# Patient Record
Sex: FEMALE | Race: WHITE | NOT HISPANIC OR LATINO | Employment: UNEMPLOYED | URBAN - METROPOLITAN AREA
[De-identification: names, ages, dates, MRNs, and addresses within clinical notes are randomized per-mention and may not be internally consistent; named-entity substitution may affect disease eponyms.]

---

## 2021-04-14 DIAGNOSIS — Z23 ENCOUNTER FOR IMMUNIZATION: ICD-10-CM

## 2021-11-04 ENCOUNTER — HOSPITAL ENCOUNTER (INPATIENT)
Facility: HOSPITAL | Age: 61
LOS: 2 days | Discharge: HOME | End: 2021-11-06
Attending: EMERGENCY MEDICINE | Admitting: HOSPITALIST
Payer: COMMERCIAL

## 2021-11-04 ENCOUNTER — ANESTHESIA EVENT (INPATIENT)
Dept: OPERATING ROOM | Facility: HOSPITAL | Age: 61
End: 2021-11-04
Payer: COMMERCIAL

## 2021-11-04 ENCOUNTER — ANESTHESIA (INPATIENT)
Dept: OPERATING ROOM | Facility: HOSPITAL | Age: 61
End: 2021-11-04
Payer: COMMERCIAL

## 2021-11-04 ENCOUNTER — APPOINTMENT (INPATIENT)
Dept: RADIOLOGY | Facility: HOSPITAL | Age: 61
End: 2021-11-04
Attending: UROLOGY
Payer: COMMERCIAL

## 2021-11-04 ENCOUNTER — APPOINTMENT (EMERGENCY)
Dept: RADIOLOGY | Facility: HOSPITAL | Age: 61
End: 2021-11-04
Attending: EMERGENCY MEDICINE
Payer: COMMERCIAL

## 2021-11-04 DIAGNOSIS — N39.0 URINARY TRACT INFECTION WITH HEMATURIA, SITE UNSPECIFIED: Primary | ICD-10-CM

## 2021-11-04 DIAGNOSIS — R31.9 URINARY TRACT INFECTION WITH HEMATURIA, SITE UNSPECIFIED: Primary | ICD-10-CM

## 2021-11-04 DIAGNOSIS — N20.1 CALCULUS OF PROXIMAL RIGHT URETER: ICD-10-CM

## 2021-11-04 PROBLEM — E11.9 TYPE 2 DIABETES MELLITUS (CMS/HCC): Status: ACTIVE | Noted: 2021-11-04

## 2021-11-04 PROBLEM — R82.90 ABNORMAL FINDING ON URINALYSIS: Status: ACTIVE | Noted: 2021-11-04

## 2021-11-04 PROBLEM — E03.9 HYPOTHYROIDISM: Status: ACTIVE | Noted: 2021-11-04

## 2021-11-04 PROBLEM — E66.01 MORBID OBESITY WITH BMI OF 50.0-59.9, ADULT (CMS/HCC): Status: ACTIVE | Noted: 2021-11-04

## 2021-11-04 PROBLEM — I89.0 LYMPHEDEMA DUE TO VENOUS INSUFFICIENCY: Status: ACTIVE | Noted: 2021-11-04

## 2021-11-04 PROBLEM — I87.2 LYMPHEDEMA DUE TO VENOUS INSUFFICIENCY: Status: ACTIVE | Noted: 2021-11-04

## 2021-11-04 PROBLEM — E78.5 HYPERLIPIDEMIA: Status: ACTIVE | Noted: 2021-11-04

## 2021-11-04 PROBLEM — I10 HTN (HYPERTENSION): Status: ACTIVE | Noted: 2021-11-04

## 2021-11-04 LAB
ANION GAP SERPL CALC-SCNC: 13 MEQ/L (ref 3–15)
BACTERIA URNS QL MICRO: 2 /HPF
BASOPHILS # BLD: 0.08 K/UL (ref 0.01–0.1)
BASOPHILS NFR BLD: 0.4 %
BILIRUB UR QL STRIP.AUTO: 2 MG/DL
BUN SERPL-MCNC: 20 MG/DL (ref 8–20)
CALCIUM SERPL-MCNC: 9.4 MG/DL (ref 8.9–10.3)
CHLORIDE SERPL-SCNC: 103 MEQ/L (ref 98–109)
CLARITY UR REFRACT.AUTO: ABNORMAL
CO2 SERPL-SCNC: 23 MEQ/L (ref 22–32)
COLOR UR AUTO: ABNORMAL
CREAT SERPL-MCNC: 1.1 MG/DL (ref 0.6–1.1)
DIFFERENTIAL METHOD BLD: ABNORMAL
EOSINOPHIL # BLD: 0.17 K/UL (ref 0.04–0.36)
EOSINOPHIL NFR BLD: 0.9 %
ERYTHROCYTE [DISTWIDTH] IN BLOOD BY AUTOMATED COUNT: 14.5 % (ref 11.7–14.4)
GFR SERPL CREATININE-BSD FRML MDRD: 50.7 ML/MIN/1.73M*2
GLUCOSE BLD-MCNC: 104 MG/DL (ref 70–99)
GLUCOSE BLD-MCNC: 111 MG/DL (ref 70–99)
GLUCOSE BLD-MCNC: 142 MG/DL (ref 70–99)
GLUCOSE BLD-MCNC: 160 MG/DL (ref 70–99)
GLUCOSE BLD-MCNC: 55 MG/DL (ref 70–99)
GLUCOSE BLD-MCNC: 56 MG/DL (ref 70–99)
GLUCOSE SERPL-MCNC: 98 MG/DL (ref 70–99)
GLUCOSE UR STRIP.AUTO-MCNC: NEGATIVE MG/DL
HCT VFR BLDCO AUTO: 44.2 % (ref 35–45)
HGB BLD-MCNC: 14.2 G/DL (ref 11.8–15.7)
HGB UR QL STRIP.AUTO: 3
HYALINE CASTS #/AREA URNS LPF: ABNORMAL /LPF
IMM GRANULOCYTES # BLD AUTO: 0.2 K/UL (ref 0–0.08)
IMM GRANULOCYTES NFR BLD AUTO: 1 %
KETONES UR STRIP.AUTO-MCNC: ABNORMAL MG/DL
LEUKOCYTE ESTERASE UR QL STRIP.AUTO: 3
LYMPHOCYTES # BLD: 1.06 K/UL (ref 1.2–3.5)
LYMPHOCYTES NFR BLD: 5.4 %
MCH RBC QN AUTO: 28.7 PG (ref 28–33.2)
MCHC RBC AUTO-ENTMCNC: 32.1 G/DL (ref 32.2–35.5)
MCV RBC AUTO: 89.5 FL (ref 83–98)
MONOCYTES # BLD: 0.8 K/UL (ref 0.28–0.8)
MONOCYTES NFR BLD: 4.1 %
NEUTROPHILS # BLD: 17.3 K/UL (ref 1.7–7)
NEUTS SEG NFR BLD: 88.2 %
NITRITE UR QL STRIP.AUTO: POSITIVE
NRBC BLD-RTO: 0 %
PDW BLD AUTO: 9.5 FL (ref 9.4–12.3)
PH UR STRIP.AUTO: 5.5 [PH]
PLATELET # BLD AUTO: 233 K/UL (ref 150–369)
POCT TEST: ABNORMAL
POTASSIUM SERPL-SCNC: 4.1 MEQ/L (ref 3.6–5.1)
PROT UR QL STRIP.AUTO: 1
RBC # BLD AUTO: 4.94 M/UL (ref 3.93–5.22)
RBC #/AREA URNS HPF: ABNORMAL /HPF
SARS-COV-2 RNA RESP QL NAA+PROBE: NEGATIVE
SODIUM SERPL-SCNC: 139 MEQ/L (ref 136–144)
SP GR UR REFRACT.AUTO: 1.03
SQUAMOUS URNS QL MICRO: 2 /HPF
UROBILINOGEN UR STRIP-ACNC: 1 EU/DL
WBC # BLD AUTO: 19.61 K/UL (ref 3.8–10.5)
WBC #/AREA URNS HPF: ABNORMAL /HPF

## 2021-11-04 PROCEDURE — U0002 COVID-19 LAB TEST NON-CDC: HCPCS | Performed by: PHYSICIAN ASSISTANT

## 2021-11-04 PROCEDURE — 27200000 HC STERILE SUPPLY: Performed by: UROLOGY

## 2021-11-04 PROCEDURE — 99285 EMERGENCY DEPT VISIT HI MDM: CPT | Mod: 25

## 2021-11-04 PROCEDURE — 87150 DNA/RNA AMPLIFIED PROBE: CPT | Performed by: PHYSICIAN ASSISTANT

## 2021-11-04 PROCEDURE — C1751 CATH, INF, PER/CENT/MIDLINE: HCPCS

## 2021-11-04 PROCEDURE — C1769 GUIDE WIRE: HCPCS | Performed by: UROLOGY

## 2021-11-04 PROCEDURE — 71000011 HC PACU PHASE 1 EA ADDL MIN: Performed by: UROLOGY

## 2021-11-04 PROCEDURE — 63700000 HC SELF-ADMINISTRABLE DRUG: Performed by: HOSPITALIST

## 2021-11-04 PROCEDURE — 96360 HYDRATION IV INFUSION INIT: CPT

## 2021-11-04 PROCEDURE — 85025 COMPLETE CBC W/AUTO DIFF WBC: CPT | Performed by: PHYSICIAN ASSISTANT

## 2021-11-04 PROCEDURE — 25800000 HC PHARMACY IV SOLUTIONS: Performed by: PHYSICIAN ASSISTANT

## 2021-11-04 PROCEDURE — 36000002 HC OR LEVEL 2 INITIAL 30MIN: Performed by: UROLOGY

## 2021-11-04 PROCEDURE — 36573 INSJ PICC RS&I 5 YR+: CPT

## 2021-11-04 PROCEDURE — 21400000 HC ROOM AND CARE CCU/INTERMEDIATE

## 2021-11-04 PROCEDURE — 37000001 HC ANESTHESIA GENERAL: Performed by: UROLOGY

## 2021-11-04 PROCEDURE — 63600000 HC DRUGS/DETAIL CODE: Performed by: ANESTHESIOLOGY

## 2021-11-04 PROCEDURE — 63600105 HC IODINE BASED CONTRAST: Performed by: UROLOGY

## 2021-11-04 PROCEDURE — 99223 1ST HOSP IP/OBS HIGH 75: CPT | Performed by: HOSPITALIST

## 2021-11-04 PROCEDURE — 05HC33Z INSERTION OF INFUSION DEVICE INTO LEFT BASILIC VEIN, PERCUTANEOUS APPROACH: ICD-10-PCS | Performed by: UROLOGY

## 2021-11-04 PROCEDURE — 0T768DZ DILATION OF RIGHT URETER WITH INTRALUMINAL DEVICE, VIA NATURAL OR ARTIFICIAL OPENING ENDOSCOPIC: ICD-10-PCS | Performed by: UROLOGY

## 2021-11-04 PROCEDURE — 63600000 HC DRUGS/DETAIL CODE: Performed by: HOSPITALIST

## 2021-11-04 PROCEDURE — 74176 CT ABD & PELVIS W/O CONTRAST: CPT | Mod: ME

## 2021-11-04 PROCEDURE — C1758 CATHETER, URETERAL: HCPCS | Performed by: UROLOGY

## 2021-11-04 PROCEDURE — 71000001 HC PACU PHASE 1 INITIAL 30MIN: Performed by: UROLOGY

## 2021-11-04 PROCEDURE — 0T9B8ZX DRAINAGE OF BLADDER, VIA NATURAL OR ARTIFICIAL OPENING ENDOSCOPIC, DIAGNOSTIC: ICD-10-PCS | Performed by: UROLOGY

## 2021-11-04 PROCEDURE — 63600000 HC DRUGS/DETAIL CODE: Performed by: EMERGENCY MEDICINE

## 2021-11-04 PROCEDURE — BT1D1ZZ FLUOROSCOPY OF RIGHT KIDNEY, URETER AND BLADDER USING LOW OSMOLAR CONTRAST: ICD-10-PCS | Performed by: UROLOGY

## 2021-11-04 PROCEDURE — C2617 STENT, NON-COR, TEM W/O DEL: HCPCS | Performed by: UROLOGY

## 2021-11-04 PROCEDURE — 81001 URINALYSIS AUTO W/SCOPE: CPT | Performed by: PHYSICIAN ASSISTANT

## 2021-11-04 PROCEDURE — 63600000 HC DRUGS/DETAIL CODE: Performed by: NURSE ANESTHETIST, CERTIFIED REGISTERED

## 2021-11-04 PROCEDURE — 36415 COLL VENOUS BLD VENIPUNCTURE: CPT | Performed by: PHYSICIAN ASSISTANT

## 2021-11-04 PROCEDURE — 96361 HYDRATE IV INFUSION ADD-ON: CPT

## 2021-11-04 PROCEDURE — 25000000 HC PHARMACY GENERAL: Performed by: NURSE ANESTHETIST, CERTIFIED REGISTERED

## 2021-11-04 PROCEDURE — 87077 CULTURE AEROBIC IDENTIFY: CPT | Performed by: PHYSICIAN ASSISTANT

## 2021-11-04 PROCEDURE — 74420 UROGRAPHY RTRGR +-KUB: CPT

## 2021-11-04 PROCEDURE — 87077 CULTURE AEROBIC IDENTIFY: CPT | Performed by: UROLOGY

## 2021-11-04 PROCEDURE — 25800000 HC PHARMACY IV SOLUTIONS: Performed by: UROLOGY

## 2021-11-04 PROCEDURE — 80048 BASIC METABOLIC PNL TOTAL CA: CPT | Mod: 59 | Performed by: PHYSICIAN ASSISTANT

## 2021-11-04 PROCEDURE — 87186 SC STD MICRODIL/AGAR DIL: CPT | Performed by: PHYSICIAN ASSISTANT

## 2021-11-04 DEVICE — STENT INLAY BARD 6FR X 24CM: Type: IMPLANTABLE DEVICE | Site: URETER | Status: FUNCTIONAL

## 2021-11-04 RX ORDER — GABAPENTIN 600 MG/1
600 TABLET ORAL 3 TIMES DAILY
COMMUNITY
Start: 2021-07-06 | End: 2022-10-12 | Stop reason: ENTERED-IN-ERROR

## 2021-11-04 RX ORDER — METHOCARBAMOL 750 MG/1
750 TABLET, FILM COATED ORAL 3 TIMES DAILY PRN
COMMUNITY
Start: 2021-08-09

## 2021-11-04 RX ORDER — MIDAZOLAM HYDROCHLORIDE 2 MG/2ML
INJECTION, SOLUTION INTRAMUSCULAR; INTRAVENOUS AS NEEDED
Status: DISCONTINUED | OUTPATIENT
Start: 2021-11-04 | End: 2021-11-04 | Stop reason: SURG

## 2021-11-04 RX ORDER — INSULIN DEGLUDEC 200 U/ML
78 INJECTION, SOLUTION SUBCUTANEOUS EVERY EVENING
Status: ON HOLD | COMMUNITY
Start: 2021-03-30 | End: 2022-10-15 | Stop reason: SDUPTHER

## 2021-11-04 RX ORDER — IRBESARTAN AND HYDROCHLOROTHIAZIDE 150; 12.5 MG/1; MG/1
1 TABLET, FILM COATED ORAL DAILY
COMMUNITY
Start: 2021-10-08 | End: 2022-08-10 | Stop reason: HOSPADM

## 2021-11-04 RX ORDER — FENTANYL CITRATE 50 UG/ML
INJECTION, SOLUTION INTRAMUSCULAR; INTRAVENOUS AS NEEDED
Status: DISCONTINUED | OUTPATIENT
Start: 2021-11-04 | End: 2021-11-04 | Stop reason: SURG

## 2021-11-04 RX ORDER — CIPROFLOXACIN 2 MG/ML
INJECTION, SOLUTION INTRAVENOUS
Status: DISPENSED
Start: 2021-11-04 | End: 2021-11-05

## 2021-11-04 RX ORDER — IBUPROFEN 200 MG
16-32 TABLET ORAL AS NEEDED
Status: DISCONTINUED | OUTPATIENT
Start: 2021-11-04 | End: 2021-11-04 | Stop reason: SDUPTHER

## 2021-11-04 RX ORDER — DOCUSATE SODIUM 100 MG/1
100 CAPSULE, LIQUID FILLED ORAL 2 TIMES DAILY
Status: DISCONTINUED | OUTPATIENT
Start: 2021-11-04 | End: 2021-11-06 | Stop reason: HOSPADM

## 2021-11-04 RX ORDER — PROPOFOL 10 MG/ML
INJECTION, EMULSION INTRAVENOUS AS NEEDED
Status: DISCONTINUED | OUTPATIENT
Start: 2021-11-04 | End: 2021-11-04 | Stop reason: SURG

## 2021-11-04 RX ORDER — NITROGLYCERIN 0.4 MG/1
0.4 TABLET SUBLINGUAL EVERY 5 MIN PRN
Status: DISCONTINUED | OUTPATIENT
Start: 2021-11-04 | End: 2021-11-06 | Stop reason: HOSPADM

## 2021-11-04 RX ORDER — IBUPROFEN 200 MG
16-32 TABLET ORAL AS NEEDED
Status: DISCONTINUED | OUTPATIENT
Start: 2021-11-04 | End: 2021-11-04 | Stop reason: HOSPADM

## 2021-11-04 RX ORDER — CIPROFLOXACIN 2 MG/ML
400 INJECTION, SOLUTION INTRAVENOUS ONCE
Status: COMPLETED | OUTPATIENT
Start: 2021-11-04 | End: 2021-11-04

## 2021-11-04 RX ORDER — DEXTROSE 40 %
15-30 GEL (GRAM) ORAL AS NEEDED
Status: DISCONTINUED | OUTPATIENT
Start: 2021-11-04 | End: 2021-11-04 | Stop reason: HOSPADM

## 2021-11-04 RX ORDER — LEVOTHYROXINE SODIUM 75 UG/1
75 TABLET ORAL
Status: DISCONTINUED | OUTPATIENT
Start: 2021-11-05 | End: 2021-11-06 | Stop reason: HOSPADM

## 2021-11-04 RX ORDER — DEXTROSE 40 %
15-30 GEL (GRAM) ORAL AS NEEDED
Status: DISCONTINUED | OUTPATIENT
Start: 2021-11-04 | End: 2021-11-06 | Stop reason: HOSPADM

## 2021-11-04 RX ORDER — LIDOCAINE HYDROCHLORIDE 10 MG/ML
INJECTION, SOLUTION INFILTRATION; PERINEURAL AS NEEDED
Status: DISCONTINUED | OUTPATIENT
Start: 2021-11-04 | End: 2021-11-04 | Stop reason: SURG

## 2021-11-04 RX ORDER — DULOXETIN HYDROCHLORIDE 20 MG/1
20 CAPSULE, DELAYED RELEASE ORAL 2 TIMES DAILY
Status: DISCONTINUED | OUTPATIENT
Start: 2021-11-04 | End: 2021-11-06 | Stop reason: HOSPADM

## 2021-11-04 RX ORDER — LEVOTHYROXINE SODIUM 75 UG/1
75 TABLET ORAL
COMMUNITY

## 2021-11-04 RX ORDER — GABAPENTIN 300 MG/1
600 CAPSULE ORAL 3 TIMES DAILY
Status: DISCONTINUED | OUTPATIENT
Start: 2021-11-04 | End: 2021-11-06 | Stop reason: HOSPADM

## 2021-11-04 RX ORDER — CIPROFLOXACIN 2 MG/ML
400 INJECTION, SOLUTION INTRAVENOUS
Status: DISCONTINUED | OUTPATIENT
Start: 2021-11-05 | End: 2021-11-05

## 2021-11-04 RX ORDER — SIMVASTATIN 40 MG/1
40 TABLET, FILM COATED ORAL EVERY MORNING
COMMUNITY
Start: 2020-11-25

## 2021-11-04 RX ORDER — POTASSIUM CHLORIDE 14.9 MG/ML
20 INJECTION INTRAVENOUS AS NEEDED
Status: DISCONTINUED | OUTPATIENT
Start: 2021-11-04 | End: 2021-11-06 | Stop reason: HOSPADM

## 2021-11-04 RX ORDER — ASPIRIN 81 MG/1
81 TABLET ORAL DAILY
Status: DISCONTINUED | OUTPATIENT
Start: 2021-11-05 | End: 2021-11-06 | Stop reason: HOSPADM

## 2021-11-04 RX ORDER — SODIUM CHLORIDE 9 MG/ML
INJECTION, SOLUTION INTRAVENOUS CONTINUOUS
Status: ACTIVE | OUTPATIENT
Start: 2021-11-04 | End: 2021-11-06

## 2021-11-04 RX ORDER — ONDANSETRON 4 MG/1
4 TABLET, ORALLY DISINTEGRATING ORAL EVERY 8 HOURS PRN
Status: DISCONTINUED | OUTPATIENT
Start: 2021-11-04 | End: 2021-11-06 | Stop reason: HOSPADM

## 2021-11-04 RX ORDER — INSULIN ASPART 100 [IU]/ML
2-10 INJECTION, SOLUTION INTRAVENOUS; SUBCUTANEOUS
Status: DISCONTINUED | OUTPATIENT
Start: 2021-11-04 | End: 2021-11-06 | Stop reason: HOSPADM

## 2021-11-04 RX ORDER — ATROPINE SULFATE 0.1 MG/ML
0.5 INJECTION INTRAVENOUS EVERY 5 MIN PRN
Status: DISCONTINUED | OUTPATIENT
Start: 2021-11-04 | End: 2021-11-06 | Stop reason: HOSPADM

## 2021-11-04 RX ORDER — ONDANSETRON HYDROCHLORIDE 2 MG/ML
4 INJECTION, SOLUTION INTRAVENOUS
Status: DISCONTINUED | OUTPATIENT
Start: 2021-11-04 | End: 2021-11-04 | Stop reason: HOSPADM

## 2021-11-04 RX ORDER — DEXTROSE 50 % IN WATER (D50W) INTRAVENOUS SYRINGE
25 AS NEEDED
Status: DISCONTINUED | OUTPATIENT
Start: 2021-11-04 | End: 2021-11-06 | Stop reason: HOSPADM

## 2021-11-04 RX ORDER — KETOROLAC TROMETHAMINE 15 MG/ML
15 INJECTION, SOLUTION INTRAMUSCULAR; INTRAVENOUS EVERY 6 HOURS PRN
Status: DISCONTINUED | OUTPATIENT
Start: 2021-11-04 | End: 2021-11-06 | Stop reason: HOSPADM

## 2021-11-04 RX ORDER — FENTANYL CITRATE 50 UG/ML
50 INJECTION, SOLUTION INTRAMUSCULAR; INTRAVENOUS
Status: DISCONTINUED | OUTPATIENT
Start: 2021-11-04 | End: 2021-11-04 | Stop reason: HOSPADM

## 2021-11-04 RX ORDER — ONDANSETRON HYDROCHLORIDE 2 MG/ML
INJECTION, SOLUTION INTRAVENOUS AS NEEDED
Status: DISCONTINUED | OUTPATIENT
Start: 2021-11-04 | End: 2021-11-04 | Stop reason: SURG

## 2021-11-04 RX ORDER — DEXTROSE 50 % IN WATER (D50W) INTRAVENOUS SYRINGE
25 AS NEEDED
Status: DISCONTINUED | OUTPATIENT
Start: 2021-11-04 | End: 2021-11-04 | Stop reason: HOSPADM

## 2021-11-04 RX ORDER — POTASSIUM CHLORIDE 750 MG/1
40 TABLET, FILM COATED, EXTENDED RELEASE ORAL AS NEEDED
Status: DISCONTINUED | OUTPATIENT
Start: 2021-11-04 | End: 2021-11-06 | Stop reason: HOSPADM

## 2021-11-04 RX ORDER — ONDANSETRON HYDROCHLORIDE 2 MG/ML
4 INJECTION, SOLUTION INTRAVENOUS EVERY 8 HOURS PRN
Status: DISCONTINUED | OUTPATIENT
Start: 2021-11-04 | End: 2021-11-06 | Stop reason: HOSPADM

## 2021-11-04 RX ORDER — ENOXAPARIN SODIUM 100 MG/ML
40 INJECTION SUBCUTANEOUS EVERY 12 HOURS
Status: DISCONTINUED | OUTPATIENT
Start: 2021-11-04 | End: 2021-11-06 | Stop reason: HOSPADM

## 2021-11-04 RX ORDER — HYDROMORPHONE HYDROCHLORIDE 1 MG/ML
0.5 INJECTION, SOLUTION INTRAMUSCULAR; INTRAVENOUS; SUBCUTANEOUS
Status: DISCONTINUED | OUTPATIENT
Start: 2021-11-04 | End: 2021-11-04 | Stop reason: HOSPADM

## 2021-11-04 RX ORDER — DEXTROSE 50 % IN WATER (D50W) INTRAVENOUS SYRINGE
25 AS NEEDED
Status: DISCONTINUED | OUTPATIENT
Start: 2021-11-04 | End: 2021-11-04 | Stop reason: SDUPTHER

## 2021-11-04 RX ORDER — IBUPROFEN 200 MG
16-32 TABLET ORAL AS NEEDED
Status: DISCONTINUED | OUTPATIENT
Start: 2021-11-04 | End: 2021-11-06 | Stop reason: HOSPADM

## 2021-11-04 RX ORDER — METHOCARBAMOL 500 MG/1
750 TABLET, FILM COATED ORAL 3 TIMES DAILY PRN
Status: DISCONTINUED | OUTPATIENT
Start: 2021-11-04 | End: 2021-11-06 | Stop reason: HOSPADM

## 2021-11-04 RX ORDER — INSULIN ASPART 100 [IU]/ML
3-5 INJECTION, SOLUTION INTRAVENOUS; SUBCUTANEOUS
Status: DISCONTINUED | OUTPATIENT
Start: 2021-11-04 | End: 2021-11-04

## 2021-11-04 RX ORDER — ATORVASTATIN CALCIUM 20 MG/1
20 TABLET, FILM COATED ORAL
Status: DISCONTINUED | OUTPATIENT
Start: 2021-11-04 | End: 2021-11-06 | Stop reason: HOSPADM

## 2021-11-04 RX ORDER — DEXTROSE 40 %
15-30 GEL (GRAM) ORAL AS NEEDED
Status: DISCONTINUED | OUTPATIENT
Start: 2021-11-04 | End: 2021-11-04 | Stop reason: SDUPTHER

## 2021-11-04 RX ORDER — POTASSIUM CHLORIDE 750 MG/1
20 TABLET, FILM COATED, EXTENDED RELEASE ORAL AS NEEDED
Status: DISCONTINUED | OUTPATIENT
Start: 2021-11-04 | End: 2021-11-06 | Stop reason: HOSPADM

## 2021-11-04 RX ORDER — INSULIN ASPART 100 [IU]/ML
0-100 INJECTION, SOLUTION INTRAVENOUS; SUBCUTANEOUS
COMMUNITY
Start: 2021-10-20 | End: 2022-10-12 | Stop reason: ENTERED-IN-ERROR

## 2021-11-04 RX ORDER — ASPIRIN 81 MG/1
81 TABLET ORAL DAILY
COMMUNITY

## 2021-11-04 RX ORDER — LOSARTAN POTASSIUM 50 MG/1
50 TABLET ORAL DAILY
Status: DISCONTINUED | OUTPATIENT
Start: 2021-11-04 | End: 2021-11-06 | Stop reason: HOSPADM

## 2021-11-04 RX ORDER — MELOXICAM 15 MG/1
15 TABLET ORAL DAILY
COMMUNITY
Start: 2021-10-20 | End: 2022-08-10 | Stop reason: HOSPADM

## 2021-11-04 RX ORDER — DULOXETIN HYDROCHLORIDE 20 MG/1
20 CAPSULE, DELAYED RELEASE ORAL 2 TIMES DAILY
COMMUNITY
Start: 2021-07-06 | End: 2022-10-12 | Stop reason: ENTERED-IN-ERROR

## 2021-11-04 RX ORDER — ACETAMINOPHEN 325 MG/1
650 TABLET ORAL EVERY 4 HOURS PRN
Status: DISCONTINUED | OUTPATIENT
Start: 2021-11-04 | End: 2021-11-06 | Stop reason: HOSPADM

## 2021-11-04 RX ADMIN — ACETAMINOPHEN 650 MG: 325 TABLET, FILM COATED ORAL at 14:51

## 2021-11-04 RX ADMIN — ACETAMINOPHEN 650 MG: 325 TABLET, FILM COATED ORAL at 19:53

## 2021-11-04 RX ADMIN — FENTANYL CITRATE 50 MCG: 50 INJECTION, SOLUTION INTRAMUSCULAR; INTRAVENOUS at 13:37

## 2021-11-04 RX ADMIN — SUCCINYLCHOLINE CHLORIDE 140 MG: 20 INJECTION, SOLUTION INTRAMUSCULAR; INTRAVENOUS; PARENTERAL at 12:59

## 2021-11-04 RX ADMIN — GABAPENTIN 600 MG: 300 CAPSULE ORAL at 19:53

## 2021-11-04 RX ADMIN — MIDAZOLAM HYDROCHLORIDE 2 MG: 1 INJECTION, SOLUTION INTRAMUSCULAR; INTRAVENOUS at 12:48

## 2021-11-04 RX ADMIN — SODIUM CHLORIDE: 9 INJECTION, SOLUTION INTRAVENOUS at 12:50

## 2021-11-04 RX ADMIN — CIPROFLOXACIN 400 MG: 2 INJECTION, SOLUTION INTRAVENOUS at 12:37

## 2021-11-04 RX ADMIN — INSULIN ASPART 2 UNITS: 100 INJECTION, SOLUTION INTRAVENOUS; SUBCUTANEOUS at 21:47

## 2021-11-04 RX ADMIN — SODIUM CHLORIDE 500 ML: 9 INJECTION, SOLUTION INTRAVENOUS at 08:51

## 2021-11-04 RX ADMIN — LIDOCAINE HYDROCHLORIDE 5 ML: 10 INJECTION, SOLUTION INFILTRATION; PERINEURAL at 12:59

## 2021-11-04 RX ADMIN — DULOXETINE HYDROCHLORIDE 20 MG: 20 CAPSULE, DELAYED RELEASE ORAL at 19:53

## 2021-11-04 RX ADMIN — FENTANYL CITRATE 50 MCG: 50 INJECTION, SOLUTION INTRAMUSCULAR; INTRAVENOUS at 12:54

## 2021-11-04 RX ADMIN — CIPROFLOXACIN 400 MG: 2 INJECTION, SOLUTION INTRAVENOUS at 23:53

## 2021-11-04 RX ADMIN — KETOROLAC TROMETHAMINE 15 MG: 15 INJECTION, SOLUTION INTRAMUSCULAR; INTRAVENOUS at 22:13

## 2021-11-04 RX ADMIN — PROPOFOL 200 MG: 10 INJECTION, EMULSION INTRAVENOUS at 12:59

## 2021-11-04 RX ADMIN — ONDANSETRON 4 MG: 2 INJECTION INTRAMUSCULAR; INTRAVENOUS at 13:06

## 2021-11-04 RX ADMIN — ENOXAPARIN SODIUM 40 MG: 40 INJECTION SUBCUTANEOUS at 20:53

## 2021-11-04 RX ADMIN — DOCUSATE SODIUM 100 MG: 100 CAPSULE, LIQUID FILLED ORAL at 19:53

## 2021-11-04 ASSESSMENT — ENCOUNTER SYMPTOMS
VOMITING: 0
COUGH: 0
DYSURIA: 0
FEVER: 0
NAUSEA: 1
CHILLS: 0
DIARRHEA: 0
FLANK PAIN: 1
SHORTNESS OF BREATH: 0
ABDOMINAL PAIN: 1

## 2021-11-04 NOTE — OP NOTE
CYSTO, RETRO, STENT (R) Procedure Note     Procedure:    CYSTO, RETRO, STENT  CPT(R) Code:  34755 - ND CYSTOSCOPY,INSERT URETERAL STENT    Indications: The patient is a 60 year old female who presented to [facility] ED with right flank pain and was found to have an obstructing ureteral calculus with associated UTI.         Pre-op Diagnosis     * Calculus of proximal right ureter [N20.1]     * Urinary tract infection with hematuria, site unspecified [N39.0, R31.9]    Post-op Diagnosis     * Calculus of proximal right ureter [N20.1]     * Urinary tract infection with hematuria, site unspecified [N39.0, R31.9]    Surgeon: Bianca Steele MD    Assistants: Jeancarlos Walsh, PGY-4    Anesthesia: General endotracheal anesthesia    ASA Class: 3      Procedure Details   The patient was seen and identified in the preoperative holding area, informed consent was obtained. Risks and benefits of the procedure were explained to the patient and she agreed to go forward at this time. Patient was then transported to the operating room and placed on the operating room table in supine position. Surgical timeout was then performed with all parties in agreement. General anesthesia was administered by anesthesia via ET tube and perioperative antibiotics were administered with Ciprofloxacin. Patient was then prepped and draped in normal sterile fashion in the dorsal lithotimy position making sure to keep all bony prominences padded off pressure.     A 21 Egyptian cystoscope was placed through the urethra ensuring to keep the lumen in center view at all times, and was advanced into the bladder, the bladder was drained, of note the bladder had purulent appearing urine that was collected and sent for culture.  At this time pan cystoscopy was then performed which revealed no concerning masses or lesions, or other abnormalities.  At this time our attention was turned to the right ureteral orifice where a 6 Egyptian open-ended catheter was placed over a  sensor wire, and used to intubate the UO, a gentle retrograde pyelogram was then shot revealing some mild fullness of .  At this time the 5 Botswanan open-ended catheter was removed over the wire a 6x24 JJ stent was then placed over the wire and the stent with good curl in the renal pelvis and bladder visualized with fluoroscopy.  At this time the wire was removed.  The bladder was then drained and the cystoscope removed.      Patient was then awakened from anesthesia and transported to PACU in stable condition.     Of note Dr. Steele was available and present for all critical portions of the case.       Findings:  1. Cloudy appearing urine in the bladder  2. Mild fullness of the right collecting system     Estimated Blood Loss:  No blood loss documented.           Drains: 6x24 R Ureteral Stent            Total IV Fluids: See Anesthesia            Specimens:   ID Type Source Tests Collected by Time Destination   A : urine culture Urine Urinary Bladder URINE CULTURE Bianca Steele MD 11/4/2021 0866               Implants:   Implant Name Type Inv. Item Serial No.  Lot No. LRB No. Used Action   STENT INLAY BARD 6FR X 24CM - [facility] - [id number] Urological stents STENT INLAY BARD 6FR X 24CM  [employer]ARD [employer] [id number] Right 1 Implanted              Complications:  None            Disposition: PACU - hemodynamically stable.           Condition: stable    Bianca Steele MD  Phone Number: [phone]

## 2021-11-04 NOTE — PERIOPERATIVE NURSING NOTE
Patient with increase in temp 100.2, rigors and tachycardia.  Dr Knott notified, coming to see patient

## 2021-11-04 NOTE — ASSESSMENT & PLAN NOTE
Patient had a hypoglycemia on admission, had not eaten  BS now stable  Improved appetite  Monitor Accu-Cheks cover with sliding scale insulin as needed  Restart tresiba on discharge

## 2021-11-04 NOTE — OR SURGEON
Pre-Procedure patient identification:  I am the primary operating surgeon/proceduralist and I have identified the patient and confirmed laterality is right on 11/04/21 at 12:34 PM Bianca Steele MD  Phone Number: [phone]

## 2021-11-04 NOTE — H&P
Urology History and Physical    Subjective     Xin Melendez is a 60 y.o. female pmhx of htn, hld, t2dm, kidney stones , lymphedema with 1 day history of right renal colic, nausea, and vomiting. She experience hematuria as well.  Her last occurrence with kidney stones was about 20 years ago requiring ureteroscopy and laser lithotripsy.     In the ED, she is afebrile and hemodynamically stable. Her pain is improved and controlled with PO toradol that was administered in the ambulance. Labs are notable for WBC 19.6 with UA positive for nitrites and LE.     Medical History:   Past Medical History:   Diagnosis Date   • Disease of thyroid gland    • Edema    • Hypertension    • Kidney stones    • Lipid disorder    • Type 2 diabetes mellitus (CMS/HCC)        Surgical History:   Past Surgical History:   Procedure Laterality Date   • APPENDECTOMY     •  SECTION     • LAPAROTOMY EXPLORATORY     • OOPHORECTOMY      right side secondary to torsion       Social History:   Social History     Social History Narrative   • Not on file       Family History:   History reviewed. No pertinent family history.    Allergies:   Cephalexin and Adhesive tape-silicones    Home Medications:  Not in a hospital admission.    Current Medications:  •  ciprofloxacin, 400 mg, intravenous, Once  •  glucose, 16-32 g of dextrose, oral, PRN **OR** dextrose, 15-30 g of dextrose, oral, PRN **OR** glucagon, 1 mg, intramuscular, PRN **OR** dextrose in water, 25 mL, intravenous, PRN  •  glucose, 16-32 g of dextrose, oral, PRN **OR** dextrose, 15-30 g of dextrose, oral, PRN **OR** glucagon, 1 mg, intramuscular, PRN **OR** dextrose in water, 25 mL, intravenous, PRN  •  docusate sodium, 100 mg, oral, BID  •  insulin aspart U-100, 3-5 Units, subcutaneous, TID with meals  •  ketorolac, 15 mg, intravenous, q6h PRN  •  ondansetron ODT, 4 mg, oral, q8h PRN **OR** ondansetron, 4 mg, intravenous, q8h PRN  •  sodium chloride 0.9 %, , intravenous, Continuous  •   ascorbic acid (VITAMIN C ORAL)  •  cholecalciferol, vitamin D3, (VITAMIN D3 ORAL)  •  DULoxetine  •  exenatide microspheres  •  gabapentin  •  TRESIBA FLEXTOUCH U-200  •  levothyroxine  •  methocarbamoL  •  simvastatin  •  aspirin  •  insulin aspart U-100  •  irbesartan-hydrochlorothiazide  •  meloxicam    Review of Systems:  Constitutional: negative for fevers, chills, and weight loss  Eyes: negative for visual disturbances  Ears, nose, and throat: negative for ear drainage, hearing loss, nasal congestion and sore throat  Respiratory: negative for cough and shortness of breath  Cardiovascular: negative for chest pain, palpitations and syncope  Gastrointestinal: negative for abdominal pain, constipation, nausea and vomiting  Genitourinary:positive for hematuria, right flank pain   Integument/breast: negative for pruritus and rash  Hematologic/lymphatic: negative for bleeding and easy bruising  Musculoskeletal: negative for arthralgias and myalgias  Neurological: negative for headaches and weakness  Behavioral/Psych: negative for anxiety and fatigue  Allergic/Immunologic: negative for urticaria      Objective     Physicial Exam  Visit Vitals  BP (!) 162/69   Pulse (!) 107   Temp 37.1 °C (98.7 °F) (Tympanic)   Resp 20   SpO2 100%     General appearance: alert, cooperative, no acute distress  Head: normocephalic, atraumatic  Eyes: lids and lashes normal, sclerae white  Nose: no discharge  Throat: lips normal without lesions  Neck: supple, symmetrical, trachea midline  Lungs: Unlabored respirations, symmetric chest expansion  Heart: regular rate and rhythm  Abdomen: soft, non-tender to palpation, non-distended  : Right cvat   Extremities: extremities normal, warm and well-perfused; no cyanosis, clubbing, or edema and no redness or tenderness in the calves bilaterally  Skin: no rashes or ulcers  Lymph nodes: no inguinal adenopathy  Neurologic: Alert and oriented X 3     Labs  BMP Results       11/04/21     0850    NA  139    K 4.1    Cl 103    CO2 23    Glucose 98    BUN 20    Creatinine 1.1    Calcium 9.4    Anion Gap 13    EGFR 50.7         Comment for K at 0850 on 11/04/21: Results obtained on plasma. Plasma Potassium values may be up to 0.4 mEQ/L less than serum values. The differences may be greater for patients with high platelet or white cell counts.        CBC Results       11/04/21     0850    WBC 19.61    RBC 4.94    HGB 14.2    HCT 44.2    MCV 89.5    MCH 28.7    MCHC 32.1            UA Results       11/04/21     0849    Color Nelly    Clarity Turbid    Glucose Negative    Bilirubin +2    Ketones Trace    Sp Grav 1.030    Blood +3    Ph 5.5    Protein +1    Urobilinogen 1.0    Nitrite Positive    Leuk Est +3    WBC Too Numerous To Count    RBC Too Numerous To Count    Bacteria +2         Comment for Ketones at 0849 on 11/04/21: Free sulfhydryl drugs such as Mesna, Capoten, and Acetylcysteine (Mucomyst) may cause false positive ketonuria.    Comment for Blood at 0849 on 11/04/21: The sensitivity of the occult blood test is equivalent to approximately 4 intact RBC/HPF.        Microbiology Results     Procedure Component Value Units Date/Time    SARS-CoV-2 (COVID-19), PCR Nasopharynx [[id number]]  (Normal) Collected: 11/04/21 1017    Specimen: Nasopharyngeal Swab from Nasopharynx Updated: 11/04/21 1051    Narrative:      The following orders were created for panel order SARS-CoV-2 (COVID-19), PCR Nasopharynx.  Procedure                               Abnormality         Status                     ---------                               -----------         ------                     SARS-CoV-2 (COVID-19), P...[[id number]]  Normal              Final result                 Please view results for these tests on the individual orders.    SARS-CoV-2 (COVID-19), PCR Nasopharynx [[id number]]  (Normal) Collected: 11/04/21 1017    Specimen: Nasopharyngeal Swab from Nasopharynx Updated: 11/04/21 1051     SARS-CoV-2 (COVID-19)  Negative    Narrative:      Nursing instructions: Obtain nasopharyngeal swab ONLY. Send swab in viral transport media.            Imaging  NCCT:   1.  2 mm proximal right ureteral calculus with mild hydroureteronephrosis and stranding.      Assessment   60 y.o. female pmhx of htn, hld, t2dm, kidney stones , lymphedema with 2mm R obstructing stone in proximal ureter.         Plan     Admit to Urology.   NPO  Case request for cystoscopy and stent placement    [facility]  Pager [phone]  Kolton Knott DO PGY 2

## 2021-11-04 NOTE — ED ATTESTATION NOTE
I have personally seen and examined the patient.  I reviewed and agree with physician assistant / nurse practitioner’s assessment and plan of care, with the following exceptions: None  My examination, assessment, and plan of care of Xin Melendez is as follows:       Complaining of right flank pain for several hours.  She states she had 5 kidney stones in the past and this feels identical.  It starts in the back and rates around to the front.  She has little bit of nausea.  She states her pain is better after she was given a shot of Toradol in the ambulance.  Her pain is better     Ananda Paez, DO  11/04/21 0739

## 2021-11-04 NOTE — ASSESSMENT & PLAN NOTE
S/p cystoscopy with right ureteral stent, POD 2.  Blood culture + E.coli  Urine culture + E. Coli with sensitivities  Abx switch to levaquin due to cymbalta-cipro drug interaction  Consulted ID for antibiotic stewardship, rec 7 more days of levaquin 500mg daily  Urology following  Outpatient stone treatment with Dr. Mayo  No events on tele  Incentive spirometry  OTC pain meds as needed

## 2021-11-04 NOTE — ED PROVIDER NOTES
Emergency Medicine Note  HPI   HISTORY OF PRESENT ILLNESS     59yo female, hx of htn, hld, t2dm, kidney stones (last one 20 years ago), lymphedema presenting to ER with c/o right-sided flank pain that started around midnight/1am. Pt reports pain seemed to be getting worse, waxing and waning in severity with associated nausea. She denies f/c. Since she felt unwell, she did take rapid covid test at home, which was negative. She denies dysuria, hematuria, frequency.      History provided by:  Patient   used: No    Abdominal Pain  Pain location:  R flank  Pain quality: aching    Pain radiation: generalized abdomen.  Pain severity:  Severe  Onset quality:  Sudden  Timing:  Constant  Progression:  Waxing and waning  Chronicity:  New  Context: not awakening from sleep, not previous surgeries and not sick contacts    Relieved by: pt given 30mg IM Toradol by EMS with improvement.  Worsened by:  Nothing  Associated symptoms: nausea    Associated symptoms: no chest pain, no chills, no cough, no diarrhea, no dysuria, no fever, no shortness of breath and no vomiting    Risk factors: has not had multiple surgeries          Patient History   PAST HISTORY     Reviewed from Nursing Triage: [facility]       Past Medical History:   Diagnosis Date   • Disease of thyroid gland    • Edema    • Hypertension    • Kidney stones    • Lipid disorder    • Type 2 diabetes mellitus (CMS/HCC)        Past Surgical History:   Procedure Laterality Date   • APPENDECTOMY     •  SECTION     • LAPAROTOMY EXPLORATORY     • OOPHORECTOMY      right side secondary to torsion       History reviewed. No pertinent family history.    Social History     Tobacco Use   • Smoking status: Former Smoker   • Smokeless tobacco: Never Used   Substance Use Topics   • Alcohol use: Not Currently   • Drug use: Not Currently         Review of Systems   REVIEW OF SYSTEMS     Review of Systems   Constitutional: Negative for chills and fever.    Respiratory: Negative for cough and shortness of breath.    Cardiovascular: Negative for chest pain.   Gastrointestinal: Positive for abdominal pain and nausea. Negative for diarrhea and vomiting.   Genitourinary: Positive for flank pain (right flank). Negative for dysuria and urgency.         VITALS     ED Vitals    Date/Time Temp Pulse Resp BP SpO2 [facility]   11/04/21 0808 37.1 °C (98.7 °F) -- -- -- -- JMT   11/04/21 0725 -- -- -- 162/69 -- PMB   11/04/21 0723 37.6 °C (99.6 °F) 107 20 -- 100 % PMB        Pulse Ox %: 100 % (11/04/21 0752)  Pulse Ox Interpretation: Normal (11/04/21 0752)           Physical Exam   PHYSICAL EXAM     Physical Exam  Vitals and nursing note reviewed.   Constitutional:       Appearance: Normal appearance.   HENT:      Head: Normocephalic and atraumatic.   Cardiovascular:      Rate and Rhythm: Normal rate and regular rhythm.   Pulmonary:      Effort: Pulmonary effort is normal.      Breath sounds: Normal breath sounds.   Abdominal:      Palpations: Abdomen is soft.      Tenderness: There is no abdominal tenderness. There is no right CVA tenderness or left CVA tenderness.   Musculoskeletal:      Right lower leg: Edema present.      Left lower leg: Edema present.      Comments: Marked lymphedema in b/l LEs   Skin:     General: Skin is warm and dry.   Neurological:      Mental Status: She is alert and oriented to person, place, and time.           PROCEDURES     Procedures     DATA     Results     Procedure Component Value Units Date/Time    SARS-CoV-2 (COVID-19), PCR Nasopharynx [[id number]]  (Normal) Collected: 11/04/21 1017    Specimen: Nasopharyngeal Swab from Nasopharynx Updated: 11/04/21 1051    Narrative:      The following orders were created for panel order SARS-CoV-2 (COVID-19), PCR Nasopharynx.  Procedure                               Abnormality         Status                     ---------                               -----------         ------                     SARS-CoV-2  (COVID-19), P...[[id number]]  Normal              Final result                 Please view results for these tests on the individual orders.    SARS-CoV-2 (COVID-19), PCR Nasopharynx [[id number]]  (Normal) Collected: 11/04/21 1017    Specimen: Nasopharyngeal Swab from Nasopharynx Updated: 11/04/21 1051     SARS-CoV-2 (COVID-19) Negative     Comment: EUA/IVD       Narrative:      Nursing instructions: Obtain nasopharyngeal swab ONLY. Send swab in viral transport media.    Basic metabolic panel [[id number]]  (Abnormal) Collected: 11/04/21 0850    Specimen: Blood, Venous Updated: 11/04/21 0926     Sodium 139 mEQ/L      Potassium 4.1 mEQ/L      Comment: Results obtained on plasma. Plasma Potassium values may be up to 0.4 mEQ/L less than serum values. The differences may be greater for patients with high platelet or white cell counts.        Chloride 103 mEQ/L      CO2 23 mEQ/L      BUN 20 mg/dL      Creatinine 1.1 mg/dL      Glucose 98 mg/dL      Calcium 9.4 mg/dL      eGFR 50.7 mL/min/1.73m*2      Anion Gap 13 mEQ/L     UA with reflex culture [[id number]]  (Abnormal) Collected: 11/04/21 0849    Specimen: Urine, Clean Catch Updated: 11/04/21 0926    Narrative:      The following orders were created for panel order UA with reflex culture.  Procedure                               Abnormality         Status                     ---------                               -----------         ------                     UA Reflex to Culture (Ma...[[id number]]  Abnormal            Final result               UA Microscopic[[id number]]               Abnormal            Final result                 Please view results for these tests on the individual orders.    UA Microscopic [[id number]]  (Abnormal) Collected: 11/04/21 0849    Specimen: Urine, Clean Catch Updated: 11/04/21 0926     WBC, Urine Too Numerous To Count /HPF      Squamous Epithelial +2 /hpf      Hyaline Cast 20 TO 34 /lpf      Bacteria, Urine +2 /HPF      RBC, Urine Too Numerous  To Count /HPF     UA Reflex to Culture (Macroscopic) [[id number]]  (Abnormal) Collected: 11/04/21 0849    Specimen: Urine, Clean Catch Updated: 11/04/21 0915     Color, Urine Nelly     Clarity, Urine Turbid     Specific Gravity, Urine 1.030     pH, Urine 5.5     Leukocyte Esterase +3     Nitrite, Urine Positive     Protein, Urine +1     Glucose, Urine Negative mg/dL      Ketones, Urine Trace mg/dL      Comment: Free sulfhydryl drugs such as Mesna, Capoten, and Acetylcysteine (Mucomyst) may cause false positive ketonuria.        Urobilinogen, Urine 1.0 EU/dL      Bilirubin, Urine +2 mg/dL      Blood, Urine +3     Comment: The sensitivity of the occult blood test is equivalent to approximately 4 intact RBC/HPF.       CBC and differential [[id number]]  (Abnormal) Collected: 11/04/21 0850    Specimen: Blood, Venous Updated: 11/04/21 0859     WBC 19.61 K/uL      RBC 4.94 M/uL      Hemoglobin 14.2 g/dL      Hematocrit 44.2 %      MCV 89.5 fL      MCH 28.7 pg      MCHC 32.1 g/dL      RDW 14.5 %      Platelets 233 K/uL      MPV 9.5 fL      Differential Type Auto     nRBC 0.0 %      Immature Granulocytes 1.0 %      Neutrophils 88.2 %      Lymphocytes 5.4 %      Monocytes 4.1 %      Eosinophils 0.9 %      Basophils 0.4 %      Immature Granulocytes, Absolute 0.20 K/uL      Neutrophils, Absolute 17.30 K/uL      Lymphocytes, Absolute 1.06 K/uL      Monocytes, Absolute 0.80 K/uL      Eosinophils, Absolute 0.17 K/uL      Basophils, Absolute 0.08 K/uL           Imaging Results          CT ABDOMEN PELVIS WITHOUT IV CONTRAST (Final result)  Result time 11/04/21 09:49:38    Final result                 Impression:    IMPRESSION:  1.  2 mm proximal right ureteral calculus with mild hydroureteronephrosis and  stranding.  2.  Nonobstructing bilateral renal calculi.  3.  Diverticulosis.  4.  Prominent fibroid uterus.  5.  Cholelithiasis.    COMMENT:    Technique: CT examination of the abdomen and pelvis was performed utilizing  contiguous  2.5 mm transaxial sections utilizing a renal stone protocol.    CT DOSE:  One or more dose reduction techniques (e.g. automated exposure  control, adjustment of the mA and/or kV according to patient size, use of  iterative reconstruction technique) utilized for this examination.    Comparison studies: CT abdomen pelvis dated 11/27/2012.    Lung bases: Normal.    Lower chest soft tissue: Normal.    Liver: Normal.    Spleen: Normal.    Adrenals: Normal.    Pancreas: Normal.    Kidneys, ureters and bladder: 2 mm proximal right ureteral calculus with mild  hydroureteronephrosis and perinephric stranding.  4 mm nonobstructing right  midpole calculus and 2 mm left lower pole nonobstructing calculus.    Gallbladder:  Cholelithiasis..    Retroperitoneal structures: Normal.    Bowel and mesentery: Scattered diverticuli noted.  There is no focal  inflammatory or obstructive gastrointestinal process.  No free air or free  fluid..    Lymph nodes: None enlarged.    Pelvic Structures:  Calcified fibroid uterus noted..    Vessels: Mild atherosclerotic disease.    Bones: Thoracolumbar degenerative change.               Narrative:    CLINICAL HISTORY: Flank pain, kidney stone suspected                                No orders to display       Scoring tools                                 ED Course & MDM   MDM / ED COURSE and CLINICAL IMPRESSIONS     [facility]    ED Course as of 11/04/21 1219   Thu Nov 04, 2021   0917 Nitrite, Urine(!): Positive [KL]   0932 Patient has a 19.61 white blood cell count and too numerous to count white blood cells in her urine she also has a left shift.  It appears to me that she does have a kidney stone.  Wait for radiology evaluation.  We will add blood cultures and start her on Cipro IV [GH]   0956 CT ABDOMEN PELVIS WITHOUT IV CONTRAST  IMPRESSION:  1.  2 mm proximal right ureteral calculus with mild hydroureteronephrosis and  stranding.  2.  Nonobstructing bilateral renal calculi.  3.  Diverticulosis.  4.   Prominent fibroid uterus.  5.  Cholelithiasis. [KL]   1004 Pt updated on results, page to urology for infected stone.  [KL]   1010 Pt to be admitted to urology, will go to OR with her.  [KL]      ED Course User Index  [GH] Ananda Paez,   [KL] Alvaro Lala, HOPE DAVE         Clinical Impressions as of 11/04/21 1219   Calculus of proximal right ureter   Urinary tract infection with hematuria, site unspecified            Alvaro Lala, HOPE DAVE  11/04/21 1220

## 2021-11-04 NOTE — ANESTHESIOLOGIST PRE-PROCEDURE ATTESTATION
Pre-Procedure Patient Identification:  I am the Primary Anesthesiologist and have identified the patient on 11/04/21 at 12:50 PM.   I have confirmed the procedure(s) will be performed by the following surgeon/proceduralist Bianca Steele MD.

## 2021-11-04 NOTE — ANESTHESIA PROCEDURE NOTES
Airway  Urgency: elective    Start Time: 11/4/2021 1:00 PM  Airway not difficult    General Information and Staff    Patient location during procedure: OR  Anesthesiologist: Lesley Cullen MD  Resident/CRNA: Sybil Chung CRNA  Performed: resident/CRNA     Indications and Patient Condition  Indications for airway management: anesthesia  Sedation level: deep  Preoxygenated: yes  Patient position: sniffing  Mask difficulty assessment: 0 - not attempted    Final Airway Details  Final airway type: endotracheal airway      Successful airway: ETT    Successful intubation technique: video laryngoscopy  Facilitating devices/methods: intubating stylet  Endotracheal tube insertion site: oral  Blade: Angelic  Blade size: #3  ETT size (mm): 7.0  Cormack-Lehane Classification: grade I - full view of glottis  Placement verified by: chest auscultation and capnometry   Measured from: lips  ETT to lips (cm): 22  Number of attempts at approach: 1  Number of other approaches attempted: 0  Atraumatic airway insertion      Additional Comments  Oropharynx clear

## 2021-11-04 NOTE — PERIOPERATIVE NURSING NOTE
1500 Dr Knott in to evaluate patient.  No changes in plan.  Tylenol given for low grade temp.  Update given to Dr Palacios.  Will continue to  observe patient in PACU

## 2021-11-04 NOTE — ASSESSMENT & PLAN NOTE
UCX with sensitivities noted  ID recs appreciated  WBC trending down  Continue abx with levaquin  Cultures noted

## 2021-11-04 NOTE — ANESTHESIA PREPROCEDURE EVALUATION
Relevant Problems   Other   (+) Urinary tract infection with hematuria       Anesthesia ROS/MED HX    Anesthesia History - neg  Pulmonary - neg  Neuro/Psych - neg  Cardiovascular   PVD   hypertension   Covid19 Test Reviewed  Hematological - neg  GI/Hepatic   GERD    Control: well controlled  Musculoskeletal- neg  Renal Disease   renal calculi  Endo/Other   Diabetes   Hypothyroidism  Body Habitus: Morbidly Obese     Patient with colic pain; had orange juice at 8am; emergency case precedes NPO guidelines and we will proceed with RSI and GA with ETT     Past Surgical History:   Procedure Laterality Date   • APPENDECTOMY     •  SECTION     • LAPAROTOMY EXPLORATORY     • OOPHORECTOMY      right side secondary to torsion       Physical Exam    Airway   Mallampati: II   TM distance: >3 FB   Neck ROM: full  Cardiovascular - normal   Rhythm: regular   Rate: normalPulmonary - normal   clear to auscultation  Dental - normal        Anesthesia Plan    Plan: general    Technique: general endotracheal     Airway: video laryngoscope   ASA 3 - emergent  Anesthetic plan and risks discussed with: patient  Induction:    intravenous   Postop Plan:   Patient Disposition: inpatient floor planned admission   Pain Management: IV analgesics

## 2021-11-04 NOTE — H&P
Hospital Medicine Service -  History & Physical        CHIEF COMPLAINT       Right flank pain     HISTORY OF PRESENT ILLNESS        Xin Melendez is a 60 y.o. female patient with past medical history of hypertension, hyperlipidemia diabetes mellitus type 2 history of kidney stones presented to the ER with complaints of right-sided flank pain since midnight.  She had a CT scan which showed 2 mm proximal right ureteral calculus with mild hydroureteronephrosis and stranding.  She was evaluated by urology and taken to the OR , had cystoscopy with right ureteral stent insertion.  We were requested to admit the patient to medicine service.  Patient was seen in PACU, complains of chills, no fever reported.  Denies any flank pain currently.  She complains of soreness in the throat and has to clear her throat frequently.  She denied any chest pain or shortness of breath.  Denied any nausea now no vomiting or diarrhea.  Feels little dizzy.  She denied any fever at home.  Denies any palpitation syncopal episode no abdominal pain vomiting or diarrhea no new focal weakness or numbness no headache or visual changes.  Patient had low blood sugar this morning her reports that her last meal was yesterday.Patient denies history of sleep apnea or snoring in the night.    PAST MEDICAL AND SURGICAL HISTORY        Past Medical History:   Diagnosis Date   • CTS (carpal tunnel syndrome)    • DJD (degenerative joint disease)    • Edema    • Frozen shoulder    • Hypertension    • Hypothyroidism    • Kidney stones    • Lipid disorder    • Lymphedema    • Obesity    • Sciatica    • Stasis dermatitis    • Type 2 diabetes mellitus (CMS/[facility])        Past Surgical History:   Procedure Laterality Date   • APPENDECTOMY     •  SECTION     • LAPAROTOMY EXPLORATORY     • OOPHORECTOMY      right side secondary to torsion       MEDICATIONS        Medications Prior to Admission   Medication Sig Dispense Refill Last Dose   • ascorbic acid (VITAMIN  C ORAL) Take 1 tablet by mouth daily.     11/3/2021 at Unknown time   • cholecalciferol, vitamin D3, (VITAMIN D3 ORAL) Take 1 tablet by mouth daily.     11/3/2021 at Unknown time   • DULoxetine 20 mg capsule Take 20 mg by mouth 2 (two) times a day.     11/3/2021 at Unknown time   • exenatide microspheres 2 mg/0.85 mL auto-injector Inject 1 Dose under the skin once a week on Friday. Friday    10/29/2021 at Unknown time   • gabapentin 600 mg tablet Take 600 mg by mouth 3 (three) times a day.     11/3/2021 at Unknown time   • insulin degludec U-200 CONC (TRESIBA FLEXTOUCH U-200) 200 unit/mL (3 mL) pen Inject 78 Units under the skin daily.     11/3/2021 at Unknown time   • levothyroxine 75 mcg tablet Take 75 mcg by mouth daily.   11/3/2021 at Unknown time   • methocarbamoL 750 mg tablet Take 750 mg by mouth 3 (three) times a day as needed. Always takes in am but doesn't always do tid    11/3/2021 at Unknown time   • simvastatin 40 mg tablet Take 40 mg by mouth daily.     11/3/2021 at Unknown time   • aspirin 81 mg enteric coated tablet Take 81 mg by mouth daily.   11/3/2021 at Unknown time   • insulin aspart U-100 100 unit/mL (3 mL) pen Inject 0-100 Units under the skin 3 (three) times a day before meals. Per sliding scale based on calorie intake by patient    11/3/2021 at Unknown time   • irbesartan-hydrochlorothiazide 150-12.5 mg per tablet Take 1 tablet by mouth daily.     11/3/2021 at Unknown time   • meloxicam 15 mg tablet Take 15 mg by mouth daily.     11/3/2021 at Unknown time       ALLERGIES        Cephalexin and Adhesive tape-silicones    FAMILY HISTORY        History reviewed. No pertinent family history.    SOCIAL HISTORY        Social History     Socioeconomic History   • Marital status:      Spouse name: None   • Number of children: None   • Years of education: None   • Highest education level: None   Occupational History   • None   Tobacco Use   • Smoking status: Former Smoker   • Smokeless tobacco:  "Never Used   Substance and Sexual Activity   • Alcohol use: Not Currently   • Drug use: Not Currently   • Sexual activity: None   Other Topics Concern   • None   Social History Narrative   • None     Social Determinants of Health     Financial Resource Strain:    • Difficulty of Paying Living Expenses: Not on file   Food Insecurity:    • Worried About Running Out of Food in the Last Year: Not on file   • Ran Out of Food in the Last Year: Not on file   Transportation Needs:    • Lack of Transportation (Medical): Not on file   • Lack of Transportation (Non-Medical): Not on file   Physical Activity:    • Days of Exercise per Week: Not on file   • Minutes of Exercise per Session: Not on file   Stress:    • Feeling of Stress : Not on file   Social Connections:    • Frequency of Communication with Friends and Family: Not on file   • Frequency of Social Gatherings with Friends and Family: Not on file   • Attends Muslim Services: Not on file   • Active Member of Clubs or Organizations: Not on file   • Attends Club or Organization Meetings: Not on file   • Marital Status: Not on file   Intimate Partner Violence:    • Fear of Current or Ex-Partner: Not on file   • Emotionally Abused: Not on file   • Physically Abused: Not on file   • Sexually Abused: Not on file   Housing Stability:    • Unable to Pay for Housing in the Last Year: Not on file   • Number of Places Lived in the Last Year: Not on file   • Unstable Housing in the Last Year: Not on file       REVIEW OF SYSTEMS      All other systems reviewed and negative except as noted in HPI    PHYSICAL EXAMINATION        Visit Vitals  /76   Pulse (!) 111   Temp 36.7 °C (98 °F)   Resp (!) 34   Ht 1.6 m (5' 3\")   Wt (!) 148 kg (327 lb)   SpO2 99%   BMI 57.93 kg/m²     Body mass index is 57.93 kg/m².  Physical Exam  Vitals reviewed.   Constitutional:       Appearance: She is obese.   HENT:      Head: Normocephalic and atraumatic.      Mouth/Throat:      Mouth: Mucous " membranes are moist.   Eyes:      Extraocular Movements: Extraocular movements intact.      Conjunctiva/sclera: Conjunctivae normal.   Cardiovascular:      Rate and Rhythm: Regular rhythm. Tachycardia present.   Pulmonary:      Effort: Pulmonary effort is normal.      Breath sounds: Normal breath sounds.   Abdominal:      General: Bowel sounds are normal.      Palpations: Abdomen is soft.      Tenderness: There is no abdominal tenderness.   Musculoskeletal:      Cervical back: Normal range of motion and neck supple.      Right lower leg: Edema present.      Left lower leg: Edema present.   Skin:     General: Skin is warm and dry.   Neurological:      General: No focal deficit present.      Mental Status: She is alert and oriented to person, place, and time.   Psychiatric:         Mood and Affect: Mood normal.           LABS / IMAGING / EKG        Labs  I have reviewed the patient's pertinent labs. Pertinent labs are within normal limits.   Results from last 7 days   Lab Units 11/04/21  0850   SODIUM mEQ/L 139   POTASSIUM mEQ/L 4.1   CHLORIDE mEQ/L 103   CO2 mEQ/L 23   BUN mg/dL 20   CREATININE mg/dL 1.1   GLUCOSE mg/dL 98   CALCIUM mg/dL 9.4     Results from last 7 days   Lab Units 11/04/21  0850   WBC K/uL 19.61*   HEMOGLOBIN g/dL 14.2   HEMATOCRIT % 44.2   PLATELETS K/uL 233   Imaging  I have independently reviewed the pertinent imaging from the last 24 hrs.  CT ABDOMEN PELVIS WITHOUT IV CONTRAST  Narrative: CLINICAL HISTORY: Flank pain, kidney stone suspected  Impression: IMPRESSION:  1.  2 mm proximal right ureteral calculus with mild hydroureteronephrosis and  stranding.  2.  Nonobstructing bilateral renal calculi.  3.  Diverticulosis.  4.  Prominent fibroid uterus.  5.  Cholelithiasis.    COMMENT:    Technique: CT examination of the abdomen and pelvis was performed utilizing  contiguous 2.5 mm transaxial sections utilizing a renal stone protocol.    CT DOSE:  One or more dose reduction techniques (e.g.  automated exposure  control, adjustment of the mA and/or kV according to patient size, use of  iterative reconstruction technique) utilized for this examination.    Comparison studies: CT abdomen pelvis dated 11/27/2012.    Lung bases: Normal.    Lower chest soft tissue: Normal.    Liver: Normal.    Spleen: Normal.    Adrenals: Normal.    Pancreas: Normal.    Kidneys, ureters and bladder: 2 mm proximal right ureteral calculus with mild  hydroureteronephrosis and perinephric stranding.  4 mm nonobstructing right  midpole calculus and 2 mm left lower pole nonobstructing calculus.    Gallbladder:  Cholelithiasis..    Retroperitoneal structures: Normal.    Bowel and mesentery: Scattered diverticuli noted.  There is no focal  inflammatory or obstructive gastrointestinal process.  No free air or free  fluid..    Lymph nodes: None enlarged.    Pelvic Structures:  Calcified fibroid uterus noted..    Vessels: Mild atherosclerotic disease.    Bones: Thoracolumbar degenerative change.    ECG/Telemetry  Telemetry sinus tachycardia    ASSESSMENT AND PLAN           * Calculus of proximal right ureter  Assessment & Plan  S/p cystoscopy with right ureteral stent  Monitor closely on telemetry  Incentive spirometry  Pain medications as needed  Monitor pulse ox    UTI (urinary tract infection)  Assessment & Plan  Continue ciprofloxacin for now  Follow WBC count   follow urine culture    Hypothyroidism  Assessment & Plan  Continue Synthroid    Hyperlipidemia  Assessment & Plan  Continue Zocor    Lymphedema due to venous insufficiency  Assessment & Plan  Compression stockings  Local wound care     Type 2 diabetes mellitus (CMS/HCC)  Assessment & Plan  Patient had a hypoglycemia earlier this morning repeat blood sugar improved 111  Monitor blood sugars closely patient has been n.p.o. since  Resume diet will start with clears and advance as tolerated  Monitor Accu-Cheks cover with sliding scale insulin as needed  Will give low-dose  Tresiba depending on the sugars    HTN (hypertension)  Assessment & Plan  Blood pressure currently stable   hold hydrochlorothiazide   continue Avapro    Morbid obesity with BMI of 50.0-59.9, adult (CMS/[facility])  Assessment & Plan  Outpatient follow-up with PCP   dietary and lifestyle modification       VTE Assessment: Padua VTE Score: 4  Code Status: Full Code  Estimated discharge date: 11/7/2021     Suzanne Gaona MD  11/4/2021  2:29 PM

## 2021-11-05 PROBLEM — R78.81 BACTEREMIA DUE TO ESCHERICHIA COLI: Status: ACTIVE | Noted: 2021-11-05

## 2021-11-05 PROBLEM — B96.20 BACTEREMIA DUE TO ESCHERICHIA COLI: Status: ACTIVE | Noted: 2021-11-05

## 2021-11-05 PROBLEM — A41.51 E. COLI SEPTICEMIA (CMS/HCC): Status: ACTIVE | Noted: 2021-11-05

## 2021-11-05 LAB
ANION GAP SERPL CALC-SCNC: 7 MEQ/L (ref 3–15)
ATRIAL RATE: 85
B FRAGILIS DNA BLD QL NAA+PROBE: NOT DETECTED
BLACTX-M ISLT/SPM QL: NOT DETECTED
BLAIMP ISLT/SPM QL: NOT DETECTED
BLAOXA-48-LIKE ISLT/SPM QL: NOT DETECTED
BLAVIM ISLT/SPM QL: NOT DETECTED
BUN SERPL-MCNC: 22 MG/DL (ref 8–20)
C ALBICANS DNA BLD QL NAA+PROBE: NOT DETECTED
C AURIS DNA BLD POS QL NAA+PROBE: NOT DETECTED
C GATTII+NEOFOR DNA BLD POS QL NAA+N-PRB: NOT DETECTED
C GLABRATA DNA BLD QL NAA+PROBE: NOT DETECTED
C KRUSEI DNA BLD QL NAA+PROBE: NOT DETECTED
C PARAP DNA BLD QL NAA+PROBE: NOT DETECTED
C TROPICLS DNA BLD QL NAA+PROBE: NOT DETECTED
CALCIUM SERPL-MCNC: 8.2 MG/DL (ref 8.9–10.3)
CHLORIDE SERPL-SCNC: 104 MEQ/L (ref 98–109)
CO2 SERPL-SCNC: 26 MEQ/L (ref 22–32)
COLISTIN RES MCR-1 ISLT/SPM QL: NOT DETECTED
CPR GENES ISLT NAA+PROBE: NOT DETECTED
CREAT SERPL-MCNC: 1.1 MG/DL (ref 0.6–1.1)
E CLOAC COMP DNA BLD POS NAA+NON-PROBE: NOT DETECTED
E COLI DNA SPEC QL NAA+PROBE: DETECTED
E FAECALIS DNA SPEC QL NAA+PROBE: NOT DETECTED
E FAECIUM DNA SPEC QL NAA+PROBE: NOT DETECTED
ERYTHROCYTE [DISTWIDTH] IN BLOOD BY AUTOMATED COUNT: 14.7 % (ref 11.7–14.4)
EST. AVERAGE GLUCOSE BLD GHB EST-MCNC: 117 MG/DL
GFR SERPL CREATININE-BSD FRML MDRD: 50.7 ML/MIN/1.73M*2
GLUCOSE BLD-MCNC: 121 MG/DL (ref 70–99)
GLUCOSE BLD-MCNC: 86 MG/DL (ref 70–99)
GLUCOSE BLD-MCNC: 99 MG/DL (ref 70–99)
GLUCOSE SERPL-MCNC: 146 MG/DL (ref 70–99)
GP B STREP DNA SPEC QL NAA+PROBE: NOT DETECTED
HAEM INFLU DNA SPEC QL NAA+PROBE: NOT DETECTED
HBA1C MFR BLD HPLC: 5.7 %
HCT VFR BLDCO AUTO: 37.4 % (ref 35–45)
HGB BLD-MCNC: 11.9 G/DL (ref 11.8–15.7)
K OXYTOCA DNA BLD QL NAA+PROBE: NOT DETECTED
K PNEUMON DNA SPEC QL NAA+PROBE: NOT DETECTED
K. AEROGENES DNA SPEC QL NAA+PROBE: NOT DETECTED
L MONOCYTOG DNA SPEC QL NAA+PROBE: NOT DETECTED
MCH RBC QN AUTO: 28.6 PG (ref 28–33.2)
MCHC RBC AUTO-ENTMCNC: 31.8 G/DL (ref 32.2–35.5)
MCV RBC AUTO: 89.9 FL (ref 83–98)
MECA ISLT/SPM QL: ABNORMAL
MECA+MECC+MREJ ISLT/SPM QL: ABNORMAL
N MEN DNA BLD QL NAA+PROBE: NOT DETECTED
NDM: NOT DETECTED
P AERUGINOSA DNA SPEC QL NAA+PROBE: NOT DETECTED
P AXIS: 65
PDW BLD AUTO: 9.4 FL (ref 9.4–12.3)
PLATELET # BLD AUTO: 188 K/UL (ref 150–369)
POCT TEST: ABNORMAL
POCT TEST: NORMAL
POCT TEST: NORMAL
POTASSIUM SERPL-SCNC: 3.8 MEQ/L (ref 3.6–5.1)
PR INTERVAL: 146
PROTEUS SP DNA BLD POS QL NAA+NON-PROBE: NOT DETECTED
QRS DURATION: 84
QT INTERVAL: 382
QTC CALCULATION(BAZETT): 454
R AXIS: 71
RBC # BLD AUTO: 4.16 M/UL (ref 3.93–5.22)
S AUREUS DNA SPEC QL NAA+PROBE: NOT DETECTED
S AUREUS+CONS DNA BLD POS NAA+NON-PROBE: NOT DETECTED
S EPIDERMIDIS DNA SPEC QL NAA+PROBE: NOT DETECTED
S HAEMOLYTICUS DNA BLD QL NAA+PROBE: NOT DETECTED
S MALTOPH DNA SPEC QL NAA+PROBE: NOT DETECTED
S MARCESCENS DNA SPEC QL NAA+PROBE: NOT DETECTED
S PNEUM DNA BLD QL NAA+PROBE: NOT DETECTED
S PYO DNA SPEC NAA+PROBE: NOT DETECTED
SALMONELLA DNA SPEC QL NAA+PROBE: NOT DETECTED
SODIUM SERPL-SCNC: 137 MEQ/L (ref 136–144)
STREPTOCOCCUS DNA BLD QL NAA+PROBE: NOT DETECTED
T WAVE AXIS: 58
TEST PERFORMANCE INFO SPEC: ABNORMAL
VANA+VANB ISLT/SPM QL: ABNORMAL
VENTRICULAR RATE: 85
WBC # BLD AUTO: 13.26 K/UL (ref 3.8–10.5)

## 2021-11-05 PROCEDURE — 85027 COMPLETE CBC AUTOMATED: CPT | Performed by: HOSPITALIST

## 2021-11-05 PROCEDURE — 21400000 HC ROOM AND CARE CCU/INTERMEDIATE

## 2021-11-05 PROCEDURE — 63600000 HC DRUGS/DETAIL CODE: Performed by: HOSPITALIST

## 2021-11-05 PROCEDURE — 63700000 HC SELF-ADMINISTRABLE DRUG: Performed by: HOSPITALIST

## 2021-11-05 PROCEDURE — 83036 HEMOGLOBIN GLYCOSYLATED A1C: CPT | Performed by: INTERNAL MEDICINE

## 2021-11-05 PROCEDURE — 99233 SBSQ HOSP IP/OBS HIGH 50: CPT | Performed by: INTERNAL MEDICINE

## 2021-11-05 PROCEDURE — 80048 BASIC METABOLIC PNL TOTAL CA: CPT | Performed by: HOSPITALIST

## 2021-11-05 PROCEDURE — 63600000 HC DRUGS/DETAIL CODE: Performed by: INTERNAL MEDICINE

## 2021-11-05 PROCEDURE — 25800000 HC PHARMACY IV SOLUTIONS: Performed by: NURSE PRACTITIONER

## 2021-11-05 PROCEDURE — 25800000 HC PHARMACY IV SOLUTIONS: Performed by: UROLOGY

## 2021-11-05 PROCEDURE — 93005 ELECTROCARDIOGRAM TRACING: CPT | Performed by: INTERNAL MEDICINE

## 2021-11-05 PROCEDURE — 36415 COLL VENOUS BLD VENIPUNCTURE: CPT | Performed by: HOSPITALIST

## 2021-11-05 RX ORDER — LEVOFLOXACIN 5 MG/ML
500 INJECTION, SOLUTION INTRAVENOUS
Status: DISCONTINUED | OUTPATIENT
Start: 2021-11-05 | End: 2021-11-06 | Stop reason: HOSPADM

## 2021-11-05 RX ADMIN — DOCUSATE SODIUM 100 MG: 100 CAPSULE, LIQUID FILLED ORAL at 08:22

## 2021-11-05 RX ADMIN — LEVOTHYROXINE SODIUM 75 MCG: 75 TABLET ORAL at 05:55

## 2021-11-05 RX ADMIN — KETOROLAC TROMETHAMINE 15 MG: 15 INJECTION, SOLUTION INTRAMUSCULAR; INTRAVENOUS at 08:40

## 2021-11-05 RX ADMIN — ACETAMINOPHEN 650 MG: 325 TABLET, FILM COATED ORAL at 20:44

## 2021-11-05 RX ADMIN — GABAPENTIN 600 MG: 300 CAPSULE ORAL at 20:21

## 2021-11-05 RX ADMIN — ASPIRIN 81 MG: 81 TABLET, COATED ORAL at 08:21

## 2021-11-05 RX ADMIN — ENOXAPARIN SODIUM 40 MG: 40 INJECTION SUBCUTANEOUS at 20:21

## 2021-11-05 RX ADMIN — LEVOFLOXACIN 500 MG: 500 INJECTION, SOLUTION INTRAVENOUS at 12:12

## 2021-11-05 RX ADMIN — DOCUSATE SODIUM 100 MG: 100 CAPSULE, LIQUID FILLED ORAL at 20:21

## 2021-11-05 RX ADMIN — DULOXETINE HYDROCHLORIDE 20 MG: 20 CAPSULE, DELAYED RELEASE ORAL at 08:22

## 2021-11-05 RX ADMIN — KETOROLAC TROMETHAMINE 15 MG: 15 INJECTION, SOLUTION INTRAMUSCULAR; INTRAVENOUS at 16:30

## 2021-11-05 RX ADMIN — ENOXAPARIN SODIUM 40 MG: 40 INJECTION SUBCUTANEOUS at 08:22

## 2021-11-05 RX ADMIN — KETOROLAC TROMETHAMINE 15 MG: 15 INJECTION, SOLUTION INTRAMUSCULAR; INTRAVENOUS at 22:39

## 2021-11-05 RX ADMIN — SODIUM CHLORIDE: 9 INJECTION, SOLUTION INTRAVENOUS at 05:55

## 2021-11-05 RX ADMIN — LOSARTAN POTASSIUM 50 MG: 50 TABLET, FILM COATED ORAL at 08:22

## 2021-11-05 RX ADMIN — GABAPENTIN 600 MG: 300 CAPSULE ORAL at 14:56

## 2021-11-05 RX ADMIN — SODIUM CHLORIDE: 9 INJECTION, SOLUTION INTRAVENOUS at 16:36

## 2021-11-05 RX ADMIN — DULOXETINE HYDROCHLORIDE 20 MG: 20 CAPSULE, DELAYED RELEASE ORAL at 20:21

## 2021-11-05 RX ADMIN — GABAPENTIN 600 MG: 300 CAPSULE ORAL at 08:22

## 2021-11-05 RX ADMIN — ACETAMINOPHEN 650 MG: 325 TABLET, FILM COATED ORAL at 07:23

## 2021-11-05 NOTE — PROGRESS NOTES
"    Hospital Medicine Service -  Daily Progress Note       SUBJECTIVE   Interval History:   Patient sitting in chair at bedside.  \"I feel unwell.\"  Patient complains of diffuse achiness, her legs hurt to move them.  She had some flank pain overnight that improved with Toradol.  She does not feel like eating this morning.  Her Accu-Chek is 124, taken on her own device.  Postop tachycardia has resolved.  She is afebrile but she feels cold.  She states she was having difficulty urinating but that has resolved.  Her urine is less bloody.  She told the Attending she was only urinating \"a little\" this morning. Will monitor urine output closely.     OBJECTIVE      Vital signs in last 24 hours:  Temp:  [36.6 °C (97.9 °F)-37.9 °C (100.2 °F)] 36.9 °C (98.4 °F)  Heart Rate:  [] 80  Resp:  [14-38] 14  BP: ()/(53-90) 100/60    Intake/Output Summary (Last 24 hours) at 11/5/2021 1017  Last data filed at 11/5/2021 0600  Gross per 24 hour   Intake 2660 ml   Output 430 ml   Net 2230 ml       PHYSICAL EXAMINATION      Physical Exam  Constitutional: Awake, alert. Appears well-developed and well-nourished. No distress.   HENT:   Head: Normocephalic and atraumatic.   Eyes: No scleral icterus.   Cardiovascular: Normal rate and regular rhythm.    Pulmonary/Chest: CTA. No r/r/w. Respirations unlabored. On room air.  Abdominal: Soft. Obese. + bowel sounds.  Musculoskeletal:  no edema.   Neurological: alert and oriented to person, place, and time. No focal deficits.  Skin: Skin is warm and dry.   Psychiatric: Normal mood and affect       LINES, CATHETERS, DRAINS, AIRWAYS, AND WOUNDS   Lines, Drains, and Airways:  Wounds (agree with documentation and present on admission):  Midline Catheter 11/04/21 1200 (Active)   Number of days: 1         Comments:      LABS / IMAGING / TELE      Labs  Results from last 7 days   Lab Units 11/05/21  0517   SODIUM mEQ/L 137   POTASSIUM mEQ/L 3.8   CHLORIDE mEQ/L 104   CO2 mEQ/L 26   BUN mg/dL 22* "   CREATININE mg/dL 1.1   GLUCOSE mg/dL 146*   CALCIUM mg/dL 8.2*     Results from last 7 days   Lab Units 11/05/21  0517   WBC K/uL 13.26*   HEMOGLOBIN g/dL 11.9   HEMATOCRIT % 37.4   PLATELETS K/uL 188         SARS-CoV-2 (COVID-19) (no units)   Date/Time Value   11/04/2021 1017 Negative       Imaging  CT ABDOMEN PELVIS WITHOUT IV CONTRAST    Result Date: 11/4/2021  Narrative: CLINICAL HISTORY: Flank pain, kidney stone suspected     Impression: IMPRESSION: 1.  2 mm proximal right ureteral calculus with mild hydroureteronephrosis and stranding. 2.  Nonobstructing bilateral renal calculi. 3.  Diverticulosis. 4.  Prominent fibroid uterus. 5.  Cholelithiasis. COMMENT: Technique: CT examination of the abdomen and pelvis was performed utilizing contiguous 2.5 mm transaxial sections utilizing a renal stone protocol. CT DOSE:  One or more dose reduction techniques (e.g. automated exposure control, adjustment of the mA and/or kV according to patient size, use of iterative reconstruction technique) utilized for this examination. Comparison studies: CT abdomen pelvis dated 11/27/2012. Lung bases: Normal. Lower chest soft tissue: Normal. Liver: Normal. Spleen: Normal. Adrenals: Normal. Pancreas: Normal. Kidneys, ureters and bladder: 2 mm proximal right ureteral calculus with mild hydroureteronephrosis and perinephric stranding.  4 mm nonobstructing right midpole calculus and 2 mm left lower pole nonobstructing calculus. Gallbladder:  Cholelithiasis.. Retroperitoneal structures: Normal. Bowel and mesentery: Scattered diverticuli noted.  There is no focal inflammatory or obstructive gastrointestinal process.  No free air or free fluid.. Lymph nodes: None enlarged. Pelvic Structures:  Calcified fibroid uterus noted.. Vessels: Mild atherosclerotic disease. Bones: Thoracolumbar degenerative change.     FL FLUOROSCOPY TECHNICAL ASSISTANCE, FLUOROSCOPY RETROGRADE UROGRAM    Result Date: 11/4/2021  Narrative: CLINICAL HISTORY: Intra-Op  exam.     Impression: IMPRESSION: Right-sided stent placed COMMENT: 2 images obtained intraoperatively demonstrate fullness/hydronephrosis involving the right pelvicalyceal system. The proximal end of a stent is seen. Distal end is not visualized. Correlation is suggested. REFERENCE AIR KERMA: 5.5 mGy.      ECG/Telemetry  I have independently reviewed the telemetry. No events for the last 24 hours.    ASSESSMENT AND PLAN      * Calculus of proximal right ureter  Assessment & Plan  S/p cystoscopy with right ureteral stent, POD 1.  Blood culture- + E.coli  Urine culture - gram negative rods  Continue CIPRO  Consult ID for antibiotic stewardship   Urology following  Outpatient stone treatment with Dr. Steele.   Monitor closely on telemetry  Incentive spirometry  Pain medications as needed    Bacteremia due to Escherichia coli  Assessment & Plan  Blood culture +E.coli  Urine culture with GNR  Continue to trend  Continue CIPRO  ID consulted for abx stewardship  WBC improving  Now afebrile  Continue IVF for now    Type 2 diabetes mellitus (CMS/[facility])  Assessment & Plan  Patient had a hypoglycemia on admission, had not eaten  BS now stable  Diet advanced - though no appetite this morning  Monitor Accu-Cheks cover with sliding scale insulin as needed  resume Tresiba once eating consistently    UTI (urinary tract infection)  Assessment & Plan  Continue ciprofloxacin for now  ID consulted for antibiotic stewardship  WBC trending down  Continue to follow cultures      HTN (hypertension)  Assessment & Plan  Blood pressure stable  Holding hydrochlorothiazide   continue ARB    Hypothyroidism  Assessment & Plan  Continue Synthroid    Hyperlipidemia  Assessment & Plan  Continue Zocor    Lymphedema due to venous insufficiency  Assessment & Plan  Compression stockings  Local wound care     Morbid obesity with BMI of 50.0-59.9, adult (CMS/[facility])  Assessment & Plan  Outpatient follow-up with PCP   Encourage dietary and lifestyle  modification       VTE Assessment: Padua VTE Score: 4  VTE Prophylaxis:  Current anticoagulants:  enoxaparin (LOVENOX) syringe 40 mg, subcutaneous, q12h VIOLETA      Code Status: Full Code      Estimated Discharge Date: 11/7/2021     Disposition Planning: stable for telemetry.     FELIPE Herring  11/5/2021

## 2021-11-05 NOTE — PROGRESS NOTES
Clinic Care Coordination Contact  [facility], [place]/Voicemail       Clinical Data: Care Coordinator Outreach  Outreach attempted x 1.  Left message on patient's voicemail with call back information and requested return call.  Plan: Care Coordinator will try to reach patient again in 1-2 business days.    Karolyn Parra [facility] Care Coordination  Tel: [phone]     Urology Daily Progress Note    Subjective     Interval History:   No acute events overnight. Has been afebrile, and hemodynamically stable. Tachycardia post op has resolved and HR in 80-90s.     Her flank pain is resolved. Denies nausea or vomiting.         Objective     Vital signs in last 24 hours:  Temp:  [36.6 °C (97.9 °F)-37.9 °C (100.2 °F)] 36.9 °C (98.4 °F)  Heart Rate:  [] 80  Resp:  [14-38] 14  BP: ()/(53-90) 100/60      Intake/Output Summary (Last 24 hours) at 11/5/2021 0656  Last data filed at 11/5/2021 0600  Gross per 24 hour   Intake 2660 ml   Output 430 ml   Net 2230 ml     Intake/Output this shift:  I/O this shift:  In: 1660 [P.O.:240; I.V.:1220; IV Piggyback:200]  Out: 400 [Urine:400]    Labs  BMP Results       11/05/21 11/04/21     0517 0850     139    K 3.8 4.1    Cl 104 103    CO2 26 23    Glucose 146 98    BUN 22 20    Creatinine 1.1 1.1    Calcium 8.2 9.4    Anion Gap 7 13    EGFR 50.7 50.7         Comment for K at 0850 on 11/04/21: Results obtained on plasma. Plasma Potassium values may be up to 0.4 mEQ/L less than serum values. The differences may be greater for patients with high platelet or white cell counts.        CBC Results       11/05/21 11/04/21     0517 0850    WBC 13.26 19.61    RBC 4.16 4.94    HGB 11.9 14.2    HCT 37.4 44.2    MCV 89.9 89.5    MCH 28.6 28.7    MCHC 31.8 32.1     233         Comment for HGB at 0517 on 11/05/21: ALL RESULTS HAVE BEEN CHECKED        UA Results       11/04/21     0849    Color Nelly    Clarity Turbid    Glucose Negative    Bilirubin +2    Ketones Trace    Sp Grav 1.030    Blood +3    Ph 5.5    Protein +1    Urobilinogen 1.0    Nitrite Positive    Leuk Est +3    WBC Too Numerous To Count    RBC Too Numerous To Count    Bacteria +2         Comment for Ketones at 0849 on 11/04/21: Free sulfhydryl drugs such as Mesna, Capoten, and Acetylcysteine (Mucomyst) may cause false positive ketonuria.    Comment for Blood at 0849 on  11/04/21: The sensitivity of the occult blood test is equivalent to approximately 4 intact RBC/HPF.        Microbiology Results     Procedure Component Value Units Date/Time    Blood Culture Blood, Venous [[id number]]  (Abnormal) Collected: 11/04/21 1219    Specimen: Blood, Venous Updated: 11/05/21 0330     Culture **Positive Culture**     Gram Stain Result Gram negative bacilli      Gram negative bacilli    Blood Culture PCR Panel Blood, Venous [[id number]]  (Abnormal) Collected: 11/04/21 1219    Specimen: Blood, Venous Updated: 11/05/21 0328     Candida albicans Not Detected     Candida auris Not Detected     Candida glabrata Not Detected     Candida krusei Not Detected     Candida parapsilosis Not Detected     Candida tropicalis Not Detected     Cryptococcus neoformans/gattii Not Detected     Enterococcus faecalis Not Detected     Enterococcus faecium Not Detected     Enterobacter cloacae complex Not Detected     Escherichia coli Detected     Klebsiella oxytoca Not Detected     Klebsiella pneumoniae Not Detected     Klebsiella aerogenes Not Detected     Proteus Not Detected     Salmonella species Not Detected     Serratia marcescens Not Detected     Haemophilus influenzae Not Detected     Listeria monocytogenes Not Detected     Neisseria meningitidis Not Detected     Pseudomonas aeruginosa Not Detected     Stenotrophomonas maltophilia Not Detected     Bacteroides fragilis Not Detected     Staph (not aureus) Not Detected     Staphylococcus aureus Not Detected     Staphylococcus ludgnensis Not Detected     Staphylococcus epidermidis Not Detected     Streptococcus Not Detected     Streptococcus agalactiae (Group B) Not Detected     Streptococcus pneumoniae Not Detected     Streptococcus pyogenes (Group A) Not Detected     KPC (Carbapenem Resistance Gene) Not Detected     mecA/C Not Applicable     mecA/C and MREJ (MRSA) Not Applicable     Gisell/B (Vancomycin Resistance Gene) Not Applicable     CTX-M (ESBL) Not Detected  "    IMP Not Detected     mcr-1 Not Detected     NDM Not Detected     OXA-48-like Not Detected     VIM Not Detected     Comment: --    SARS-CoV-2 (COVID-19), PCR Nasopharynx [[id number]]  (Normal) Collected: 11/04/21 1017    Specimen: Nasopharyngeal Swab from Nasopharynx Updated: 11/04/21 1051    Narrative:      The following orders were created for panel order SARS-CoV-2 (COVID-19), PCR Nasopharynx.  Procedure                               Abnormality         Status                     ---------                               -----------         ------                     SARS-CoV-2 (COVID-19), P...[[id number]]  Normal              Final result                 Please view results for these tests on the individual orders.    SARS-CoV-2 (COVID-19), PCR Nasopharynx [[id number]]  (Normal) Collected: 11/04/21 1017    Specimen: Nasopharyngeal Swab from Nasopharynx Updated: 11/04/21 1051     SARS-CoV-2 (COVID-19) Negative    Narrative:      Nursing instructions: Obtain nasopharyngeal swab ONLY. Send swab in viral transport media.              Physicial Exam  Visit Vitals  /60   Pulse 80   Temp 36.9 °C (98.4 °F) (Oral)   Resp 14   Ht 1.6 m (5' 3\")   Wt (!) 155 kg (341 lb 0.8 oz)   SpO2 94%   Breastfeeding No   BMI 60.41 kg/m²     General appearance: alert, no acute distress   Lungs: Unlabored respirations, symmetric chest expansion   Heart: regular rate    Abdomen: soft, non-tender to palpation, non-distended   : no cvat bilaterally   Extremities: extremities normal, warm and well-perfused; no calve tenderness bilaterally   Neurologic:  Grossly normal      Assessment     60 y.o. female pmhx of htn, hld, t2dm, kidney stones , lymphedema with 2mm R obstructing stone in proximal ureter s/p cystoscopy and right ureteral stent placement 11/4        Plan   Her stone colic has resolved and she is experiencing intermittent urgency from her stents.     Blood cultures are growing E coli on PCR. Urine cultures pending.  Continue " with antibiotics and follow up cultures for tailoring antibiotic tx.   Continue with regular diet.   Recommend ditropan prn for bladder spasms and urgency.   Outpatient stone treatment with Dr. Steele.     [facility] Urology  Pager [phone]  Kolton Knott DO PGY 2

## 2021-11-05 NOTE — CONSULTS
Infectious Disease Consult Note    Patient Name: Xin Melendez  MR#: [id number]  : 1960  Admission Date: 2021  Consult Date: 21 1:10 PM   Consultant: Donta Phillips MD    Reason for Consult: antibiotic sanjana ship  Referring Provider: Dr. De La Cruz        Xin Melendez is a 60 y.o. female who was admitted on 2021.  This patient was admitted with an obstructed kidney due to a stone and was taken to the OR for right stent placement.  Blood cultures and urine cultures have E. coli.  The patient is feeling better on Cipro her white count went from 19,000-13,000      Allergies:   Allergies   Allergen Reactions   • Cephalexin Angioedema     Other reaction(s): Rash   • Adhesive Tape-Silicones      Imported from external source.       Medical History:   Past Medical History:   Diagnosis Date   • CTS (carpal tunnel syndrome)    • DJD (degenerative joint disease)    • Edema    • Frozen shoulder    • Hypertension    • Hypothyroidism    • Kidney stones    • Lipid disorder    • Lymphedema    • Obesity    • Sciatica    • Stasis dermatitis    • Type 2 diabetes mellitus (CMS/[facility])        Surgical History:   Past Surgical History:   Procedure Laterality Date   • APPENDECTOMY     •  SECTION     • LAPAROTOMY EXPLORATORY     • OOPHORECTOMY      right side secondary to torsion       Social History     Tobacco Use   • Smoking status: Former Smoker   • Smokeless tobacco: Never Used   Substance Use Topics   • Alcohol use: Not Currently   • Drug use: Not Currently       Family History: History reviewed. No pertinent family history.    Review of Systems    All other systems reviewed and negative except as noted in the HPI.    Medications:    Current IP Meds (From admission, onward)        Frequency     levoFLOXacin (LEVAQUIN) IVPB 500 mg         Every 24 hours interval     aspirin enteric coated tablet 81 mg         Daily     levothyroxine (SYNTHROID) tablet 75 mcg         Daily (6:30a)     ciprofloxacin  "(CIPRO) IVPB 400 mg  Status:  Discontinued         Every 12 hours interval     enoxaparin (LOVENOX) syringe 40 mg  (Assessed at increased VTE risk (Padua score greater than or equal to 4))         Every 12 hours     DULoxetine (CYMBALTA) capsule,delayed release(DR/EC) 20 mg         2 times daily     gabapentin (NEURONTIN) capsule 600 mg         3 times daily     atorvastatin (LIPITOR) tablet 20 mg         Daily (6p)     insulin aspart U-100 (NovoLOG) pen 2-10 Units         4 times daily with meals and nightly     losartan (COZAAR) tablet 50 mg         Daily     methocarbamoL (ROBAXIN) tablet 750 mg         3 times daily PRN     potassium chloride (KLOR-CON) tablet extended release 20 mEq  (Potassium Replacement)         As needed     potassium chloride (KLOR-CON) tablet extended release 40 mEq  (Potassium Replacement)         As needed     potassium chloride 20 mEq in 100 mL IVPB  (premix)  (Potassium Replacement)         As needed     potassium chloride 20 mEq in 100 mL IVPB  (premix)  (Potassium Replacement)        \"And\" Linked Group Details    As needed     potassium chloride 20 mEq in 100 mL IVPB  (premix)  (Potassium Replacement)        \"And\" Linked Group Details    As needed     acetaminophen (TYLENOL) tablet 650 mg  (Analgesics - Acetaminophen pain or fever)         Every 4 hours PRN     atropine injection 0.5 mg         Every 5 min PRN     nitroglycerin (NITROSTAT) SL tablet 0.4 mg         Every 5 min PRN     glucose chewable tablet 16-32 g of dextrose  (Hypoglycemia Treatment Protocol and Hyperglycemia Validation Protocol)        \"Or\" Linked Group Details    As needed     dextrose 40 % oral gel 15-30 g of dextrose  (Hypoglycemia Treatment Protocol and Hyperglycemia Validation Protocol)        \"Or\" Linked Group Details    As needed     glucagon (GLUCAGEN) injection 1 mg  (Hypoglycemia Treatment Protocol and Hyperglycemia Validation Protocol)        \"Or\" Linked Group Details    As needed     dextrose in water " "injection 12.5 g  (Hypoglycemia Treatment Protocol and Hyperglycemia Validation Protocol)        \"Or\" Linked Group Details    As needed     benzocaine-menthol (CEPACOL) lozenge 1 lozenge         Every 2 hour PRN     glucose chewable tablet 16-32 g of dextrose  (Hypoglycemia Treatment Protocol and Hyperglycemia Validation Protocol)  Status:  Discontinued        \"Or\" Linked Group Details    As needed     dextrose 40 % oral gel 15-30 g of dextrose  (Hypoglycemia Treatment Protocol and Hyperglycemia Validation Protocol)  Status:  Discontinued        \"Or\" Linked Group Details    As needed     glucagon (GLUCAGEN) injection 1 mg  (Hypoglycemia Treatment Protocol and Hyperglycemia Validation Protocol)  Status:  Discontinued        \"Or\" Linked Group Details    As needed     dextrose in water injection 12.5 g  (Hypoglycemia Treatment Protocol and Hyperglycemia Validation Protocol)  Status:  Discontinued        \"Or\" Linked Group Details    As needed     HYDROmorphone (DILAUDID) injection 0.5 mg  (morphine or hydromorphone IV, to be used after fentanyl doses are complete)  Status:  Discontinued         Every 15 min PRN     ondansetron (ZOFRAN) injection 4 mg  Status:  Discontinued         Every 15 min PRN     fentaNYL (PF) (SUBLIMAZE) injection 50 mcg  Status:  Discontinued         Every 15 min PRN     iohexoL (OMNIPAQUE) 180 mg iodine/mL intrathecal solution  Status:  Discontinued         Once via One Step medication administration     ondansetron (ZOFRAN) injection  Status:  Discontinued         As needed     propofoL (DIPRIVAN) 10 mg/mL continuous infusion  Status:  Discontinued         As needed     succinylcholine (ANECTINE) injection  Status:  Discontinued         As needed     lidocaine (XYLOCAINE) 10 mg/mL (1 %) injection  Status:  Discontinued         As needed     fentaNYL (PF) (SUBLIMAZE) injection  Status:  Discontinued         As needed     midazolam (VERSED) 1 mg/mL injection  Status:  Discontinued         As " "needed     ciprofloxacin (CIPRO) 400 mg/200 mL IVPB  - Pyxis Override Pull        Note to Pharmacy: Arline Heard: cabinet override         insulin aspart U-100 (NovoLOG) pen 3-5 Units  (Novolog Insulin Correction Factor for patient weight < 200 lb)  Status:  Discontinued         3 times daily with meals     sodium chloride 0.9 % infusion         Continuous     docusate sodium (COLACE) capsule 100 mg  (Constipation)         2 times daily     glucose chewable tablet 16-32 g of dextrose  (Hypoglycemia Treatment Protocol and Hyperglycemia Validation Protocol)  Status:  Discontinued        \"Or\" Linked Group Details    As needed     dextrose 40 % oral gel 15-30 g of dextrose  (Hypoglycemia Treatment Protocol and Hyperglycemia Validation Protocol)  Status:  Discontinued        \"Or\" Linked Group Details    As needed     glucagon (GLUCAGEN) injection 1 mg  (Hypoglycemia Treatment Protocol and Hyperglycemia Validation Protocol)  Status:  Discontinued        \"Or\" Linked Group Details    As needed     dextrose in water injection 12.5 g  (Hypoglycemia Treatment Protocol and Hyperglycemia Validation Protocol)  Status:  Discontinued        \"Or\" Linked Group Details    As needed     ketorolac (TORADOL) injection 15 mg  (Analgesics - NSAIDS)         Every 6 hours PRN     ondansetron ODT (ZOFRAN-ODT) disintegrating tablet 4 mg  (Nausea/Vomiting)        \"Or\" Linked Group Details    Every 8 hours PRN     ondansetron (ZOFRAN) injection 4 mg  (Nausea/Vomiting)        \"Or\" Linked Group Details    Every 8 hours PRN     glucose chewable tablet 16-32 g of dextrose  (Hypoglycemia Treatment Protocol and Hyperglycemia Validation Protocol)  Status:  Discontinued        \"Or\" Linked Group Details    As needed     dextrose 40 % oral gel 15-30 g of dextrose  (Hypoglycemia Treatment Protocol and Hyperglycemia Validation Protocol)  Status:  Discontinued        \"Or\" Linked Group Details    As needed     glucagon (GLUCAGEN) injection 1 mg  (Hypoglycemia " "Treatment Protocol and Hyperglycemia Validation Protocol)  Status:  Discontinued        \"Or\" Linked Group Details    As needed     dextrose in water injection 12.5 g  (Hypoglycemia Treatment Protocol and Hyperglycemia Validation Protocol)  Status:  Discontinued        \"Or\" Linked Group Details    As needed     ciprofloxacin (CIPRO) IVPB 400 mg         Once          Anti-infectives (From admission, onward)    Start     Dose/Rate Route Frequency Ordered Stop    21 1145  levoFLOXacin (LEVAQUIN) IVPB 500 mg         500 mg  100 mL/hr over 60 Minutes intravenous Every 24 hours interval 21 1052              Vital Signs:    Temp:  [36.6 °C (97.9 °F)-37.9 °C (100.2 °F)] 36.9 °C (98.5 °F)  Heart Rate:  [] 89  Resp:  [14-43] 24  BP: ()/(53-90) 101/55    Temp (72hrs), Av.2 °C (98.9 °F), Min:36.6 °C (97.9 °F), Max:37.9 °C (100.2 °F)      Physical Exam     Gen: Aox3  HEENT: OP clear  Neck: Supple  LAD: No cervical LAD  Lungs: CTAB  CV: RRR no murmurs  Abd: Soft NTND +BS  Ext: No c/c/e  Skin: no rash  Neuro: II-XII intact        Lines, Drains, Airways, Wounds:  Midline Catheter 21 1200 (Active)   Number of days: 1       Labs:    Lab Results   Component Value Date    WBC 13.26 (H) 2021    HGB 11.9 2021    HCT 37.4 2021    MCV 89.9 2021     2021     Lab Results   Component Value Date    GLUCOSE 146 (H) 2021    CALCIUM 8.2 (L) 2021     2021    K 3.8 2021    CO2 26 2021     2021    BUN 22 (H) 2021    CREATININE 1.1 2021     No results found for: ALT, AST, GGT, ALKPHOS, BILITOT  UA Results       21     0849    Color Nelly    Clarity Turbid    Glucose Negative    Bilirubin +2    Ketones Trace    Sp Grav 1.030    Blood +3    Ph 5.5    Protein +1    Urobilinogen 1.0    Nitrite Positive    Leuk Est +3    WBC Too Numerous To Count    RBC Too Numerous To Count    Bacteria +2         Comment for Ketones at " 0849 on 11/04/21: Free sulfhydryl drugs such as Mesna, Capoten, and Acetylcysteine (Mucomyst) may cause false positive ketonuria.    Comment for Blood at 0849 on 11/04/21: The sensitivity of the occult blood test is equivalent to approximately 4 intact RBC/HPF.        Microbiology Results     Procedure Component Value Units Date/Time    Urine culture Urinary Bladder [[id number]]  (Abnormal) Collected: 11/04/21 1309    Specimen: Urine from Urinary Bladder Updated: 11/05/21 1010     Urine Culture **Positive Culture**      >1 x 10^5 CFU/mL Gram Negative Rods    Blood Culture Blood, Venous [[id number]]  (Abnormal) Collected: 11/04/21 1219    Specimen: Blood, Venous Updated: 11/05/21 0330     Culture **Positive Culture**     Gram Stain Result Gram negative bacilli      Gram negative bacilli    Blood Culture PCR Panel Blood, Venous [[id number]]  (Abnormal) Collected: 11/04/21 1219    Specimen: Blood, Venous Updated: 11/05/21 0328     Candida albicans Not Detected     Candida auris Not Detected     Candida glabrata Not Detected     Candida krusei Not Detected     Candida parapsilosis Not Detected     Candida tropicalis Not Detected     Cryptococcus neoformans/gattii Not Detected     Enterococcus faecalis Not Detected     Enterococcus faecium Not Detected     Enterobacter cloacae complex Not Detected     Escherichia coli Detected     Klebsiella oxytoca Not Detected     Klebsiella pneumoniae Not Detected     Klebsiella aerogenes Not Detected     Proteus Not Detected     Salmonella species Not Detected     Serratia marcescens Not Detected     Haemophilus influenzae Not Detected     Listeria monocytogenes Not Detected     Neisseria meningitidis Not Detected     Pseudomonas aeruginosa Not Detected     Stenotrophomonas maltophilia Not Detected     Bacteroides fragilis Not Detected     Staph (not aureus) Not Detected     Staphylococcus aureus Not Detected     Staphylococcus ludgnensis Not Detected     Staphylococcus epidermidis Not  Detected     Streptococcus Not Detected     Streptococcus agalactiae (Group B) Not Detected     Streptococcus pneumoniae Not Detected     Streptococcus pyogenes (Group A) Not Detected     KPC (Carbapenem Resistance Gene) Not Detected     mecA/C Not Applicable     mecA/C and MREJ (MRSA) Not Applicable     Gisell/B (Vancomycin Resistance Gene) Not Applicable     CTX-M (ESBL) Not Detected     IMP Not Detected     mcr-1 Not Detected     NDM Not Detected     OXA-48-like Not Detected     VIM Not Detected     Comment: --    SARS-CoV-2 (COVID-19), PCR Nasopharynx [[id number]]  (Normal) Collected: 11/04/21 1017    Specimen: Nasopharyngeal Swab from Nasopharynx Updated: 11/04/21 1051    Narrative:      The following orders were created for panel order SARS-CoV-2 (COVID-19), PCR Nasopharynx.  Procedure                               Abnormality         Status                     ---------                               -----------         ------                     SARS-CoV-2 (COVID-19), P...[[id number]]  Normal              Final result                 Please view results for these tests on the individual orders.    SARS-CoV-2 (COVID-19), PCR Nasopharynx [[id number]]  (Normal) Collected: 11/04/21 1017    Specimen: Nasopharyngeal Swab from Nasopharynx Updated: 11/04/21 1051     SARS-CoV-2 (COVID-19) Negative    Narrative:      Nursing instructions: Obtain nasopharyngeal swab ONLY. Send swab in viral transport media.    Urine culture Urine, Clean Catch [[id number]]  (Abnormal) Collected: 11/04/21 0849    Specimen: Urine, Clean Catch Updated: 11/05/21 0954     Urine Culture **Positive Culture**      >1 x 10^5 CFU/mL Escherichia coli           Pathology Results     ** No results found for the last 720 hours. **          Echo:         Imaging:    Independent review of CT abdomen pelvis without IV contrast shows mild right-sided hydronephrosis due to an obstructing ureteral calculus diverticulosis fibroids and cholelithiasis    Patient  Active Problem List   Diagnosis Code   • UTI (urinary tract infection) N39.0   • Calculus of proximal right ureter N20.1   • Morbid obesity with BMI of 50.0-59.9, adult (CMS/[facility]) E66.01, Z68.43   • HTN (hypertension) I10   • Type 2 diabetes mellitus (CMS/[facility]) E11.9   • Lymphedema due to venous insufficiency I89.0, I87.2   • Hyperlipidemia E78.5   • Hypothyroidism E03.9   • Bacteremia due to Escherichia coli R78.81, B96.20   • E. coli septicemia (CMS/[facility]) A41.51     E. coli septicemia (CMS/HCC)  Assessment & Plan  There is a significant drug drug interaction between Cymbalta and Cipro however Levaquin could be a better choice  Start Levaquin 500 IV every 24 hours and check EKG for QTC    UTI (urinary tract infection)  Assessment & Plan  Pyelonephritis status post stent change  Further management as per urology        Donta Phillips MD  11/5/2021 1:10 PM

## 2021-11-05 NOTE — ANESTHESIA POSTPROCEDURE EVALUATION
Patient: Xin Melendez    Procedure Summary     Date: 11/04/21 Room / Location: [facility] OR 06 / [facility] OR [facility]    Anesthesia Start: 1250 Anesthesia Stop: 1327    Procedure: CYSTO, RETRO, STENT (Right Ureter) Diagnosis:       Calculus of proximal right ureter      Urinary tract infection with hematuria, site unspecified      (Calculus of proximal right ureter [N20.1])      (Urinary tract infection with hematuria, site unspecified [N39.0, R31.9])    Surgeons: Bianca Steele MD Responsible Provider: Lesley Cullen MD    Anesthesia Type: general ASA Status: 3 - Emergent          Anesthesia Type: general  PACU Vitals  11/4/2021 1318 - 11/4/2021 1418      11/4/2021  1324 11/4/2021  1345 11/4/2021  1350 11/4/2021  1400    BP: 147/72 137/64 -- 133/76    Temp: 36.6 °C (97.9 °F) -- 36.7 °C (98 °F) --    Pulse: 116 114 109 111    Resp: 16 38 22 34    SpO2: 97 % -- -- 99 %              11/4/2021  1415             BP: 136/85       Temp: --       Pulse: 114       Resp: 33       SpO2: 90 %               Anesthesia Post Evaluation    Pain management: adequate  Patient participation: complete - patient participated  Level of consciousness: awake and alert  Cardiovascular status: acceptable  Airway Patency: adequate  Respiratory status: acceptable  Hydration status: acceptable  Anesthetic complications: no

## 2021-11-05 NOTE — PLAN OF CARE
"  Problem: Adult Inpatient Plan of Care  Goal: Readiness for Transition of Care  Intervention: Mutually Develop Transition Plan  Flowsheets (Taken 11/5/2021 1898)  Anticipated Discharge Disposition: home with assistance  Equipment Needed After Discharge: none  Discharge Coordination/Progress: Per care progression rounds and chart review, pt POD 1 cystoscopy with right ureteral stent insertion. Pt order in for transfer to tele. [facility] bedside for CMA. No discharge needs identified currently. CC/SW to follow  Assistive Device/Animal Currently Used at Home:   stair glide   walker, front-wheeled   glucometer   grab bar  Anticipated Changes Related to Illness: none  Readmission Within the Last 30 Days: no previous admission in last 30 days  Patient/Family Anticipated Services at Transition: none  Patient/Family Anticipates Transition to: home with family  Transportation Anticipated: family or friend will provide  Concerns to be Addressed:   care coordination/care conferences   discharge planning     Per care progression rounds and chart review, pt POD 1 cystoscopy with right ureteral stent insertion. Pt order in for transfer to tele.     [facility] bedside for CMA. Confirmed pt demographics, PCP Rasta Ortiz, Yoselin DILLON, [facility] insurance, COVID vaccinated x3, full code with no LW/POA. Pt independent at baseline with ambulation with walker and ADLs. Pt lives with son in [place] with b/b on second floor accessed with stair glide. Pt has 1STE and neighbor is putting in a grab bar for this step. Pt reports walker, stair glide, and glucometer as her only medical equipment. Pt's son good support and able to assist with care in post hospitalization period. He attends [employer]. Pt reports further good support in neighbors and friends. Son does not drive, but pt states that a friend will be able to provide a ride as long as she has some notice.    Anticipated Disposition:  Home with family  Friend to provide ride home- \"as " "long as she has notice\"    No discharge needs identified currently. CC/SW to follow  "

## 2021-11-05 NOTE — ASSESSMENT & PLAN NOTE
03/11/19 1639   PICC Single Lumen 03/11/19 Left Brachial vein medial   Placement Date/Time: 03/11/19 1639   Orientation: Left  Vein : Brachial vein medial   Site Assessment WDL   Line Status Blood return noted;Saline locked   External Cath Length (cm) 5 cm   Extremity Circumference (cm) 38 cm   Extravasation? No   Dressing Intervention Chlorhexidine patch;Transparent;Securing device;New dressing   Dressing Change Due 03/18/19   PICC Comment PICC insertion done      There is a contraindication between Cymbalta and Cipro however Levaquin could be a better choice  Start Levaquin 500 IV every 24 hours and check EKG for QTC

## 2021-11-06 VITALS
DIASTOLIC BLOOD PRESSURE: 52 MMHG | TEMPERATURE: 97.8 F | HEIGHT: 63 IN | BODY MASS INDEX: 51.91 KG/M2 | WEIGHT: 293 LBS | RESPIRATION RATE: 18 BRPM | OXYGEN SATURATION: 96 % | SYSTOLIC BLOOD PRESSURE: 108 MMHG | HEART RATE: 84 BPM

## 2021-11-06 LAB
ANION GAP SERPL CALC-SCNC: 9 MEQ/L (ref 3–15)
BACTERIA UR CULT: ABNORMAL
BUN SERPL-MCNC: 22 MG/DL (ref 8–20)
CALCIUM SERPL-MCNC: 8.2 MG/DL (ref 8.9–10.3)
CHLORIDE SERPL-SCNC: 103 MEQ/L (ref 98–109)
CO2 SERPL-SCNC: 24 MEQ/L (ref 22–32)
CREAT SERPL-MCNC: 1 MG/DL (ref 0.6–1.1)
ERYTHROCYTE [DISTWIDTH] IN BLOOD BY AUTOMATED COUNT: 14.7 % (ref 11.7–14.4)
GFR SERPL CREATININE-BSD FRML MDRD: 56.6 ML/MIN/1.73M*2
GLUCOSE BLD-MCNC: 100 MG/DL (ref 70–99)
GLUCOSE BLD-MCNC: 131 MG/DL (ref 70–99)
GLUCOSE SERPL-MCNC: 106 MG/DL (ref 70–99)
HCT VFR BLDCO AUTO: 36.4 % (ref 35–45)
HGB BLD-MCNC: 11.4 G/DL (ref 11.8–15.7)
MCH RBC QN AUTO: 28.3 PG (ref 28–33.2)
MCHC RBC AUTO-ENTMCNC: 31.3 G/DL (ref 32.2–35.5)
MCV RBC AUTO: 90.3 FL (ref 83–98)
PDW BLD AUTO: 9.8 FL (ref 9.4–12.3)
PLATELET # BLD AUTO: 190 K/UL (ref 150–369)
POCT TEST: ABNORMAL
POCT TEST: ABNORMAL
POTASSIUM SERPL-SCNC: 4.1 MEQ/L (ref 3.6–5.1)
RBC # BLD AUTO: 4.03 M/UL (ref 3.93–5.22)
SODIUM SERPL-SCNC: 136 MEQ/L (ref 136–144)
WBC # BLD AUTO: 6.61 K/UL (ref 3.8–10.5)

## 2021-11-06 PROCEDURE — 99239 HOSP IP/OBS DSCHRG MGMT >30: CPT | Performed by: STUDENT IN AN ORGANIZED HEALTH CARE EDUCATION/TRAINING PROGRAM

## 2021-11-06 PROCEDURE — 63700000 HC SELF-ADMINISTRABLE DRUG: Performed by: HOSPITALIST

## 2021-11-06 PROCEDURE — 36415 COLL VENOUS BLD VENIPUNCTURE: CPT | Performed by: HOSPITALIST

## 2021-11-06 PROCEDURE — 63600000 HC DRUGS/DETAIL CODE: Performed by: INTERNAL MEDICINE

## 2021-11-06 PROCEDURE — 80048 BASIC METABOLIC PNL TOTAL CA: CPT | Performed by: HOSPITALIST

## 2021-11-06 PROCEDURE — 63600000 HC DRUGS/DETAIL CODE: Performed by: HOSPITALIST

## 2021-11-06 PROCEDURE — 25800000 HC PHARMACY IV SOLUTIONS: Performed by: NURSE PRACTITIONER

## 2021-11-06 PROCEDURE — 93005 ELECTROCARDIOGRAM TRACING: CPT | Performed by: STUDENT IN AN ORGANIZED HEALTH CARE EDUCATION/TRAINING PROGRAM

## 2021-11-06 PROCEDURE — 85027 COMPLETE CBC AUTOMATED: CPT | Performed by: HOSPITALIST

## 2021-11-06 RX ORDER — LEVOFLOXACIN 500 MG/1
500 TABLET, FILM COATED ORAL DAILY
Qty: 7 TABLET | Refills: 0 | Status: SHIPPED | OUTPATIENT
Start: 2021-11-07 | End: 2021-11-14

## 2021-11-06 RX ADMIN — ENOXAPARIN SODIUM 40 MG: 40 INJECTION SUBCUTANEOUS at 10:00

## 2021-11-06 RX ADMIN — KETOROLAC TROMETHAMINE 15 MG: 15 INJECTION, SOLUTION INTRAMUSCULAR; INTRAVENOUS at 05:53

## 2021-11-06 RX ADMIN — LOSARTAN POTASSIUM 50 MG: 50 TABLET, FILM COATED ORAL at 09:57

## 2021-11-06 RX ADMIN — DOCUSATE SODIUM 100 MG: 100 CAPSULE, LIQUID FILLED ORAL at 10:00

## 2021-11-06 RX ADMIN — SODIUM CHLORIDE: 9 INJECTION, SOLUTION INTRAVENOUS at 01:51

## 2021-11-06 RX ADMIN — DULOXETINE HYDROCHLORIDE 20 MG: 20 CAPSULE, DELAYED RELEASE ORAL at 10:00

## 2021-11-06 RX ADMIN — LEVOTHYROXINE SODIUM 75 MCG: 75 TABLET ORAL at 05:45

## 2021-11-06 RX ADMIN — GABAPENTIN 600 MG: 300 CAPSULE ORAL at 14:53

## 2021-11-06 RX ADMIN — GABAPENTIN 600 MG: 300 CAPSULE ORAL at 09:57

## 2021-11-06 RX ADMIN — LEVOFLOXACIN 500 MG: 500 INJECTION, SOLUTION INTRAVENOUS at 12:15

## 2021-11-06 RX ADMIN — ASPIRIN 81 MG: 81 TABLET, COATED ORAL at 10:00

## 2021-11-06 RX ADMIN — KETOROLAC TROMETHAMINE 15 MG: 15 INJECTION, SOLUTION INTRAMUSCULAR; INTRAVENOUS at 15:04

## 2021-11-06 NOTE — PROGRESS NOTES
Urology Daily Progress Note    Subjective     Interval History: Patient seen and examined at bedside this a.m., no acute events overnight.  Patient has been afebrile for the duration of her hospitalization, states that she is feeling much better since having ureteral stent placed.      Objective     Vital signs in last 24 hours:  Temp:  [36.6 °C (97.8 °F)-37.1 °C (98.7 °F)] 36.6 °C (97.9 °F)  Heart Rate:  [80-97] 85  Resp:  [16-43] 18  BP: ()/(50-72) 101/56      Intake/Output Summary (Last 24 hours) at 11/6/2021 0748  Last data filed at 11/6/2021 0553  Gross per 24 hour   Intake 680 ml   Output 875 ml   Net -195 ml     Intake/Output this shift:  No intake/output data recorded.    Labs  BMP Results       11/06/21 11/05/21 11/04/21     0518 0517 0850     137 139    K 4.1 3.8 4.1    Cl 103 104 103    CO2 24 26 23    Glucose 106 146 98    BUN 22 22 20    Creatinine 1.0 1.1 1.1    Calcium 8.2 8.2 9.4    Anion Gap 9 7 13    EGFR 56.6 50.7 50.7         Comment for K at 0850 on 11/04/21: Results obtained on plasma. Plasma Potassium values may be up to 0.4 mEQ/L less than serum values. The differences may be greater for patients with high platelet or white cell counts.        CBC Results       11/06/21 11/05/21 11/04/21     0518 0517 0850    WBC 6.61 13.26 19.61    RBC 4.03 4.16 4.94    HGB 11.4 11.9 14.2    HCT 36.4 37.4 44.2    MCV 90.3 89.9 89.5    MCH 28.3 28.6 28.7    MCHC 31.3 31.8 32.1     188 233         Comment for HGB at 0517 on 11/05/21: ALL RESULTS HAVE BEEN CHECKED        UA Results       11/04/21     0849    Color Nelly    Clarity Turbid    Glucose Negative    Bilirubin +2    Ketones Trace    Sp Grav 1.030    Blood +3    Ph 5.5    Protein +1    Urobilinogen 1.0    Nitrite Positive    Leuk Est +3    WBC Too Numerous To Count    RBC Too Numerous To Count    Bacteria +2         Comment for Ketones at 0849 on 11/04/21: Free sulfhydryl drugs such as Mesna, Capoten, and Acetylcysteine (Mucomyst)  may cause false positive ketonuria.    Comment for Blood at 0849 on 11/04/21: The sensitivity of the occult blood test is equivalent to approximately 4 intact RBC/HPF.        Microbiology Results     Procedure Component Value Units Date/Time    Urine culture Urinary Bladder [[id number]]  (Abnormal)  (Susceptibility) Collected: 11/04/21 1309    Specimen: Urine from Urinary Bladder Updated: 11/06/21 0730     Urine Culture **Positive Culture**      >1 x 10^5 CFU/mL Escherichia coli    Blood Culture Blood, Venous [[id number]]  (Abnormal) Collected: 11/04/21 1219    Specimen: Blood, Venous Updated: 11/05/21 0330     Culture **Positive Culture**     Gram Stain Result Gram negative bacilli      Gram negative bacilli    Blood Culture PCR Panel Blood, Venous [[id number]]  (Abnormal) Collected: 11/04/21 1219    Specimen: Blood, Venous Updated: 11/05/21 0328     Candida albicans Not Detected     Candida auris Not Detected     Candida glabrata Not Detected     Candida krusei Not Detected     Candida parapsilosis Not Detected     Candida tropicalis Not Detected     Cryptococcus neoformans/gattii Not Detected     Enterococcus faecalis Not Detected     Enterococcus faecium Not Detected     Enterobacter cloacae complex Not Detected     Escherichia coli Detected     Klebsiella oxytoca Not Detected     Klebsiella pneumoniae Not Detected     Klebsiella aerogenes Not Detected     Proteus Not Detected     Salmonella species Not Detected     Serratia marcescens Not Detected     Haemophilus influenzae Not Detected     Listeria monocytogenes Not Detected     Neisseria meningitidis Not Detected     Pseudomonas aeruginosa Not Detected     Stenotrophomonas maltophilia Not Detected     Bacteroides fragilis Not Detected     Staph (not aureus) Not Detected     Staphylococcus aureus Not Detected     Staphylococcus ludgnensis Not Detected     Staphylococcus epidermidis Not Detected     Streptococcus Not Detected     Streptococcus agalactiae (Group B)  "Not Detected     Streptococcus pneumoniae Not Detected     Streptococcus pyogenes (Group A) Not Detected     KPC (Carbapenem Resistance Gene) Not Detected     mecA/C Not Applicable     mecA/C and MREJ (MRSA) Not Applicable     Gisell/B (Vancomycin Resistance Gene) Not Applicable     CTX-M (ESBL) Not Detected     IMP Not Detected     mcr-1 Not Detected     NDM Not Detected     OXA-48-like Not Detected     VIM Not Detected     Comment: --    SARS-CoV-2 (COVID-19), PCR Nasopharynx [[id number]]  (Normal) Collected: 11/04/21 1017    Specimen: Nasopharyngeal Swab from Nasopharynx Updated: 11/04/21 1051    Narrative:      The following orders were created for panel order SARS-CoV-2 (COVID-19), PCR Nasopharynx.  Procedure                               Abnormality         Status                     ---------                               -----------         ------                     SARS-CoV-2 (COVID-19), P...[[id number]]  Normal              Final result                 Please view results for these tests on the individual orders.    SARS-CoV-2 (COVID-19), PCR Nasopharynx [[id number]]  (Normal) Collected: 11/04/21 1017    Specimen: Nasopharyngeal Swab from Nasopharynx Updated: 11/04/21 1051     SARS-CoV-2 (COVID-19) Negative    Narrative:      Nursing instructions: Obtain nasopharyngeal swab ONLY. Send swab in viral transport media.    Urine culture Urine, Clean Catch [[id number]]  (Abnormal) Collected: 11/04/21 0849    Specimen: Urine, Clean Catch Updated: 11/05/21 0954     Urine Culture **Positive Culture**      >1 x 10^5 CFU/mL Escherichia coli            Physicial Exam  Visit Vitals  BP (!) 101/56   Pulse 85   Temp 36.6 °C (97.9 °F) (Oral)   Resp 18   Ht 1.6 m (5' 3\")   Wt (!) 155 kg (341 lb 0.8 oz)   SpO2 96%   Breastfeeding No   BMI 60.41 kg/m²     General appearance: alert, cooperative, morbidly obese  Lungs: Unlabored respirations, symmetric chest expansion  Heart: regular rate and rhythm  Abdomen: soft, non-tender to " palpation, non-distended  : No CVA tenderness bilaterally, no suprapubic tenderness to palpation.  Extremities: extremities edematous  Neurologic: Alert and oriented X 3       Assessment        60 y.o. female pmhx of htn, hld, t2dm, kidney stones , lymphedema with 2mm R obstructing stone in proximal ureter s/p cystoscopy and right ureteral stent placement 11/4        Plan     1.  Obstructing right ureteral calculus with superimposed urinary tract infection  -Status post cystoscopy and right ureteral stent placement  -Continue with current antibiotic regimen, urine culture growing E. Coli\  -Tailor antibiotic therapy based on culture results  -Patient is safe for discharge from urologic perspective, patient will follow up with Dr. Antionette Marroquin in the outpatient setting for definitive stone management  -Please call with any further questions    [facility]  Jeancarlos Walsh DO, PGY 4  Pager #[phone]

## 2021-11-06 NOTE — DISCHARGE INSTRUCTIONS
You were admitted for management of urinary tract infection and bacteremia secondary to an obstructing kidney stone. You were evaluated by Urology who placed a ureteral stent for management of the stone. You received IV antibiotics with clinical improvement. Infectious Disease followed in consult for antibiotic stewardship. You are now medically stable for discharge on oral antibiotics for an additional 7 days. Recommend to follow up with your PCP within 1 week. Please follow up with Urology in 3 weeks.      Urology Follow Up  You were seen by Urology during your stay. You had a stent placed for a small obstructing stone.   You have stones in your kidney that are also non obstructive.   You have a stent in - it is important to follow up with a Urologist to have your stone treated and stent removed.   Followup with Dr. Marroquin in 3 weeks. Call ASAP after discharge to schedule at [phone].      Levofloxacin tablets  What is this medicine?  LEVOFLOXACIN (augustine voe FLOX a sin) is a quinolone antibiotic. It is used to treat certain kinds of bacterial infections. It will not work for colds, flu, or other viral infections.  This medicine may be used for other purposes; ask your health care provider or pharmacist if you have questions.  COMMON BRAND NAME(S): Levaquin, Levaquin Leva-Xiang  What should I tell my health care provider before I take this medicine?  They need to know if you have any of these conditions:  · bone problems  · diabetes  · heart disease  · high blood pressure  · history of irregular heartbeat  · history of low levels of potassium in the blood  · joint problems  · kidney disease  · liver disease  · mental illness  · myasthenia gravis  · seizures  · tendon problems  · tingling of the fingers or toes, or other nerve disorder  · an unusual or allergic reaction to levofloxacin, other quinolone antibiotics, foods, dyes, or preservatives  · pregnant or trying to get pregnant  · breast-feeding  How should I  use this medicine?  Take this medicine by mouth with a full glass of water. Follow the directions on the prescription label. You can take it with or without food. If it upsets your stomach, take it with food. Take your medicine at regular intervals. Do not take your medicine more often than directed. Take all of your medicine as directed even if you think you are better. Do not skip doses or stop your medicine early.  Avoid antacids, calcium, iron, and zinc products for 2 hours before and 2 hours after taking a dose of this medicine.  A special MedGuide will be given to you by the pharmacist with each prescription and refill. Be sure to read this information carefully each time.  Talk to your pediatrician regarding the use of this medicine in children. While this drug may be prescribed for children as young as 6 months for selected conditions, precautions do apply.  Overdosage: If you think you have taken too much of this medicine contact a poison control center or emergency room at once.  NOTE: This medicine is only for you. Do not share this medicine with others.  What if I miss a dose?  If you miss a dose, take it as soon as you remember. If it is almost time for your next dose, take only that dose. Do not take double or extra doses.  What may interact with this medicine?  Do not take this medicine with any of the following medications:  · cisapride  · dronedarone  · pimozide  · thioridazine  This medicine may also interact with the following medications:  · antacids  · birth control pills  · certain medicines for diabetes, like glipizide, glyburide, or insulin  · certain medicines that treat or prevent blood clots like warfarin  · didanosine buffered tablets or powder  · multivitamins  · NSAIDS, medicines for pain and inflammation, like ibuprofen or naproxen  · other medicines that prolong the QT interval (cause an abnormal heart rhythm) like dofetilide, ziprasidone  · steroid medicines like prednisone or  cortisone  · sucralfate  · theophylline  This list may not describe all possible interactions. Give your health care provider a list of all the medicines, herbs, non-prescription drugs, or dietary supplements you use. Also tell them if you smoke, drink alcohol, or use illegal drugs. Some items may interact with your medicine.  What should I watch for while using this medicine?  Tell your doctor or healthcare professional if your symptoms do not start to get better or if they get worse.  Do not treat diarrhea with over the counter products. Contact your doctor if you have diarrhea that lasts more than 2 days or if it is severe and watery.  Check with your doctor or health care professional if you get an attack of severe diarrhea, nausea and vomiting, or if you sweat a lot. The loss of too much body fluid can make it dangerous for you to take this medicine.  This medicine may increase blood sugar. Ask your healthcare provider if changes in diet or medicines are needed if you have diabetes.  You may get drowsy or dizzy. Do not drive, use machinery, or do anything that needs mental alertness until you know how this medicine affects you. Do not sit or stand up quickly, especially if you are an older patient. This reduces the risk of dizzy or fainting spells.  This medicine can make you more sensitive to the sun. Keep out of the sun. If you cannot avoid being in the sun, wear protective clothing and use sunscreen. Do not use sun lamps or tanning beds/booths.  What side effects may I notice from receiving this medicine?  Side effects that you should report to your doctor or health care professional as soon as possible:  · allergic reactions like skin rash or hives, swelling of the face, lips, or tongue  · anxious  · bloody or watery diarrhea  · breathing problems  · confusion  · depressed mood  · fast, irregular heartbeat  · fever  · hallucination, loss of contact with reality  · joint, muscle, or tendon pain or  swelling  · loss of memory  · muscle weakness  · pain, tingling, numbness in the hands or feet  · seizures  · signs and symptoms of aortic dissection such as sudden chest, stomach, or back pain  · signs and symptoms of high blood sugar such as being more thirsty or hungry or having to urinate more than normal. You may also feel very tired or have blurry vision.  · signs and symptoms of liver injury like dark yellow or brown urine; general ill feeling or flu-like symptoms; light-colored stools; loss of appetite; nausea; right upper belly pain; unusually weak or tired; yellowing of the eyes or skin  · signs and symptoms of low blood sugar such as feeling anxious; confusion; dizziness; increased hunger; unusually weak or tired; sweating; shakiness; cold; irritable; headache; blurred vision; fast heartbeat; loss of consciousness; pale skin  · suicidal thoughts or other mood changes  · sunburn  · unusually weak  Side effects that usually do not require medical attention (report to your doctor or health care professional if they continue or are bothersome):  · constipation  · dry mouth  · headache  · nausea, vomiting  · trouble sleeping  This list may not describe all possible side effects. Call your doctor for medical advice about side effects. You may report side effects to FDA at 6-920-FDA-1045.  Where should I keep my medicine?  Keep out of the reach of children.  Store at room temperature between 15 and 30 degrees C (59 and 86 degrees F). Keep in a tightly closed container. Throw away any unused medicine after the expiration date.  NOTE: This sheet is a summary. It may not cover all possible information. If you have questions about this medicine, talk to your doctor, pharmacist, or health care provider.  © 2021 [employer] (2019-12-09 14:03:14)

## 2021-11-07 LAB
ATRIAL RATE: 78
BACTERIA BLD CULT: ABNORMAL
BACTERIA BLD CULT: ABNORMAL
GRAM STN SPEC: ABNORMAL
GRAM STN SPEC: ABNORMAL
P AXIS: 50
PR INTERVAL: 138
QRS DURATION: 86
QT INTERVAL: 400
QTC CALCULATION(BAZETT): 456
R AXIS: 36
T WAVE AXIS: 53
VENTRICULAR RATE: 78

## 2021-11-17 ENCOUNTER — TRANSCRIBE ORDERS (OUTPATIENT)
Dept: LAB | Facility: HOSPITAL | Age: 61
End: 2021-11-17
Payer: COMMERCIAL

## 2021-11-17 ENCOUNTER — APPOINTMENT (OUTPATIENT)
Dept: PREADMISSION TESTING | Facility: HOSPITAL | Age: 61
End: 2021-11-17
Attending: UROLOGY
Payer: COMMERCIAL

## 2021-11-17 ENCOUNTER — APPOINTMENT (OUTPATIENT)
Dept: LAB | Facility: HOSPITAL | Age: 61
End: 2021-11-17
Attending: UROLOGY
Payer: COMMERCIAL

## 2021-11-17 ENCOUNTER — ANESTHESIA EVENT (OUTPATIENT)
Dept: OPERATING ROOM | Facility: HOSPITAL | Age: 61
Setting detail: HOSPITAL OUTPATIENT SURGERY
End: 2021-11-17
Payer: COMMERCIAL

## 2021-11-17 VITALS
SYSTOLIC BLOOD PRESSURE: 121 MMHG | HEIGHT: 63 IN | TEMPERATURE: 98.5 F | HEART RATE: 85 BPM | OXYGEN SATURATION: 97 % | WEIGHT: 293 LBS | DIASTOLIC BLOOD PRESSURE: 59 MMHG | RESPIRATION RATE: 18 BRPM | BODY MASS INDEX: 51.91 KG/M2

## 2021-11-17 DIAGNOSIS — N20.0 CALCULUS OF KIDNEY: ICD-10-CM

## 2021-11-17 DIAGNOSIS — N20.0 CALCULUS OF KIDNEY: Primary | ICD-10-CM

## 2021-11-17 PROBLEM — M54.30 SCIATICA: Status: ACTIVE | Noted: 2021-11-17

## 2021-11-17 PROBLEM — M19.90 OSTEOARTHRITIS: Status: ACTIVE | Noted: 2021-11-17

## 2021-11-17 LAB
ALBUMIN SERPL-MCNC: 3.3 G/DL (ref 3.4–5)
ALP SERPL-CCNC: 73 IU/L (ref 35–126)
ALT SERPL-CCNC: 23 IU/L (ref 11–54)
ANION GAP SERPL CALC-SCNC: 12 MEQ/L (ref 3–15)
APTT PPP: 32 SEC (ref 23–35)
AST SERPL-CCNC: 22 IU/L (ref 15–41)
ATRIAL RATE: 74
BACTERIA #/AREA URNS HPF: 1 /HPF
BASOPHILS # BLD: 0.11 K/UL (ref 0.01–0.1)
BASOPHILS NFR BLD: 1.2 %
BILIRUB SERPL-MCNC: 0.8 MG/DL (ref 0.3–1.2)
BILIRUB UR QL STRIP.AUTO: ABNORMAL MG/DL
BUN SERPL-MCNC: 18 MG/DL (ref 8–20)
CALCIUM SERPL-MCNC: 9.3 MG/DL (ref 8.9–10.3)
CHLORIDE SERPL-SCNC: 102 MEQ/L (ref 98–109)
CLARITY UR REFRACT.AUTO: ABNORMAL
CO2 SERPL-SCNC: 27 MEQ/L (ref 22–32)
COLOR UR AUTO: ABNORMAL
CREAT SERPL-MCNC: 1 MG/DL (ref 0.6–1.1)
DIFFERENTIAL METHOD BLD: ABNORMAL
EOSINOPHIL # BLD: 0.26 K/UL (ref 0.04–0.36)
EOSINOPHIL NFR BLD: 2.7 %
ERYTHROCYTE [DISTWIDTH] IN BLOOD BY AUTOMATED COUNT: 15 % (ref 11.7–14.4)
GFR SERPL CREATININE-BSD FRML MDRD: 56.6 ML/MIN/1.73M*2
GLUCOSE SERPL-MCNC: 133 MG/DL (ref 70–99)
GLUCOSE UR STRIP.AUTO-MCNC: ABNORMAL MG/DL
HCT VFR BLDCO AUTO: 41.5 % (ref 35–45)
HGB BLD-MCNC: 13.2 G/DL (ref 11.8–15.7)
HGB UR QL STRIP.AUTO: 3
HYALINE CASTS #/AREA URNS LPF: ABNORMAL /LPF
IMM GRANULOCYTES # BLD AUTO: 0.09 K/UL (ref 0–0.08)
IMM GRANULOCYTES NFR BLD AUTO: 0.9 %
INR PPP: 1
KETONES UR STRIP.AUTO-MCNC: ABNORMAL MG/DL
LEUKOCYTE ESTERASE UR QL STRIP.AUTO: 2
LYMPHOCYTES # BLD: 2.09 K/UL (ref 1.2–3.5)
LYMPHOCYTES NFR BLD: 21.9 %
MCH RBC QN AUTO: 28.3 PG (ref 28–33.2)
MCHC RBC AUTO-ENTMCNC: 31.8 G/DL (ref 32.2–35.5)
MCV RBC AUTO: 89.1 FL (ref 83–98)
MONOCYTES # BLD: 0.96 K/UL (ref 0.28–0.8)
MONOCYTES NFR BLD: 10.1 %
NEUTROPHILS # BLD: 6.02 K/UL (ref 1.7–7)
NEUTS SEG NFR BLD: 63.2 %
NITRITE UR QL STRIP.AUTO: ABNORMAL
NRBC BLD-RTO: 0 %
P AXIS: 61
PDW BLD AUTO: 10 FL (ref 9.4–12.3)
PH UR STRIP.AUTO: ABNORMAL [PH]
PLATELET # BLD AUTO: 224 K/UL (ref 150–369)
POTASSIUM SERPL-SCNC: 4.2 MEQ/L (ref 3.6–5.1)
PR INTERVAL: 142
PROT SERPL-MCNC: 6.2 G/DL (ref 6–8.2)
PROT UR QL STRIP.AUTO: ABNORMAL
PROTHROMBIN TIME: 13 SEC (ref 12.2–14.5)
QRS DURATION: 92
QT INTERVAL: 400
QTC CALCULATION(BAZETT): 444
R AXIS: 37
RBC # BLD AUTO: 4.66 M/UL (ref 3.93–5.22)
RBC #/AREA URNS HPF: ABNORMAL /HPF
SARS-COV-2 RNA RESP QL NAA+PROBE: NEGATIVE
SODIUM SERPL-SCNC: 141 MEQ/L (ref 136–144)
SP GR UR REFRACT.AUTO: 1.03
SQUAMOUS #/AREA URNS HPF: 1 /HPF
T WAVE AXIS: 66
TRANS CELLS URNS QL MICRO: ABNORMAL /HPF
UROBILINOGEN UR STRIP-ACNC: ABNORMAL EU/DL
VENTRICULAR RATE: 74
WBC # BLD AUTO: 9.53 K/UL (ref 3.8–10.5)
WBC #/AREA URNS HPF: ABNORMAL /HPF

## 2021-11-17 PROCEDURE — 85730 THROMBOPLASTIN TIME PARTIAL: CPT

## 2021-11-17 PROCEDURE — 87086 URINE CULTURE/COLONY COUNT: CPT

## 2021-11-17 PROCEDURE — C9803 HOPD COVID-19 SPEC COLLECT: HCPCS

## 2021-11-17 PROCEDURE — 85025 COMPLETE CBC W/AUTO DIFF WBC: CPT

## 2021-11-17 PROCEDURE — 93005 ELECTROCARDIOGRAM TRACING: CPT

## 2021-11-17 PROCEDURE — 93010 ELECTROCARDIOGRAM REPORT: CPT | Performed by: INTERNAL MEDICINE

## 2021-11-17 PROCEDURE — U0003 INFECTIOUS AGENT DETECTION BY NUCLEIC ACID (DNA OR RNA); SEVERE ACUTE RESPIRATORY SYNDROME CORONAVIRUS 2 (SARS-COV-2) (CORONAVIRUS DISEASE [COVID-19]), AMPLIFIED PROBE TECHNIQUE, MAKING USE OF HIGH THROUGHPUT TECHNOLOGIES AS DESCRIBED BY CMS-2020-01-R: HCPCS

## 2021-11-17 PROCEDURE — 85610 PROTHROMBIN TIME: CPT

## 2021-11-17 PROCEDURE — 36415 COLL VENOUS BLD VENIPUNCTURE: CPT

## 2021-11-17 PROCEDURE — 80053 COMPREHEN METABOLIC PANEL: CPT

## 2021-11-17 PROCEDURE — 81003 URINALYSIS AUTO W/O SCOPE: CPT

## 2021-11-17 RX ORDER — BLOOD SUGAR DIAGNOSTIC
STRIP MISCELLANEOUS
COMMUNITY
Start: 2021-10-29 | End: 2022-08-03 | Stop reason: ENTERED-IN-ERROR

## 2021-11-17 ASSESSMENT — ENCOUNTER SYMPTOMS
FATIGUE: 0
WOUND: 1
FEVER: 0
HEMATURIA: 1
BACK PAIN: 1
ARTHRALGIAS: 1
FREQUENCY: 1
ACTIVITY CHANGE: 1
CHILLS: 0

## 2021-11-17 ASSESSMENT — PAIN SCALES - GENERAL: PAINLEVEL: 5

## 2021-11-17 NOTE — H&P
History and Physical  Pre-admission testing         HISTORY OF PRESENT ILLNESS      Xin Melendez is an 60 y.o. female with a past medical history of  Kidney stones, degenerative joint disease, carpal tunnel syndrome, Hypertension, Hypothyroidism, hyperlipidemia, Type 2 diabetes mellitus, Lymphedema, Stasis dermatitis. She was recently hospitalized at [facility] ( 2021 through 2021) with Calculus of proximal right ureter,  UTI and and bacteremia secondary to an obstructing kidney stone. She was evaluated by Urology who placed a ureteral stent for management of the stone. She received IV antibiotics with clinical improvement. She presents to [facility] for preoperative evaluation prior to planned surgery. She is scheduled for Cystoscopy Retrograde Pyelogram Ureteroscopy Laser Lithotripsy Stent Exchange on 2021 with Dr Marroquin.    PAST MEDICAL AND SURGICAL HISTORY      Past Medical History:   Diagnosis Date   • CTS (carpal tunnel syndrome)    • DJD (degenerative joint disease)    • Edema    • Frozen shoulder    • Hypertension    • Hypothyroidism    • Kidney stones    • Lipid disorder    • Lymphedema    • Obesity    • Sciatica    • Stasis dermatitis    • Type 2 diabetes mellitus (CMS/HCC)        Past Surgical History:   Procedure Laterality Date   • APPENDECTOMY     •  SECTION  1999   • CYSTOSCOPY     • LAPAROTOMY EXPLORATORY     • OOPHORECTOMY      right side secondary to torsion       PROBLEM LIST     Patient Active Problem List   Diagnosis   • UTI (urinary tract infection)   • Calculus of proximal right ureter   • Morbid obesity with BMI of 50.0-59.9, adult (CMS/Formerly [facility])   • HTN (hypertension)   • Type 2 diabetes mellitus (CMS/HCC)   • Lymphedema due to venous insufficiency   • Hyperlipidemia   • Hypothyroidism   • Bacteremia due to Escherichia coli   • Sepsis due to Escherichia coli (CMS/Formerly [facility])   • Osteoarthritis   • Sciatica       MEDICATIONS        Current Outpatient Medications:   •   ascorbic acid (VITAMIN C ORAL), Take 1 tablet by mouth daily.  , Disp: , Rfl:   •  aspirin 81 mg enteric coated tablet, Take 81 mg by mouth daily., Disp: , Rfl:   •  cholecalciferol, vitamin D3, (VITAMIN D3 ORAL), Take 1 tablet by mouth daily.  , Disp: , Rfl:   •  DULoxetine 20 mg capsule, Take 20 mg by mouth 2 (two) times a day.  , Disp: , Rfl:   •  exenatide microspheres 2 mg/0.85 mL auto-injector, Inject 1 Dose under the skin once a week on Friday. Friday , Disp: , Rfl:   •  gabapentin 600 mg tablet, Take 600 mg by mouth 3 (three) times a day.  , Disp: , Rfl:   •  insulin aspart U-100 100 unit/mL (3 mL) pen, Inject 0-100 Units under the skin 3 (three) times a day before meals. Per sliding scale based on calorie intake by patient , Disp: , Rfl:   •  insulin degludec U-200 CONC (TRESIBA FLEXTOUCH U-200) 200 unit/mL (3 mL) pen, Inject 78 Units under the skin every evening.  , Disp: , Rfl:   •  irbesartan-hydrochlorothiazide 150-12.5 mg per tablet, Take 1 tablet by mouth daily.  , Disp: , Rfl:   •  levothyroxine 75 mcg tablet, Take 75 mcg by mouth daily., Disp: , Rfl:   •  meloxicam 15 mg tablet, Take 15 mg by mouth daily.  , Disp: , Rfl:   •  methocarbamoL 750 mg tablet, Take 750 mg by mouth 3 (three) times a day as needed. Always takes in am but doesn't always do tid , Disp: , Rfl:   •  ONETOUCH ULTRA TEST strip, TEST AS DIRECTED 4 TO 5 TIMES DAILY, Disp: , Rfl:   •  simvastatin 40 mg tablet, Take 40 mg by mouth daily.  , Disp: , Rfl:     ALLERGIES      Allergies   Allergen Reactions   • Cephalexin Angioedema     Other reaction(s): Rash   • Adhesive Tape-Silicones      Imported from external source.       FAMILY HISTORY      Family History   Problem Relation Age of Onset   • Heart attack Biological Father        SOCIAL HISTORY      Social History     Socioeconomic History   • Marital status:      Spouse name: Not on file   • Number of children: 1   • Years of education: Not on file   • Highest education  level: Not on file   Occupational History   • Not on file   Tobacco Use   • Smoking status: Former Smoker     Quit date:      Years since quittin.9   • Smokeless tobacco: Never Used   • Tobacco comment: quit    Substance and Sexual Activity   • Alcohol use: Not Currently   • Drug use: Never   • Sexual activity: Defer   Other Topics Concern   • Not on file   Social History Narrative    Lives with son in a 2 story home has a chair lift     Social Determinants of Health     Financial Resource Strain:    • Difficulty of Paying Living Expenses: Not on file   Food Insecurity:    • Worried About Running Out of Food in the Last Year: Not on file   • Ran Out of Food in the Last Year: Not on file   Transportation Needs:    • Lack of Transportation (Medical): Not on file   • Lack of Transportation (Non-Medical): Not on file   Physical Activity:    • Days of Exercise per Week: Not on file   • Minutes of Exercise per Session: Not on file   Stress:    • Feeling of Stress : Not on file   Social Connections:    • Frequency of Communication with Friends and Family: Not on file   • Frequency of Social Gatherings with Friends and Family: Not on file   • Attends Buddhism Services: Not on file   • Active Member of Clubs or Organizations: Not on file   • Attends Club or Organization Meetings: Not on file   • Marital Status: Not on file   Intimate Partner Violence:    • Fear of Current or Ex-Partner: Not on file   • Emotionally Abused: Not on file   • Physically Abused: Not on file   • Sexually Abused: Not on file   Housing Stability:    • Unable to Pay for Housing in the Last Year: Not on file   • Number of Places Lived in the Last Year: Not on file   • Unstable Housing in the Last Year: Not on file       REVIEW OF SYSTEMS      Review of Systems   Constitutional: Positive for activity change. Negative for chills, fatigue and fever.   Genitourinary: Positive for frequency, hematuria and urgency.   Musculoskeletal: Positive for  "arthralgias, back pain and gait problem.   Skin: Positive for wound.        Biteral lower extremity wound with Radha dressing   All other systems reviewed and are negative.      PHYSICAL EXAMINATION      Visit Vitals  BP (!) 121/59 (BP Location: Left upper arm, Patient Position: Sitting)   Pulse 85   Temp 36.9 °C (98.5 °F) (Temporal)   Resp 18   Ht 1.6 m (5' 3\")   Wt (!) 148 kg (326 lb)   SpO2 97%   BMI 57.75 kg/m²     Body mass index is 57.75 kg/m².    Physical Exam  Vitals reviewed.   Constitutional:       Appearance: Normal appearance. She is obese.   HENT:      Head: Normocephalic and atraumatic.      Nose: Nose normal. No congestion or rhinorrhea.      Mouth/Throat:      Comments: Deferred d/t Covid precaution  Eyes:      Extraocular Movements: Extraocular movements intact.      Conjunctiva/sclera: Conjunctivae normal.      Pupils: Pupils are equal, round, and reactive to light.   Cardiovascular:      Rate and Rhythm: Normal rate and regular rhythm.      Pulses:           Dorsalis pedis pulses are 1+ on the right side and 1+ on the left side.        Posterior tibial pulses are 1+ on the right side and 1+ on the left side.      Heart sounds: Normal heart sounds, S1 normal and S2 normal. No murmur heard.      Pulmonary:      Effort: Pulmonary effort is normal. No respiratory distress.      Breath sounds: Normal breath sounds. No wheezing.   Abdominal:      General: Bowel sounds are normal. There is no distension.      Palpations: Abdomen is soft.      Tenderness: There is no abdominal tenderness.   Genitourinary:     Comments: Deferred  Musculoskeletal:         General: Normal range of motion.      Cervical back: Normal range of motion and neck supple.      Right lower le+ Pitting Edema present.      Left lower le+ Pitting Edema present.      Comments: Bilateral lower extermity ++Edema  ( lymphedema)with gauze dressing at the lower posterior areas. Ambulating with a rolator   Skin:     General: Skin is " warm and dry.      Comments: Large ecchymotic areas on the left abdominal area   Neurological:      General: No focal deficit present.      Mental Status: She is alert and oriented to person, place, and time.   Psychiatric:         Mood and Affect: Mood normal.         Behavior: Behavior normal.            FELIPE Ghotra  11/17/2021

## 2021-11-17 NOTE — H&P (VIEW-ONLY)
History and Physical  Pre-admission testing         HISTORY OF PRESENT ILLNESS      Xin Melendez is an 60 y.o. female with a past medical history of  Kidney stones, degenerative joint disease, carpal tunnel syndrome, Hypertension, Hypothyroidism, hyperlipidemia, Type 2 diabetes mellitus, Lymphedema, Stasis dermatitis. She was recently hospitalized at [facility] ( 2021 through 2021) with Calculus of proximal right ureter,  UTI and and bacteremia secondary to an obstructing kidney stone. She was evaluated by Urology who placed a ureteral stent for management of the stone. She received IV antibiotics with clinical improvement. She presents to [facility] for preoperative evaluation prior to planned surgery. She is scheduled for Cystoscopy Retrograde Pyelogram Ureteroscopy Laser Lithotripsy Stent Exchange on 2021 with Dr Marroquin.    PAST MEDICAL AND SURGICAL HISTORY      Past Medical History:   Diagnosis Date   • CTS (carpal tunnel syndrome)    • DJD (degenerative joint disease)    • Edema    • Frozen shoulder    • Hypertension    • Hypothyroidism    • Kidney stones    • Lipid disorder    • Lymphedema    • Obesity    • Sciatica    • Stasis dermatitis    • Type 2 diabetes mellitus (CMS/HCC)        Past Surgical History:   Procedure Laterality Date   • APPENDECTOMY     •  SECTION  1999   • CYSTOSCOPY     • LAPAROTOMY EXPLORATORY     • OOPHORECTOMY      right side secondary to torsion       PROBLEM LIST     Patient Active Problem List   Diagnosis   • UTI (urinary tract infection)   • Calculus of proximal right ureter   • Morbid obesity with BMI of 50.0-59.9, adult (CMS/Former[facility])   • HTN (hypertension)   • Type 2 diabetes mellitus (CMS/HCC)   • Lymphedema due to venous insufficiency   • Hyperlipidemia   • Hypothyroidism   • Bacteremia due to Escherichia coli   • Sepsis due to Escherichia coli (CMS/[facility])   • Osteoarthritis   • Sciatica       MEDICATIONS        Current Outpatient Medications:   •   ascorbic acid (VITAMIN C ORAL), Take 1 tablet by mouth daily.  , Disp: , Rfl:   •  aspirin 81 mg enteric coated tablet, Take 81 mg by mouth daily., Disp: , Rfl:   •  cholecalciferol, vitamin D3, (VITAMIN D3 ORAL), Take 1 tablet by mouth daily.  , Disp: , Rfl:   •  DULoxetine 20 mg capsule, Take 20 mg by mouth 2 (two) times a day.  , Disp: , Rfl:   •  exenatide microspheres 2 mg/0.85 mL auto-injector, Inject 1 Dose under the skin once a week on Friday. Friday , Disp: , Rfl:   •  gabapentin 600 mg tablet, Take 600 mg by mouth 3 (three) times a day.  , Disp: , Rfl:   •  insulin aspart U-100 100 unit/mL (3 mL) pen, Inject 0-100 Units under the skin 3 (three) times a day before meals. Per sliding scale based on calorie intake by patient , Disp: , Rfl:   •  insulin degludec U-200 CONC (TRESIBA FLEXTOUCH U-200) 200 unit/mL (3 mL) pen, Inject 78 Units under the skin every evening.  , Disp: , Rfl:   •  irbesartan-hydrochlorothiazide 150-12.5 mg per tablet, Take 1 tablet by mouth daily.  , Disp: , Rfl:   •  levothyroxine 75 mcg tablet, Take 75 mcg by mouth daily., Disp: , Rfl:   •  meloxicam 15 mg tablet, Take 15 mg by mouth daily.  , Disp: , Rfl:   •  methocarbamoL 750 mg tablet, Take 750 mg by mouth 3 (three) times a day as needed. Always takes in am but doesn't always do tid , Disp: , Rfl:   •  ONETOUCH ULTRA TEST strip, TEST AS DIRECTED 4 TO 5 TIMES DAILY, Disp: , Rfl:   •  simvastatin 40 mg tablet, Take 40 mg by mouth daily.  , Disp: , Rfl:     ALLERGIES      Allergies   Allergen Reactions   • Cephalexin Angioedema     Other reaction(s): Rash   • Adhesive Tape-Silicones      Imported from external source.       FAMILY HISTORY      Family History   Problem Relation Age of Onset   • Heart attack Biological Father        SOCIAL HISTORY      Social History     Socioeconomic History   • Marital status:      Spouse name: Not on file   • Number of children: 1   • Years of education: Not on file   • Highest education  level: Not on file   Occupational History   • Not on file   Tobacco Use   • Smoking status: Former Smoker     Quit date:      Years since quittin.9   • Smokeless tobacco: Never Used   • Tobacco comment: quit    Substance and Sexual Activity   • Alcohol use: Not Currently   • Drug use: Never   • Sexual activity: Defer   Other Topics Concern   • Not on file   Social History Narrative    Lives with son in a 2 story home has a chair lift     Social Determinants of Health     Financial Resource Strain:    • Difficulty of Paying Living Expenses: Not on file   Food Insecurity:    • Worried About Running Out of Food in the Last Year: Not on file   • Ran Out of Food in the Last Year: Not on file   Transportation Needs:    • Lack of Transportation (Medical): Not on file   • Lack of Transportation (Non-Medical): Not on file   Physical Activity:    • Days of Exercise per Week: Not on file   • Minutes of Exercise per Session: Not on file   Stress:    • Feeling of Stress : Not on file   Social Connections:    • Frequency of Communication with Friends and Family: Not on file   • Frequency of Social Gatherings with Friends and Family: Not on file   • Attends Yazidi Services: Not on file   • Active Member of Clubs or Organizations: Not on file   • Attends Club or Organization Meetings: Not on file   • Marital Status: Not on file   Intimate Partner Violence:    • Fear of Current or Ex-Partner: Not on file   • Emotionally Abused: Not on file   • Physically Abused: Not on file   • Sexually Abused: Not on file   Housing Stability:    • Unable to Pay for Housing in the Last Year: Not on file   • Number of Places Lived in the Last Year: Not on file   • Unstable Housing in the Last Year: Not on file       REVIEW OF SYSTEMS      Review of Systems   Constitutional: Positive for activity change. Negative for chills, fatigue and fever.   Genitourinary: Positive for frequency, hematuria and urgency.   Musculoskeletal: Positive for  "arthralgias, back pain and gait problem.   Skin: Positive for wound.        Biteral lower extremity wound with Radha dressing   All other systems reviewed and are negative.      PHYSICAL EXAMINATION      Visit Vitals  BP (!) 121/59 (BP Location: Left upper arm, Patient Position: Sitting)   Pulse 85   Temp 36.9 °C (98.5 °F) (Temporal)   Resp 18   Ht 1.6 m (5' 3\")   Wt (!) 148 kg (326 lb)   SpO2 97%   BMI 57.75 kg/m²     Body mass index is 57.75 kg/m².    Physical Exam  Vitals reviewed.   Constitutional:       Appearance: Normal appearance. She is obese.   HENT:      Head: Normocephalic and atraumatic.      Nose: Nose normal. No congestion or rhinorrhea.      Mouth/Throat:      Comments: Deferred d/t Covid precaution  Eyes:      Extraocular Movements: Extraocular movements intact.      Conjunctiva/sclera: Conjunctivae normal.      Pupils: Pupils are equal, round, and reactive to light.   Cardiovascular:      Rate and Rhythm: Normal rate and regular rhythm.      Pulses:           Dorsalis pedis pulses are 1+ on the right side and 1+ on the left side.        Posterior tibial pulses are 1+ on the right side and 1+ on the left side.      Heart sounds: Normal heart sounds, S1 normal and S2 normal. No murmur heard.      Pulmonary:      Effort: Pulmonary effort is normal. No respiratory distress.      Breath sounds: Normal breath sounds. No wheezing.   Abdominal:      General: Bowel sounds are normal. There is no distension.      Palpations: Abdomen is soft.      Tenderness: There is no abdominal tenderness.   Genitourinary:     Comments: Deferred  Musculoskeletal:         General: Normal range of motion.      Cervical back: Normal range of motion and neck supple.      Right lower le+ Pitting Edema present.      Left lower le+ Pitting Edema present.      Comments: Bilateral lower extermity ++Edema  ( lymphedema)with gauze dressing at the lower posterior areas. Ambulating with a rolator   Skin:     General: Skin is " warm and dry.      Comments: Large ecchymotic areas on the left abdominal area   Neurological:      General: No focal deficit present.      Mental Status: She is alert and oriented to person, place, and time.   Psychiatric:         Mood and Affect: Mood normal.         Behavior: Behavior normal.            FELIPE Ghotra  11/17/2021

## 2021-11-17 NOTE — ANESTHESIA PREPROCEDURE EVALUATION
Relevant Problems   CARDIOVASCULAR   (+) HTN (hypertension)      MUSCULOSKELETAL   (+) Osteoarthritis      NEUROLOGY   (+) Sciatica      Other   (+) Hypothyroidism   (+) Sepsis due to Escherichia coli (CMS/HCC)   (+) Type 2 diabetes mellitus (CMS/HCC)   (+) UTI (urinary tract infection)   Problem List  Current as of 11/22/21 1325  UTI (urinary tract infection)   Calculus of proximal right ureter   Morbid obesity with BMI of 50.0-59.9, adult (CMS/HCC)   HTN (hypertension)   Type 2 diabetes mellitus (CMS/HCC)   Lymphedema due to venous insufficiency   Hyperlipidemia   Hypothyroidism   Bacteremia due to Escherichia coli   Sepsis due to Escherichia coli (CMS/HCC)   Osteoarthritis   Sciatica       Medical History  Current as of 11/22/21 1325  History Comments   Edema    Kidney stones    Type 2 diabetes mellitus (CMS/HCC)    Hypertension    Lipid disorder    Frozen shoulder    CTS (carpal tunnel syndrome)    DJD (degenerative joint disease)    Sciatica    Stasis dermatitis    Obesity    Hypothyroidism    Lymphedema    COVID-19 vaccine series completed [facility] 10/2021       60 y.o. female patient with past medical history of hypertension, hyperlipidemia diabetes mellitus type 2 history of kidney stones presented to the ER with complaints of right-sided flank pain since midnight.  She had a CT scan which showed 2 mm proximal right ureteral calculus with mild hydroureteronephrosis and stranding.  She was evaluated by urology and taken to the OR , had cystoscopy with right ureteral stent insertion.     Anesthesia ROS/MED HX    Anesthesia History    History of anesthetic complications  - PONV  Cardiovascular   dyslipidemia   hypertension   ECG reviewed   Normal ECG    Musculoskeletal   Arthritis  Chronic musculoskeletal pain  Renal Disease   renal calculi  Endo/Other   Diabetes   Infectious disease (UTI)   Hypothyroidism  Body Habitus: Morbidly Obese  ROS/MED HX Comments:    Cardiology: EKG (11/17/2021):  Normal sinus  rhythm   Nonspecific T wave abnormality   Abnormal ECG   When compared with ECG of 2021 08:42,   No significant change was found   GI/Hepatic/Renal: S/p appendectomy   Endo: S/p oopherectomy       Past Surgical History:   Procedure Laterality Date   • APPENDECTOMY     •  SECTION  1999   • CYSTOSCOPY     • LAPAROTOMY EXPLORATORY     • OOPHORECTOMY      right side secondary to torsion       Physical Exam    Airway   Mallampati: III   TM distance: >3 FB   Neck ROM: full  Cardiovascular - normal   Rhythm: regular   Rate: normalPulmonary - normal   clear to auscultation  Dental - normal    Anesthesia  Exam Comments:   Exam Airway: Intubation (2021):  Mask difficulty assessment: 0 - not attempted  Final Airway Details  Final airway type: endotracheal airway  Successful airway: ETT   Successful intubation technique: video laryngoscopy  Facilitating devices/methods: intubating stylet  Endotracheal tube insertion site: oral  Blade: Angelic  Blade size: #3  ETT size (mm): 7.0  Cormack-Lehane Classification: grade I - full view of glottis  Placement verified by: chest auscultation and capnometry   Measured from: lips  ETT to lips (cm): 22  Number of attempts at approach: 1  Number of other approaches attempted: 0  Atraumatic airway insertion            I have reviewed the following records for  Xin Melendez.    Lab Results   Component Value Date    WBC 9.53 2021    HGB 13.2 2021    HCT 41.5 2021    MCV 89.1 2021     2021     Lab Results   Component Value Date    GLUCOSE 133 (H) 2021    CALCIUM 9.3 2021     2021    K 4.2 2021    CO2 27 2021     2021    BUN 18 2021    CREATININE 1.0 2021     No results found for: HCGPREGUR, PREGSERUM, HCG, HCGQUANT  @LABRCNTIP(aptt,inr,ptt)  )  Current Facility-Administered Medications   Medication Dose Route Frequency Provider Last Rate Last Admin   • ciprofloxacin  (CIPRO) IVPB 400 mg  400 mg intravenous On call to OR Francine Griffiths, DO       • gentamicin (GARAMYCIN) IVPB 80 mg  80 mg intravenous On call to OR Francine Griffiths DO         Prior to Admission medications    Medication Sig Start Date End Date Taking? Authorizing Provider   ascorbic acid (VITAMIN C ORAL) Take 1 tablet by mouth daily.      Fred Dow MD   aspirin 81 mg enteric coated tablet Take 81 mg by mouth daily.    Fred Dow MD   cholecalciferol, vitamin D3, (VITAMIN D3 ORAL) Take 1 tablet by mouth daily.      Fred Dow MD   DULoxetine 20 mg capsule Take 20 mg by mouth 2 (two) times a day.   7/6/21   Fred Dow MD   exenatide microspheres 2 mg/0.85 mL auto-injector Inject 1 Dose under the skin once a week on Friday. Friday 8/6/21   Fred Dow MD   gabapentin 600 mg tablet Take 600 mg by mouth 3 (three) times a day.   7/6/21   Fred Dow MD   insulin aspart U-100 100 unit/mL (3 mL) pen Inject 0-100 Units under the skin 3 (three) times a day before meals. Per sliding scale based on calorie intake by patient  10/20/21   Fred Dow MD   insulin degludec U-200 CONC (TRESIBA FLEXTOUCH U-200) 200 unit/mL (3 mL) pen Inject 78 Units under the skin every evening.   3/30/21   Fred Dow MD   irbesartan-hydrochlorothiazide 150-12.5 mg per tablet Take 1 tablet by mouth daily.   10/8/21   Fred Dow MD   levothyroxine 75 mcg tablet Take 75 mcg by mouth daily.    Fred Dow MD   meloxicam 15 mg tablet Take 15 mg by mouth daily.   10/20/21   Fred Dow MD   methocarbamoL 750 mg tablet Take 750 mg by mouth 3 (three) times a day as needed. Always takes in am but doesn't always do tid  8/9/21   Fred Dow MD   ONETOUCH ULTRA TEST strip TEST AS DIRECTED 4 TO 5 TIMES DAILY 10/29/21   Fred Dow MD   simvastatin 40 mg tablet Take 40 mg by mouth daily.   11/25/20   Fred Dow MD         Rios Deleon MD      Anesthesia Plan    Plan: general     Lines and Monitors: PIV     Airway: oral intubation   ASA 3  Blood Products:     Use of Blood Products Discussed: Yes     Consented to blood products  Anesthetic plan and risks discussed with: patient  Induction:    intravenous   Postop Plan:    Trial extubation   Pain Management: IV analgesics

## 2021-11-17 NOTE — PRE-PROCEDURE INSTRUCTIONS
1. We will call you between 3 pm and 7 pm on November 19, 2021 to determine that arrival time for your procedure. If you do not hear by 6PM. Please call [phone] for arrival time.    2. Please report to Main Entrance near Parking lot [address], walk into main lobby and report to the admission desk on the first floor on the day of your procedure.       3. Please follow the following fasting guidelines:         Nothing to eat after midnight.  Unlimited clear liquids, meaning water or PLAIN black coffee WITHOUT any milk or cream, are permitted up to TWO HOURS prior to arrival at the hospital.     4. On the morning of the procedure please take your usual dose of the listed medications Levothyroxine, methocarbamol, gabapentin ,DULoxetinewith a sip of water:    PER ANESTHESIOLOGIST, DO NOT TAKE irbesartan-hydrochlorothiazide,  insulin aspart, ON THE MORNING OF SURGERY    AVOID ASPIRIN, MOTRIN, ADVIL,  Meloxicam, ALEVE, VITAMIN E, HERBAL SUPPLEMENTS 1 WEEK PRIOR TO SURGERY     YOU MAY TAKE TYLENOL FOR PAIN     5. Other Instructions: You may brush your teeth the morning of the procedure. Rinse and spit, do not swallow.  Bring a list of your medications with dosages with you.    Please follow the hospital provided surgical wash instructions given to you today. If surgical procedure indicates the need for CHG anti-bacterial wash, please use provided soap and follow instructions.      6. If you develop a cold, cough, fever, rash, or other symptom prior to the data of the procedure, please report it to your physician immediately.   7. If you need to cancel the procedure for any reason, please contact your physician or call the unit listed above.   8. Make arrangements to have someone drive you home from the procedure. If you have not arranged for transportation home, your surgery may be cancelled.    9. You may not take public transportation unless you are accompanied by a responsible person.   10. You may not drive a car or  operate complex or potentially dangerous machinery for 24 hours following anesthesia and/or sedation.   11. If it is medically necessary for you to have a longer stay, you will be informed as soon as the decision is made.   12. Do not wear or bring anything of value to the hospital including jewelry of any kind, money, or wallet. Do not wear make-up or contact lenses. DO bring your glasses and a case. DO NOT BRING MEDICATIONS FROM HOME.   13. No lotion, creams, powders, or oils on skin the morning of procedure    14. Dress in comfortable clothes.   15.  If instructed, please bring a copy of your Advanced Directive (Living Will/Durable Power of ) on the day of your procedure.      Pre operative instructions given as per protocol.  Form explained by: FELIPE Ghotra     I have read and understand the above information. I have had sufficient opportunity to ask questions I might have and they have been answered to my satisfaction. I agree to comply with the Patient Responsibilities listed above and have received a copy of this form.

## 2021-11-18 LAB
BACTERIA UR CULT: NORMAL
BACTERIA UR CULT: NORMAL

## 2021-11-22 ENCOUNTER — ANESTHESIA (OUTPATIENT)
Dept: OPERATING ROOM | Facility: HOSPITAL | Age: 61
Setting detail: HOSPITAL OUTPATIENT SURGERY
End: 2021-11-22
Payer: COMMERCIAL

## 2021-11-22 ENCOUNTER — HOSPITAL ENCOUNTER (OUTPATIENT)
Facility: HOSPITAL | Age: 61
Setting detail: HOSPITAL OUTPATIENT SURGERY
Discharge: HOME | End: 2021-11-22
Attending: UROLOGY | Admitting: UROLOGY
Payer: COMMERCIAL

## 2021-11-22 ENCOUNTER — APPOINTMENT (OUTPATIENT)
Dept: RADIOLOGY | Facility: HOSPITAL | Age: 61
Setting detail: HOSPITAL OUTPATIENT SURGERY
End: 2021-11-22
Attending: UROLOGY
Payer: COMMERCIAL

## 2021-11-22 VITALS
RESPIRATION RATE: 16 BRPM | HEART RATE: 84 BPM | OXYGEN SATURATION: 95 % | TEMPERATURE: 96.7 F | SYSTOLIC BLOOD PRESSURE: 125 MMHG | DIASTOLIC BLOOD PRESSURE: 70 MMHG

## 2021-11-22 DIAGNOSIS — N20.0 KIDNEY STONES: ICD-10-CM

## 2021-11-22 LAB
GLUCOSE BLD-MCNC: 75 MG/DL (ref 70–99)
GLUCOSE BLD-MCNC: 84 MG/DL (ref 70–99)
POCT TEST: NORMAL
POCT TEST: NORMAL

## 2021-11-22 PROCEDURE — C1758 CATHETER, URETERAL: HCPCS | Performed by: UROLOGY

## 2021-11-22 PROCEDURE — 71000012 HC PACU PHASE 2 EA ADDL MIN: Performed by: UROLOGY

## 2021-11-22 PROCEDURE — 25800000 HC PHARMACY IV SOLUTIONS: Performed by: NURSE ANESTHETIST, CERTIFIED REGISTERED

## 2021-11-22 PROCEDURE — 36000003 HC OR LEVEL 3 INITIAL 30MIN: Performed by: UROLOGY

## 2021-11-22 PROCEDURE — 82365 CALCULUS SPECTROSCOPY: CPT | Performed by: UROLOGY

## 2021-11-22 PROCEDURE — BT1DZZZ FLUOROSCOPY OF RIGHT KIDNEY, URETER AND BLADDER: ICD-10-PCS | Performed by: UROLOGY

## 2021-11-22 PROCEDURE — 27200000 HC STERILE SUPPLY: Performed by: UROLOGY

## 2021-11-22 PROCEDURE — C2617 STENT, NON-COR, TEM W/O DEL: HCPCS | Performed by: UROLOGY

## 2021-11-22 PROCEDURE — C1769 GUIDE WIRE: HCPCS | Performed by: UROLOGY

## 2021-11-22 PROCEDURE — 25000000 HC PHARMACY GENERAL: Performed by: NURSE ANESTHETIST, CERTIFIED REGISTERED

## 2021-11-22 PROCEDURE — 63600000 HC DRUGS/DETAIL CODE: Performed by: UROLOGY

## 2021-11-22 PROCEDURE — 63600000 HC DRUGS/DETAIL CODE: Performed by: NURSE ANESTHETIST, CERTIFIED REGISTERED

## 2021-11-22 PROCEDURE — 71000001 HC PACU PHASE 1 INITIAL 30MIN: Performed by: UROLOGY

## 2021-11-22 PROCEDURE — 63600105 HC IODINE BASED CONTRAST: Performed by: UROLOGY

## 2021-11-22 PROCEDURE — 71000011 HC PACU PHASE 1 EA ADDL MIN: Performed by: UROLOGY

## 2021-11-22 PROCEDURE — 36000013 HC OR LEVEL 3 EA ADDL MIN: Performed by: UROLOGY

## 2021-11-22 PROCEDURE — 25000000 HC PHARMACY GENERAL: Performed by: UROLOGY

## 2021-11-22 PROCEDURE — 0T768DZ DILATION OF RIGHT URETER WITH INTRALUMINAL DEVICE, VIA NATURAL OR ARTIFICIAL OPENING ENDOSCOPIC: ICD-10-PCS | Performed by: UROLOGY

## 2021-11-22 PROCEDURE — 37000001 HC ANESTHESIA GENERAL: Performed by: UROLOGY

## 2021-11-22 PROCEDURE — 71000002 HC PACU PHASE 2 INITIAL 30MIN: Performed by: UROLOGY

## 2021-11-22 PROCEDURE — 63600000 HC DRUGS/DETAIL CODE: Performed by: ANESTHESIOLOGY

## 2021-11-22 PROCEDURE — 0TC08ZZ EXTIRPATION OF MATTER FROM RIGHT KIDNEY, VIA NATURAL OR ARTIFICIAL OPENING ENDOSCOPIC: ICD-10-PCS | Performed by: UROLOGY

## 2021-11-22 DEVICE — SOF-CURL URETERAL STENT 6.0FRX24CM: Type: IMPLANTABLE DEVICE | Site: URETER | Status: FUNCTIONAL

## 2021-11-22 RX ORDER — CIPROFLOXACIN 2 MG/ML
400 INJECTION, SOLUTION INTRAVENOUS
Status: COMPLETED | OUTPATIENT
Start: 2021-11-22 | End: 2021-11-22

## 2021-11-22 RX ORDER — DEXAMETHASONE SODIUM PHOSPHATE 4 MG/ML
INJECTION, SOLUTION INTRA-ARTICULAR; INTRALESIONAL; INTRAMUSCULAR; INTRAVENOUS; SOFT TISSUE AS NEEDED
Status: DISCONTINUED | OUTPATIENT
Start: 2021-11-22 | End: 2021-11-22 | Stop reason: SURG

## 2021-11-22 RX ORDER — GENTAMICIN SULFATE 80 MG/50ML
80 INJECTION, SOLUTION INTRAVENOUS
Status: COMPLETED | OUTPATIENT
Start: 2021-11-22 | End: 2021-11-22

## 2021-11-22 RX ORDER — ACETAMINOPHEN 325 MG/1
975 TABLET ORAL EVERY 6 HOURS PRN
Status: DISCONTINUED | OUTPATIENT
Start: 2021-11-22 | End: 2021-11-22 | Stop reason: HOSPADM

## 2021-11-22 RX ORDER — SCOPOLAMINE 1 MG/3D
1 PATCH, EXTENDED RELEASE TRANSDERMAL ONCE
Status: DISCONTINUED | OUTPATIENT
Start: 2021-11-22 | End: 2021-11-22 | Stop reason: HOSPADM

## 2021-11-22 RX ORDER — FENTANYL CITRATE 50 UG/ML
50 INJECTION, SOLUTION INTRAMUSCULAR; INTRAVENOUS
Status: DISCONTINUED | OUTPATIENT
Start: 2021-11-22 | End: 2021-11-22 | Stop reason: HOSPADM

## 2021-11-22 RX ORDER — DIPHENHYDRAMINE HCL 50 MG/ML
12.5 VIAL (ML) INJECTION
Status: DISCONTINUED | OUTPATIENT
Start: 2021-11-22 | End: 2021-11-22 | Stop reason: HOSPADM

## 2021-11-22 RX ORDER — DEXTROSE 40 %
15-30 GEL (GRAM) ORAL AS NEEDED
Status: DISCONTINUED | OUTPATIENT
Start: 2021-11-22 | End: 2021-11-22 | Stop reason: HOSPADM

## 2021-11-22 RX ORDER — LIDOCAINE HYDROCHLORIDE 10 MG/ML
INJECTION, SOLUTION INFILTRATION; PERINEURAL AS NEEDED
Status: DISCONTINUED | OUTPATIENT
Start: 2021-11-22 | End: 2021-11-22 | Stop reason: SURG

## 2021-11-22 RX ORDER — HYDROMORPHONE HYDROCHLORIDE 1 MG/ML
0.5 INJECTION, SOLUTION INTRAMUSCULAR; INTRAVENOUS; SUBCUTANEOUS
Status: DISCONTINUED | OUTPATIENT
Start: 2021-11-22 | End: 2021-11-22 | Stop reason: HOSPADM

## 2021-11-22 RX ORDER — FENTANYL CITRATE 50 UG/ML
INJECTION, SOLUTION INTRAMUSCULAR; INTRAVENOUS AS NEEDED
Status: DISCONTINUED | OUTPATIENT
Start: 2021-11-22 | End: 2021-11-22 | Stop reason: SURG

## 2021-11-22 RX ORDER — ROCURONIUM BROMIDE 10 MG/ML
INJECTION, SOLUTION INTRAVENOUS AS NEEDED
Status: DISCONTINUED | OUTPATIENT
Start: 2021-11-22 | End: 2021-11-22 | Stop reason: SURG

## 2021-11-22 RX ORDER — PHENAZOPYRIDINE HYDROCHLORIDE 100 MG/1
100 TABLET, FILM COATED ORAL 3 TIMES DAILY
Qty: 10 TABLET | Refills: 0 | Status: SHIPPED | OUTPATIENT
Start: 2021-11-22 | End: 2021-12-02

## 2021-11-22 RX ORDER — SODIUM CHLORIDE 9 MG/ML
INJECTION, SOLUTION INTRAVENOUS CONTINUOUS PRN
Status: DISCONTINUED | OUTPATIENT
Start: 2021-11-22 | End: 2021-11-22 | Stop reason: SURG

## 2021-11-22 RX ORDER — HYDRALAZINE HYDROCHLORIDE 20 MG/ML
5 INJECTION INTRAMUSCULAR; INTRAVENOUS
Status: DISCONTINUED | OUTPATIENT
Start: 2021-11-22 | End: 2021-11-22 | Stop reason: HOSPADM

## 2021-11-22 RX ORDER — PHENAZOPYRIDINE HYDROCHLORIDE 95 MG/1
190 TABLET ORAL
Status: DISCONTINUED | OUTPATIENT
Start: 2021-11-22 | End: 2021-11-22 | Stop reason: HOSPADM

## 2021-11-22 RX ORDER — LABETALOL HCL 20 MG/4 ML
5 SYRINGE (ML) INTRAVENOUS AS NEEDED
Status: DISCONTINUED | OUTPATIENT
Start: 2021-11-22 | End: 2021-11-22 | Stop reason: HOSPADM

## 2021-11-22 RX ORDER — IBUPROFEN 200 MG
16-32 TABLET ORAL AS NEEDED
Status: DISCONTINUED | OUTPATIENT
Start: 2021-11-22 | End: 2021-11-22 | Stop reason: HOSPADM

## 2021-11-22 RX ORDER — SULFAMETHOXAZOLE AND TRIMETHOPRIM 800; 160 MG/1; MG/1
1 TABLET ORAL 2 TIMES DAILY
Qty: 6 TABLET | Refills: 0 | Status: SHIPPED | OUTPATIENT
Start: 2021-11-22 | End: 2021-11-25

## 2021-11-22 RX ORDER — MIDAZOLAM HYDROCHLORIDE 2 MG/2ML
INJECTION, SOLUTION INTRAMUSCULAR; INTRAVENOUS AS NEEDED
Status: DISCONTINUED | OUTPATIENT
Start: 2021-11-22 | End: 2021-11-22 | Stop reason: SURG

## 2021-11-22 RX ORDER — ONDANSETRON HYDROCHLORIDE 2 MG/ML
4 INJECTION, SOLUTION INTRAVENOUS
Status: DISCONTINUED | OUTPATIENT
Start: 2021-11-22 | End: 2021-11-22 | Stop reason: HOSPADM

## 2021-11-22 RX ORDER — DEXTROSE 50 % IN WATER (D50W) INTRAVENOUS SYRINGE
25 AS NEEDED
Status: DISCONTINUED | OUTPATIENT
Start: 2021-11-22 | End: 2021-11-22 | Stop reason: HOSPADM

## 2021-11-22 RX ORDER — SODIUM CHLORIDE 0.9 G/100ML
INJECTION, SOLUTION IRRIGATION
Status: DISCONTINUED | OUTPATIENT
Start: 2021-11-22 | End: 2021-11-22 | Stop reason: HOSPADM

## 2021-11-22 RX ORDER — ONDANSETRON HYDROCHLORIDE 2 MG/ML
INJECTION, SOLUTION INTRAVENOUS AS NEEDED
Status: DISCONTINUED | OUTPATIENT
Start: 2021-11-22 | End: 2021-11-22 | Stop reason: SURG

## 2021-11-22 RX ADMIN — SODIUM CHLORIDE: 9 INJECTION, SOLUTION INTRAVENOUS at 14:46

## 2021-11-22 RX ADMIN — ONDANSETRON HYDROCHLORIDE 4 MG: 2 SOLUTION INTRAMUSCULAR; INTRAVENOUS at 15:47

## 2021-11-22 RX ADMIN — ONDANSETRON HYDROCHLORIDE 4 MG: 2 SOLUTION INTRAMUSCULAR; INTRAVENOUS at 16:28

## 2021-11-22 RX ADMIN — SUGAMMADEX 400 MG: 100 INJECTION, SOLUTION INTRAVENOUS at 15:50

## 2021-11-22 RX ADMIN — FENTANYL CITRATE 100 MCG: 50 INJECTION, SOLUTION INTRAMUSCULAR; INTRAVENOUS at 14:52

## 2021-11-22 RX ADMIN — GENTAMICIN SULFATE 80 MG: 80 INJECTION, SOLUTION INTRAVENOUS at 14:43

## 2021-11-22 RX ADMIN — ROCURONIUM BROMIDE 50 MG: 10 INJECTION, SOLUTION INTRAVENOUS at 14:52

## 2021-11-22 RX ADMIN — LIDOCAINE HYDROCHLORIDE 5 ML: 10 INJECTION, SOLUTION INFILTRATION; PERINEURAL at 14:52

## 2021-11-22 RX ADMIN — MIDAZOLAM HYDROCHLORIDE 2 MG: 1 INJECTION, SOLUTION INTRAMUSCULAR; INTRAVENOUS at 14:48

## 2021-11-22 RX ADMIN — CIPROFLOXACIN 400 MG: 2 INJECTION, SOLUTION INTRAVENOUS at 15:06

## 2021-11-22 RX ADMIN — DEXAMETHASONE SODIUM PHOSPHATE 4 MG: 4 INJECTION, SOLUTION INTRAMUSCULAR; INTRAVENOUS at 14:57

## 2021-11-22 ASSESSMENT — PAIN SCALES - GENERAL: PAIN_LEVEL: 0

## 2021-11-22 NOTE — OR SURGEON
Pre-Procedure patient identification:  I am the primary operating surgeon/proceduralist and I have identified the patient and confirmed laterality is right   on 11/22/21 at 2:15 PM Antionette Marroquin DO  Phone Number: [phone]

## 2021-11-22 NOTE — ANESTHESIOLOGIST PRE-PROCEDURE ATTESTATION
Pre-Procedure Patient Identification:  I am the Primary Anesthesiologist and have identified the patient on 11/22/21 at 1:51 PM.   I have confirmed the procedure(s) will be performed by the following surgeon/proceduralist Antionette Marroquin DO.

## 2021-11-22 NOTE — OP NOTE
Operative Note     Preoperative Diagnosis:   1. Right renal calculus    Postoperative Diagnosis:   1. same    Procedure:  Cystoscopy, semirigid ureteroscopy, flexible ureteroscopy, retrograde pyelogram, laser lithotripsy, stone manipulation, ureteral stent placement    Surgeon: Antionette Marroquin DO     Assistants: Talha Rivers DO     Anesthesia: General endotracheal anesthesia    Antibiotic(s): ancef, Gentamicin    Complications: None           Drains: R 6x24 ureteral stent    Specimens: right renal calculus    Estimated Blood Loss:  No blood loss documented.    Findings:   1. 5mm right renal calculus        Disposition: PACU - hemodynamically stable.    Indications of Procedure:  Patient is a 60f with a recent history of ureteral stent placement for an obstructing ureteral stone who presents for definitive treatment of her renal calculi.    Details of Procedure: Patient was identified by name and medical record number in the holding area.  Informed consent was confirmed in the chart.  Patient was transported to the operating room and placed supine on the operative table.  Bilateral lower extremity compression devices were applied and in good working order.  Perioperative antibiotics were administered.  General endotracheal anesthesia was administered.  Patient was then positioned in lithotomy with care to pad all pressure points and bony prominences.  Patient was prepped with Betadine and draped in sterile fashion.  Surgical timeout was performed and all parties were in agreement.    A 21 Samoan cystoscope was placed through the urethra ensuring to keep the lumen in center view at all times, and was advanced into the bladder, the bladder was drained.  At this time pan cystoscopy was then performed which revealed no concerning masses or lesions, or other abnormalities.  At this time our attention was turned to the left ureteral orifice where a sensor wire, and used to intubate the UO.  At this time a  semirigid ureteroscope was used to intubated the UO an ureteroscopy was perfomed as proximally as able.  No stones were seen within the ureter.  An Amplatz superstiff wire was then placed in the second port of the scope, and seen under  guidance to be in the renal pelvis, the semi rigid scope was then pushed pulled off of the 2 wires.  The sensor wire was then tagged to the drape with a hemostat.  A flexible ureteroscope was then placed over the Amplatz wire and under visualization was seen and advanced to the level of the renal pelvis.  The Amplatz wire was then removed and complete pyeloscopy was performed which revealed a 5mm right lower pole calculus. at this time a 272 µm laser fiber was placed through the ureteroscope to the level of the renal pelvis, laser lithotripsy was then used to break the stone into segments all less then 1 mm in size, the largest segment was placed in a basket and extracted for specimen.  At this time the ureteroscope was removed and pullout pyeloscopy and ureteroscopy was performed which revealed no injuries to the ureter or collecting system.  The cystoscope was then reintroduced and a 6x24JJ stent was then placed over the sensor wire, and a pusher was used to push the proximal end into the renal pelvis and distal end into the bladder with good curl seen on fluoroscopic guidance.  The wire was then removed, the bladder was drained and the cystoscope was removed.      Patient was then undraped and cleaned placed back in supine position.  Patient was awoke from anesthesia without incident and transferred to PACU in stable condition.    Antionette Marroquin DO was present and performed all critical portions of this procedure.    Plan:  3 days antibiotics, stent out in 1 week

## 2021-11-22 NOTE — PERIOPERATIVE NURSING NOTE
Patient without complaints.  D/C instructions reviewed with patient and patient home with friend

## 2021-11-22 NOTE — ANESTHESIA PROCEDURE NOTES
Airway  Start Time: 11/22/2021 2:56 PM    General Information and Staff    Performed: resident/CRNA     Indications and Patient Condition  Preoxygenated: yes  Patient position: sniffing  Mask difficulty assessment: 1 - vent by mask    Final Airway Details  Final airway type: endotracheal airway      Successful airway: ETT    Successful intubation technique: video laryngoscopy  Facilitating devices/methods: intubating stylet  Endotracheal tube insertion site: oral  Blade size: #3  ETT size (mm): 7.0  Cormack-Lehane Classification: grade I - full view of glottis  Measured from: teeth  ETT to teeth (cm): 23  Atraumatic airway insertion

## 2021-11-22 NOTE — DISCHARGE INSTRUCTIONS
Please call the office if you have any of the following:     - Fever greater than 100.4  - Pain that is not controlled by over the counter pain medications  - Inability to urinate   - Take your antibiotics as prescribed  - Call the office for your follow up appointment with Dr. Marroquin in 1-2 weeks to have your ureteral stent removed.            Laser Therapy for Kidney Stones, Care After  This sheet gives you information about how to care for yourself after your procedure. Your health care provider may also give you more specific instructions. If you have problems or questions, contact your health care provider.  What can I expect after the procedure?  After the procedure, it is common to have:  · Pain.  · A burning sensation while urinating.  · Small amounts of blood in your urine.  · A need to urinate frequently.  · Pieces of kidney stone in your urine.  · Mild discomfort when urinating that may be felt in the back. You may experience this if you have a flexible tube (stent) in your ureter.  Follow these instructions at home:    Medicines  · Take over-the-counter and prescription medicines only as told by your health care provider.  · If you were prescribed an antibiotic medicine, take it as told by your health care provider. Do not stop taking the antibiotic even if you start to feel better.  · Ask your health care provider if the medicine prescribed to you:  ? Requires you to avoid driving or using heavy machinery.  ? Can cause constipation. You may need to take actions to prevent or treat constipation, such as:  § Take over-the-counter or prescription medicines.  § Eat foods that are high in fiber, such as beans, whole grains, and fresh fruits and vegetables.  § Limit foods that are high in fat and processed sugars, such as fried or sweet foods.  Activity  · Return to your normal activities as told by your health care provider. Ask your health care provider what activities are safe for you.  · Do not drive for  24 hours if you were given a sedative during your procedure.  General instructions  · If your health care provider approves, you may take a warm bath to ease discomfort and burning.  · Drink enough fluid to keep your urine pale yellow. Your health care provider may recommend drinking two 8 oz (237 mL) glasses of water per hour for a few hours after your procedure.  · You may be asked to strain your urine to collect any stone fragments that you pass. These fragments may be tested.  · Keep all follow-up visits as told by your health care provider. This is important. If you have a stent, you will need to return to your health care provider to have the stent removed.  Contact a health care provider if you:  · Have pain or a burning feeling that lasts more than 2 days.  · Feel nauseous.  · Vomit more and more often.  · Have difficulty urinating.  · Have pain that gets worse or does not get better with medicine.  Get help right away if:  · You are unable to urinate, even if your bladder feels full.  · You have:  ? Bright red blood or blood clots in your urine.  ? More blood in your urine.  ? Severe pain or discomfort.  ? A fever or shaking chills.  ? Abdominal pain.  ? Difficulty breathing.  ? Swelling in your legs.  Summary  · After the procedure, it is common to have a burning sensation while urinating and small amounts of blood in your urine.  · Take over-the-counter and prescription medicines only as told by your health care provider.  · Drink enough fluid to keep your urine pale yellow.  · Keep all follow-up visits as told by your health care provider. This is important.  This information is not intended to replace advice given to you by your health care provider. Make sure you discuss any questions you have with your health care provider.  Document Revised: 08/29/2019 Document Reviewed: 08/29/2019  Elsevier Patient Education © 2021 [employer]

## 2021-11-22 NOTE — ANESTHESIA POSTPROCEDURE EVALUATION
Patient: Xin Melendez    Procedure Summary     Date: 11/22/21 Room / Location: [facility] OR 9 / [facility] OR    Anesthesia Start: 1446 Anesthesia Stop: 1616    Procedure: Cystoscopy Retrograde Pyelogram Ureteroscopy Laser Lithotripsy Stone manipulationStent Exchange  Conf [id number] (Right Bladder) Diagnosis:       Kidney stones      (N20.0 Kidney Stones)    Surgeons: Antionette Marroquin DO Responsible Provider: Rios Deleon MD    Anesthesia Type: general ASA Status: 3          Anesthesia Type: general  PACU Vitals  11/22/2021 1605 - 11/22/2021 1647      11/22/2021  1612 11/22/2021  1615 11/22/2021  1630       BP: 121/81 -- 131/56     Temp: 35.9 °C (96.7 °F) -- --     Pulse: 90 88 82     Resp: -- 20 22     SpO2: 97 % 100 % 97 %             Anesthesia Post Evaluation    Pain score: 0  Pain management: adequate  Patient location during evaluation: PACU  Patient participation: complete - patient participated  Level of consciousness: awake and alert  Cardiovascular status: acceptable  Airway Patency: adequate  Respiratory status: acceptable  Hydration status: acceptable  Anesthetic complications: no

## 2021-11-26 LAB
COMPN STONE: NORMAL
ORIGIN STONE: NORMAL
WT STONE: 0.01 G

## 2022-08-03 ENCOUNTER — APPOINTMENT (EMERGENCY)
Dept: RADIOLOGY | Facility: HOSPITAL | Age: 62
End: 2022-08-03
Payer: COMMERCIAL

## 2022-08-03 ENCOUNTER — HOSPITAL ENCOUNTER (INPATIENT)
Facility: HOSPITAL | Age: 62
LOS: 4 days | Discharge: ANOTHER HOSPITAL | End: 2022-08-07
Attending: EMERGENCY MEDICINE | Admitting: INTERNAL MEDICINE
Payer: COMMERCIAL

## 2022-08-03 ENCOUNTER — APPOINTMENT (INPATIENT)
Dept: RADIOLOGY | Facility: HOSPITAL | Age: 62
End: 2022-08-03
Attending: EMERGENCY MEDICINE
Payer: COMMERCIAL

## 2022-08-03 DIAGNOSIS — N95.0 POSTMENOPAUSAL BLEEDING: ICD-10-CM

## 2022-08-03 DIAGNOSIS — N20.0 KIDNEY STONE: ICD-10-CM

## 2022-08-03 DIAGNOSIS — N20.1 LEFT URETERAL STONE: ICD-10-CM

## 2022-08-03 DIAGNOSIS — R10.9 FLANK PAIN: Primary | ICD-10-CM

## 2022-08-03 DIAGNOSIS — N28.9 ACUTE RENAL INSUFFICIENCY: ICD-10-CM

## 2022-08-03 PROBLEM — D72.829 LEUCOCYTOSIS: Status: ACTIVE | Noted: 2022-08-03

## 2022-08-03 PROBLEM — N13.30 HYDROURETERONEPHROSIS: Status: ACTIVE | Noted: 2022-08-03

## 2022-08-03 PROBLEM — N12 PYELONEPHRITIS: Status: ACTIVE | Noted: 2022-08-03

## 2022-08-03 PROBLEM — N17.9 AKI (ACUTE KIDNEY INJURY) (CMS/HCC): Status: ACTIVE | Noted: 2022-08-03

## 2022-08-03 LAB
ALBUMIN SERPL-MCNC: 3.5 G/DL (ref 3.4–5)
ALP SERPL-CCNC: 62 IU/L (ref 35–126)
ALT SERPL-CCNC: 10 IU/L (ref 11–54)
ANION GAP SERPL CALC-SCNC: 9 MEQ/L (ref 3–15)
AST SERPL-CCNC: 18 IU/L (ref 15–41)
BACTERIA URNS QL MICRO: ABNORMAL /HPF
BASOPHILS # BLD: 0.12 K/UL (ref 0.01–0.1)
BASOPHILS NFR BLD: 0.9 %
BILIRUB SERPL-MCNC: 0.7 MG/DL (ref 0.3–1.2)
BILIRUB UR QL STRIP.AUTO: NEGATIVE MG/DL
BUN SERPL-MCNC: 33 MG/DL (ref 8–20)
CALCIUM SERPL-MCNC: 9.3 MG/DL (ref 8.9–10.3)
CHLORIDE SERPL-SCNC: 104 MEQ/L (ref 98–109)
CLARITY UR REFRACT.AUTO: ABNORMAL
CO2 SERPL-SCNC: 26 MEQ/L (ref 22–32)
COLOR UR AUTO: YELLOW
CREAT SERPL-MCNC: 1.6 MG/DL (ref 0.6–1.1)
DIFFERENTIAL METHOD BLD: ABNORMAL
EOSINOPHIL # BLD: 0.23 K/UL (ref 0.04–0.36)
EOSINOPHIL NFR BLD: 1.7 %
ERYTHROCYTE [DISTWIDTH] IN BLOOD BY AUTOMATED COUNT: 14 % (ref 11.7–14.4)
GFR SERPL CREATININE-BSD FRML MDRD: 32.8 ML/MIN/1.73M*2
GLUCOSE BLD-MCNC: 109 MG/DL (ref 70–99)
GLUCOSE BLD-MCNC: 126 MG/DL (ref 70–99)
GLUCOSE SERPL-MCNC: 92 MG/DL (ref 70–99)
GLUCOSE UR STRIP.AUTO-MCNC: NEGATIVE MG/DL
HCT VFR BLDCO AUTO: 39.6 % (ref 35–45)
HGB BLD-MCNC: 12.4 G/DL (ref 11.8–15.7)
HGB UR QL STRIP.AUTO: 3
HYALINE CASTS #/AREA URNS LPF: ABNORMAL /LPF
IMM GRANULOCYTES # BLD AUTO: 0.1 K/UL (ref 0–0.08)
IMM GRANULOCYTES NFR BLD AUTO: 0.7 %
KETONES UR STRIP.AUTO-MCNC: NEGATIVE MG/DL
LEUKOCYTE ESTERASE UR QL STRIP.AUTO: ABNORMAL
LYMPHOCYTES # BLD: 2 K/UL (ref 1.2–3.5)
LYMPHOCYTES NFR BLD: 14.6 %
MCH RBC QN AUTO: 27.9 PG (ref 28–33.2)
MCHC RBC AUTO-ENTMCNC: 31.3 G/DL (ref 32.2–35.5)
MCV RBC AUTO: 89.2 FL (ref 83–98)
MONOCYTES # BLD: 1.18 K/UL (ref 0.28–0.8)
MONOCYTES NFR BLD: 8.6 %
MUCOUS THREADS URNS QL MICRO: ABNORMAL /LPF
NEUTROPHILS # BLD: 10.06 K/UL (ref 1.7–7)
NEUTS SEG NFR BLD: 73.5 %
NITRITE UR QL STRIP.AUTO: NEGATIVE
NRBC BLD-RTO: 0 %
PDW BLD AUTO: 9.9 FL (ref 9.4–12.3)
PH UR STRIP.AUTO: 5.5 [PH]
PLATELET # BLD AUTO: 306 K/UL (ref 150–369)
POCT TEST: ABNORMAL
POCT TEST: ABNORMAL
POTASSIUM SERPL-SCNC: 4.3 MEQ/L (ref 3.6–5.1)
PROT SERPL-MCNC: 7.1 G/DL (ref 6–8.2)
PROT UR QL STRIP.AUTO: 1
RBC # BLD AUTO: 4.44 M/UL (ref 3.93–5.22)
RBC #/AREA URNS HPF: ABNORMAL /HPF
SARS-COV-2 RNA RESP QL NAA+PROBE: NEGATIVE
SODIUM SERPL-SCNC: 139 MEQ/L (ref 136–144)
SP GR UR REFRACT.AUTO: 1.03
SQUAMOUS URNS QL MICRO: ABNORMAL /HPF
UROBILINOGEN UR STRIP-ACNC: 0.2 EU/DL
WBC # BLD AUTO: 13.69 K/UL (ref 3.8–10.5)
WBC #/AREA URNS HPF: ABNORMAL /HPF

## 2022-08-03 PROCEDURE — 63700000 HC SELF-ADMINISTRABLE DRUG: Performed by: INTERNAL MEDICINE

## 2022-08-03 PROCEDURE — 96374 THER/PROPH/DIAG INJ IV PUSH: CPT

## 2022-08-03 PROCEDURE — 83036 HEMOGLOBIN GLYCOSYLATED A1C: CPT | Performed by: INTERNAL MEDICINE

## 2022-08-03 PROCEDURE — 81003 URINALYSIS AUTO W/O SCOPE: CPT | Performed by: EMERGENCY MEDICINE

## 2022-08-03 PROCEDURE — 99223 1ST HOSP IP/OBS HIGH 75: CPT | Performed by: INTERNAL MEDICINE

## 2022-08-03 PROCEDURE — 36415 COLL VENOUS BLD VENIPUNCTURE: CPT

## 2022-08-03 PROCEDURE — 99284 EMERGENCY DEPT VISIT MOD MDM: CPT | Mod: 25

## 2022-08-03 PROCEDURE — U0003 INFECTIOUS AGENT DETECTION BY NUCLEIC ACID (DNA OR RNA); SEVERE ACUTE RESPIRATORY SYNDROME CORONAVIRUS 2 (SARS-COV-2) (CORONAVIRUS DISEASE [COVID-19]), AMPLIFIED PROBE TECHNIQUE, MAKING USE OF HIGH THROUGHPUT TECHNOLOGIES AS DESCRIBED BY CMS-2020-01-R: HCPCS | Performed by: EMERGENCY MEDICINE

## 2022-08-03 PROCEDURE — 85025 COMPLETE CBC W/AUTO DIFF WBC: CPT | Performed by: EMERGENCY MEDICINE

## 2022-08-03 PROCEDURE — 76830 TRANSVAGINAL US NON-OB: CPT

## 2022-08-03 PROCEDURE — 96375 TX/PRO/DX INJ NEW DRUG ADDON: CPT

## 2022-08-03 PROCEDURE — 96376 TX/PRO/DX INJ SAME DRUG ADON: CPT

## 2022-08-03 PROCEDURE — G1004 CDSM NDSC: HCPCS

## 2022-08-03 PROCEDURE — 63600000 HC DRUGS/DETAIL CODE: Performed by: PHYSICIAN ASSISTANT

## 2022-08-03 PROCEDURE — 25800000 HC PHARMACY IV SOLUTIONS: Performed by: PHYSICIAN ASSISTANT

## 2022-08-03 PROCEDURE — 74018 RADEX ABDOMEN 1 VIEW: CPT

## 2022-08-03 PROCEDURE — 82040 ASSAY OF SERUM ALBUMIN: CPT | Performed by: EMERGENCY MEDICINE

## 2022-08-03 PROCEDURE — 63600000 HC DRUGS/DETAIL CODE: Performed by: HOSPITALIST

## 2022-08-03 PROCEDURE — 87086 URINE CULTURE/COLONY COUNT: CPT | Performed by: EMERGENCY MEDICINE

## 2022-08-03 PROCEDURE — 96361 HYDRATE IV INFUSION ADD-ON: CPT

## 2022-08-03 PROCEDURE — 12000000 HC ROOM AND CARE MED/SURG

## 2022-08-03 PROCEDURE — 76857 US EXAM PELVIC LIMITED: CPT

## 2022-08-03 PROCEDURE — 85025 COMPLETE CBC W/AUTO DIFF WBC: CPT

## 2022-08-03 PROCEDURE — 87040 BLOOD CULTURE FOR BACTERIA: CPT | Performed by: INTERNAL MEDICINE

## 2022-08-03 RX ORDER — DEXTROSE 40 %
15-30 GEL (GRAM) ORAL AS NEEDED
Status: DISCONTINUED | OUTPATIENT
Start: 2022-08-03 | End: 2022-08-07 | Stop reason: HOSPADM

## 2022-08-03 RX ORDER — ATORVASTATIN CALCIUM 20 MG/1
20 TABLET, FILM COATED ORAL NIGHTLY
Status: DISCONTINUED | OUTPATIENT
Start: 2022-08-03 | End: 2022-08-07 | Stop reason: HOSPADM

## 2022-08-03 RX ORDER — ONDANSETRON HYDROCHLORIDE 2 MG/ML
4 INJECTION, SOLUTION INTRAVENOUS ONCE
Status: COMPLETED | OUTPATIENT
Start: 2022-08-03 | End: 2022-08-03

## 2022-08-03 RX ORDER — HYDROMORPHONE HYDROCHLORIDE 1 MG/ML
1 INJECTION, SOLUTION INTRAMUSCULAR; INTRAVENOUS; SUBCUTANEOUS ONCE
Status: COMPLETED | OUTPATIENT
Start: 2022-08-03 | End: 2022-08-03

## 2022-08-03 RX ORDER — GABAPENTIN 300 MG/1
600 CAPSULE ORAL 3 TIMES DAILY
Status: DISCONTINUED | OUTPATIENT
Start: 2022-08-03 | End: 2022-08-06

## 2022-08-03 RX ORDER — HYDROMORPHONE HYDROCHLORIDE 1 MG/ML
0.5 INJECTION, SOLUTION INTRAMUSCULAR; INTRAVENOUS; SUBCUTANEOUS
Status: DISCONTINUED | OUTPATIENT
Start: 2022-08-03 | End: 2022-08-06

## 2022-08-03 RX ORDER — HYDROMORPHONE HYDROCHLORIDE 1 MG/ML
0.5 INJECTION, SOLUTION INTRAMUSCULAR; INTRAVENOUS; SUBCUTANEOUS ONCE
Status: COMPLETED | OUTPATIENT
Start: 2022-08-03 | End: 2022-08-03

## 2022-08-03 RX ORDER — IBUPROFEN 200 MG
16-32 TABLET ORAL AS NEEDED
Status: DISCONTINUED | OUTPATIENT
Start: 2022-08-03 | End: 2022-08-07 | Stop reason: HOSPADM

## 2022-08-03 RX ORDER — METHOCARBAMOL 750 MG/1
750 TABLET, FILM COATED ORAL 3 TIMES DAILY PRN
Status: DISCONTINUED | OUTPATIENT
Start: 2022-08-03 | End: 2022-08-07 | Stop reason: HOSPADM

## 2022-08-03 RX ORDER — CIPROFLOXACIN 2 MG/ML
400 INJECTION, SOLUTION INTRAVENOUS ONCE
Status: COMPLETED | OUTPATIENT
Start: 2022-08-03 | End: 2022-08-03

## 2022-08-03 RX ORDER — HYDRALAZINE HYDROCHLORIDE 20 MG/ML
5 INJECTION INTRAMUSCULAR; INTRAVENOUS EVERY 8 HOURS PRN
Status: DISCONTINUED | OUTPATIENT
Start: 2022-08-03 | End: 2022-08-07 | Stop reason: HOSPADM

## 2022-08-03 RX ORDER — DULOXETIN HYDROCHLORIDE 20 MG/1
20 CAPSULE, DELAYED RELEASE ORAL 2 TIMES DAILY
Status: DISCONTINUED | OUTPATIENT
Start: 2022-08-03 | End: 2022-08-07 | Stop reason: HOSPADM

## 2022-08-03 RX ORDER — INSULIN ASPART 100 [IU]/ML
6-10 INJECTION, SOLUTION INTRAVENOUS; SUBCUTANEOUS
Status: DISCONTINUED | OUTPATIENT
Start: 2022-08-03 | End: 2022-08-07 | Stop reason: HOSPADM

## 2022-08-03 RX ORDER — LEVOTHYROXINE SODIUM 75 UG/1
75 TABLET ORAL
Status: DISCONTINUED | OUTPATIENT
Start: 2022-08-04 | End: 2022-08-07 | Stop reason: HOSPADM

## 2022-08-03 RX ORDER — DEXTROSE 50 % IN WATER (D50W) INTRAVENOUS SYRINGE
25 AS NEEDED
Status: DISCONTINUED | OUTPATIENT
Start: 2022-08-03 | End: 2022-08-07 | Stop reason: HOSPADM

## 2022-08-03 RX ORDER — ONDANSETRON HYDROCHLORIDE 2 MG/ML
4 INJECTION, SOLUTION INTRAVENOUS EVERY 6 HOURS PRN
Status: DISCONTINUED | OUTPATIENT
Start: 2022-08-03 | End: 2022-08-07 | Stop reason: HOSPADM

## 2022-08-03 RX ADMIN — ONDANSETRON 4 MG: 2 INJECTION INTRAMUSCULAR; INTRAVENOUS at 12:07

## 2022-08-03 RX ADMIN — SODIUM CHLORIDE 1000 ML: 9 INJECTION, SOLUTION INTRAVENOUS at 12:06

## 2022-08-03 RX ADMIN — GABAPENTIN 600 MG: 300 CAPSULE ORAL at 20:39

## 2022-08-03 RX ADMIN — DULOXETINE 20 MG: 20 CAPSULE, DELAYED RELEASE ORAL at 20:39

## 2022-08-03 RX ADMIN — ONDANSETRON 4 MG: 2 INJECTION INTRAMUSCULAR; INTRAVENOUS at 21:44

## 2022-08-03 RX ADMIN — HYDROMORPHONE HYDROCHLORIDE 1 MG: 1 INJECTION, SOLUTION INTRAMUSCULAR; INTRAVENOUS; SUBCUTANEOUS at 12:10

## 2022-08-03 RX ADMIN — HYDROMORPHONE HYDROCHLORIDE 0.5 MG: 1 INJECTION, SOLUTION INTRAMUSCULAR; INTRAVENOUS; SUBCUTANEOUS at 15:32

## 2022-08-03 RX ADMIN — METHOCARBAMOL 750 MG: 750 TABLET ORAL at 20:39

## 2022-08-03 RX ADMIN — CIPROFLOXACIN 400 MG: 2 INJECTION, SOLUTION INTRAVENOUS at 15:24

## 2022-08-03 ASSESSMENT — ENCOUNTER SYMPTOMS
VOMITING: 0
FEVER: 0
ANAL BLEEDING: 0
NECK PAIN: 0
BACK PAIN: 0
CHEST TIGHTNESS: 0
DYSURIA: 1
CHILLS: 0
FLANK PAIN: 1
TROUBLE SWALLOWING: 0
PSYCHIATRIC NEGATIVE: 1
NAUSEA: 1
EYES NEGATIVE: 1
DIAPHORESIS: 0
DIARRHEA: 0
BLOOD IN STOOL: 0
NECK STIFFNESS: 0
WEAKNESS: 0
FATIGUE: 0
CARDIOVASCULAR NEGATIVE: 1
CONSTIPATION: 0
FACIAL SWELLING: 0
RECTAL PAIN: 0
WOUND: 0
CONSTITUTIONAL NEGATIVE: 1
SHORTNESS OF BREATH: 0
DIZZINESS: 0
RESPIRATORY NEGATIVE: 1
LIGHT-HEADEDNESS: 0
HEADACHES: 0
NEUROLOGICAL NEGATIVE: 1
ABDOMINAL PAIN: 1
PALPITATIONS: 0
HEMATURIA: 0

## 2022-08-03 ASSESSMENT — PATIENT HEALTH QUESTIONNAIRE - PHQ9
SUM OF ALL RESPONSES TO PHQ QUESTIONS 1-9: 3
SUM OF ALL RESPONSES TO PHQ9 QUESTIONS 1 & 2: 1

## 2022-08-03 NOTE — ASSESSMENT & PLAN NOTE
Ongoing for about 3 weeks now  transvaginal US - showed enlarged fibroid uterus and thickened endometrium, measuring up to 2cm  Patient reports difficulty getting a gynecology visit OP  Consult placed to gyne for biopsy- plan is for endometrial biopsy at the same time as her cystoscopy

## 2022-08-03 NOTE — ASSESSMENT & PLAN NOTE
Acute kidney Injury, Cr 1.6 on admission, secondary to post renal calculi obstruction  Baseline Cr 1.0 - 1.1    Cr 1.7, no improvement, still persistent L flank pain. Hopefully after urologic procedure her DARCY will improve.

## 2022-08-03 NOTE — ASSESSMENT & PLAN NOTE
On 78iu tresiba, SSI - held for now    Tresiba on hold, only on SSI the last few days and bl glc has been good. Continue to monitor bl glc. Once pt's appetite improves and is eating well then she will likely need long acting insulin to be restarted

## 2022-08-03 NOTE — ASSESSMENT & PLAN NOTE
ON Irbesartan-HCTZ  Hold both meds for now, given DARCY on admission  Can be on IV hydralazine prn temporarily till DARCY resolves  Monitor BP per unit protocol

## 2022-08-03 NOTE — ASSESSMENT & PLAN NOTE
Left sided ureteral stone, present on admission, with associated hydroureteronephrosis  No medical barriers to surgery  RCRI - 1 point, 0.9% risk of perioperative cardiac events. At an acceptable risk for intervention, deemed necessary by urology    Pt went to OR for ureteral stent. However procedure unable to be performed due to pt's body habitus and the lack of bariatric equipment here. Urologist Dr. Tinsley advised me to transfer patient. Discussed with urologist as well as transfer center. Pt is accepted by Dr. Ang at [facility], plan for transfer 8/7 for urologic procedure 8/8

## 2022-08-03 NOTE — ED PROVIDER NOTES
HPI    Chief Complaint   Patient presents with   • Abdominal Pain        HPI   61-year-old female with past medical history significant for hypertension, hyperlipidemia, hypothyroidism, osteoarthritis, obesity and type 2 diabetes as well as history of kidney stones with UTI and infected/obstructed stone -states last kidney stone was November 2021, had infected stone and went to the OR with urology at that time, saw urology 1 time for follow-up after that but has not seen them since, presents for evaluation of flank pain.  Patient states that approximately 1 week ago she developed some burning with urination as well as urinary frequency.  She spoke with her primary care doctor over the phone who prescribed her Levaquin, she states that she is still on this, day 6 out of 7, and the UTI-like symptoms improved, however last night she developed a sharp aching pain in her left flank.  She does states she had some mild pain on the right side but today the pain is on the left.  She states it is traveling around the side of the left abdomen.  She was given 30 mg of Toradol by EMS prior to arrival and states that this did improve her symptoms.  She does states she was nauseated last night and took Zofran last night, admits to some continued nausea today but has not had any vomiting.  She denies any fever or chills.  She is denying any chest pain, chest pressure, shortness of breath, palpitations, tachycardia, headache, dizziness or syncope.  Patient has history of uterine fibroid, she states that she has not had any vaginal bleeding for 7 years and last night she states she noticed a small amount of vaginal bleeding with a small clot as well as some darker/brown blood from the vagina.  She states she has not been seen by an OB/GYN in some time.  Denies any rectal pain or bleeding, dark or black stools.  Denying any blood in her urine that she has noticed.  Past surgical history significant for appendectomy, oophorectomy and  ".  She states she woke up around 7:30 AM and urinated \"a small amount\" but has not urinated since, however she states she does not feel like her bladder is full and that she needs to urinate and cannot, she states \"it just feels like I do not have any urine to pee.\"  Denying any injury, trauma or fall.  Past Medical History:   Diagnosis Date   • COVID-19 vaccine series completed     Moderna Boster 10/2021   • CTS (carpal tunnel syndrome)    • DJD (degenerative joint disease)    • Edema    • Frozen shoulder    • HL (hearing loss)    • Hypertension    • Hypothyroidism    • Kidney stones    • Lipid disorder    • Lymphedema    • Obesity    • Sciatica    • Stasis dermatitis    • Type 2 diabetes mellitus (CMS/HCC)          Past Surgical History:   Procedure Laterality Date   • APPENDECTOMY     •  SECTION  1999   • CYSTOSCOPY  2021    CYSTOSCOPY,INSERT URETERAL STENT   • LAPAROTOMY EXPLORATORY     • OOPHORECTOMY      right side secondary to torsion       Family History   Problem Relation Age of Onset   • Heart attack Biological Father        Social History     Tobacco Use   • Smoking status: Former Smoker     Quit date:      Years since quittin.6   • Smokeless tobacco: Never Used   • Tobacco comment: quit    Substance Use Topics   • Alcohol use: Not Currently   • Drug use: Never       Systems Reviewed from Nursing Triage:                 Review of Systems   Constitutional: Negative.  Negative for chills, diaphoresis, fatigue and fever.   HENT: Negative.  Negative for facial swelling and trouble swallowing.    Eyes: Negative.  Negative for visual disturbance.   Respiratory: Negative.  Negative for chest tightness and shortness of breath.    Cardiovascular: Negative.  Negative for chest pain and palpitations.   Gastrointestinal: Positive for abdominal pain and nausea. Negative for anal bleeding, blood in stool, constipation, diarrhea, rectal pain and vomiting.   Genitourinary: Positive " for dysuria, flank pain and vaginal bleeding. Negative for hematuria, urgency and vaginal discharge.   Musculoskeletal: Negative for back pain, neck pain and neck stiffness.   Skin: Negative.  Negative for rash and wound.   Neurological: Negative.  Negative for dizziness, syncope, weakness, light-headedness and headaches.   Psychiatric/Behavioral: Negative.  Negative for suicidal ideas.            ED Triage Vitals   Temp Heart Rate Resp BP SpO2   08/03/22 1142 08/03/22 1142 08/03/22 1142 08/03/22 1146 08/03/22 1142   36.4 °C (97.6 °F) 85 20 137/63 97 %      Temp Source Heart Rate Source Patient Position BP Location FiO2 (%) (Set)   08/03/22 1347 -- 08/03/22 1142 08/03/22 1142 --   Temporal  Lying Right forearm        Vitals:    08/03/22 1146 08/03/22 1347 08/03/22 1527 08/03/22 1726   BP: 137/63 (!) 119/56 125/60 (!) 100/57   BP Location:  Right forearm Right forearm Right forearm   Patient Position:  Lying Lying Sitting   Pulse:  83 79 80   Resp:  16 20 18   Temp:  36.1 °C (97 °F) 36.1 °C (97 °F)    TempSrc:  Temporal Temporal    SpO2:  100% 100% 98%   Weight:       Height:                             Physical Exam  Constitutional:       Appearance: Normal appearance. She is not toxic-appearing or diaphoretic.      Comments: Appears uncomfortable, holding left side   HENT:      Head: Normocephalic.      Mouth/Throat:      Mouth: Mucous membranes are moist.      Pharynx: Oropharynx is clear.   Eyes:      Conjunctiva/sclera: Conjunctivae normal.      Pupils: Pupils are equal, round, and reactive to light.   Cardiovascular:      Rate and Rhythm: Normal rate.      Pulses: Normal pulses.   Pulmonary:      Effort: Pulmonary effort is normal. No respiratory distress.      Breath sounds: Normal breath sounds.   Abdominal:      Palpations: Abdomen is soft.      Tenderness: There is no guarding.      Comments: She is tender palpation to the left flank and left lower quadrant diffusely, nontender ovation of the upper abdomen  and epigastric region as well as in the right lower quadrant, CVA tenderness positive on the left and negative on the right, skin is normal in appearance without erythema or rash, bowel sounds are present and they are not high-pitched or hyperactive in nature   Musculoskeletal:         General: No swelling or deformity.      Cervical back: Neck supple. No rigidity or tenderness.   Skin:     General: Skin is warm.      Capillary Refill: Capillary refill takes less than 2 seconds.   Neurological:      General: No focal deficit present.      Mental Status: She is alert and oriented to person, place, and time.   Psychiatric:         Behavior: Behavior normal.              ULTRASOUND PELVIS LIMITED TRANSABDOMINAL ONLY   Final Result   IMPRESSION:   1. Enlarged fibroid uterus.   2. Thickened endometrium measuring up to 2 cm. Endometrial biopsy may be   considered.      In accordance with [place] Act 112,  the patient will receive a letter notifying them   to follow up with their physician.         ENDOMETRIAL THICKNESS MEASUREMENTS FOR RECOMMENDING BIOPSY      Premenopausal:   Endometrial measurement greater than or equal to 18 mm (after repeat ultrasound   in proliferative phase)      Postmenopausal with vaginal bleeding, no HRT:   Endometrial measurement greater than 5 mm      Postmenopausal with vaginal bleeding, on HRT:   Endometrial measurement greater than 5 mm      Postmenopausal without vaginal bleeding, no HRT:   No consensus: greater than or equal to 8 mm or greater than 11 mm      Postmenopausal without vaginal bleeding or taking tamoxifen or HRT:   No consensus: greater than or equal to 8 mm or greater than 11 mm      Valerie et al.  ARRS 2016      ULTRASOUND PELVIS TRANSVAGINAL ONLY   Final Result   IMPRESSION:   1. Enlarged fibroid uterus.   2. Thickened endometrium measuring up to 2 cm. Endometrial biopsy may be   considered.      [place] 112,  the patient will receive a letter notifying them    to follow up with their physician.         ENDOMETRIAL THICKNESS MEASUREMENTS FOR RECOMMENDING BIOPSY      Premenopausal:   Endometrial measurement greater than or equal to 18 mm (after repeat ultrasound   in proliferative phase)      Postmenopausal with vaginal bleeding, no HRT:   Endometrial measurement greater than 5 mm      Postmenopausal with vaginal bleeding, on HRT:   Endometrial measurement greater than 5 mm      Postmenopausal without vaginal bleeding, no HRT:   No consensus: greater than or equal to 8 mm or greater than 11 mm      Postmenopausal without vaginal bleeding or taking tamoxifen or HRT:   No consensus: greater than or equal to 8 mm or greater than 11 mm      Valerie et al.  ARRS 2016         CT ABDOMEN PELVIS WITHOUT IV CONTRAST   Final Result   IMPRESSION:   4 mm obstructing calculus in the left proximal ureter with moderate resultant   left hydroureteronephrosis. Asymmetric left perinephric stranding, correlate   with urinalysis.   Additional nonobstructing right renal calculi are also noted.   Cholelithiasis without CT findings of acute cholecystitis.      X-RAY ABDOMEN 1 VIEW    (Results Pending)       Labs Reviewed   CBC AND DIFF - Abnormal       Result Value    WBC 13.69 (*)     RBC 4.44      Hemoglobin 12.4      Hematocrit 39.6      MCV 89.2      MCH 27.9 (*)     MCHC 31.3 (*)     RDW 14.0      Platelets 306      MPV 9.9      Differential Type Auto      nRBC 0.0      Immature Granulocytes 0.7      Neutrophils 73.5      Lymphocytes 14.6      Monocytes 8.6      Eosinophils 1.7      Basophils 0.9      Immature Granulocytes, Absolute 0.10 (*)     Neutrophils, Absolute 10.06 (*)     Lymphocytes, Absolute 2.00      Monocytes, Absolute 1.18 (*)     Eosinophils, Absolute 0.23      Basophils, Absolute 0.12 (*)    COMPREHENSIVE METABOLIC PANEL - Abnormal    Sodium 139      Potassium 4.3      Chloride 104      CO2 26      BUN 33 (*)     Creatinine 1.6 (*)     Glucose 92      Calcium 9.3      AST  (SGOT) 18      ALT (SGPT) 10 (*)     Alkaline Phosphatase 62      Total Protein 7.1      Albumin 3.5      Bilirubin, Total 0.7      eGFR 32.8 (*)     Anion Gap 9     UA REFLEX CULTURE (MACROSCOPIC) - Abnormal    Color, Urine Yellow      Clarity, Urine Cloudy (*)     Specific Gravity, Urine 1.030      pH, Urine 5.5      Leukocyte Esterase Trace (*)     Nitrite, Urine Negative      Protein, Urine +1 (*)     Glucose, Urine Negative      Ketones, Urine Negative      Urobilinogen, Urine 0.2      Bilirubin, Urine Negative      Blood, Urine +3 (*)    UA MICROSCOPIC (UCU) - Abnormal    RBC, Urine Too Numerous To Count (*)     WBC, Urine 4 TO 10 (*)     Squamous Epithelial Rare (*)     Hyaline Cast 0 TO 2 (*)     Bacteria, Urine Rare (*)     MUCUSUA Rare (*)    POCT GLUCOSE (BEAKER) - Abnormal    POCT Bedside Glucose 109 (*)     POC Test POC     URINE CULTURE   BLOOD CULTURE   BLOOD CULTURE   SARS-COV-2 (COVID 19), PCR    Narrative:     The following orders were created for panel order SARS-CoV-2 (COVID-19), PCR Nasopharynx.  Procedure                               Abnormality         Status                     ---------                               -----------         ------                     SARS-CoV-2 (COVID-19), P...[[id number]]                      In process                   Please view results for these tests on the individual orders.   SARS-COV-2 (COVID 19), PCR (PERF)   URINALYSIS REFLEX CULTURE (ED AND OUTPATIENT ONLY)    Narrative:     The following orders were created for panel order UA with reflex culture.  Procedure                               Abnormality         Status                     ---------                               -----------         ------                     UA Reflex to Culture (Ma...[[id number]]  Abnormal            Final result               UA Microscopic[[id number]]               Abnormal            Final result                 Please view results for these tests on the individual  orders.   POCT GLUCOSE       ULTRASOUND PELVIS LIMITED TRANSABDOMINAL ONLY   Final Result   IMPRESSION:   1. Enlarged fibroid uterus.   2. Thickened endometrium measuring up to 2 cm. Endometrial biopsy may be   considered.      [place] 112,  the patient will receive a letter notifying them   to follow up with their physician.         ENDOMETRIAL THICKNESS MEASUREMENTS FOR RECOMMENDING BIOPSY      Premenopausal:   Endometrial measurement greater than or equal to 18 mm (after repeat ultrasound   in proliferative phase)      Postmenopausal with vaginal bleeding, no HRT:   Endometrial measurement greater than 5 mm      Postmenopausal with vaginal bleeding, on HRT:   Endometrial measurement greater than 5 mm      Postmenopausal without vaginal bleeding, no HRT:   No consensus: greater than or equal to 8 mm or greater than 11 mm      Postmenopausal without vaginal bleeding or taking tamoxifen or HRT:   No consensus: greater than or equal to 8 mm or greater than 11 mm      Valerie et al.  ARRS 2016      ULTRASOUND PELVIS TRANSVAGINAL ONLY   Final Result   IMPRESSION:   1. Enlarged fibroid uterus.   2. Thickened endometrium measuring up to 2 cm. Endometrial biopsy may be   considered.      [place] Act 112,  the patient will receive a letter notifying them   to follow up with their physician.         ENDOMETRIAL THICKNESS MEASUREMENTS FOR RECOMMENDING BIOPSY      Premenopausal:   Endometrial measurement greater than or equal to 18 mm (after repeat ultrasound   in proliferative phase)      Postmenopausal with vaginal bleeding, no HRT:   Endometrial measurement greater than 5 mm      Postmenopausal with vaginal bleeding, on HRT:   Endometrial measurement greater than 5 mm      Postmenopausal without vaginal bleeding, no HRT:   No consensus: greater than or equal to 8 mm or greater than 11 mm      Postmenopausal without vaginal bleeding or taking tamoxifen or HRT:   No consensus: greater than or  equal to 8 mm or greater than 11 mm      Valerie et al.  ARRS 2016         CT ABDOMEN PELVIS WITHOUT IV CONTRAST   Final Result   IMPRESSION:   4 mm obstructing calculus in the left proximal ureter with moderate resultant   left hydroureteronephrosis. Asymmetric left perinephric stranding, correlate   with urinalysis.   Additional nonobstructing right renal calculi are also noted.   Cholelithiasis without CT findings of acute cholecystitis.      X-RAY ABDOMEN 1 VIEW    (Results Pending)         Procedures    Final diagnoses:   [R10.9] Flank pain   [N20.0] Kidney stone   [N28.9] Acute renal insufficiency       ED Course & [facility]     ED Course as of 08/03/22 1751   Wed Aug 03, 2022   1200 Mildly elevated white blood cell count however no evidence of sepsis, no bandemia, no tachycardia, afebrile.  Will order IV Cipro as previous urine cultures were positive for E. coli, however attempting to obtain a urinalysis prior to beginning IV antibiotics. [ES]   1255 4 mm obstructing calculus in the left proximal ureter with moderate resultant  left hydroureteronephrosis - will consult urology - still need urine sample, not retaining, receiving IV fluid and will attempt again [ES]   1333 BUN and creatinine are elevated consistent with acute renal insufficiency and CT findings of obstructing calculus. Patient is receiving IV fluid, she still feels as though she cannot give urine sample, discussed cath urine which patient states she is comfortable with [ES]   1411 Urinalysis is positive for trace leukocytes and 4-10 white blood cells, previously when she had sepsis from UTI she had positive leukocytes, nitrates and too numerous to count white blood cells, she also had a white blood cell count of 19.6 at that time [ES]      ED Course User Index  [ES] Jeanne Abreu PA C           [facility]    5:51 PM    Impression: Atraumatic left flank pain with history of UTI/kidney stone    Plan: IV, UA, labs, CT abdomen/pelvis, IV  fluids/Zofran/pain medication, rule out obstructed stone/infection  No indication of SIRS or sepsis on arrival, no tachycardia, afebrile.  She does reportedly have history of positive blood cultures with E. coli secondary to UTI causing sepsis last year, however at that time she was febrile and tachycardic, she does not present with similar symptoms today, no indication for blood cultures at this time.  PREVIOUS BLOOD CULTURES AND URINE CULTURES POSITIVE FOR E. COLI BOTH SUSCEPTIBLE TO CEFTRIAXONE AND CIPRO, SHE HAS ANGIOEDEMA FROM CEPHALOSPORINS SO WE WILL USE IV CIPRO.  Unable to urinate since 7:30 AM - bladder scan performed with trace urine in the bladder, no retention  Concerns regarding patient's vaginal bleeding that began last night after 7 years without menstruation.  At this time she is very uncomfortable, will evaluate for kidney stone and obtain CT first, however I did explain that we will likely need to get a pelvic ultrasound and further evaluate this bleeding when her pain is better controlled.  Regardless she will need OB/GYN referral and likely endometrial biopsy.    Vital Signs Review: Vital signs have been reviewed. The oxygen saturation is  SpO2: 97 % which is within normal limits.    Spoke with on-call urology, request to keep patient n.p.o. at midnight for possible stent tomorrow.    Admit to [facility].  Patient is lying in bed in no acute distress at this time and is comfortable with this plan.    Received notification that patient is actually a patient of academic urology, I have paged that group, discussed patient case, requesting KUB x-ray and n.p.o. at midnight order placed.    HOPE Gonsales  8/3/2022          This document was created using dragon dictation software.  There might be some typographical errors due to this technology.     Jeanne Abreu PA C  08/03/22 [id number]

## 2022-08-03 NOTE — ASSESSMENT & PLAN NOTE
Wbc 13.69k on admission, secondary to pyelonephritis and ureteral stones  Did not meet sepsis criteria on admission  Check blood cultures  IV Ciprofloxacin given in the ED - given h/o cephalexin allergies

## 2022-08-03 NOTE — ED ATTESTATION NOTE
Procedures  Physical Exam  Review of Systems    8/3/91771:49 PM  I have personally seen and examined the patient.  I reviewed and agree with the PA/NP/Resident's assessment and plan of care.    My examination, assessment, and plan of care of Xin Melendez is as follows:  The patient presents with left flank pain with nausea.  This is a 61-year-old female with history of hypertension, hyperlipidemia, morbid obesity and type 2 diabetes along with kidney stones and urinary tract infection in the past.  She has had a recent fever with difficulty urinating and her doctor placed her on Levaquin, 500 mg for 1 week.  She developed pain in her left flank last night which is waxing and waning and causing her to be nauseo she believes she has a kidney stone.us.  Her symptoms are reminiscent.  She also has intermittent vaginal bleeding from bright red blood to brown blood over the past week.  She has a history of a fibroid uterus.  She is menopausal for the past 7 years.  Exam: The patient is alert in no acute distress.  There is no left CVA tenderness.  There is no abdominal tenderness.  The patient has already received IV Dilaudid when I am seeing her.  Impression/Plan: The patient was evaluated with CAT scan of the abdomen and pelvis revealing a 4 mm proximal left ureter stone with hydronephrosis and perinephric stranding.  There is also some calcifications in the fibroid and there are large gallstones.  The patient is going for ultrasound of the pelvis regarding her vaginal bleeding and lab work is being performed including urinalysis.  Her creatinine is 1.6 and it was 1.0 about 8 months ago.  Ultrasound of the pelvis revealed fibroid uterus and then was a 2 cm thickened endometrium.  Radiology is recommending endometrial biopsy.  The patient may have a urinary tract infection in addition to this obstructing stone on the left.  She will be admitted for IV antibiotics and further evaluation and treatment.    Vital Sign  Review: Vital signs have been ordered and reviewed. The oxygen saturation is 97% on room air, normal     I was physically present for the key/critical portions of the following procedures: None    This document was created using dragon dictation software.  There might be some typographical errors due to this technology.     Gael Mac, DO  08/03/22 [id number]

## 2022-08-03 NOTE — H&P
Hospital Medicine Service -  History & Physical        CHIEF COMPLAINT   Left sided abdominal pain     HISTORY OF PRESENT ILLNESS      Xin Melendez is a 61 y.o. female with a past medical history of HTN, Type 2 DM, hypothyroidism, who presents to [facility] ER on 8/3/2022 with left sided abdominal pain, located in her left flank, sharp, radiating to the down, associated with nausea but no fevers or vomiting. Her symptoms were preceded by a 1 week history of dysuria and urinary frequency. She was placed on a 7 day course of levaquin, which improved her urinary symptoms but she then developed left flank pain. All other ROS negative and noncontributory.    She has a history obstructing renal stones. She underwent cystoscopy, laser lithotripsy, ureteral stent placement on 11/22/22 for management of right renal calculus    Patient also reports postmenopausal bleeding, which started about 3 weeks ago. She has a history of fibroids and attributed the bleeds to fibroids, she reports difficulty getting gynecologist appointment outpatient.    On presentation, she was hemodynamically stable. Labs showed Cr 1.6, BUN 33, WBC 13.69, UA was cloudy, with 4-10 wbc, rare bacteria. Imaging CT abd showed left sided 4mm ureteral obstructing calculus with resultant moderate left hydroureteronephrosis and assymmetric perinephric stranding, multiple non-obstructing right renal calculi, cholelithiasis without acute cholecystitis.    She is being admitted for evaluation of obstructing ureteral calculus    PAST MEDICAL AND SURGICAL HISTORY      Past Medical History:   Diagnosis Date   • COVID-19 vaccine series completed     Moderna Boster 10/2021   • CTS (carpal tunnel syndrome)    • DJD (degenerative joint disease)    • Edema    • Frozen shoulder    • HL (hearing loss)    • Hypertension    • Hypothyroidism    • Kidney stones    • Lipid disorder    • Lymphedema    • Obesity    • Sciatica    • Stasis dermatitis    • Type 2 diabetes  mellitus (CMS/HCC)        Past Surgical History:   Procedure Laterality Date   • APPENDECTOMY     •  SECTION  1999   • CYSTOSCOPY  2021    CYSTOSCOPY,INSERT URETERAL STENT   • LAPAROTOMY EXPLORATORY     • OOPHORECTOMY      right side secondary to torsion       MEDICATIONS      Prior to Admission medications    Medication Sig Start Date End Date Taking? Authorizing Provider   ascorbic acid (VITAMIN C ORAL) Take 1 tablet by mouth daily.      Fred Dow MD   aspirin 81 mg enteric coated tablet Take 81 mg by mouth daily.    Fred Dow MD   cholecalciferol, vitamin D3, (VITAMIN D3 ORAL) Take 1 tablet by mouth daily.      Fred Dow MD   DULoxetine 20 mg capsule Take 20 mg by mouth 2 (two) times a day.   21   Fred Dow MD   exenatide microspheres 2 mg/0.85 mL auto-injector Inject 1 Dose under the skin once a week on Friday. 21   Fred Dow MD   gabapentin 600 mg tablet Take 600 mg by mouth 3 (three) times a day.   21   Fred Dow MD   insulin aspart U-100 100 unit/mL (3 mL) pen Inject 0-100 Units under the skin 3 (three) times a day before meals. Per sliding scale based on calorie intake by patient  10/20/21   Fred Dow MD   insulin degludec U-200 CONC (TRESIBA FLEXTOUCH U-200) 200 unit/mL (3 mL) pen Inject 78 Units under the skin every evening.   3/30/21   Fred Dow MD   irbesartan-hydrochlorothiazide 150-12.5 mg per tablet Take 1 tablet by mouth daily.   10/8/21   Fred Dow MD   levothyroxine 75 mcg tablet Take 75 mcg by mouth daily.    Fred Dow MD   meloxicam 15 mg tablet Take 15 mg by mouth daily.   10/20/21   Fred Dow MD   methocarbamoL 750 mg tablet Take 750 mg by mouth 3 (three) times a day as needed. Always takes in am but doesn't always do tid  21   Fred Dow MD   ONETOUCH ULTRA TEST strip TEST AS DIRECTED 4 TO 5 TIMES DAILY  10/29/21   ProviderFred MD   simvastatin 40 mg tablet Take 40 mg by mouth daily.   20   Fred Dow MD       ALLERGIES      Cephalexin and Adhesive tape-silicones    FAMILY HISTORY      Family History   Problem Relation Age of Onset   • Heart attack Biological Father        SOCIAL HISTORY      Social History     Socioeconomic History   • Marital status:      Spouse name: None   • Number of children: 1   • Years of education: None   • Highest education level: None   Tobacco Use   • Smoking status: Former Smoker     Quit date:      Years since quittin.6   • Smokeless tobacco: Never Used   • Tobacco comment: quit    Substance and Sexual Activity   • Alcohol use: Not Currently   • Drug use: Never   • Sexual activity: Defer   Social History Narrative    Lives with son in a 2 story home has a chair lift     Social Determinants of Health     Food Insecurity: No Food Insecurity   • Worried About Running Out of Food in the Last Year: Never true   • Ran Out of Food in the Last Year: Never true       REVIEW OF SYSTEMS      All other systems reviewed and negative except as noted in HPI    PHYSICAL EXAMINATION      Temp:  [36.1 °C (97 °F)-36.4 °C (97.6 °F)] 36.1 °C (97 °F)  Heart Rate:  [79-85] 80  Resp:  [16-20] 18  BP: (100-137)/(56-63) 100/57  Body mass index is 59.34 kg/m².    GEN: well-developed and well-nourished; In acute painful distress  HEENT: normocephalic; atraumatic  NECK: no JVD; no bruits  CARDIO: regular rate and rhythm; no murmurs or rubs  RESP: clear to auscultation bilaterally; no rales, rhonchi, or wheezes  ABD: soft, non-distended, L flank/CVA tenderness  EXT: no cyanosis, clubbing, or edema  SKIN: clean, dry, warm, and intact  MUSCULOSKELETAL: no injury or deformity  NEURO: alert and oriented x 3; nonfocal  BEHAVIOR/EMOTIONAL: appropriate; cooperative    LABS / IMAGING / EKG        Labs  Lab Results   Component Value Date    GLUCOSE 92 2022    CALCIUM 9.3  08/03/2022     08/03/2022    K 4.3 08/03/2022    CO2 26 08/03/2022     08/03/2022    BUN 33 (H) 08/03/2022    CREATININE 1.6 (H) 08/03/2022     Lab Results   Component Value Date    WBC 13.69 (H) 08/03/2022    HGB 12.4 08/03/2022    HCT 39.6 08/03/2022    MCV 89.2 08/03/2022     08/03/2022     Lab Results   Component Value Date    ALBUMIN 3.5 08/03/2022    BILITOT 0.7 08/03/2022    ALKPHOS 62 08/03/2022    AST 18 08/03/2022    ALT 10 (L) 08/03/2022    PROTEIN 7.1 08/03/2022       Imaging  CT ABDOMEN PELVIS WITHOUT IV CONTRAST    Result Date: 8/3/2022  IMPRESSION: 4 mm obstructing calculus in the left proximal ureter with moderate resultant left hydroureteronephrosis. Asymmetric left perinephric stranding, correlate with urinalysis. Additional nonobstructing right renal calculi are also noted. Cholelithiasis without CT findings of acute cholecystitis.    ULTRASOUND PELVIS TRANSVAGINAL ONLY    Result Date: 8/3/2022  IMPRESSION: 1. Enlarged fibroid uterus. 2. Thickened endometrium measuring up to 2 cm. Endometrial biopsy may be considered. In accordance with [place] Act 112,  the patient will receive a letter notifying them to follow up with their physician. ENDOMETRIAL THICKNESS MEASUREMENTS FOR RECOMMENDING BIOPSY Premenopausal: Endometrial measurement greater than or equal to 18 mm (after repeat ultrasound in proliferative phase) Postmenopausal with vaginal bleeding, no HRT: Endometrial measurement greater than 5 mm Postmenopausal with vaginal bleeding, on HRT: Endometrial measurement greater than 5 mm Postmenopausal without vaginal bleeding, no HRT: No consensus: greater than or equal to 8 mm or greater than 11 mm Postmenopausal without vaginal bleeding or taking tamoxifen or HRT: No consensus: greater than or equal to 8 mm or greater than 11 mm Valerie et al.  ARRS 2016     ULTRASOUND PELVIS LIMITED TRANSABDOMINAL ONLY    Result Date: 8/3/2022  IMPRESSION: 1. Enlarged fibroid uterus. 2. Thickened  endometrium measuring up to 2 cm. Endometrial biopsy may be considered. In accordance with [place] Act 112,  the patient will receive a letter notifying them to follow up with their physician. ENDOMETRIAL THICKNESS MEASUREMENTS FOR RECOMMENDING BIOPSY Premenopausal: Endometrial measurement greater than or equal to 18 mm (after repeat ultrasound in proliferative phase) Postmenopausal with vaginal bleeding, no HRT: Endometrial measurement greater than 5 mm Postmenopausal with vaginal bleeding, on HRT: Endometrial measurement greater than 5 mm Postmenopausal without vaginal bleeding, no HRT: No consensus: greater than or equal to 8 mm or greater than 11 mm Postmenopausal without vaginal bleeding or taking tamoxifen or HRT: No consensus: greater than or equal to 8 mm or greater than 11 mm Valerie et al.  ARRS 2016      ECG/Telemetry  I have independently reviewed the ECG. No significant findings.    ASSESSMENT AND PLAN           Postmenopausal bleeding  Assessment & Plan  Ongoing for about 3 weeks now  transvaginal US - showed enlarged fibroid uterus and thickened endometrium, measuring up to 2cm  Patient reports difficulty getting a gynecology visit OP  Consult placed to gyne for biopsy      Leucocytosis  Assessment & Plan  Wbc 13.69k on admission, secondary to pyelonephritis and ureteral stones  Did not meet sepsis criteria on admission  Check blood cultures  IV Ciprofloxacin given in the ED - given h/o cephalexin allergies    Hydroureteronephrosis  Assessment & Plan  Left sided hydroureteronephrosis - secondary to ureteral calculus  For stent placement tomorrow per urology    Pyelonephritis  Assessment & Plan  Acute pyelonephritis Noted on CT scan, coupled with flank pain  Check blood cultures, not checked in the ED  Allergic to cephalexin, recently on levaquin outpatient  Started on IV ciprofloxacin by the ED  Unclear if her allergy to cephalexin extends to all penicillins and cephalosporins, would continue with  ciprofloxacin for now, defer abx changes/coverage to ID  Trend CBC and temp curve  Pain control    DARCY (acute kidney injury) (CMS/[facility])  Assessment & Plan  Acute kidney Injury, Cr 1.6 on admission, secondary to post renal calculi obstruction  Baseline Cr 1.0 - 1.1  Would need urological intervention   Trend BMP      Left ureteral stone  Assessment & Plan  Left sided ureteral stone, present on admission, with associated hydroureteronephrosis  Urology consulted, planning for stent placement tomorrow  No medical barriers to surgery  RCRI - 1 point, 0.9% risk of perioperative cardiac events. At an acceptable risk for intervention, deemed necessary by urology    Hypothyroidism  Assessment & Plan  On levothyroxine    Hyperlipidemia  Assessment & Plan  On simvastatin    Type 2 diabetes mellitus (CMS/[facility])  Assessment & Plan  On 78iu tresiba, SSI - held for now  Check updated a1c  On SSI and POCT for now while NPO  Resume home meds after urological intervention tomorrow    HTN (hypertension)  Assessment & Plan  ON Irbesartan-HCTZ  Hold both meds for now, given DARCY on admission  Can be on IV hydralazine prn temporarily till DARCY resolves  Monitor BP per unit protocol    Morbid obesity with BMI of 50.0-59.9, adult (CMS/[facility])  Assessment & Plan  Dietary and lifestyle counseling       VTE Assessment: [place] VTE Score: 2  Code Status: Full Code  Estimated discharge date:      Akin Galan MD  8/3/2022

## 2022-08-04 LAB
ANION GAP SERPL CALC-SCNC: 8 MEQ/L (ref 3–15)
BACTERIA UR CULT: NORMAL
BUN SERPL-MCNC: 35 MG/DL (ref 8–20)
CALCIUM SERPL-MCNC: 8.9 MG/DL (ref 8.9–10.3)
CHLORIDE SERPL-SCNC: 103 MEQ/L (ref 98–109)
CO2 SERPL-SCNC: 27 MEQ/L (ref 22–32)
CREAT SERPL-MCNC: 1.6 MG/DL (ref 0.6–1.1)
ERYTHROCYTE [DISTWIDTH] IN BLOOD BY AUTOMATED COUNT: 14.2 % (ref 11.7–14.4)
EST. AVERAGE GLUCOSE BLD GHB EST-MCNC: 111 MG/DL
GFR SERPL CREATININE-BSD FRML MDRD: 32.8 ML/MIN/1.73M*2
GLUCOSE BLD-MCNC: 104 MG/DL (ref 70–99)
GLUCOSE BLD-MCNC: 109 MG/DL (ref 70–99)
GLUCOSE BLD-MCNC: 109 MG/DL (ref 70–99)
GLUCOSE BLD-MCNC: 111 MG/DL (ref 70–99)
GLUCOSE SERPL-MCNC: 136 MG/DL (ref 70–99)
HBA1C MFR BLD HPLC: 5.5 %
HCT VFR BLDCO AUTO: 37.4 % (ref 35–45)
HGB BLD-MCNC: 11.8 G/DL (ref 11.8–15.7)
MCH RBC QN AUTO: 28.3 PG (ref 28–33.2)
MCHC RBC AUTO-ENTMCNC: 31.6 G/DL (ref 32.2–35.5)
MCV RBC AUTO: 89.7 FL (ref 83–98)
PDW BLD AUTO: 9.7 FL (ref 9.4–12.3)
PLATELET # BLD AUTO: 291 K/UL (ref 150–369)
POCT TEST: ABNORMAL
POTASSIUM SERPL-SCNC: 3.9 MEQ/L (ref 3.6–5.1)
RBC # BLD AUTO: 4.17 M/UL (ref 3.93–5.22)
SODIUM SERPL-SCNC: 138 MEQ/L (ref 136–144)
WBC # BLD AUTO: 12.19 K/UL (ref 3.8–10.5)

## 2022-08-04 PROCEDURE — 85027 COMPLETE CBC AUTOMATED: CPT | Performed by: INTERNAL MEDICINE

## 2022-08-04 PROCEDURE — 63700000 HC SELF-ADMINISTRABLE DRUG: Performed by: INTERNAL MEDICINE

## 2022-08-04 PROCEDURE — 63600000 HC DRUGS/DETAIL CODE: Performed by: HOSPITALIST

## 2022-08-04 PROCEDURE — 99233 SBSQ HOSP IP/OBS HIGH 50: CPT | Performed by: INTERNAL MEDICINE

## 2022-08-04 PROCEDURE — 80048 BASIC METABOLIC PNL TOTAL CA: CPT | Performed by: INTERNAL MEDICINE

## 2022-08-04 PROCEDURE — 36415 COLL VENOUS BLD VENIPUNCTURE: CPT | Performed by: INTERNAL MEDICINE

## 2022-08-04 PROCEDURE — 12000000 HC ROOM AND CARE MED/SURG

## 2022-08-04 RX ORDER — CIPROFLOXACIN 500 MG/1
500 TABLET ORAL EVERY 12 HOURS
Status: DISCONTINUED | OUTPATIENT
Start: 2022-08-04 | End: 2022-08-05

## 2022-08-04 RX ADMIN — ONDANSETRON 4 MG: 2 INJECTION INTRAMUSCULAR; INTRAVENOUS at 04:02

## 2022-08-04 RX ADMIN — GABAPENTIN 600 MG: 300 CAPSULE ORAL at 08:56

## 2022-08-04 RX ADMIN — DULOXETINE 20 MG: 20 CAPSULE, DELAYED RELEASE ORAL at 08:56

## 2022-08-04 RX ADMIN — DULOXETINE 20 MG: 20 CAPSULE, DELAYED RELEASE ORAL at 21:09

## 2022-08-04 RX ADMIN — LEVOTHYROXINE SODIUM 75 MCG: 0.07 TABLET ORAL at 09:24

## 2022-08-04 RX ADMIN — GABAPENTIN 600 MG: 300 CAPSULE ORAL at 21:10

## 2022-08-04 RX ADMIN — HYDROMORPHONE HYDROCHLORIDE 0.5 MG: 1 INJECTION, SOLUTION INTRAMUSCULAR; INTRAVENOUS; SUBCUTANEOUS at 22:53

## 2022-08-04 RX ADMIN — ONDANSETRON 4 MG: 2 INJECTION INTRAMUSCULAR; INTRAVENOUS at 23:01

## 2022-08-04 RX ADMIN — HYDROMORPHONE HYDROCHLORIDE 0.5 MG: 1 INJECTION, SOLUTION INTRAMUSCULAR; INTRAVENOUS; SUBCUTANEOUS at 09:30

## 2022-08-04 RX ADMIN — CIPROFLOXACIN 500 MG: 500 TABLET, FILM COATED ORAL at 21:10

## 2022-08-04 RX ADMIN — CIPROFLOXACIN 500 MG: 500 TABLET, FILM COATED ORAL at 12:32

## 2022-08-04 RX ADMIN — HYDROMORPHONE HYDROCHLORIDE 0.5 MG: 1 INJECTION, SOLUTION INTRAMUSCULAR; INTRAVENOUS; SUBCUTANEOUS at 05:43

## 2022-08-04 RX ADMIN — METHOCARBAMOL 750 MG: 750 TABLET ORAL at 09:24

## 2022-08-04 RX ADMIN — GABAPENTIN 600 MG: 300 CAPSULE ORAL at 14:03

## 2022-08-04 NOTE — PROGRESS NOTES
Gynecology Consult Note    Subjective     Xin Melendez is a 61 y.o. female who was admitted for Kidney stone [N20.0]  Acute renal insufficiency [N28.9]  Flank pain [R10.9]  Left ureteral stone [N20.1]. Patient was seen in consultation at the request of referring physician for management recommendations. Patient is a 62yo P1, LMP age 54 who was admitted for ureteral calculus. Upon admission admitted to 1 week of light vaginal spotting, no cramping. No prior bleeding since age 54. Last gyn check up was approx around then.     Pertinent radiology results reviewed..    OB History: - 28 week CS  OB History   No obstetric history on file.       Medical History:   Past Medical History:   Diagnosis Date   • COVID-19 vaccine series completed     Moderna Boster 10/2021   • CTS (carpal tunnel syndrome)    • DJD (degenerative joint disease)    • Edema    • Frozen shoulder    • HL (hearing loss)    • Hypertension    • Hypothyroidism    • Kidney stones    • Lipid disorder    • Lymphedema    • Obesity    • Sciatica    • Stasis dermatitis    • Type 2 diabetes mellitus (CMS/HCC)        Surgical History:   Past Surgical History:   Procedure Laterality Date   • APPENDECTOMY     •  SECTION  1999   • CYSTOSCOPY  2021    CYSTOSCOPY,INSERT URETERAL STENT   • LAPAROTOMY EXPLORATORY     • OOPHORECTOMY      right side secondary to torsion       Social History:   Social History     Socioeconomic History   • Marital status:      Spouse name: None   • Number of children: 1   • Years of education: None   • Highest education level: None   Tobacco Use   • Smoking status: Former Smoker     Quit date:      Years since quittin.6   • Smokeless tobacco: Never Used   • Tobacco comment: quit    Substance and Sexual Activity   • Alcohol use: Not Currently   • Drug use: Never   • Sexual activity: Defer   Social History Narrative    Lives with son in a 2 story home has a chair lift     Social Determinants of Health      Food Insecurity: No Food Insecurity   • Worried About Running Out of Food in the Last Year: Never true   • Ran Out of Food in the Last Year: Never true       Family History:   Family History   Problem Relation Age of Onset   • Heart attack Biological Father        Allergies: Cephalexin and Adhesive tape-silicones    Current Inpatient Medications   Medication Dose Route Frequency Provider Last Rate Last Admin   • atorvastatin (LIPITOR) tablet 20 mg  20 mg oral Nightly Akin Galan MD       • ciprofloxacin (CIPRO) tablet 500 mg  500 mg oral q12h [facility] Jeet Nicolas MD   500 mg at 08/04/22 1232   • glucose chewable tablet 16-32 g of dextrose  16-32 g of dextrose oral PRN Akin Galan MD        Or   • dextrose 40 % oral gel 15-30 g of dextrose  15-30 g of dextrose oral PRN Akin Galan MD        Or   • glucagon (GLUCAGEN) injection 1 mg  1 mg intramuscular PRN Akin Galan MD        Or   • dextrose 50 % in water (D50) injection 12.5 g  25 mL intravenous PRN Akin Galan MD       • DULoxetine (CYMBALTA) capsule,delayed release(DR/EC) 20 mg  20 mg oral BID Akin Galan MD   20 mg at 08/04/22 0856   • gabapentin (NEURONTIN) capsule 600 mg  600 mg oral TID Akin Galan MD   600 mg at 08/04/22 1403   • hydrALAZINE (APRESOLINE) injection 5 mg  5 mg intravenous q8h PRN Akin Galan MD       • HYDROmorphone (DILAUDID) injection 0.5 mg  0.5 mg intravenous q3h PRN Derrick Pina MD   0.5 mg at 08/04/22 0930   • insulin aspart U-100 (NovoLOG) pen 6-10 Units  6-10 Units subcutaneous TID with meals Akin Galan MD       • levothyroxine (SYNTHROID) tablet 75 mcg  75 mcg oral Daily (6:30a) Akin Galan MD   75 mcg at 08/04/22 0924   • methocarbamoL (ROBAXIN) tablet 750 mg  750 mg oral 3x daily PRN Akin Galan MD   750 mg at 08/04/22 0924   • ondansetron (ZOFRAN) injection 4 mg  4 mg intravenous q6h PRN Derrick Pina MD   4 mg at 08/04/22 4398         Medication List      ASK your doctor about these medications    aspirin 81 mg enteric coated tablet  Take 81 mg by mouth daily.  Dose: 81 mg     DULoxetine 20 mg capsule  Commonly known as: CYMBALTA  Take 20 mg by mouth 2 (two) times a day.  Dose: 20 mg     exenatide microspheres 2 mg/0.85 mL auto-injector  Inject 1 Dose under the skin once a week on Friday.  Dose: 1 Dose     gabapentin 600 mg tablet  Commonly known as: NEURONTIN  Take 600 mg by mouth 3 (three) times a day.  Dose: 600 mg     insulin aspart U-100 100 unit/mL (3 mL) pen  Commonly known as: NovoLOG  Inject 0-100 Units under the skin 3 (three) times a day before meals. Per sliding scale based on calorie intake by patient  Dose: 0-100 Units     irbesartan-hydrochlorothiazide 150-12.5 mg per tablet  Commonly known as: AVALIDE  Take 1 tablet by mouth daily.  Dose: 1 tablet     levothyroxine 75 mcg tablet  Commonly known as: SYNTHROID  Take 75 mcg by mouth daily.  Dose: 75 mcg     meloxicam 15 mg tablet  Commonly known as: MOBIC  Take 15 mg by mouth daily.  Dose: 15 mg     methocarbamoL 750 mg tablet  Commonly known as: ROBAXIN  Take 750 mg by mouth 3 (three) times a day as needed. Always takes in am but doesn't always do tid  Dose: 750 mg     simvastatin 40 mg tablet  Commonly known as: ZOCOR  Take 40 mg by mouth daily.  Dose: 40 mg     TRESIBA FLEXTOUCH U-200 200 unit/mL (3 mL) pen  Inject 78 Units under the skin every evening.  Dose: 78 Units  Generic drug: insulin degludec U-200 CONC     VITAMIN C ORAL  Take 1 tablet by mouth daily.  Dose: 1 tablet     VITAMIN D3 ORAL  Take 1 tablet by mouth daily.  Dose: 1 tablet            Review of Systems  Constitutional: negative  Eyes: negative  Ears, nose, mouth, throat, and face: negative  Respiratory: negative  Cardiovascular: negative  Gastrointestinal: left sided abd/flank pain  Genitourinary:PMB  Integument/breast: negative  Hematologic/lymphatic: negative  Musculoskeletal:negative  Neurological:  negative  Behavioral/Psych: negative  Endocrine: negative  Allergic/Immunologic: negative    Objective     Exam  General Appearance: Alert, cooperative, no acute distress  Head: Normocephalic, without obvious abnormality, atraumatic  Lungs: Respirations unlabored  Heart: Regular rate and rhythm  Abdomen: Soft, nontender, obese  Genitalia: defer  Rectal: Deferred  Extremities: edema   Musculoskeletal: No injury or deformity  Skin: Skin color, texture, turgor normal, no rashes or lesions  Neurologic: Grossly intact        Imaging  Significant findings include: 11 cm uterus with several small fibroids, 2cm endometrial lining      Assessment   61 y.o. female being consulted for postmenopausal bleeding, thickened stripe. Discussed possible etiologies of cancer, hyperplasia or polyp. Discussed need for tissue diagnosis, which can be accomplished by either endometrial biopsy in the office as an outpatient, or a D&C hysteroscopy in the OR. As pt is already going to OR tomorrow for stent placement, will coordinate with urology and the OR to do it at the same time. If this does not work for logistical reasons, she was given information on how to follow up in my office for a biopsy. Consent obtained for D&C hysterscopy and possible polypectomy.           Estrella Nielsen MD  8/4/2022  6:15 PM

## 2022-08-04 NOTE — NURSING NOTE
Patient is refusing assistance with ambulation, toileting, and her B/L LE dressing changes. She became agitated when staff members attempted to assist her OOB. Education provided about falls risk. Will notify [facility].

## 2022-08-04 NOTE — CONSULTS
Infectious Disease Consult Note    Patient Name: Xin Melendez  MR#: [id number]  : 1960  Admission Date: 8/3/2022  Consult Date: 22 10:27 AM   Consultant: Jeet Nicolas MD    Reason for Consult: Pyelonephritis, and obstructing kidney stone, please advise on abx  Referring Provider: Akin Galan      History of Present Illness     Xin Melendez is a 61 y.o. female with PMH of HTN, DM, hypothyroidism who was admitted on 8/3/2022 with L flank pain which started on Tuesday night associated with dysuria and low grade temp. She was given levofloxacin last week with some improvement but L flank pain developed now with pt having prior history of it. She denies any cough, dyspnea, chest pain, abdominal pain, N/V/D, or skin rashes. On admission, she was afebrile with WBC count of 13K. UA showed 4-10 WBC with urine cx and blood cx pending. CT A/P showed 4 mm obstructing calculus in the left proximal ureter with moderate resultant left hydroureteronephrosis and asymmetric left perinephric stranding. She was given a dose of ciprofloxacin.     Outside records were reviewed.    Allergies:   Allergies   Allergen Reactions   • Cephalexin Angioedema     Other reaction(s): Rash   • Adhesive Tape-Silicones      Imported from external source.       Medical History:   Past Medical History:   Diagnosis Date   • COVID-19 vaccine series completed     Moderna Bost 10/2021   • CTS (carpal tunnel syndrome)    • DJD (degenerative joint disease)    • Edema    • Frozen shoulder    • HL (hearing loss)    • Hypertension    • Hypothyroidism    • Kidney stones    • Lipid disorder    • Lymphedema    • Obesity    • Sciatica    • Stasis dermatitis    • Type 2 diabetes mellitus (CMS/[facility])        Surgical History:   Past Surgical History:   Procedure Laterality Date   • APPENDECTOMY     •  SECTION  1999   • CYSTOSCOPY  2021    CYSTOSCOPY,INSERT URETERAL STENT   • LAPAROTOMY EXPLORATORY     • OOPHORECTOMY       right side secondary to torsion       Social History:   Social History     Socioeconomic History   • Marital status:      Spouse name: None   • Number of children: 1   • Years of education: None   • Highest education level: None   Tobacco Use   • Smoking status: Former Smoker     Quit date:      Years since quittin.6   • Smokeless tobacco: Never Used   • Tobacco comment: quit    Substance and Sexual Activity   • Alcohol use: Not Currently   • Drug use: Never   • Sexual activity: Defer   Social History Narrative    Lives with son in a 2 story home has a chair lift     Social Determinants of Health     Food Insecurity: No Food Insecurity   • Worried About Running Out of Food in the Last Year: Never true   • Ran Out of Food in the Last Year: Never true          Travel Exposure:   Travel and Exposure Screening    Flowsheet Row Most Recent Value   Travel Screening    Overnight hospitalization outside the [place] in the last year? No Filed On: 2022 1145   Exposure Screening    Symptoms           Family History:   Family History   Problem Relation Age of Onset   • Heart attack Biological Father        Review of Systems    All other systems reviewed and negative except as noted in the HPI.    Medications:    Current IP Meds (From admission, onward)        Frequency     levothyroxine (SYNTHROID) tablet 75 mcg         Daily (6:30a)     atorvastatin (LIPITOR) tablet 20 mg         Nightly     ondansetron (ZOFRAN) injection 4 mg         Every 6 hours PRN     HYDROmorphone (DILAUDID) injection 0.5 mg         Every 3 hours PRN     DULoxetine (CYMBALTA) capsule,delayed release(DR/EC) 20 mg         2 times daily     gabapentin (NEURONTIN) capsule 600 mg         3 times daily     methocarbamoL (ROBAXIN) tablet 750 mg         3 times daily PRN     hydrALAZINE (APRESOLINE) injection 5 mg         Every 8 hours PRN     insulin aspart U-100 (NovoLOG) pen 6-10 Units  (Novolog Insulin Correction Factor for patient weight  ">= 200 lb)         3 times daily with meals     glucose chewable tablet 16-32 g of dextrose  (Hypoglycemia Treatment Protocol and Hyperglycemia Validation Protocol)        \"Or\" Linked Group Details    As needed     dextrose 40 % oral gel 15-30 g of dextrose  (Hypoglycemia Treatment Protocol and Hyperglycemia Validation Protocol)        Note to Pharmacy: Pharmacist: [facility] is the depot location for this medication.   \"Or\" Linked Group Details    As needed     glucagon (GLUCAGEN) injection 1 mg  (Hypoglycemia Treatment Protocol and Hyperglycemia Validation Protocol)        \"Or\" Linked Group Details    As needed     dextrose 50 % in water (D50) injection 12.5 g  (Hypoglycemia Treatment Protocol and Hyperglycemia Validation Protocol)        \"Or\" Linked Group Details    As needed     HYDROmorphone (DILAUDID) injection 0.5 mg         Once     ciprofloxacin (CIPRO) IVPB 400 mg         Once     HYDROmorphone (DILAUDID) 1 mg/mL injection 1 mg         Once     sodium chloride 0.9 % bolus 1,000 mL         Once     ondansetron (ZOFRAN) injection 4 mg         Once                Objective     Vital Signs:    Patient Vitals for the past 72 hrs:   BP Temp Temp src Pulse Resp SpO2 Height Weight   22 0835 122/60 36.4 °C (97.6 °F) Oral 93 18 97 % -- --   22 2321 (!) 105/58 36.7 °C (98.1 °F) Oral 83 18 98 % -- --   22 1915 (!) 101/50 36.4 °C (97.6 °F) Oral 73 18 99 % -- --   22 1846 (!) 120/59 (!) 36 °C (96.8 °F) Temporal 79 20 99 % -- --   22 1726 (!) 100/57 -- -- 80 18 98 % -- --   22 1527 125/60 36.1 °C (97 °F) Temporal 79 20 100 % -- --   22 1347 (!) 119/56 36.1 °C (97 °F) Temporal 83 16 100 % -- --   22 1146 137/63 -- -- -- -- -- -- --   22 1142 -- 36.4 °C (97.6 °F) -- 85 20 97 % 1.6 m (5' 3\") (!) 152 kg (335 lb)       Temp (72hrs), Av.3 °C (97.4 °F), Min:36 °C (96.8 °F), Max:36.7 °C (98.1 °F)      Physical Exam:    General appearance: alert and cooperative  Head: " normocephalic, without obvious abnormality, atraumatic  Eyes: anicteric sclera  Neck: supple  Lungs: clear to auscultation bilaterally  Heart: regular rate and rhythm  Abdomen: soft, non-tender  Back: L flank tenderness  Extremities: edema b/l LE  Joints: no swelling  Skin: no rashes  Neurologic: Grossly normal    Lines, Drains, Airways, Wounds:  Peripheral IV (Adult) 08/03/22 Left Antecubital (Active)   Number of days: 1       Labs:    CBC Results       08/04/22 08/03/22 11/17/21     0944 1149 0943    WBC 12.19 13.69 9.53    RBC 4.17 4.44 4.66    HGB 11.8 12.4 13.2    HCT 37.4 39.6 41.5    MCV 89.7 89.2 89.1    MCH 28.3 27.9 28.3    MCHC 31.6 31.3 31.8     306 224      BMP Results       08/03/22 11/17/21 11/06/21     1149 0943 0518     141 136    K 4.3 4.2 4.1    Cl 104 102 103    CO2 26 27 24    Glucose 92 133 106    BUN 33 18 22    Creatinine 1.6 1.0 1.0    Calcium 9.3 9.3 8.2    Anion Gap 9 12 9    EGFR 32.8 56.6 56.6         Comment for K at 1149 on 08/03/22: Results obtained on plasma. Plasma Potassium values may be up to 0.4 mEQ/L less than serum values. The differences may be greater for patients with high platelet or white cell counts.  SLIGHT HEMOLYSIS, RESULT MAY BE INCREASED.      PT/PTT Results       11/17/21     0943    PT 13.0    INR 1.0    PTT 32         Comment for INR at 0943 on 11/17/21: INR has no defined significance when PT is within Reference Range.      UA Results       08/03/22     1345    Color Yellow    Clarity Cloudy    Glucose Negative    Bilirubin Negative    Ketones Negative    Sp Grav 1.030    Blood +3    Ph 5.5    Protein +1    Urobilinogen 0.2    Nitrite Negative    Leuk Est Trace    WBC 4 TO 10    RBC Too Numerous To Count    Bacteria Rare         Comment for Blood at 1345 on 08/03/22: The sensitivity of the occult blood test is equivalent to approximately 4 intact RBC/HPF.      Lactate Results    No lab values to display.         Microbiology Results     Procedure  Component Value Units Date/Time    SARS-CoV-2 (COVID-19), PCR Nasopharynx [[id number]]  (Normal) Collected: 08/03/22 1724    Specimen: Nasopharyngeal Swab from Nasopharynx Updated: 08/03/22 1814    Narrative:      The following orders were created for panel order SARS-CoV-2 (COVID-19), PCR Nasopharynx.  Procedure                               Abnormality         Status                     ---------                               -----------         ------                     SARS-CoV-2 (COVID-19), P...[[id number]]  Normal              Final result                 Please view results for these tests on the individual orders.    SARS-CoV-2 (COVID-19), PCR Nasopharynx [[id number]]  (Normal) Collected: 08/03/22 1724    Specimen: Nasopharyngeal Swab from Nasopharynx Updated: 08/03/22 1814     SARS-CoV-2 (COVID-19) Negative    Narrative:      Testing performed using real-time PCR for detection of COVID-19. EUA approved validation studies performed on site.           Pathology Results     ** No results found for the last 720 hours. **          Echo:         Imaging:    Radiology Imaging    XR ABDOMEN 1 VW    Narrative  CLINICAL HISTORY: Nephrolithiasis, symptomatic/complicated  shunt series    COMPARISON:CT 8/3/2022    COMMENT: Single view of the abdomen is obtained.    Bowel gas pattern is nonspecific but appears nonobstructive. A known left renal  calculus is better visualized on prior CT. Overlying soft tissues are  unremarkable.    --    Impression  See comment.      Assessment     1. L pyelonephritis - not improved on outpatient levofloxacin. CT A/P showed 4 mm obstructing calculus in the left proximal ureter with moderate resultant left hydroureteronephrosis and asymmetric left perinephric stranding. Pt remains afebrile and urine cx and blood cx are in process.         Plan     1. Resume ciprofloxacin in the setting of allergy while waiting for urine cx and monitor flank pain for improvement.

## 2022-08-04 NOTE — PROGRESS NOTES
Hospital Medicine Service -  Daily Progress Note       SUBJECTIVE   Interval History: pt seen and examined, L flank pain seems to improved after pain medication. No chest pain, nausea, vomiting. No stone passed yet.      OBJECTIVE      Vital signs in last 24 hours:  Temp:  [36 °C (96.8 °F)-36.7 °C (98.1 °F)] 36.5 °C (97.7 °F)  Heart Rate:  [73-93] 93  Resp:  [18-20] 18  BP: (100-123)/(50-60) 123/57    Intake/Output Summary (Last 24 hours) at 8/4/2022 1713  Last data filed at 8/4/2022 1300  Gross per 24 hour   Intake 480 ml   Output 725 ml   Net -245 ml       PHYSICAL EXAMINATION      Physical Exam    Physical Exam   Constitutional:  obese. No distress.   HENT:   Head: Normocephalic and atraumatic.   Right Ear: External ear normal.   Left Ear: External ear normal.   Mouth/Throat: Oropharynx is clear and moist.   Eyes: Conjunctivae and EOM are normal. Right eye exhibits no discharge. Left eye exhibits no discharge.   Neck: Normal range of motion. Neck supple. No tracheal deviation present.   Cardiovascular: Normal rate, regular rhythm and normal heart sounds.  Exam reveals no gallop and no friction rub.    No murmur heard.  Pulmonary/Chest: Effort normal and breath sounds normal. No stridor. No respiratory distress.  has no wheezes.  has no rales.   Abdominal: Soft. Bowel sounds are normal. exhibits no distension. There is no tenderness. There is no rebound and no guarding. L CVA tenderness  Musculoskeletal: b/l LE lymphedema. No deformity.   Neurological:  is alert, awake, oriented x3   Skin: Skin is warm and dry, not diaphoretic.      LINES, CATHETERS, DRAINS, AIRWAYS, AND WOUNDS   Lines, Drains, and Airways:  Wounds (agree with documentation and present on admission):  Peripheral IV (Adult) 08/03/22 Left Antecubital (Active)   Number of days: 1         Comments:      LABS / IMAGING / TELE      Labs  CBC Results       08/04/22 08/03/22 11/17/21     0944 1149 0943    WBC 12.19 13.69 9.53    RBC 4.17 4.44 4.66     HGB 11.8 12.4 13.2    HCT 37.4 39.6 41.5    MCV 89.7 89.2 89.1    MCH 28.3 27.9 28.3    MCHC 31.6 31.3 31.8     306 224        CMP Results       08/04/22 08/03/22 11/17/21     0944 1149 0943     139 141    K 3.9 4.3 4.2    Cl 103 104 102    CO2 27 26 27    Glucose 136 92 133    BUN 35 33 18    Creatinine 1.6 1.6 1.0    Calcium 8.9 9.3 9.3    Anion Gap 8 9 12    AST -- 18 22    ALT -- 10 23    Albumin -- 3.5 3.3    EGFR 32.8 32.8 56.6         Comment for K at 1149 on 08/03/22: Results obtained on plasma. Plasma Potassium values may be up to 0.4 mEQ/L less than serum values. The differences may be greater for patients with high platelet or white cell counts.  SLIGHT HEMOLYSIS, RESULT MAY BE INCREASED.    Comment for AST at 1149 on 08/03/22: SLIGHT HEMOLYSIS, RESULT MAY BE INCREASED.    Comment for ALT at 1149 on 08/03/22: SLIGHT HEMOLYSIS, RESULT MAY BE INCREASED.            SARS-CoV-2 (COVID-19) (no units)   Date/Time Value   08/03/2022 1724 Negative       ASSESSMENT AND PLAN      Postmenopausal bleeding  Assessment & Plan  Ongoing for about 3 weeks now  transvaginal US - showed enlarged fibroid uterus and thickened endometrium, measuring up to 2cm  Patient reports difficulty getting a gynecology visit OP  Consult placed to gyne for biopsy      Leucocytosis  Assessment & Plan  Wbc 13.69k on admission, secondary to pyelonephritis and ureteral stones  Did not meet sepsis criteria on admission  Check blood cultures  IV Ciprofloxacin given in the ED - given h/o cephalexin allergies    Hydroureteronephrosis  Assessment & Plan  Left sided hydroureteronephrosis - secondary to ureteral calculus  -urology evaluated, continue trial of stone passage, if cr worse and no stone passed then OR tomorrow for stent placement    Pyelonephritis  Assessment & Plan  Acute pyelonephritis Noted on CT scan, coupled with flank pain  Check blood cultures, not checked in the ED  Allergic to cephalexin, recently on levaquin  outpatient  Started on IV ciprofloxacin by the ED  Unclear if her allergy to cephalexin extends to all penicillins and cephalosporins, would continue with ciprofloxacin for now  -ID following, on PO cipro    DARCY (acute kidney injury) (CMS/[facility])  Assessment & Plan  Acute kidney Injury, Cr 1.6 on admission, secondary to post renal calculi obstruction  Baseline Cr 1.0 - 1.1  IVF, urology c/s      Hypothyroidism  Assessment & Plan  On levothyroxine    Hyperlipidemia  Assessment & Plan  On simvastatin    Lymphedema due to venous insufficiency  Assessment & Plan  Having serous drainage, covered with dressing.   -podiatry c/s  -wound care c/s.     Type 2 diabetes mellitus (CMS/[facility])  Assessment & Plan  On 78iu tresiba, SSI - held for now  Check updated a1c  On SSI and POCT for now while NPO  Resume home meds after urological intervention tomorrow    HTN (hypertension)  Assessment & Plan  ON Irbesartan-HCTZ  Hold both meds for now, given DARCY on admission  Can be on IV hydralazine prn temporarily till DARCY resolves  Monitor BP per unit protocol    Morbid obesity with BMI of 50.0-59.9, adult (CMS/[facility])  Assessment & Plan  Dietary and lifestyle counseling    * Left ureteral stone  Assessment & Plan  Left sided ureteral stone, present on admission, with associated hydroureteronephrosis  Urology consulted, planning for stent placement tomorrow  No medical barriers to surgery  RCRI - 1 point, 0.9% risk of perioperative cardiac events. At an acceptable risk for intervention, deemed necessary by urology       VTE Assessment: Padua VTE Score: 2  VTE Prophylaxis:  Current anticoagulants:    •None      Code Status: Full Code  Palliative Care Screening Score: 0   Estimated Discharge Date: 8/5/2022     Disposition Planning: need 2-3 more days     Ceci Mckeon DO  8/4/2022

## 2022-08-04 NOTE — NURSING NOTE
"Patient noted to have BLE dressings from home. RN asked patient to redress and see what was under dressings and patient refused. Patient states she has \"weeping\" from her lymphedema and that she changes abds as needed. RN discussed with patient consulting [facility] nurse and podiatry. Patient in agreement.  "

## 2022-08-04 NOTE — PLAN OF CARE
Problem: Adult Inpatient Plan of Care  Goal: Plan of Care Review  Outcome: Progressing  Flowsheets (Taken 8/4/2022 1712)  Progress: no change  Plan of Care Reviewed With: patient  Outcome Summary: patient resting in chair. patient has not required any PRN nausea meds this shift and has taken PRN dilaudid x1. wound and podiatry consulted for BLE wounds. urine being strained. no stone noted. patient for cysto tomorrow. chair alarm in place. call bell within reach  Goal: Absence of Hospital-Acquired Illness or Injury  Outcome: Progressing  Goal: Optimal Comfort and Wellbeing  Outcome: Progressing   Plan of Care Review  Plan of Care Reviewed With: patient  Progress: no change  Outcome Summary: patient resting in chair. patient has not required any PRN nausea meds this shift and has taken PRN dilaudid x1. wound and podiatry consulted for BLE wounds. urine being strained. no stone noted. patient for cysto tomorrow. chair alarm in place. call bell within reach

## 2022-08-04 NOTE — PATIENT CARE CONFERENCE
Care Progression Rounds Note  Date: 8/4/2022  Time: 9:28 AM     Patient Name: Xin Melendez     Medical Record Number: [id number]   YOB: 1960  Sex: Female      Room/Bed: 4206    Admitting Diagnosis: Kidney stone [N20.0]  Acute renal insufficiency [N28.9]  Flank pain [R10.9]  Left ureteral stone [N20.1]   Admit Date/Time: 8/3/2022 11:41 AM    Primary Diagnosis: Left ureteral stone  Principal Problem: Left ureteral stone    GMLOS: pending  Anticipated Discharge Date: 8/5/2022    AM-PAC:  Mobility Score:      Discharge Planning:  Anticipated Discharge Disposition: home without assistance or services    Barriers to Discharge:  Medical issues not resolved    Comments:       Participants:  nursing, social work/services

## 2022-08-04 NOTE — PROGRESS NOTES
Referral received and chart reviewed. Met with patient, agreeable to PT/OT services with [facility]. Referral to be completed.

## 2022-08-04 NOTE — CONSULTS
Subjective     Xin Melendez is a 61 y.o. female who was admitted for Kidney stone [N20.0]  Acute renal insufficiency [N28.9]  Flank pain [R10.9]  Left ureteral stone [N20.1]. Patient was referred for obstructing ureteral stone.    Patient is a 62 yo female  with a past medical history of HTN, Type 2 DM, hypothyroidism, who presents to [facility] ER on 8/3/2022 with left sided abdominal pain, located in her left flank, sharp, radiating to the down, associated with nausea but no fevers or vomiting.  Week prior she c/o dysuria and urinary frequency and was Rx 7 day course of levaquin by PCP which improved those symptoms.     Pt with hx nephrolithiasis.  21: cysto, Right stent (Amster)  21: Cystoscopy, semirigid ureteroscopy, flexible ureteroscopy, retrograde pyelogram, laser lithotripsy, stone manipulation, ureteral stent placement (Lopez)    8/3/22 CT: 4 mm obstructing calculus in the left  proximal ureter with moderate resultant left hydroureteronephrosis. There is asymmetric left perinephric stranding, correlate with urinalysis. There are multiple nonobstructing right renal calculi noted.    WBC 13.69, Creat 1.6  UA: TNTC RBC, 4-10 WBC, rare bacteria, trace leuk, neg nitrates    Pt with mild Left flank pain s/p Dilaudid this Am at 5:30.  Still with nausea but no f/c/v. Denies any voiding concerns.  Afebrile    Pertinent radiology results reviewed.    Medical History:   Past Medical History:   Diagnosis Date   • COVID-19 vaccine series completed     Moderna [facility] 10/2021   • CTS (carpal tunnel syndrome)    • DJD (degenerative joint disease)    • Edema    • Frozen shoulder    • HL (hearing loss)    • Hypertension    • Hypothyroidism    • Kidney stones    • Lipid disorder    • Lymphedema    • Obesity    • Sciatica    • Stasis dermatitis    • Type 2 diabetes mellitus (CMS/HCC)        Surgical History:   Past Surgical History:   Procedure Laterality Date   • APPENDECTOMY     •  SECTION   06/16/1999   • CYSTOSCOPY  11/04/2021    CYSTOSCOPY,INSERT URETERAL STENT   • LAPAROTOMY EXPLORATORY     • OOPHORECTOMY      right side secondary to torsion       Social History:   Social History     Social History Narrative    Lives with son in a 2 story home has a chair lift       Family History:   Family History   Problem Relation Age of Onset   • Heart attack Biological Father        Allergies: Cephalexin and Adhesive tape-silicones    Current Facility-Administered Medications   Medication Dose Route Frequency Provider Last Rate Last Admin   • atorvastatin (LIPITOR) tablet 20 mg  20 mg oral Nightly Akin Galan MD       • glucose chewable tablet 16-32 g of dextrose  16-32 g of dextrose oral PRN Akin Galan MD        Or   • dextrose 40 % oral gel 15-30 g of dextrose  15-30 g of dextrose oral PRN Akin Galan MD        Or   • glucagon (GLUCAGEN) injection 1 mg  1 mg intramuscular PRN Akin Galan MD        Or   • dextrose 50 % in water (D50) injection 12.5 g  25 mL intravenous PRN Akin Galan MD       • DULoxetine (CYMBALTA) capsule,delayed release(DR/EC) 20 mg  20 mg oral BID Akin Galan MD   20 mg at 08/03/22 2039   • gabapentin (NEURONTIN) capsule 600 mg  600 mg oral TID Akin Galan MD   600 mg at 08/03/22 2039   • hydrALAZINE (APRESOLINE) injection 5 mg  5 mg intravenous q8h PRN Akin Galan MD       • HYDROmorphone (DILAUDID) injection 0.5 mg  0.5 mg intravenous q3h PRN Derrick Pina MD   0.5 mg at 08/04/22 0543   • insulin aspart U-100 (NovoLOG) pen 6-10 Units  6-10 Units subcutaneous TID with meals Akin Galan MD       • levothyroxine (SYNTHROID) tablet 75 mcg  75 mcg oral Daily (6:30a) Akin Galan MD       • methocarbamoL (ROBAXIN) tablet 750 mg  750 mg oral 3x daily PRN Akin Galan MD   750 mg at 08/03/22 2039   • ondansetron (ZOFRAN) injection 4 mg  4 mg intravenous q6h PRN Derrick Pina MD   4 mg at 08/04/22 2047         Medication List      ASK your doctor about these medications    aspirin 81 mg enteric coated tablet  Take 81 mg by mouth daily.  Dose: 81 mg     DULoxetine 20 mg capsule  Commonly known as: CYMBALTA  Take 20 mg by mouth 2 (two) times a day.  Dose: 20 mg     exenatide microspheres 2 mg/0.85 mL auto-injector  Inject 1 Dose under the skin once a week on Friday.  Dose: 1 Dose     gabapentin 600 mg tablet  Commonly known as: NEURONTIN  Take 600 mg by mouth 3 (three) times a day.  Dose: 600 mg     insulin aspart U-100 100 unit/mL (3 mL) pen  Commonly known as: NovoLOG  Inject 0-100 Units under the skin 3 (three) times a day before meals. Per sliding scale based on calorie intake by patient  Dose: 0-100 Units     irbesartan-hydrochlorothiazide 150-12.5 mg per tablet  Commonly known as: AVALIDE  Take 1 tablet by mouth daily.  Dose: 1 tablet     levothyroxine 75 mcg tablet  Commonly known as: SYNTHROID  Take 75 mcg by mouth daily.  Dose: 75 mcg     meloxicam 15 mg tablet  Commonly known as: MOBIC  Take 15 mg by mouth daily.  Dose: 15 mg     methocarbamoL 750 mg tablet  Commonly known as: ROBAXIN  Take 750 mg by mouth 3 (three) times a day as needed. Always takes in am but doesn't always do tid  Dose: 750 mg     simvastatin 40 mg tablet  Commonly known as: ZOCOR  Take 40 mg by mouth daily.  Dose: 40 mg     TRESIBA FLEXTOUCH U-200 200 unit/mL (3 mL) pen  Inject 78 Units under the skin every evening.  Dose: 78 Units  Generic drug: insulin degludec U-200 CONC     VITAMIN C ORAL  Take 1 tablet by mouth daily.  Dose: 1 tablet     VITAMIN D3 ORAL  Take 1 tablet by mouth daily.  Dose: 1 tablet          Review of Systems  Pertinent items are noted in HPI.    Objective   Labs  CBC Results       08/03/22 11/17/21 11/06/21     1149 0943 0518    WBC 13.69 9.53 6.61    RBC 4.44 4.66 4.03    HGB 12.4 13.2 11.4    HCT 39.6 41.5 36.4    MCV 89.2 89.1 90.3    MCH 27.9 28.3 28.3    MCHC 31.3 31.8 31.3     224 190        BMP Results        08/03/22 11/17/21 11/06/21     1149 0943 0518     141 136    K 4.3 4.2 4.1    Cl 104 102 103    CO2 26 27 24    Glucose 92 133 106    BUN 33 18 22    Creatinine 1.6 1.0 1.0    Calcium 9.3 9.3 8.2    Anion Gap 9 12 9    EGFR 32.8 56.6 56.6         Comment for K at 1149 on 08/03/22: Results obtained on plasma. Plasma Potassium values may be up to 0.4 mEQ/L less than serum values. The differences may be greater for patients with high platelet or white cell counts.  SLIGHT HEMOLYSIS, RESULT MAY BE INCREASED.        UA Results       08/03/22     1345    Color Yellow    Clarity Cloudy    Glucose Negative    Bilirubin Negative    Ketones Negative    Sp Grav 1.030    Blood +3    Ph 5.5    Protein +1    Urobilinogen 0.2    Nitrite Negative    Leuk Est Trace    WBC 4 TO 10    RBC Too Numerous To Count    Bacteria Rare         Comment for Blood at 1345 on 08/03/22: The sensitivity of the occult blood test is equivalent to approximately 4 intact RBC/HPF.        Microbiology Results     Procedure Component Value Units Date/Time    SARS-CoV-2 (COVID-19), PCR Nasopharynx [[id number]]  (Normal) Collected: 08/03/22 1724    Specimen: Nasopharyngeal Swab from Nasopharynx Updated: 08/03/22 1814    Narrative:      The following orders were created for panel order SARS-CoV-2 (COVID-19), PCR Nasopharynx.  Procedure                               Abnormality         Status                     ---------                               -----------         ------                     SARS-CoV-2 (COVID-19), P...[[id number]]  Normal              Final result                 Please view results for these tests on the individual orders.    SARS-CoV-2 (COVID-19), PCR Nasopharynx [[id number]]  (Normal) Collected: 08/03/22 1724    Specimen: Nasopharyngeal Swab from Nasopharynx Updated: 08/03/22 1814     SARS-CoV-2 (COVID-19) Negative    Narrative:      Testing performed using real-time PCR for detection of COVID-19. EUA approved validation  studies performed on site.             Imaging  8/3/22 CT: 4 mm obstructing calculus in the left  proximal ureter with moderate resultant left hydroureteronephrosis. There is asymmetric left perinephric stranding, correlate with urinalysis. There are multiple nonobstructing right renal calculi noted.    8/3/22 KUB: Bowel gas pattern is nonspecific but appears nonobstructive. A known left renalcalculus is better visualized on prior CT. Overlying soft tissues areunremarkable.    Physicial Exam  Vitals: 98.1, 105/58, 83, 18  Gen: WDWN  Psych: AAO x 3, NAD  HEENT: NC/AT  Pulm: nonlabored breathing, O2 sat 98%  Abd: soft, NT/ND  Gu: no CVA tenderness  Ext: no c/t/e  Neuro: CNS intact     A/P: 60 yo female hx nephrolithiasis treated previously by Dr. Steele/Lopez now admitted with Left flank pain secondary to obstructing 4 mm left proximal ureter calculus with left hydroureteronephrosis, DARCY, mild leukocytosis, afebrile    - Can have diet. Monitor for nausea/vomiting  - Strain urine  - IVFs, analgesics/antiemetics PRN  - Cipro given in ED, not currently taking, ID consulted for Abx recommendations  - Ucx pending  - AM labs to be drawn by IV team this morning.    If Creat stable and pt tolerates PO then may be able to be d/c for trial of passage. If not then can consider stent placement tomorrow.  Plan d/w patient and RN.

## 2022-08-04 NOTE — CONSULTS
Podiatry Consult Note    Subjective     Xin Melendez is a 61 y.o. female who was admitted for Kidney stone [N20.0]  Acute renal insufficiency [N28.9]  Flank pain [R10.9]  Left ureteral stone [N20.1]. Patient was referred by primary for bilateral lymphedema and wounds.  Patient states she has had lymphedema for many years, she had a podiatrist in the past which she no longer follows.  Patient states she has never gotten cellulitis from her bilateral extremity in the past.  She keeps a close eye on it and knows how to do her own dressing changes which she changes as frequently as needed to be.  Outside records reviewed..    Medical History:   Past Medical History:   Diagnosis Date   • COVID-19 vaccine series completed     Moderna [facility] 10/2021   • CTS (carpal tunnel syndrome)    • DJD (degenerative joint disease)    • Edema    • Frozen shoulder    • HL (hearing loss)    • Hypertension    • Hypothyroidism    • Kidney stones    • Lipid disorder    • Lymphedema    • Obesity    • Sciatica    • Stasis dermatitis    • Type 2 diabetes mellitus (CMS/HCC)        Surgical History:   Past Surgical History:   Procedure Laterality Date   • APPENDECTOMY     •  SECTION  1999   • CYSTOSCOPY  2021    CYSTOSCOPY,INSERT URETERAL STENT   • LAPAROTOMY EXPLORATORY     • OOPHORECTOMY      right side secondary to torsion       Social History:   Social History     Social History Narrative    Lives with son in a 2 story home has a chair lift       Family History:   Family History   Problem Relation Age of Onset   • Heart attack Biological Father        Allergies: Cephalexin and Adhesive tape-silicones    Home Medications:  •  ascorbic acid (VITAMIN C ORAL), Take 1 tablet by mouth daily.    •  aspirin, Take 81 mg by mouth daily.  •  cholecalciferol, vitamin D3, (VITAMIN D3 ORAL), Take 1 tablet by mouth daily.    •  DULoxetine, Take 20 mg by mouth 2 (two) times a day.    •  exenatide microspheres, Inject 1 Dose under the  "skin once a week on Friday.  •  gabapentin, Take 600 mg by mouth 3 (three) times a day.    •  insulin aspart U-100, Inject 0-100 Units under the skin 3 (three) times a day before meals. Per sliding scale based on calorie intake by patient   •  TRESIBA FLEXTOUCH U-200, Inject 78 Units under the skin every evening.    •  irbesartan-hydrochlorothiazide, Take 1 tablet by mouth daily.    •  levothyroxine, Take 75 mcg by mouth daily.  •  meloxicam, Take 15 mg by mouth daily.    •  methocarbamoL, Take 750 mg by mouth 3 (three) times a day as needed. Always takes in am but doesn't always do tid   •  simvastatin, Take 40 mg by mouth daily.      Current Medications:  •  atorvastatin, 20 mg, oral, Nightly  •  ciprofloxacin, 500 mg, oral, q12h VIOLETA  •  glucose, 16-32 g of dextrose, oral, PRN **OR** dextrose, 15-30 g of dextrose, oral, PRN **OR** glucagon, 1 mg, intramuscular, PRN **OR** dextrose 50 % in water (D50), 25 mL, intravenous, PRN  •  DULoxetine, 20 mg, oral, BID  •  gabapentin, 600 mg, oral, TID  •  hydrALAZINE, 5 mg, intravenous, q8h PRN  •  HYDROmorphone, 0.5 mg, intravenous, q3h PRN  •  insulin aspart U-100, 6-10 Units, subcutaneous, TID with meals  •  levothyroxine, 75 mcg, oral, Daily (6:30a)  •  methocarbamoL, 750 mg, oral, 3x daily PRN  •  ondansetron, 4 mg, intravenous, q6h PRN    Labs  CBC Results       08/04/22 08/03/22 11/17/21     0944 1149 0943    WBC 12.19 13.69 9.53    RBC 4.17 4.44 4.66    HGB 11.8 12.4 13.2    HCT 37.4 39.6 41.5    MCV 89.7 89.2 89.1    MCH 28.3 27.9 28.3    MCHC 31.6 31.3 31.8     306 224            Imaging  I have reviewed the Imaging from the last 24 hrs.    Review of Systems  Pertinent items are noted in HPI.    Objective     Physicial Exam  Visit Vitals  BP (!) 123/57 (BP Location: Right forearm, Patient Position: Lying)   Pulse 93   Temp 36.5 °C (97.7 °F) (Oral)   Resp 18   Ht 1.6 m (5' 3\")   Wt (!) 152 kg (335 lb)   LMP  (Within Years)   SpO2 97%   Breastfeeding No   BMI " 59.34 kg/m²         General appearance: alert, appears stated age and cooperative  Head: normocephalic, without obvious abnormality, atraumatic  Eyes: conjunctivae/corneas clear. PERRL, EOM's intact. Fundi benign.  Ears: normal TM's and external ear canals both ears  Nose: Nares normal. Septum midline. Mucosa normal. No drainage or sinus tenderness.    Physical Exam:  -Vascular: Bilateral DP/PT nonpalpable secondary to pitting edema.  -Orthopedic: decreased ROM at ankle jt b/l, +DF/PF all digits intact  -Neurologic: light touch and epicritic sensation intact  -Dermatologic: No obvious wounds on the lower extremity, hyperpigmentation posterior bilateral calf noted with multiple small superficial draining blister wounds.  No crepitus, no fluctuance, no erythema, no increase in warmth.  Severely edematous with active serous drainage      Assessment   51-year patient seen for bilateral lymphedema with no acute infection        Plan     -Patient seen, examined and treated with all questions concerns addressed  -Recommend continuous compression through stockinette or Ace dressing  -Recommend frequent dressing changes  -Recommend elevation if tolerable by patient  -Recommend steroid cream to bilateral lower extremity  -Pending d/w attending, podiatry will likely sign off  -Thank you for this consult, please contact on call podiatry resident with any questions or concerns    Antoine Nieves DPM PGY2  #[id number]

## 2022-08-04 NOTE — PLAN OF CARE
Problem: Adult Inpatient Plan of Care  Goal: Readiness for Transition of Care  Intervention: Mutually Develop Transition Plan  Flowsheets (Taken 8/4/2022 1229)  Equipment Needed After Discharge: none  Assistive Device/Animal Currently Used at Home: walker, front-wheeled  Anticipated Changes Related to Illness: none  Transportation Concerns: car, none  Patient/Family Anticipated Services at Transition: none  Patient/Family Anticipates Transition to: home with family  Transportation Anticipated: family or friend will provide  Concerns to be Addressed: care coordination/care conferences     NORBERTO CABRERA met with pt.  Pt states she lives in a 2 story house. States her spouse passed and her 23 year old son lives with her. Pt states she has a chair lift at home and a walker. Homecare discussed. Patient and/or patient guardian/advocate was made aware of their right to choose a provider. A list of eligible providers was presented and reviewed with the patient and/or patient guardian/advocate in written and/or verbal form. The list delineates providers in the patient’s desired geographic area who are participating in the Medicare program and/or providers contracted with the patient’s primary insurance. The Medicare list and quality ratings were obtained from the Medicare.gov [medicare.gov] website. Pt wants homecare through [facility]. NORBERTO CABRERA following.    PLAN: AR home with [facility].

## 2022-08-05 ENCOUNTER — ANESTHESIA EVENT (INPATIENT)
Dept: OPERATING ROOM | Facility: HOSPITAL | Age: 62
End: 2022-08-05
Payer: COMMERCIAL

## 2022-08-05 ENCOUNTER — ANESTHESIA (INPATIENT)
Dept: OPERATING ROOM | Facility: HOSPITAL | Age: 62
End: 2022-08-05
Payer: COMMERCIAL

## 2022-08-05 ENCOUNTER — APPOINTMENT (INPATIENT)
Dept: RADIOLOGY | Facility: HOSPITAL | Age: 62
End: 2022-08-05
Attending: UROLOGY
Payer: COMMERCIAL

## 2022-08-05 LAB
ANION GAP SERPL CALC-SCNC: 6 MEQ/L (ref 3–15)
ATRIAL RATE: 87
BUN SERPL-MCNC: 30 MG/DL (ref 8–20)
CALCIUM SERPL-MCNC: 8.9 MG/DL (ref 8.9–10.3)
CHLORIDE SERPL-SCNC: 102 MEQ/L (ref 98–109)
CO2 SERPL-SCNC: 30 MEQ/L (ref 22–32)
CREAT SERPL-MCNC: 1.6 MG/DL (ref 0.6–1.1)
ERYTHROCYTE [DISTWIDTH] IN BLOOD BY AUTOMATED COUNT: 14.2 % (ref 11.7–14.4)
GFR SERPL CREATININE-BSD FRML MDRD: 32.8 ML/MIN/1.73M*2
GLUCOSE BLD-MCNC: 103 MG/DL (ref 70–99)
GLUCOSE BLD-MCNC: 91 MG/DL (ref 70–99)
GLUCOSE SERPL-MCNC: 110 MG/DL (ref 70–99)
HCT VFR BLDCO AUTO: 34.9 % (ref 35–45)
HGB BLD-MCNC: 10.9 G/DL (ref 11.8–15.7)
MCH RBC QN AUTO: 28 PG (ref 28–33.2)
MCHC RBC AUTO-ENTMCNC: 31.2 G/DL (ref 32.2–35.5)
MCV RBC AUTO: 89.7 FL (ref 83–98)
P AXIS: 51
PDW BLD AUTO: 9.7 FL (ref 9.4–12.3)
PLATELET # BLD AUTO: 254 K/UL (ref 150–369)
POCT TEST: ABNORMAL
POCT TEST: NORMAL
POTASSIUM SERPL-SCNC: 4.3 MEQ/L (ref 3.6–5.1)
PR INTERVAL: 150
QRS DURATION: 96
QT INTERVAL: 380
QTC CALCULATION(BAZETT): 457
R AXIS: 25
RBC # BLD AUTO: 3.89 M/UL (ref 3.93–5.22)
SODIUM SERPL-SCNC: 138 MEQ/L (ref 136–144)
T WAVE AXIS: 56
VENTRICULAR RATE: 87
WBC # BLD AUTO: 12.2 K/UL (ref 3.8–10.5)

## 2022-08-05 PROCEDURE — 99233 SBSQ HOSP IP/OBS HIGH 50: CPT | Performed by: INTERNAL MEDICINE

## 2022-08-05 PROCEDURE — 93005 ELECTROCARDIOGRAM TRACING: CPT | Performed by: ANESTHESIOLOGY

## 2022-08-05 PROCEDURE — 63600000 HC DRUGS/DETAIL CODE: Performed by: INTERNAL MEDICINE

## 2022-08-05 PROCEDURE — 36415 COLL VENOUS BLD VENIPUNCTURE: CPT | Performed by: INTERNAL MEDICINE

## 2022-08-05 PROCEDURE — 63700000 HC SELF-ADMINISTRABLE DRUG: Performed by: INTERNAL MEDICINE

## 2022-08-05 PROCEDURE — 63600000 HC DRUGS/DETAIL CODE: Performed by: HOSPITALIST

## 2022-08-05 PROCEDURE — 12000000 HC ROOM AND CARE MED/SURG

## 2022-08-05 PROCEDURE — G1004 CDSM NDSC: HCPCS

## 2022-08-05 PROCEDURE — 80048 BASIC METABOLIC PNL TOTAL CA: CPT | Performed by: INTERNAL MEDICINE

## 2022-08-05 PROCEDURE — 85027 COMPLETE CBC AUTOMATED: CPT | Performed by: INTERNAL MEDICINE

## 2022-08-05 PROCEDURE — 87040 BLOOD CULTURE FOR BACTERIA: CPT | Performed by: INTERNAL MEDICINE

## 2022-08-05 PROCEDURE — 63600000 HC DRUGS/DETAIL CODE: Performed by: UROLOGY

## 2022-08-05 RX ORDER — ONDANSETRON HYDROCHLORIDE 2 MG/ML
4 INJECTION, SOLUTION INTRAVENOUS
Status: CANCELLED | OUTPATIENT
Start: 2022-08-05

## 2022-08-05 RX ORDER — ACETAMINOPHEN 325 MG/1
650 TABLET ORAL EVERY 4 HOURS PRN
Status: DISCONTINUED | OUTPATIENT
Start: 2022-08-05 | End: 2022-08-07 | Stop reason: HOSPADM

## 2022-08-05 RX ORDER — HYDROMORPHONE HYDROCHLORIDE 1 MG/ML
0.5 INJECTION, SOLUTION INTRAMUSCULAR; INTRAVENOUS; SUBCUTANEOUS
Status: DISCONTINUED | OUTPATIENT
Start: 2022-08-05 | End: 2022-08-06

## 2022-08-05 RX ORDER — CIPROFLOXACIN 2 MG/ML
400 INJECTION, SOLUTION INTRAVENOUS
Status: DISCONTINUED | OUTPATIENT
Start: 2022-08-05 | End: 2022-08-07 | Stop reason: HOSPADM

## 2022-08-05 RX ORDER — HYDROMORPHONE HYDROCHLORIDE 1 MG/ML
0.5 INJECTION, SOLUTION INTRAMUSCULAR; INTRAVENOUS; SUBCUTANEOUS ONCE
Status: COMPLETED | OUTPATIENT
Start: 2022-08-05 | End: 2022-08-05

## 2022-08-05 RX ORDER — FENTANYL CITRATE 50 UG/ML
50 INJECTION, SOLUTION INTRAMUSCULAR; INTRAVENOUS
Status: CANCELLED | OUTPATIENT
Start: 2022-08-05

## 2022-08-05 RX ADMIN — DULOXETINE 20 MG: 20 CAPSULE, DELAYED RELEASE ORAL at 21:37

## 2022-08-05 RX ADMIN — CIPROFLOXACIN 400 MG: 2 INJECTION, SOLUTION INTRAVENOUS at 21:38

## 2022-08-05 RX ADMIN — DULOXETINE 20 MG: 20 CAPSULE, DELAYED RELEASE ORAL at 11:13

## 2022-08-05 RX ADMIN — HYDROMORPHONE HYDROCHLORIDE 0.5 MG: 1 INJECTION, SOLUTION INTRAMUSCULAR; INTRAVENOUS; SUBCUTANEOUS at 06:09

## 2022-08-05 RX ADMIN — HYDROMORPHONE HYDROCHLORIDE 0.5 MG: 1 INJECTION, SOLUTION INTRAMUSCULAR; INTRAVENOUS; SUBCUTANEOUS at 21:39

## 2022-08-05 RX ADMIN — ONDANSETRON 4 MG: 2 INJECTION INTRAMUSCULAR; INTRAVENOUS at 06:09

## 2022-08-05 RX ADMIN — LEVOTHYROXINE SODIUM 75 MCG: 0.07 TABLET ORAL at 06:09

## 2022-08-05 RX ADMIN — CIPROFLOXACIN 400 MG: 2 INJECTION, SOLUTION INTRAVENOUS at 09:06

## 2022-08-05 RX ADMIN — GABAPENTIN 600 MG: 300 CAPSULE ORAL at 15:02

## 2022-08-05 RX ADMIN — GABAPENTIN 600 MG: 300 CAPSULE ORAL at 11:13

## 2022-08-05 RX ADMIN — HYDROMORPHONE HYDROCHLORIDE 0.5 MG: 1 INJECTION, SOLUTION INTRAMUSCULAR; INTRAVENOUS; SUBCUTANEOUS at 11:41

## 2022-08-05 RX ADMIN — GABAPENTIN 600 MG: 300 CAPSULE ORAL at 21:38

## 2022-08-05 RX ADMIN — HYDROMORPHONE HYDROCHLORIDE 0.5 MG: 1 INJECTION, SOLUTION INTRAMUSCULAR; INTRAVENOUS; SUBCUTANEOUS at 10:12

## 2022-08-05 NOTE — PROGRESS NOTES
Hospital Medicine Service -  Daily Progress Note       SUBJECTIVE   Interval History: pt seen and examined, having worsening L flank pain. No chest pain, shortness of breath. Went to OR but wasn't able to get procedure done due to body habitus.    OBJECTIVE      Vital signs in last 24 hours:  Temp:  [36.5 °C (97.7 °F)-37 °C (98.6 °F)] 36.5 °C (97.7 °F)  Heart Rate:  [79-97] 79  Resp:  [16] 16  BP: ()/(53-77) 99/58    Intake/Output Summary (Last 24 hours) at 8/5/2022 1731  Last data filed at 8/5/2022 1500  Gross per 24 hour   Intake --   Output 875 ml   Net -875 ml       PHYSICAL EXAMINATION      Physical Exam    Physical Exam   Constitutional:  obese. No distress.   HENT:   Head: Normocephalic and atraumatic.   Right Ear: External ear normal.   Left Ear: External ear normal.   Mouth/Throat: Oropharynx is clear and moist.   Eyes: Conjunctivae and EOM are normal. Right eye exhibits no discharge. Left eye exhibits no discharge.   Neck: Normal range of motion. Neck supple. No tracheal deviation present.   Cardiovascular: Normal rate, regular rhythm and normal heart sounds.  Exam reveals no gallop and no friction rub.    No murmur heard.  Pulmonary/Chest: Effort normal and breath sounds normal. No stridor. No respiratory distress.  has no wheezes.  has no rales.   Abdominal: Soft. Bowel sounds are normal. exhibits no distension. There is no tenderness. There is no rebound and no guarding. L CVA tenderness  Musculoskeletal: b/l LE lymphedema. No deformity.   Neurological:  is alert, awake, oriented x3   Skin: Skin is warm and dry, not diaphoretic.      LINES, CATHETERS, DRAINS, AIRWAYS, AND WOUNDS   Lines, Drains, and Airways:  Wounds (agree with documentation and present on admission):  Peripheral IV (Adult) 08/03/22 Left Antecubital (Active)   Number of days: 1         Comments:      LABS / IMAGING / TELE      Labs  CBC Results       08/04/22 08/03/22 11/17/21     0944 1149 0943    WBC 12.19 13.69 9.53    RBC  4.17 4.44 4.66    HGB 11.8 12.4 13.2    HCT 37.4 39.6 41.5    MCV 89.7 89.2 89.1    MCH 28.3 27.9 28.3    MCHC 31.6 31.3 31.8     306 224        CMP Results       08/04/22 08/03/22 11/17/21     0944 1149 0943     139 141    K 3.9 4.3 4.2    Cl 103 104 102    CO2 27 26 27    Glucose 136 92 133    BUN 35 33 18    Creatinine 1.6 1.6 1.0    Calcium 8.9 9.3 9.3    Anion Gap 8 9 12    AST -- 18 22    ALT -- 10 23    Albumin -- 3.5 3.3    EGFR 32.8 32.8 56.6         Comment for K at 1149 on 08/03/22: Results obtained on plasma. Plasma Potassium values may be up to 0.4 mEQ/L less than serum values. The differences may be greater for patients with high platelet or white cell counts.  SLIGHT HEMOLYSIS, RESULT MAY BE INCREASED.    Comment for AST at 1149 on 08/03/22: SLIGHT HEMOLYSIS, RESULT MAY BE INCREASED.    Comment for ALT at 1149 on 08/03/22: SLIGHT HEMOLYSIS, RESULT MAY BE INCREASED.            SARS-CoV-2 (COVID-19) (no units)   Date/Time Value   08/03/2022 1724 Negative       ASSESSMENT AND PLAN      Postmenopausal bleeding  Assessment & Plan  Ongoing for about 3 weeks now  transvaginal US - showed enlarged fibroid uterus and thickened endometrium, measuring up to 2cm  Patient reports difficulty getting a gynecology visit OP  Consult placed to gyne for biopsy      Leucocytosis  Assessment & Plan  Wbc 13.69k on admission, secondary to pyelonephritis and ureteral stones  Did not meet sepsis criteria on admission  Check blood cultures  IV Ciprofloxacin given in the ED - given h/o cephalexin allergies    Hydroureteronephrosis  Assessment & Plan  Left sided hydroureteronephrosis - secondary to ureteral calculus  -urology following    Pyelonephritis  Assessment & Plan  Acute pyelonephritis Noted on CT scan, coupled with flank pain  Check blood cultures, not checked in the ED  Allergic to cephalexin, recently on levaquin outpatient  Started on IV ciprofloxacin by the ED  Unclear if her allergy to cephalexin  extends to all penicillins and cephalosporins, would continue with ciprofloxacin for now  -ID following, on PO cipro    DARCY (acute kidney injury) (CMS/[facility])  Assessment & Plan  Acute kidney Injury, Cr 1.6 on admission, secondary to post renal calculi obstruction  Baseline Cr 1.0 - 1.1  urology c/s      Hypothyroidism  Assessment & Plan  On levothyroxine    Hyperlipidemia  Assessment & Plan  On simvastatin    Lymphedema due to venous insufficiency  Assessment & Plan  Having serous drainage, covered with dressing.   -podiatry c/s  -wound care c/s.     Type 2 diabetes mellitus (CMS/[facility])  Assessment & Plan  On 78iu tresiba, SSI - held for now  Check updated a1c  On SSI and POCT for now while NPO  Resume home meds after urological intervention tomorrow    HTN (hypertension)  Assessment & Plan  ON Irbesartan-HCTZ  Hold both meds for now, given DARCY on admission  Can be on IV hydralazine prn temporarily till DARCY resolves  Monitor BP per unit protocol    Morbid obesity with BMI of 50.0-59.9, adult (CMS/[facility])  Assessment & Plan  Dietary and lifestyle counseling    * Left ureteral stone  Assessment & Plan  Left sided ureteral stone, present on admission, with associated hydroureteronephrosis  No medical barriers to surgery  RCRI - 1 point, 0.9% risk of perioperative cardiac events. At an acceptable risk for intervention, deemed necessary by urology    Pt went to OR for ureteral stent. However procedure unable to be performed due to pt's body habitus and the lack of bariatric equipment here. Urologist Dr. Tinsley advised me to transfer patient to [place] or [facility]. Pt expressed she would prefer [facility]. Called transfer center regarding transfer. However, [facility] reports they have the same equipment as [place] so they are unable to accept the transfer. Discussed with transfer center about potential transfer to [facility]. Awaiting response. Urology then recommend STAT CT a/p to assess if stone has moved and if pt truly  need to go to the OR.        VTE Assessment: [place] VTE Score: 2  VTE Prophylaxis:  Current anticoagulants:    •None      Code Status: Full Code  Palliative Care Screening Score: 0   Estimated Discharge Date: 8/6/2022     Disposition Planning: need 2-3 more days     Ceci Mckeon DO  8/5/2022

## 2022-08-05 NOTE — PLAN OF CARE
Problem: Adult Inpatient Plan of Care  Goal: Plan of Care Review  Outcome: Progressing  Flowsheets (Taken 8/5/2022 7018)  Progress: no change  Plan of Care Reviewed With: patient  Outcome Summary: pt resting in chair. patient voiding and urine being strained. no stone/sediment noted. patient waiting for plan to transfer to [facility]. podiatry seeing pt for BLE wounds/weeping. patient refusing bed alarm/chair alarm. patient educated on falls risks/falls prevention.  Goal: Absence of Hospital-Acquired Illness or Injury  Outcome: Progressing  Goal: Optimal Comfort and Wellbeing  Outcome: Progressing   Plan of Care Review  Plan of Care Reviewed With: patient  Progress: no change  Outcome Summary: pt resting in chair. patient voiding and urine being strained. no stone/sediment noted. patient waiting for plan to transfer to [facility]. podiatry seeing pt for BLE wounds/weeping. patient refusing bed alarm/chair alarm. patient educated on falls risks/falls prevention.

## 2022-08-05 NOTE — ANESTHESIA PREPROCEDURE EVALUATION
Relevant Problems   CARDIOVASCULAR   (+) HTN (hypertension)      MUSCULOSKELETAL   (+) Osteoarthritis      NEUROLOGY   (+) Sciatica      Other   (+) Hypothyroidism   (+) Pyelonephritis   (+) Sepsis due to Escherichia coli (CMS/HCC)   (+) Type 2 diabetes mellitus (CMS/HCC)   (+) UTI (urinary tract infection)       Anesthesia ROS/MED HX    Anesthesia History    Previous anesthetics  No history of anesthetic complications  Pulmonary    history of tobacco use and ex-smoker  Neuro/Psych - neg  Cardiovascular   hypertension   Covid19 Test Reviewed and ECG reviewed  Abnormal ECG  Hematological    anemia  GI/Hepatic- neg  Musculoskeletal- neg  Renal Disease   chronic renal insufficiency   renal calculi  Endo/Other   Diabetes   Hypothyroidism  Body Habitus: Morbidly Obese       Past Surgical History:   Procedure Laterality Date   • APPENDECTOMY     •  SECTION  1999   • CYSTOSCOPY  2021    CYSTOSCOPY,INSERT URETERAL STENT   • LAPAROTOMY EXPLORATORY     • OOPHORECTOMY      right side secondary to torsion       Physical Exam    Airway   Mallampati: III   TM distance: >3 FB   Neck ROM: full  Cardiovascular - normal   Rhythm: regular   Rate: normalPulmonary    Decreased breath sounds  Dental - normal        Anesthesia Plan    Plan: general    Technique: general endotracheal     Lines and Monitors: PIV     Airway: oral intubation   ASA 3  Anesthetic plan and risks discussed with: patient  Induction:    intravenous   Postop Plan:   Patient Disposition: phase II then home

## 2022-08-05 NOTE — PROGRESS NOTES
Daily Progress Note    Subjective     Interval History: pain requiring dilaudid yesterday and this AM    Objective     Vital signs in last 24 hours:  Temp:  [36.4 °C (97.6 °F)-36.5 °C (97.7 °F)] 36.5 °C (97.7 °F)  Heart Rate:  [93-97] 97  Resp:  [16-18] 16  BP: (122-131)/(57-77) 131/77      Intake/Output Summary (Last 24 hours) at 8/5/2022 0758  Last data filed at 8/4/2022 2100  Gross per 24 hour   Intake 480 ml   Output 475 ml   Net 5 ml     Intake/Output this shift:  No intake/output data recorded.    Current Facility-Administered Medications   Medication Dose Route Frequency Provider Last Rate Last Admin   • atorvastatin (LIPITOR) tablet 20 mg  20 mg oral Nightly Akin Galan MD       • ciprofloxacin (CIPRO) tablet 500 mg  500 mg oral q12h [facility] Jeet Nicolas MD   500 mg at 08/04/22 2110   • glucose chewable tablet 16-32 g of dextrose  16-32 g of dextrose oral PRN Akin Galan MD        Or   • dextrose 40 % oral gel 15-30 g of dextrose  15-30 g of dextrose oral PRN Akin Galan MD        Or   • glucagon (GLUCAGEN) injection 1 mg  1 mg intramuscular PRN Akin Galan MD        Or   • dextrose 50 % in water (D50) injection 12.5 g  25 mL intravenous PRN Akin Galan MD       • DULoxetine (CYMBALTA) capsule,delayed release(DR/EC) 20 mg  20 mg oral BID Akin Galan MD   20 mg at 08/04/22 2109   • gabapentin (NEURONTIN) capsule 600 mg  600 mg oral TID Akin Galan MD   600 mg at 08/04/22 2110   • hydrALAZINE (APRESOLINE) injection 5 mg  5 mg intravenous q8h PRN Akin Galan MD       • HYDROmorphone (DILAUDID) injection 0.5 mg  0.5 mg intravenous q3h PRN Derrick Pina MD   0.5 mg at 08/05/22 0609   • insulin aspart U-100 (NovoLOG) pen 6-10 Units  6-10 Units subcutaneous TID with meals Akin Galan MD       • levothyroxine (SYNTHROID) tablet 75 mcg  75 mcg oral Daily (6:30a) Akin Galan MD   75 mcg at 08/05/22 0609   • methocarbamoL (ROBAXIN) tablet  750 mg  750 mg oral 3x daily PRN Akin Galan MD   750 mg at 08/04/22 0924   • ondansetron (ZOFRAN) injection 4 mg  4 mg intravenous q6h PRN Derrick Pina MD   4 mg at 08/05/22 0609       Labs  Today's BMP still pending    Imaging  reviewed    VTE Assessment: TBD    Physical Exam:  NAD  A&O    Plan:  To OR for cysto L JJ stent    Addendum: 10am    OR unable to accommodate the patient's body habitus safely due to lack of a bariatric bed. Plan transfer to [facility]. Will need GYN assessment there for D&C at time of procedure.     Needs BMP today to assess acuity of need for transfer.

## 2022-08-05 NOTE — PROGRESS NOTES
GYN PN (delayed from 1000):    OR does not have equipment to properly and safely position patient in lithotomy for procedure.  Procedure canceled by OR, plan as per urology and [facility] is to transfer the patient to [facility].

## 2022-08-05 NOTE — PROGRESS NOTES
"Infectious Disease Progress Note    Patient Name: Xin Melendez  MR#: [id number]  : 1960  Admission Date: 8/3/2022  Date: 22   Time: 10:39 AM   Author: Jeet Nicolas MD    Major Events: awaiting cystoscopy with ureteral stent placement today    Antibiotics:    Anti-infectives (From admission, onward)    Start     Dose/Rate Route Frequency Ordered Stop    22 0915  [MAR Hold]  ciprofloxacin (CIPRO) IVPB 400 mg        (MAR Hold since 2022 at 0856.Hold Reason: Unreviewed Transfer Orders.)    400 mg  200 mL/hr over 60 Minutes intravenous Every 12 hours interval 22 0815            Subjective     Review of Systems    Remains afebrile without acute events overnight but L flank pain still persists. Remainder of 12 ROS is unchanged.    Objective     Vital Signs:    Patient Vitals for the past 72 hrs:   BP Temp Temp src Pulse Resp SpO2 Height Weight   22 0859 (!) 117/53 36.8 °C (98.3 °F) Temporal 84 16 99 % -- --   22 0753 (!) 92/59 37 °C (98.6 °F) Oral 82 16 99 % -- --   22 2215 131/77 36.5 °C (97.7 °F) Oral 97 16 97 % -- --   22 1501 (!) 123/57 36.5 °C (97.7 °F) Oral 93 18 97 % -- (!) 152 kg (335 lb)   22 0835 122/60 36.4 °C (97.6 °F) Oral 93 18 97 % -- --   22 2321 (!) 105/58 36.7 °C (98.1 °F) Oral 83 18 98 % -- --   22 1915 (!) 101/50 36.4 °C (97.6 °F) Oral 73 18 99 % -- --   22 1846 (!) 120/59 (!) 36 °C (96.8 °F) Temporal 79 20 99 % -- --   22 1726 (!) 100/57 -- -- 80 18 98 % -- --   22 1527 125/60 36.1 °C (97 °F) Temporal 79 20 100 % -- --   22 1347 (!) 119/56 36.1 °C (97 °F) Temporal 83 16 100 % -- --   22 1146 137/63 -- -- -- -- -- -- --   22 1142 -- 36.4 °C (97.6 °F) -- 85 20 97 % 1.6 m (5' 3\") (!) 152 kg (335 lb)       Temp (72hrs), Av.4 °C (97.6 °F), Min:36 °C (96.8 °F), Max:37 °C (98.6 °F)      Physical Exam:    General appearance: NAD  Head: normocephalic, without obvious abnormality, " atraumatic  Eyes: anicteric sclera  Neck: supple  Lungs: clear to auscultation bilaterally  Heart: regular rate and rhythm  Abdomen: soft, non-tender  Back: L flank tenderness  Extremities: lymphedema b/l LE  Joints: no swelling  Skin: no rashes  Neurologic:  following commands    Lines, Drains, Airways, Wounds:  Peripheral IV (Adult) 08/03/22 Left Antecubital (Active)   Number of days: 2       Labs:    CBC Results       08/04/22 08/03/22 11/17/21     0944 1149 0943    WBC 12.19 13.69 9.53    RBC 4.17 4.44 4.66    HGB 11.8 12.4 13.2    HCT 37.4 39.6 41.5    MCV 89.7 89.2 89.1    MCH 28.3 27.9 28.3    MCHC 31.6 31.3 31.8     306 224      BMP Results       08/04/22 08/03/22 11/17/21     0944 1149 0943     139 141    K 3.9 4.3 4.2    Cl 103 104 102    CO2 27 26 27    Glucose 136 92 133    BUN 35 33 18    Creatinine 1.6 1.6 1.0    Calcium 8.9 9.3 9.3    Anion Gap 8 9 12    EGFR 32.8 32.8 56.6         Comment for K at 1149 on 08/03/22: Results obtained on plasma. Plasma Potassium values may be up to 0.4 mEQ/L less than serum values. The differences may be greater for patients with high platelet or white cell counts.  SLIGHT HEMOLYSIS, RESULT MAY BE INCREASED.      PT/PTT Results       11/17/21     0943    PT 13.0    INR 1.0    PTT 32         Comment for INR at 0943 on 11/17/21: INR has no defined significance when PT is within Reference Range.      UA Results       08/03/22     1345    Color Yellow    Clarity Cloudy    Glucose Negative    Bilirubin Negative    Ketones Negative    Sp Grav 1.030    Blood +3    Ph 5.5    Protein +1    Urobilinogen 0.2    Nitrite Negative    Leuk Est Trace    WBC 4 TO 10    RBC Too Numerous To Count    Bacteria Rare         Comment for Blood at 1345 on 08/03/22: The sensitivity of the occult blood test is equivalent to approximately 4 intact RBC/HPF.      Lactate Results    No lab values to display.         Microbiology Results     Procedure Component Value Units Date/Time     SARS-CoV-2 (COVID-19), PCR Nasopharynx [[id number]]  (Normal) Collected: 08/03/22 1724    Specimen: Nasopharyngeal Swab from Nasopharynx Updated: 08/03/22 1814    Narrative:      The following orders were created for panel order SARS-CoV-2 (COVID-19), PCR Nasopharynx.  Procedure                               Abnormality         Status                     ---------                               -----------         ------                     SARS-CoV-2 (COVID-19), P...[[id number]]  Normal              Final result                 Please view results for these tests on the individual orders.    SARS-CoV-2 (COVID-19), PCR Nasopharynx [[id number]]  (Normal) Collected: 08/03/22 1724    Specimen: Nasopharyngeal Swab from Nasopharynx Updated: 08/03/22 1814     SARS-CoV-2 (COVID-19) Negative    Narrative:      Testing performed using real-time PCR for detection of COVID-19. EUA approved validation studies performed on site.     Blood Culture Blood, Venous [[id number]]  (Normal) Collected: 08/03/22 1716    Specimen: Blood, Venous Updated: 08/04/22 2202     Culture No growth at 18-24 hours    Urine culture Urine, Clean Catch [[id number]]  (Normal) Collected: 08/03/22 1345    Specimen: Urine, Clean Catch Updated: 08/04/22 1304     Urine Culture No Growth (Limit of detection 1000 cfu/mL)          Pathology Results     ** No results found for the last 720 hours. **          Echo:         Imaging:    Radiology Imaging    XR ABDOMEN 1 VW    Narrative  CLINICAL HISTORY: Nephrolithiasis, symptomatic/complicated  shunt series    COMPARISON:CT 8/3/2022    COMMENT: Single view of the abdomen is obtained.    Bowel gas pattern is nonspecific but appears nonobstructive. A known left renal  calculus is better visualized on prior CT. Overlying soft tissues are  unremarkable.    --    Impression  See comment.      Assessment     1. L pyelonephritis with hydronephrosis - due to obstructing calculus in the left proximal ureter. Awaiting ureteral  stent placement today with urine cx and blood cx showing no growth to date, likely sterilized from being on levofloxacin outpatient.      Plan      1. Continue ciprofloxacin for one more week to complete 10 day course with urine cx showing no growth to date now.

## 2022-08-05 NOTE — PROGRESS NOTES
GYN PN:    Spoke with patient, reviewed procedure, consent reviewed.  Will proceed with D&C, hysteroscopy.

## 2022-08-05 NOTE — PROGRESS NOTES
[facility] following for discharge.  Please notify [facility] of weekend discharge at [phone].

## 2022-08-05 NOTE — NURSING NOTE
Patient is a fall risk. RN discussed with patient falls risks and falls prevention. Patient verbalizes understanding of falls risks/falls prevention. Pt refusing bed and chair alarm.

## 2022-08-05 NOTE — PROGRESS NOTES
Subjective:   Patient was seen at bedside for continuing evaluation of bilateral lymphedema. Patient is in NAD. No overnight events. Dressing is clean, dry, and intact. Denies any N/V/F/CP/SOB.     ROS:   Past Medical/Surgical history, Allergies, Meds reviewed in detail as charted  FH/SH reviewed in detail as charted  Review of Systems:  Head and Neck: No complaints  Chest : No complaints  Abdomen: No Complaints  Constitutional: Unremarkable     Vitals: I have reviewed the patient's current vital signs as documented in the patient's EMR.    Radiology: No new Radiology.    Labs:   CBC Results       08/05/22 08/04/22 08/03/22     1127 0944 1149    WBC 12.20 12.19 13.69    RBC 3.89 4.17 4.44    HGB 10.9 11.8 12.4    HCT 34.9 37.4 39.6    MCV 89.7 89.7 89.2    MCH 28.0 28.3 27.9    MCHC 31.2 31.6 31.3     291 306           Objective:   -Vascular: Bilateral DP/PT nonpalpable secondary to pitting edema.  -Orthopedic: decreased ROM at ankle jt b/l, +DF/PF all digits intact  -Neurologic: light touch and epicritic sensation intact  -Dermatologic: No obvious wounds on the lower extremity, hyperpigmentation posterior bilateral calf noted with multiple small superficial draining blister wounds.  No crepitus, no fluctuance, no erythema, no increase in warmth.  Severely edematous with active serous drainage    Assessment/Plan:   51-year patient seen for bilateral lymphedema with no acute infection    -Rounded bedside with attending  -Aquacel dressing ordered, please place bedside  -ABD and net compression dressing  -Podiatry team to preform dressing change MWF, all other days please change per nursing staff  -Recommend elevation if tolerable by patient  -May follow with wound care at [facility] upon discharge with Dr. Oconnor  -Please contact on call podiatry resident with any questions or concerns     Antoine Nieves DPM PGY2  #[phone]

## 2022-08-05 NOTE — PROGRESS NOTES
Currently [facility] refusing transfer.  OR here maintains they are unable to accommodate this patient with current equipment.  Awaiting options.  Don't know whether [facility] or one of the [facility] would be able to accommodate.  Stone is very small.  Would recommend re-imaging prior to consideration of OR.

## 2022-08-05 NOTE — CONSULTS
Wound Ostomy Continence Note    Subjective    HPI Patient is a 61 y.o. female who was admitted on 8/3/2022 with a diagnosis of Kidney stone [N20.0]  Acute renal insufficiency [N28.9]  Flank pain [R10.9]  Left ureteral stone [N20.1].    Problem list Problem List:   Patient Active Problem List   Diagnosis   • UTI (urinary tract infection)   • Calculus of proximal right ureter   • Morbid obesity with BMI of 50.0-59.9, adult (CMS/[facility])   • HTN (hypertension)   • Type 2 diabetes mellitus (CMS/[facility])   • Lymphedema due to venous insufficiency   • Hyperlipidemia   • Hypothyroidism   • Bacteremia due to Escherichia coli   • Sepsis due to Escherichia coli (CMS/[facility])   • Osteoarthritis   • Sciatica   • Left ureteral stone   • DARCY (acute kidney injury) (CMS/[facility])   • Pyelonephritis   • Hydroureteronephrosis   • Leucocytosis   • Flank pain   • Postmenopausal bleeding      PMH/PSH Medical History:   Past Medical History:   Diagnosis Date   • COVID-19 vaccine series completed     Moderna Boster 10/2021   • CTS (carpal tunnel syndrome)    • DJD (degenerative joint disease)    • Edema    • Frozen shoulder    • HL (hearing loss)    • Hypertension    • Hypothyroidism    • Kidney stones    • Lipid disorder    • Lymphedema    • Obesity    • Sciatica    • Stasis dermatitis    • Type 2 diabetes mellitus (CMS/[facility])      Surgical History:   Past Surgical History:   Procedure Laterality Date   • APPENDECTOMY     •  SECTION  1999   • CYSTOSCOPY  2021    CYSTOSCOPY,INSERT URETERAL STENT   • LAPAROTOMY EXPLORATORY     • OOPHORECTOMY      right side secondary to torsion      Assessment and Recommendation     Consult by [facility] physician for LE wound care recs. Patient noted to have chronic lymphedema with weeping (posterior>anterior). BMI-59.34. Patient manages her own dressings at home. Maintain PI PREVENT bundle. Podiatry team following patient-will defer to their expertise for management as to not duplicate care. Patient should f/u  with Podiatry as outpatient for continued mgmt. and monitoring. Wound care to sign off, please re consult if needed.    LE wound care: Per Podiatry team recs       Date: 08/05/22  Signature: Luz Melo RN

## 2022-08-05 NOTE — PATIENT CARE CONFERENCE
Care Progression Rounds Note  Date: 8/5/2022  Time: 8:38 AM     Patient Name: Xin Melendez     Medical Record Number: [id number]   YOB: 1960  Sex: Female      Room/Bed: 4206    Admitting Diagnosis: Kidney stone [N20.0]  Acute renal insufficiency [N28.9]  Flank pain [R10.9]  Left ureteral stone [N20.1]   Admit Date/Time: 8/3/2022 11:41 AM    Primary Diagnosis: Left ureteral stone  Principal Problem: Left ureteral stone    GMLOS: pending  Anticipated Discharge Date: 8/6/2022    AM-PAC:  Mobility Score:      Discharge Planning:  Concerns to be Addressed: care coordination/care conferences  Anticipated Discharge Disposition: home with home health, home without assistance or services    Barriers to Discharge:  Medical issues not resolved    Comments:       Participants:  nursing, social work/services

## 2022-08-05 NOTE — OR SURGEON
Pre-Procedure patient identification:  I am the primary operating surgeon/proceduralist and I have identified the patient and confirmed laterality is left on 08/05/22 at 7:59 AM  Guerita Tinsley [phone]

## 2022-08-05 NOTE — ANESTHESIOLOGIST PRE-PROCEDURE ATTESTATION
Pre-Procedure Patient Identification:  I am the Primary Anesthesiologist and have identified the patient on 08/05/22 at 9:00 AM.   I have confirmed the procedure(s) will be performed by the following surgeon/proceduralist Guerita Tinsley MD.

## 2022-08-06 LAB
ANION GAP SERPL CALC-SCNC: 11 MEQ/L (ref 3–15)
ATRIAL RATE: 86
BASOPHILS # BLD: 0.09 K/UL (ref 0.01–0.1)
BASOPHILS NFR BLD: 0.7 %
BUN SERPL-MCNC: 32 MG/DL (ref 8–20)
CALCIUM SERPL-MCNC: 8.9 MG/DL (ref 8.9–10.3)
CHLORIDE SERPL-SCNC: 100 MEQ/L (ref 98–109)
CO2 SERPL-SCNC: 23 MEQ/L (ref 22–32)
CREAT SERPL-MCNC: 1.8 MG/DL (ref 0.6–1.1)
DIFFERENTIAL METHOD BLD: ABNORMAL
EOSINOPHIL # BLD: 0.29 K/UL (ref 0.04–0.36)
EOSINOPHIL NFR BLD: 2.3 %
ERYTHROCYTE [DISTWIDTH] IN BLOOD BY AUTOMATED COUNT: 14.1 % (ref 11.7–14.4)
GFR SERPL CREATININE-BSD FRML MDRD: 28.6 ML/MIN/1.73M*2
GLUCOSE BLD-MCNC: 108 MG/DL (ref 70–99)
GLUCOSE BLD-MCNC: 121 MG/DL (ref 70–99)
GLUCOSE BLD-MCNC: 93 MG/DL (ref 70–99)
GLUCOSE SERPL-MCNC: 133 MG/DL (ref 70–99)
HCT VFR BLDCO AUTO: 36.1 % (ref 35–45)
HGB BLD-MCNC: 11.3 G/DL (ref 11.8–15.7)
IMM GRANULOCYTES # BLD AUTO: 0.11 K/UL (ref 0–0.08)
IMM GRANULOCYTES NFR BLD AUTO: 0.9 %
LYMPHOCYTES # BLD: 1.97 K/UL (ref 1.2–3.5)
LYMPHOCYTES NFR BLD: 15.6 %
MCH RBC QN AUTO: 28 PG (ref 28–33.2)
MCHC RBC AUTO-ENTMCNC: 31.3 G/DL (ref 32.2–35.5)
MCV RBC AUTO: 89.4 FL (ref 83–98)
MONOCYTES # BLD: 1.35 K/UL (ref 0.28–0.8)
MONOCYTES NFR BLD: 10.7 %
NEUTROPHILS # BLD: 8.81 K/UL (ref 1.7–7)
NEUTS SEG NFR BLD: 69.8 %
NRBC BLD-RTO: 0 %
P AXIS: 45
PDW BLD AUTO: 9.7 FL (ref 9.4–12.3)
PLATELET # BLD AUTO: 268 K/UL (ref 150–369)
POCT TEST: ABNORMAL
POCT TEST: ABNORMAL
POCT TEST: NORMAL
POTASSIUM SERPL-SCNC: 4.5 MEQ/L (ref 3.6–5.1)
PR INTERVAL: 154
QRS DURATION: 96
QT INTERVAL: 378
QTC CALCULATION(BAZETT): 452
R AXIS: 22
RBC # BLD AUTO: 4.04 M/UL (ref 3.93–5.22)
SODIUM SERPL-SCNC: 134 MEQ/L (ref 136–144)
T WAVE AXIS: 50
VENTRICULAR RATE: 86
WBC # BLD AUTO: 12.62 K/UL (ref 3.8–10.5)

## 2022-08-06 PROCEDURE — 99233 SBSQ HOSP IP/OBS HIGH 50: CPT | Performed by: INTERNAL MEDICINE

## 2022-08-06 PROCEDURE — 63700000 HC SELF-ADMINISTRABLE DRUG: Performed by: INTERNAL MEDICINE

## 2022-08-06 PROCEDURE — 12000000 HC ROOM AND CARE MED/SURG

## 2022-08-06 PROCEDURE — 36415 COLL VENOUS BLD VENIPUNCTURE: CPT | Performed by: INTERNAL MEDICINE

## 2022-08-06 PROCEDURE — 85025 COMPLETE CBC W/AUTO DIFF WBC: CPT | Performed by: INTERNAL MEDICINE

## 2022-08-06 PROCEDURE — 63700000 HC SELF-ADMINISTRABLE DRUG: Performed by: STUDENT IN AN ORGANIZED HEALTH CARE EDUCATION/TRAINING PROGRAM

## 2022-08-06 PROCEDURE — 63600000 HC DRUGS/DETAIL CODE: Performed by: INTERNAL MEDICINE

## 2022-08-06 PROCEDURE — 93005 ELECTROCARDIOGRAM TRACING: CPT | Performed by: INTERNAL MEDICINE

## 2022-08-06 PROCEDURE — 80048 BASIC METABOLIC PNL TOTAL CA: CPT | Performed by: INTERNAL MEDICINE

## 2022-08-06 RX ORDER — OXYCODONE HYDROCHLORIDE 5 MG/1
5 TABLET ORAL EVERY 6 HOURS PRN
Status: DISCONTINUED | OUTPATIENT
Start: 2022-08-06 | End: 2022-08-06

## 2022-08-06 RX ORDER — GABAPENTIN 300 MG/1
300 CAPSULE ORAL 3 TIMES DAILY
Status: DISCONTINUED | OUTPATIENT
Start: 2022-08-06 | End: 2022-08-07 | Stop reason: HOSPADM

## 2022-08-06 RX ORDER — OXYCODONE HYDROCHLORIDE 5 MG/1
5-10 TABLET ORAL EVERY 6 HOURS PRN
Status: DISCONTINUED | OUTPATIENT
Start: 2022-08-06 | End: 2022-08-07 | Stop reason: HOSPADM

## 2022-08-06 RX ADMIN — CIPROFLOXACIN 400 MG: 2 INJECTION, SOLUTION INTRAVENOUS at 21:27

## 2022-08-06 RX ADMIN — DULOXETINE 20 MG: 20 CAPSULE, DELAYED RELEASE ORAL at 08:51

## 2022-08-06 RX ADMIN — ACETAMINOPHEN 650 MG: 325 TABLET ORAL at 00:42

## 2022-08-06 RX ADMIN — ACETAMINOPHEN 650 MG: 325 TABLET ORAL at 10:28

## 2022-08-06 RX ADMIN — GABAPENTIN 300 MG: 300 CAPSULE ORAL at 21:26

## 2022-08-06 RX ADMIN — GABAPENTIN 300 MG: 300 CAPSULE ORAL at 17:21

## 2022-08-06 RX ADMIN — CIPROFLOXACIN 400 MG: 2 INJECTION, SOLUTION INTRAVENOUS at 08:51

## 2022-08-06 RX ADMIN — GABAPENTIN 600 MG: 300 CAPSULE ORAL at 08:51

## 2022-08-06 RX ADMIN — LEVOTHYROXINE SODIUM 75 MCG: 0.07 TABLET ORAL at 06:06

## 2022-08-06 RX ADMIN — ACETAMINOPHEN 650 MG: 325 TABLET ORAL at 15:26

## 2022-08-06 RX ADMIN — DULOXETINE 20 MG: 20 CAPSULE, DELAYED RELEASE ORAL at 21:27

## 2022-08-06 RX ADMIN — HYDROMORPHONE HYDROCHLORIDE 0.5 MG: 1 INJECTION, SOLUTION INTRAMUSCULAR; INTRAVENOUS; SUBCUTANEOUS at 06:07

## 2022-08-06 NOTE — PROGRESS NOTES
"Daily Progress Note    Subjective     Interval History: complains of Some pain.  No fevers.  . States pain a little better this am.      Objective     Vital signs in last 24 hours:  Temp:  [36.5 °C (97.7 °F)-36.9 °C (98.5 °F)] 36.5 °C (97.7 °F)  Heart Rate:  [79-85] 83  Resp:  [16] 16  BP: ()/(56-65) 116/56      Intake/Output Summary (Last 24 hours) at 8/6/2022 0907  Last data filed at 8/6/2022 0100  Gross per 24 hour   Intake 1826 ml   Output 950 ml   Net 876 ml     Intake/Output this shift:  No intake/output data recorded.    Labs  Not drawn yet this am.    Imaging  CT last night shows stone still in proximal ureter with mild/moderate hydro.    VTE Assessment: TBD    Physical Exam:  Visit Vitals  BP (!) 116/56 (BP Location: Right forearm, Patient Position: Sitting)   Pulse 83   Temp 36.5 °C (97.7 °F) (Oral)   Resp 16   Ht 1.6 m (5' 3\")   Wt (!) 152 kg (335 lb)   LMP  (Within Years)   SpO2 96%   Breastfeeding No   BMI 59.34 kg/m²     General appearance: alert, cooperative and no distress  Lungs: Nml effort    Assessment and recommendations:  Left proximal ureteral stone with UTI.  Awaiting transfer to [facility].        "

## 2022-08-06 NOTE — PLAN OF CARE
Problem: Adult Inpatient Plan of Care  Goal: Plan of Care Review  Flowsheets (Taken 8/6/2022 0609)  Progress: no change  Plan of Care Reviewed With: patient  Outcome Summary: pain controlled to left flank with dilaudid.pt is urinating brown urine  which pt states cmes and goes. encouraged po fluids. will let MD know. but no new changes. pt refuses to call for help . and refuses bed alarm.education given.   Plan of Care Review  Plan of Care Reviewed With: patient  Progress: no change  Outcome Summary: pain controlled to left flank with dilaudid.pt is urinating brown urine  which pt states cmes and goes. encouraged po fluids. will let MD know. but no new changes. pt refuses to call for help . and refuses bed alarm.education given.

## 2022-08-06 NOTE — PLAN OF CARE
Plan of Care Review  Plan of Care Reviewed With: patient  Progress: no change  Outcome Summary: AAOx3, VSS on RA, up in chair majority this shift, refusing bed/chair alarm, patient agreeable to using call bell to ask for help. ambulates independently w/ walker to bathroom, voiding brown/dark yellow urine, straining urine w/o stone present. small clot present once. no BM this shift, tolerating diet, bowel sounds present. left flank pain tolerable w/ PRN tylenol. b/l LE wound dressing CDI, pt agreeable to call RN if pt completes dressing change. call bell in reach.

## 2022-08-06 NOTE — PROGRESS NOTES
[facility] Medicine Service -  Daily Progress Note       SUBJECTIVE   Interval History: pt seen and examined, L flank pain better than yesterday. No chest pain, shortness of breath, abd pain. Leg pain the same.    OBJECTIVE      Vital signs in last 24 hours:  Temp:  [36.5 °C (97.7 °F)-36.9 °C (98.4 °F)] 36.9 °C (98.4 °F)  Heart Rate:  [72-83] 79  Resp:  [16-18] 18  BP: (110-117)/(55-59) 117/59    Intake/Output Summary (Last 24 hours) at 8/6/2022 1813  Last data filed at 8/6/2022 1530  Gross per 24 hour   Intake 866 ml   Output 725 ml   Net 141 ml       PHYSICAL EXAMINATION      Physical Exam    Physical Exam   Constitutional:  obese. No distress.   HENT:   Head: Normocephalic and atraumatic.   Right Ear: External ear normal.   Left Ear: External ear normal.   Mouth/Throat: Oropharynx is clear and moist.   Eyes: Conjunctivae and EOM are normal. Right eye exhibits no discharge. Left eye exhibits no discharge.   Neck: Normal range of motion. Neck supple. No tracheal deviation present.   Cardiovascular: Normal rate, regular rhythm and normal heart sounds.  Exam reveals no gallop and no friction rub.    No murmur heard.  Pulmonary/Chest: Effort normal and breath sounds normal. No stridor. No respiratory distress.  has no wheezes.  has no rales.   Abdominal: Soft. Bowel sounds are normal. exhibits no distension. There is no tenderness. There is no rebound and no guarding. L CVA tenderness  Musculoskeletal: b/l LE lymphedema. No deformity.   Neurological:  is alert, awake, oriented x3   Skin: Skin is warm and dry, not diaphoretic.      LINES, CATHETERS, DRAINS, AIRWAYS, AND WOUNDS   Lines, Drains, and Airways:  Wounds (agree with documentation and present on admission):  Peripheral IV (Adult) 08/03/22 Left Antecubital (Active)   Number of days: 1         Comments:      LABS / IMAGING / TELE      Labs  CBC Results       08/06/22 08/05/22 08/04/22     1126 1127 0944    WBC 12.62 12.20 12.19    RBC 4.04 3.89 4.17    HGB  11.3 10.9 11.8    HCT 36.1 34.9 37.4    MCV 89.4 89.7 89.7    MCH 28.0 28.0 28.3    MCHC 31.3 31.2 31.6     254 291        CMP Results       08/06/22 08/05/22 08/04/22     1047 1127 0944     138 138    K 4.5 4.3 3.9    Cl 100 102 103    CO2 23 30 27    Glucose 133 110 136    BUN 32 30 35    Creatinine 1.8 1.6 1.6    Calcium 8.9 8.9 8.9    Anion Gap 11 6 8    EGFR 28.6 32.8 32.8         Comment for K at 1047 on 08/06/22: SLIGHT HEMOLYSIS, RESULT MAY BE INCREASED.          SARS-CoV-2 (COVID-19) (no units)   Date/Time Value   08/03/2022 1724 Negative       ASSESSMENT AND PLAN      Postmenopausal bleeding  Assessment & Plan  Ongoing for about 3 weeks now  transvaginal US - showed enlarged fibroid uterus and thickened endometrium, measuring up to 2cm  Patient reports difficulty getting a gynecology visit OP  Consult placed to gyne for biopsy      Leucocytosis  Assessment & Plan  Wbc 13.69k on admission, secondary to pyelonephritis and ureteral stones  Did not meet sepsis criteria on admission  Check blood cultures  IV Ciprofloxacin given in the ED - given h/o cephalexin allergies    Hydroureteronephrosis  Assessment & Plan  Left sided hydroureteronephrosis - secondary to ureteral calculus  -urology following    Pyelonephritis  Assessment & Plan  Acute pyelonephritis Noted on CT scan, coupled with flank pain  Check blood cultures, not checked in the ED  Allergic to cephalexin, recently on levaquin outpatient  Started on IV ciprofloxacin by the ED  Unclear if her allergy to cephalexin extends to all penicillins and cephalosporins, would continue with ciprofloxacin for now  -ID following, on PO cipro    DARCY (acute kidney injury) (CMS/[facility])  Assessment & Plan  Acute kidney Injury, Cr 1.6 on admission, secondary to post renal calculi obstruction  Baseline Cr 1.0 - 1.1  urology c/s      Hypothyroidism  Assessment & Plan  On levothyroxine    Hyperlipidemia  Assessment & Plan  On simvastatin    Lymphedema due to  venous insufficiency  Assessment & Plan  Having serous drainage, covered with dressing.   -podiatry c/s  -wound care c/s.     Type 2 diabetes mellitus (CMS/[facility])  Assessment & Plan  On 78iu tresiba, SSI - held for now  Check updated a1c  On SSI and POCT for now while NPO  Resume home meds after urological intervention tomorrow    HTN (hypertension)  Assessment & Plan  ON Irbesartan-HCTZ  Hold both meds for now, given DARCY on admission  Can be on IV hydralazine prn temporarily till DARCY resolves  Monitor BP per unit protocol    Morbid obesity with BMI of 50.0-59.9, adult (CMS/[facility])  Assessment & Plan  Dietary and lifestyle counseling    * Left ureteral stone  Assessment & Plan  Left sided ureteral stone, present on admission, with associated hydroureteronephrosis  No medical barriers to surgery  RCRI - 1 point, 0.9% risk of perioperative cardiac events. At an acceptable risk for intervention, deemed necessary by urology    Pt went to OR for ureteral stent. However procedure unable to be performed due to pt's body habitus and the lack of bariatric equipment here. Urologist Dr. Tinsley advised me to transfer patient. Discussed with urologist as well as transfer center. Pt is accepted by Dr. Ang at [facility], plan for transfer 8/7 for urologic procedure 8/8       VTE Assessment: [place] VTE Score: 2  VTE Prophylaxis:  Current anticoagulants:    •None      Code Status: Full Code  Palliative Care Screening Score: 0   Estimated Discharge Date: 8/8/2022     Disposition Planning: need 2-3 more days     Ceci Mckeon,   8/6/2022

## 2022-08-07 ENCOUNTER — HOSPITAL ENCOUNTER (INPATIENT)
Facility: HOSPITAL | Age: 62
LOS: 3 days | Discharge: HOME HEALTH | End: 2022-08-10
Attending: HOSPITALIST | Admitting: STUDENT IN AN ORGANIZED HEALTH CARE EDUCATION/TRAINING PROGRAM
Payer: COMMERCIAL

## 2022-08-07 VITALS
HEART RATE: 87 BPM | BODY MASS INDEX: 51.91 KG/M2 | DIASTOLIC BLOOD PRESSURE: 55 MMHG | SYSTOLIC BLOOD PRESSURE: 102 MMHG | HEIGHT: 63 IN | WEIGHT: 293 LBS | TEMPERATURE: 98 F | RESPIRATION RATE: 18 BRPM | OXYGEN SATURATION: 97 %

## 2022-08-07 DIAGNOSIS — N17.9 AKI (ACUTE KIDNEY INJURY) (CMS/HCC): ICD-10-CM

## 2022-08-07 DIAGNOSIS — N20.1 LEFT URETERAL STONE: Primary | ICD-10-CM

## 2022-08-07 PROBLEM — N39.0 UTI (URINARY TRACT INFECTION): Status: RESOLVED | Noted: 2021-11-04 | Resolved: 2022-08-07

## 2022-08-07 LAB
ANION GAP SERPL CALC-SCNC: 8 MEQ/L (ref 3–15)
BASOPHILS # BLD: 0.08 K/UL (ref 0.01–0.1)
BASOPHILS NFR BLD: 0.8 %
BUN SERPL-MCNC: 31 MG/DL (ref 8–20)
CALCIUM SERPL-MCNC: 8.4 MG/DL (ref 8.9–10.3)
CHLORIDE SERPL-SCNC: 102 MEQ/L (ref 98–109)
CO2 SERPL-SCNC: 27 MEQ/L (ref 22–32)
CREAT SERPL-MCNC: 1.7 MG/DL (ref 0.6–1.1)
DIFFERENTIAL METHOD BLD: ABNORMAL
EOSINOPHIL # BLD: 0.2 K/UL (ref 0.04–0.36)
EOSINOPHIL NFR BLD: 2 %
ERYTHROCYTE [DISTWIDTH] IN BLOOD BY AUTOMATED COUNT: 14 % (ref 11.7–14.4)
GFR SERPL CREATININE-BSD FRML MDRD: 30.6 ML/MIN/1.73M*2
GLUCOSE BLD-MCNC: 100 MG/DL (ref 70–99)
GLUCOSE BLD-MCNC: 119 MG/DL (ref 70–99)
GLUCOSE BLD-MCNC: 129 MG/DL (ref 70–99)
GLUCOSE BLD-MCNC: 143 MG/DL (ref 70–99)
GLUCOSE SERPL-MCNC: 126 MG/DL (ref 70–99)
HCT VFR BLDCO AUTO: 32 % (ref 35–45)
HGB BLD-MCNC: 10.2 G/DL (ref 11.8–15.7)
IMM GRANULOCYTES # BLD AUTO: 0.07 K/UL (ref 0–0.08)
IMM GRANULOCYTES NFR BLD AUTO: 0.7 %
LYMPHOCYTES # BLD: 1.42 K/UL (ref 1.2–3.5)
LYMPHOCYTES NFR BLD: 13.9 %
MCH RBC QN AUTO: 28.7 PG (ref 28–33.2)
MCHC RBC AUTO-ENTMCNC: 31.9 G/DL (ref 32.2–35.5)
MCV RBC AUTO: 89.9 FL (ref 83–98)
MONOCYTES # BLD: 1.14 K/UL (ref 0.28–0.8)
MONOCYTES NFR BLD: 11.2 %
NEUTROPHILS # BLD: 7.28 K/UL (ref 1.7–7)
NEUTS SEG NFR BLD: 71.4 %
NRBC BLD-RTO: 0 %
PDW BLD AUTO: 9.5 FL (ref 9.4–12.3)
PLATELET # BLD AUTO: 246 K/UL (ref 150–369)
POCT TEST: ABNORMAL
POTASSIUM SERPL-SCNC: 3.8 MEQ/L (ref 3.6–5.1)
RBC # BLD AUTO: 3.56 M/UL (ref 3.93–5.22)
SARS-COV-2 RNA RESP QL NAA+PROBE: NEGATIVE
SODIUM SERPL-SCNC: 137 MEQ/L (ref 136–144)
WBC # BLD AUTO: 10.19 K/UL (ref 3.8–10.5)

## 2022-08-07 PROCEDURE — 85025 COMPLETE CBC W/AUTO DIFF WBC: CPT | Performed by: INTERNAL MEDICINE

## 2022-08-07 PROCEDURE — 63700000 HC SELF-ADMINISTRABLE DRUG: Performed by: STUDENT IN AN ORGANIZED HEALTH CARE EDUCATION/TRAINING PROGRAM

## 2022-08-07 PROCEDURE — 1123F ACP DISCUSS/DSCN MKR DOCD: CPT | Performed by: STUDENT IN AN ORGANIZED HEALTH CARE EDUCATION/TRAINING PROGRAM

## 2022-08-07 PROCEDURE — 80048 BASIC METABOLIC PNL TOTAL CA: CPT | Performed by: INTERNAL MEDICINE

## 2022-08-07 PROCEDURE — 63600000 HC DRUGS/DETAIL CODE: Performed by: INTERNAL MEDICINE

## 2022-08-07 PROCEDURE — 0U9C7ZX DRAINAGE OF CERVIX, VIA NATURAL OR ARTIFICIAL OPENING, DIAGNOSTIC: ICD-10-PCS | Performed by: UROLOGY

## 2022-08-07 PROCEDURE — 0UDB7ZX EXTRACTION OF ENDOMETRIUM, VIA NATURAL OR ARTIFICIAL OPENING, DIAGNOSTIC: ICD-10-PCS | Performed by: UROLOGY

## 2022-08-07 PROCEDURE — U0003 INFECTIOUS AGENT DETECTION BY NUCLEIC ACID (DNA OR RNA); SEVERE ACUTE RESPIRATORY SYNDROME CORONAVIRUS 2 (SARS-COV-2) (CORONAVIRUS DISEASE [COVID-19]), AMPLIFIED PROBE TECHNIQUE, MAKING USE OF HIGH THROUGHPUT TECHNOLOGIES AS DESCRIBED BY CMS-2020-01-R: HCPCS | Performed by: STUDENT IN AN ORGANIZED HEALTH CARE EDUCATION/TRAINING PROGRAM

## 2022-08-07 PROCEDURE — 63700000 HC SELF-ADMINISTRABLE DRUG: Performed by: INTERNAL MEDICINE

## 2022-08-07 PROCEDURE — 99999 PR OFFICE/OUTPT VISIT,PROCEDURE ONLY: CPT | Performed by: INTERNAL MEDICINE

## 2022-08-07 PROCEDURE — 12000000 HC ROOM AND CARE MED/SURG

## 2022-08-07 PROCEDURE — 99223 1ST HOSP IP/OBS HIGH 75: CPT | Performed by: STUDENT IN AN ORGANIZED HEALTH CARE EDUCATION/TRAINING PROGRAM

## 2022-08-07 PROCEDURE — 36415 COLL VENOUS BLD VENIPUNCTURE: CPT | Performed by: INTERNAL MEDICINE

## 2022-08-07 RX ORDER — DULOXETIN HYDROCHLORIDE 20 MG/1
20 CAPSULE, DELAYED RELEASE ORAL 2 TIMES DAILY
Status: DISCONTINUED | OUTPATIENT
Start: 2022-08-07 | End: 2022-08-10 | Stop reason: HOSPADM

## 2022-08-07 RX ORDER — GABAPENTIN 300 MG/1
300 CAPSULE ORAL 3 TIMES DAILY
Status: CANCELLED | OUTPATIENT
Start: 2022-08-07

## 2022-08-07 RX ORDER — TAMSULOSIN HYDROCHLORIDE 0.4 MG/1
0.4 CAPSULE ORAL NIGHTLY
Status: DISCONTINUED | OUTPATIENT
Start: 2022-08-07 | End: 2022-08-10 | Stop reason: HOSPADM

## 2022-08-07 RX ORDER — IBUPROFEN 200 MG
16-32 TABLET ORAL AS NEEDED
Status: DISCONTINUED | OUTPATIENT
Start: 2022-08-07 | End: 2022-08-10 | Stop reason: HOSPADM

## 2022-08-07 RX ORDER — DEXTROSE 50 % IN WATER (D50W) INTRAVENOUS SYRINGE
25 AS NEEDED
Status: CANCELLED | OUTPATIENT
Start: 2022-08-07

## 2022-08-07 RX ORDER — DEXTROSE 50 % IN WATER (D50W) INTRAVENOUS SYRINGE
25 AS NEEDED
Status: DISCONTINUED | OUTPATIENT
Start: 2022-08-07 | End: 2022-08-10 | Stop reason: HOSPADM

## 2022-08-07 RX ORDER — INSULIN ASPART 100 [IU]/ML
6-10 INJECTION, SOLUTION INTRAVENOUS; SUBCUTANEOUS
Status: CANCELLED | OUTPATIENT
Start: 2022-08-07

## 2022-08-07 RX ORDER — OXYCODONE HYDROCHLORIDE 5 MG/1
5-10 TABLET ORAL EVERY 6 HOURS PRN
Status: CANCELLED | OUTPATIENT
Start: 2022-08-07

## 2022-08-07 RX ORDER — IBUPROFEN 200 MG
16-32 TABLET ORAL AS NEEDED
Status: CANCELLED | OUTPATIENT
Start: 2022-08-07

## 2022-08-07 RX ORDER — ATORVASTATIN CALCIUM 20 MG/1
20 TABLET, FILM COATED ORAL NIGHTLY
Status: DISCONTINUED | OUTPATIENT
Start: 2022-08-07 | End: 2022-08-10 | Stop reason: HOSPADM

## 2022-08-07 RX ORDER — CIPROFLOXACIN 2 MG/ML
400 INJECTION, SOLUTION INTRAVENOUS
Status: CANCELLED | OUTPATIENT
Start: 2022-08-07

## 2022-08-07 RX ORDER — LEVOTHYROXINE SODIUM 75 UG/1
75 TABLET ORAL
Status: CANCELLED | OUTPATIENT
Start: 2022-08-08

## 2022-08-07 RX ORDER — DEXTROSE 40 %
15-30 GEL (GRAM) ORAL AS NEEDED
Status: DISCONTINUED | OUTPATIENT
Start: 2022-08-07 | End: 2022-08-10 | Stop reason: HOSPADM

## 2022-08-07 RX ORDER — ACETAMINOPHEN 325 MG/1
650 TABLET ORAL EVERY 4 HOURS PRN
Status: DISCONTINUED | OUTPATIENT
Start: 2022-08-07 | End: 2022-08-10 | Stop reason: HOSPADM

## 2022-08-07 RX ORDER — ACETAMINOPHEN 325 MG/1
650 TABLET ORAL EVERY 4 HOURS PRN
Status: CANCELLED | OUTPATIENT
Start: 2022-08-07

## 2022-08-07 RX ORDER — INSULIN ASPART 100 [IU]/ML
6-10 INJECTION, SOLUTION INTRAVENOUS; SUBCUTANEOUS
Status: DISCONTINUED | OUTPATIENT
Start: 2022-08-07 | End: 2022-08-10 | Stop reason: HOSPADM

## 2022-08-07 RX ORDER — DULOXETIN HYDROCHLORIDE 20 MG/1
20 CAPSULE, DELAYED RELEASE ORAL 2 TIMES DAILY
Status: CANCELLED | OUTPATIENT
Start: 2022-08-07

## 2022-08-07 RX ORDER — METHOCARBAMOL 750 MG/1
750 TABLET, FILM COATED ORAL 3 TIMES DAILY PRN
Status: CANCELLED | OUTPATIENT
Start: 2022-08-07

## 2022-08-07 RX ORDER — HYDROMORPHONE HYDROCHLORIDE 1 MG/ML
0.5 INJECTION, SOLUTION INTRAMUSCULAR; INTRAVENOUS; SUBCUTANEOUS EVERY 4 HOURS PRN
Status: DISCONTINUED | OUTPATIENT
Start: 2022-08-07 | End: 2022-08-09

## 2022-08-07 RX ORDER — DEXTROSE 40 %
15-30 GEL (GRAM) ORAL AS NEEDED
Status: CANCELLED | OUTPATIENT
Start: 2022-08-07

## 2022-08-07 RX ORDER — ONDANSETRON HYDROCHLORIDE 2 MG/ML
4 INJECTION, SOLUTION INTRAVENOUS EVERY 6 HOURS PRN
Status: DISCONTINUED | OUTPATIENT
Start: 2022-08-07 | End: 2022-08-08

## 2022-08-07 RX ORDER — GABAPENTIN 300 MG/1
300 CAPSULE ORAL 3 TIMES DAILY
Status: DISCONTINUED | OUTPATIENT
Start: 2022-08-07 | End: 2022-08-10 | Stop reason: HOSPADM

## 2022-08-07 RX ORDER — CIPROFLOXACIN 2 MG/ML
400 INJECTION, SOLUTION INTRAVENOUS
Status: DISCONTINUED | OUTPATIENT
Start: 2022-08-07 | End: 2022-08-10 | Stop reason: HOSPADM

## 2022-08-07 RX ORDER — METHOCARBAMOL 750 MG/1
750 TABLET, FILM COATED ORAL 3 TIMES DAILY PRN
Status: DISCONTINUED | OUTPATIENT
Start: 2022-08-07 | End: 2022-08-10 | Stop reason: HOSPADM

## 2022-08-07 RX ORDER — LEVOTHYROXINE SODIUM 75 UG/1
75 TABLET ORAL
Status: DISCONTINUED | OUTPATIENT
Start: 2022-08-08 | End: 2022-08-10 | Stop reason: HOSPADM

## 2022-08-07 RX ORDER — ONDANSETRON HYDROCHLORIDE 2 MG/ML
4 INJECTION, SOLUTION INTRAVENOUS EVERY 6 HOURS PRN
Status: CANCELLED | OUTPATIENT
Start: 2022-08-07 | End: 2022-11-01

## 2022-08-07 RX ORDER — OXYCODONE HYDROCHLORIDE 5 MG/1
5-10 TABLET ORAL EVERY 6 HOURS PRN
Status: DISCONTINUED | OUTPATIENT
Start: 2022-08-07 | End: 2022-08-10 | Stop reason: HOSPADM

## 2022-08-07 RX ORDER — ATORVASTATIN CALCIUM 20 MG/1
20 TABLET, FILM COATED ORAL NIGHTLY
Status: CANCELLED | OUTPATIENT
Start: 2022-08-07

## 2022-08-07 RX ADMIN — DULOXETINE 20 MG: 20 CAPSULE, DELAYED RELEASE ORAL at 21:24

## 2022-08-07 RX ADMIN — GABAPENTIN 300 MG: 300 CAPSULE ORAL at 15:22

## 2022-08-07 RX ADMIN — GABAPENTIN 300 MG: 300 CAPSULE ORAL at 21:23

## 2022-08-07 RX ADMIN — OXYCODONE HYDROCHLORIDE 10 MG: 5 TABLET ORAL at 06:09

## 2022-08-07 RX ADMIN — DULOXETINE 20 MG: 20 CAPSULE, DELAYED RELEASE ORAL at 08:52

## 2022-08-07 RX ADMIN — CIPROFLOXACIN 400 MG: 2 INJECTION, SOLUTION INTRAVENOUS at 08:50

## 2022-08-07 RX ADMIN — ATORVASTATIN CALCIUM 20 MG: 20 TABLET, FILM COATED ORAL at 21:24

## 2022-08-07 RX ADMIN — OXYCODONE HYDROCHLORIDE 10 MG: 5 TABLET ORAL at 11:50

## 2022-08-07 RX ADMIN — ONDANSETRON 4 MG: 2 INJECTION INTRAMUSCULAR; INTRAVENOUS at 08:46

## 2022-08-07 RX ADMIN — ACETAMINOPHEN 650 MG: 325 TABLET, FILM COATED ORAL at 18:00

## 2022-08-07 RX ADMIN — OXYCODONE HYDROCHLORIDE 10 MG: 5 TABLET ORAL at 18:00

## 2022-08-07 RX ADMIN — GABAPENTIN 300 MG: 300 CAPSULE ORAL at 08:52

## 2022-08-07 RX ADMIN — CIPROFLOXACIN 400 MG: 2 INJECTION, SOLUTION INTRAVENOUS at 22:00

## 2022-08-07 RX ADMIN — LEVOTHYROXINE SODIUM 75 MCG: 0.07 TABLET ORAL at 06:09

## 2022-08-07 RX ADMIN — ACETAMINOPHEN 650 MG: 325 TABLET ORAL at 06:09

## 2022-08-07 RX ADMIN — ACETAMINOPHEN 650 MG: 325 TABLET, FILM COATED ORAL at 11:50

## 2022-08-07 RX ADMIN — TAMSULOSIN HYDROCHLORIDE 0.4 MG: 0.4 CAPSULE ORAL at 21:23

## 2022-08-07 NOTE — ASSESSMENT & PLAN NOTE
- Admitted to [facility] 8/3/22 with flank pain. Imaging showed Left sided ureteral stone, with associated hydroureteronephrosis  - History of previous nephrolithiasis, last cysto 11/2021 at [facility] with follow up at office of Dr. Marroquin.   - At [facility], pt failed trial of observation for stone to pass.   - Pt went to OR 8/5/22 at [facility] for ureteral stent. However procedure unable to be performed due to pt's body habitus and the lack of bariatric leg stirrups. Urologist Dr. Tinsley advised transfer patient to [facility]-->Transferred 8/7/22  -s/p cystoscopy and left ureteral stent placement on 8/8    Plan:  -Campoverde removed 8/9 AM; CTM PVR and bladder scans   - f/u intraoperative UCx  - pain control w/ prn Tylenol, prn oxycodone--> will d/c prn IV Dilaudid  - cont Flomax  - urology c/s, appreciate recs  - will need to follow up with urology to schedule ureteroscopy and laser lithotripsy in 2 weeks

## 2022-08-07 NOTE — ASSESSMENT & PLAN NOTE
On 78U Tresiba, SSI - held Tresiba for now     - Tresiba on hold for now, only on SSI the last few days and BG has been good  - CTM BG  - cont SSI

## 2022-08-07 NOTE — ASSESSMENT & PLAN NOTE
Cr 1.6 on admission, secondary to post renal calculi obstruction. Baseline Cr 1.0 - 1.1  - Cr slowly improving-->Cr 1.6 today  - start mIVF in case of component of dehydration and while NPO  - hold home ARB-HCTZ  - avoid nephrotoxins as able, renally dose all meds

## 2022-08-07 NOTE — NURSING NOTE
MALINA Cartagena returned this RN phone call and received report on patient. Patient currently in route to [facility] via [facility] transport.

## 2022-08-07 NOTE — ASSESSMENT & PLAN NOTE
- Chronic lymphedema.   - Evaluated by Wound Care on 8/5 while admitted to [facility].   - Patient noted to have chronic lymphedema with weeping (posterior>anterior). BMI-59.34. Patient manages her own dressings at home. Maintain PI PREVENT bundle.   - Podiatry team was following patient during [facility] admit. Consult here for continued care.   - ABD and net compression dressings  - Podiatry recommend elevation if tolerable by patient. Pt states unable to do this since she can not tolerate a pad or any pressure under her LE.   - May follow with wound care at [facility] upon discharge with Dr. Oconnor

## 2022-08-07 NOTE — ASSESSMENT & PLAN NOTE
- Ongoing for about 3 weeks now. LMP was age 54.  - Transvaginal US - showed enlarged fibroid uterus and thickened endometrium, measuring up to 2cm  - Patient reports difficulty getting a gynecology visit OP. Last visit age 54.    Plan:  -s/p EMB and pap on 8/8  -pt will need to f/u w/OBGYN for biopsy results

## 2022-08-07 NOTE — ASSESSMENT & PLAN NOTE
- Dietary and lifestyle counseling.   - Limited mobility due to lymphedema, knee pain and hip pain, uses walker  - Pt agreeable to PT in home. [facility] was following while pt at [facility].

## 2022-08-07 NOTE — PROGRESS NOTES
I saw and evaluated the patient.  I discussed the case with the NP and agree with the findings and plan as documented in the note except for my comments below or within the additional notes today.    ** TRANSFER FROM [facility] TO [facility] **    Patient is a 62 yo F with PMH of T2DM, HTN, and hypothyroidism who presented to [facility] on 8/3 with L sided flank pain and was found to have a 4mm obstructing renal stone complicated by DARCY. She was unable to under cystoscopy for stone removal due to body habitus. Transfer to [facility] was initiated for repeat cystoscopy with bariatric equipment.     Her course at OSH was complicated by intermittent vaginal bleeding. Pelvic US showed thickened endometrium. She was seen by GYN who recommended endometrial biopsy during her cystoscopy.     Today, patient states she feels well. She continues to have pain in her L flank. She states IV dilaudid worked best for this pain. She continues to have nausea which is well controlled with ondansetron. She denies any fevers or chills.     CODE STATUS addressed at time of admission. Patient would like to be FULL CODE in the event of cardiac or pulmonary arrest.     PHYSICAL EXAM  Vitals: There were no vitals taken for this visit.   General: NAD, well-appearing, obese   HEENT: Oropharyx clear and without exudates   Eyes: Conjunctiva clear  EOMI   Cardiac: RRR  No murmurs, rubs, or gallops   Pulmonary: Clear to auscultation bilaterally  No respiratory distress   Abdomen: Soft, non-tender, +BS  No rebound, no guarding  + L sided CVA tenderness    MSK: No joint deformity   Extremities: +chronic b/l lymphedema L > R   Neuro: AAO x 3, non-focal  Moves all extremities equally   Psych: Appropriate, cooperative   Skin: + b/l statsis dermatitis on lower extremities      Labs: pending   EKG: NSR, Qtc 452 on 8/5  Imaging: CT A/P on 8/5 with moderate L hydronephrosis and hydroureter, 2mm stone in L ureter     A+P:    Pt is a 62 yo F with obstructive  uropathy who presents to [facility] for cystoscopy with urology.     #Obstructive uropathy  Unsuccessful cystoscopy on 8/5  Plan is to reattempt cystoscopy on 8/8  RCRI is 1 which correlates to 0.9% perioperative risk of adverse cardiac events    -Urology consulted  -NPO midnight for procedure in AM  -continue pain control with oxycodone PRN, can do IV hydromorphone 0.5 mg PRN a 4 hours for break through or if unable to tolerate PO  -continue ondansetron     #pyelonephritis  Hx of allergy to cephalexin  Started on ciprofloxacin on 8/3  UCx with NGTD    -continue ciprofloxacin, today is day 4    #DARCY  Likely obstructive in nature due to renal stone  Baseline Cr 1.0, Cr 1.6 on admission     -f/u BMP  - will need intervention to relieve obstruction     #postmenupausal bleeding  Attempted endometrial bx on 8/5 during cystoscopy but were unsuccessful     -consult GYN here to try and coordinate biopsy tomorrow  - trend Hgb    #T2DM  -holding home tresiba  -continue SSI while here  - may need to add back long acting insulin     Remainder of chronic issues per NP documentation

## 2022-08-07 NOTE — DISCHARGE SUMMARY
[facility] Medicine Service -  Inpatient Discharge Summary        BRIEF OVERVIEW   Admitting Provider: Akin Galan MD  Attending Provider: Akin Galan MD;Y* Attending phys phone: N/A    PCP: Rasta Ortiz [phone]    Admission Date: 8/3/2022  Discharge Date: 8/7/2022     DISCHARGE DIAGNOSES      Primary Discharge Diagnosis  Left ureteral stone    Secondary Discharge Diagnoses  Active Hospital Problems    Diagnosis Date Noted   • Left ureteral stone 08/03/2022   • DARCY (acute kidney injury) (CMS/Former[facility]) 08/03/2022   • Pyelonephritis 08/03/2022   • Hydroureteronephrosis 08/03/2022   • Leucocytosis 08/03/2022   • Flank pain 08/03/2022   • Postmenopausal bleeding 08/03/2022   • Morbid obesity with BMI of 50.0-59.9, adult (CMS/[facility]) 11/04/2021   • HTN (hypertension) 11/04/2021   • Type 2 diabetes mellitus (CMS/[facility]) 11/04/2021   • Hypothyroidism 11/04/2021   • Hyperlipidemia 11/04/2021   • Lymphedema due to venous insufficiency 11/04/2021      Resolved Hospital Problems   No resolved problems to display.       Problem List on Day of Discharge  Postmenopausal bleeding  Assessment & Plan  Ongoing for about 3 weeks now  transvaginal US - showed enlarged fibroid uterus and thickened endometrium, measuring up to 2cm  Patient reports difficulty getting a gynecology visit OP  Consult placed to gyne for biopsy- plan is for endometrial biopsy at the same time as her cystoscopy    Leucocytosis  Assessment & Plan  Wbc 13.69k on admission, secondary to pyelonephritis and ureteral stones  Did not meet sepsis criteria on admission  Check blood cultures  IV Ciprofloxacin given in the ED - given h/o cephalexin allergies    Hydroureteronephrosis  Assessment & Plan  Left sided hydroureteronephrosis - secondary to ureteral calculus  -urology following    Pyelonephritis  Assessment & Plan  Acute pyelonephritis Noted on CT scan, coupled with flank pain  Allergic to cephalexin, recently on levaquin outpatient  Started on  IV ciprofloxacin by the ED  Unclear if her allergy to cephalexin extends to all penicillins and cephalosporins, would continue with ciprofloxacin for now  -ID following, on cipro. Urine cx no growth.     DARCY (acute kidney injury) (CMS/[facility])  Assessment & Plan  Acute kidney Injury, Cr 1.6 on admission, secondary to post renal calculi obstruction  Baseline Cr 1.0 - 1.1    Cr 1.7, no improvement, still persistent L flank pain. Hopefully after urologic procedure her DARCY will improve.       Hypothyroidism  Assessment & Plan  On levothyroxine    Hyperlipidemia  Assessment & Plan  On simvastatin    Lymphedema due to venous insufficiency  Assessment & Plan  Having serous drainage, covered with dressing.   -podiatry c/s  -wound care c/s.     Type 2 diabetes mellitus (CMS/[facility])  Assessment & Plan  On 78iu tresiba, SSI - held for now    Tresiba on hold, only on SSI the last few days and bl glc has been good. Continue to monitor bl glc. Once pt's appetite improves and is eating well then she will likely need long acting insulin to be restarted    HTN (hypertension)  Assessment & Plan  ON Irbesartan-HCTZ  Hold both meds for now, given DARCY on admission  Can be on IV hydralazine prn temporarily till DARCY resolves  Monitor BP per unit protocol    Morbid obesity with BMI of 50.0-59.9, adult (CMS/[facility])  Assessment & Plan  Dietary and lifestyle counseling    * Left ureteral stone  Assessment & Plan  Left sided ureteral stone, present on admission, with associated hydroureteronephrosis  No medical barriers to surgery  RCRI - 1 point, 0.9% risk of perioperative cardiac events. At an acceptable risk for intervention, deemed necessary by urology    Pt went to OR for ureteral stent. However procedure unable to be performed due to pt's body habitus and the lack of bariatric equipment here. Urologist Dr. Tinsley advised me to transfer patient. Discussed with urologist as well as transfer center. Pt is accepted by Dr. Ang at [facility], plan  for transfer 8/7 for urologic procedure 8/8    SUMMARY OF HOSPITALIZATION      Presenting Problem/History of Present Illness  This is a 61 y.o. year-old female admitted on 8/3/2022 with Kidney stone [N20.0]  Acute renal insufficiency [N28.9]  Flank pain [R10.9]  Left ureteral stone [N20.1].      Hospital Course    60 yo female with PMHx of HTN, DM type 2, hypothyroid who presented with L sided abdominal pain/ L flank pain. She was found to have L sided renal stone about 4mm on CT. Urology evaluated the patient. Initially pt had trial of passage but stone has not passed and her pain, DARCY persisted. Urology then decided to take pt to OR for cystoscopy/ stent. However due to her body habitus it was unable to be done at [facility]. After discussion with urology, plan is to transfer pt to [facility]. Pt is accepted for transfer by Dr. Ang, she will go to [facility] for cystoscopy.     Pt was also complaining of intermittent vaginal bleed with occasional clots. Pt's hemoglobin is slightly low but no significant drop while in the hospital. Pelvis U/S was performed and was found to have thickened endometrium. Gynecologist was consulted and plan is for endometrial biopsy the same time as her cystoscopy.       Exam on Day of Discharge  Physical Exam  Physical Exam   Constitutional: obese. No distress.   HENT:   Head: Normocephalic and atraumatic.   Right Ear: External ear normal.   Left Ear: External ear normal.   Mouth/Throat: Oropharynx is clear and moist.   Eyes: Conjunctivae and EOM are normal. Right eye exhibits no discharge. Left eye exhibits no discharge.   Neck: Normal range of motion. Neck supple. No tracheal deviation present.   Cardiovascular: Normal rate, regular rhythm and normal heart sounds.  Exam reveals no gallop and no friction rub.    No murmur heard.  Pulmonary/Chest: Effort normal and breath sounds normal. No stridor. No respiratory distress.  has no wheezes.  has no rales.   Abdominal: Soft. Bowel sounds  are normal. exhibits no distension. There is no tenderness. There is no rebound and no guarding. L CVA tenderness  Musculoskeletal:  B/l LE lymphedema with serous drainange   Neurological:  is alert, awake, oriented x3   Skin: Skin is warm and dry, not diaphoretic.     Consults During Admission  IP CONSULT TO INFECTIOUS DISEASE  IP CONSULT TO UROLOGY  IP CONSULT TO WOUND OSTOMY CONTINENCE  IP CONSULT TO PODIATRY  IP CONSULT TO OB GYN    DISCHARGE MEDICATIONS               Medication List      ASK your doctor about these medications    aspirin 81 mg enteric coated tablet  Take 81 mg by mouth daily.  Dose: 81 mg     DULoxetine 20 mg capsule  Commonly known as: CYMBALTA  Take 20 mg by mouth 2 (two) times a day.  Dose: 20 mg     exenatide microspheres 2 mg/0.85 mL auto-injector  Inject 1 Dose under the skin once a week on Friday.  Dose: 1 Dose     gabapentin 600 mg tablet  Commonly known as: NEURONTIN  Take 600 mg by mouth 3 (three) times a day.  Dose: 600 mg     insulin aspart U-100 100 unit/mL (3 mL) pen  Commonly known as: NovoLOG  Inject 0-100 Units under the skin 3 (three) times a day before meals. Per sliding scale based on calorie intake by patient  Dose: 0-100 Units     irbesartan-hydrochlorothiazide 150-12.5 mg per tablet  Commonly known as: AVALIDE  Take 1 tablet by mouth daily.  Dose: 1 tablet     levothyroxine 75 mcg tablet  Commonly known as: SYNTHROID  Take 75 mcg by mouth daily.  Dose: 75 mcg     meloxicam 15 mg tablet  Commonly known as: MOBIC  Take 15 mg by mouth daily.  Dose: 15 mg     methocarbamoL 750 mg tablet  Commonly known as: ROBAXIN  Take 750 mg by mouth 3 (three) times a day as needed. Always takes in am but doesn't always do tid  Dose: 750 mg     simvastatin 40 mg tablet  Commonly known as: ZOCOR  Take 40 mg by mouth daily.  Dose: 40 mg     TRESIBA FLEXTOUCH U-200 200 unit/mL (3 mL) pen  Inject 78 Units under the skin every evening.  Dose: 78 Units  Generic drug: insulin degludec U-200 CONC      VITAMIN C ORAL  Take 1 tablet by mouth daily.  Dose: 1 tablet     VITAMIN D3 ORAL  Take 1 tablet by mouth daily.  Dose: 1 tablet                      PROCEDURES / LABS / IMAGING      Pertinent Labs  CBC Results       08/07/22 08/06/22 08/05/22     0825 1126 1127    WBC 10.19 12.62 12.20    RBC 3.56 4.04 3.89    HGB 10.2 11.3 10.9    HCT 32.0 36.1 34.9    MCV 89.9 89.4 89.7    MCH 28.7 28.0 28.0    MCHC 31.9 31.3 31.2     268 254        CMP Results       08/07/22 08/06/22 08/05/22     0826 1047 1127     134 138    K 3.8 4.5 4.3    Cl 102 100 102    CO2 27 23 30    Glucose 126 133 110    BUN 31 32 30    Creatinine 1.7 1.8 1.6    Calcium 8.4 8.9 8.9    Anion Gap 8 11 6    EGFR 30.6 28.6 32.8         Comment for K at 1047 on 08/06/22: SLIGHT HEMOLYSIS, RESULT MAY BE INCREASED.            SARS-CoV-2 (COVID-19) (no units)   Date/Time Value   08/03/2022 1724 Negative       Pertinent Imaging  CT ABDOMEN PELVIS WITHOUT IV CONTRAST    Result Date: 8/5/2022  IMPRESSION: Mild to moderate left hydronephrosis with a 2 mm calculus in the proximal left ureter. TECHNIQUE: CT of the abdomen and pelvis was performed without IV contrast. The lack of IV contrast limits the sensitivity for detecting certain disease processes, particularly infection and malignancy. Oral contrast was not administered. CT DOSE: One or more dose reduction techniques (e.g. automated exposure control, adjustment of the mA and/or kV according to patient size, use of iterative reconstruction technique) were utilized for this examination. COMMENT: LOWER CHEST: Small hiatal hernia. ABDOMEN: LIVER: within normal limits. BILE DUCTS: normal caliber. GALLBLADDER: Cholelithiasis. PANCREAS: within normal limits. SPLEEN: within normal limits. ADRENALS: within normal limits. KIDNEYS & URETERS: There are a few scattered punctate nonobstructing right renal calculi. There is mild to moderate left hydronephrosis and proximal hydroureter with a 2 mm calculus in  the proximal left ureter (axial series 4, image 79). PELVIS: REPRODUCTIVE ORGANS: Heterogeneous uterus with coarse calcifications, most likely due to fibroids. BLADDER: within normal limits. BOWEL: Normal caliber. Sigmoid diverticulosis. PERITONEUM: no ascites or free air, no fluid collection. VESSELS: within normal limits. LYMPH NODES: No enlarged nodes. RETROPERITONEUM: within normal limits. ABDOMINAL WALL: within normal limits. BONES: within normal limits.     CT ABDOMEN PELVIS WITHOUT IV CONTRAST    Result Date: 8/3/2022  IMPRESSION: 4 mm obstructing calculus in the left proximal ureter with moderate resultant left hydroureteronephrosis. Asymmetric left perinephric stranding, correlate with urinalysis. Additional nonobstructing right renal calculi are also noted. Cholelithiasis without CT findings of acute cholecystitis.    X-RAY ABDOMEN 1 VIEW    Result Date: 8/3/2022  IMPRESSION: See comment.     ULTRASOUND PELVIS TRANSVAGINAL ONLY    Result Date: 8/3/2022  IMPRESSION: 1. Enlarged fibroid uterus. 2. Thickened endometrium measuring up to 2 cm. Endometrial biopsy may be considered. In accordance with [place] Act 112,  the patient will receive a letter notifying them to follow up with their physician. ENDOMETRIAL THICKNESS MEASUREMENTS FOR RECOMMENDING BIOPSY Premenopausal: Endometrial measurement greater than or equal to 18 mm (after repeat ultrasound in proliferative phase) Postmenopausal with vaginal bleeding, no HRT: Endometrial measurement greater than 5 mm Postmenopausal with vaginal bleeding, on HRT: Endometrial measurement greater than 5 mm Postmenopausal without vaginal bleeding, no HRT: No consensus: greater than or equal to 8 mm or greater than 11 mm Postmenopausal without vaginal bleeding or taking tamoxifen or HRT: No consensus: greater than or equal to 8 mm or greater than 11 mm Valerie balderas al.  ARRS 2016     ULTRASOUND PELVIS LIMITED TRANSABDOMINAL ONLY    Result Date: 8/3/2022  IMPRESSION: 1. Enlarged  fibroid uterus. 2. Thickened endometrium measuring up to 2 cm. Endometrial biopsy may be considered. In accordance with [place] Act 112,  the patient will receive a letter notifying them to follow up with their physician. ENDOMETRIAL THICKNESS MEASUREMENTS FOR RECOMMENDING BIOPSY Premenopausal: Endometrial measurement greater than or equal to 18 mm (after repeat ultrasound in proliferative phase) Postmenopausal with vaginal bleeding, no HRT: Endometrial measurement greater than 5 mm Postmenopausal with vaginal bleeding, on HRT: Endometrial measurement greater than 5 mm Postmenopausal without vaginal bleeding, no HRT: No consensus: greater than or equal to 8 mm or greater than 11 mm Postmenopausal without vaginal bleeding or taking tamoxifen or HRT: No consensus: greater than or equal to 8 mm or greater than 11 mm Valerie et al.  ARRS 2016      OUTPATIENT  FOLLOW-UP / REFERRALS / PENDING TESTS        Outpatient Follow-Up Appointments  Encounter Information    This patient does not currently have any appointments scheduled.         Referrals  No orders of the defined types were placed in this encounter.      Test Results Pending at Discharge  Unresulted Labs (From admission, onward)             Start     Ordered    08/08/22 0600  CBC and Differential  Morning draw        Question:  Release to patient  Answer:  Immediate    08/07/22 0756    08/08/22 0600  Basic metabolic panel  Morning draw        Question:  Release to patient  Answer:  Immediate    08/07/22 0756    Signed and Held  CBC and Differential  Morning draw        Question:  Release to patient  Answer:  Immediate    Signed and Held    Signed and Held  Basic metabolic panel  Morning draw        Question:  Release to patient  Answer:  Immediate    Signed and Held                Important Issues to Address in Follow-Up  You will be going to [facility] for cystoscopy and stent. You will also need endometrial biopsy.     DISCHARGE DISPOSITION AND DESTINATION       Disposition: Acute Care Facility - [facility]  Destination:                              Code Status At Discharge: Full Code      Time spent: Total time spent on discharge was 32 minutes coordinating care with specialists, nursing, /case management.  Also discussing discharge planning with patient.    Physician Order for Life-Sustaining Treatment Document Status      No documents found

## 2022-08-07 NOTE — CONSULTS
Urology Consult    Subjective     Xin Melendez is a 61 y.o. female who was admitted for KIDNEY STONE, HYDRONEPHROSIS. Patient was seen in consultation at the request of referring physician for management recommendations.     Patient is a 62 yo female with pmh of T2DM, HTN, hypothyroidism, lymphedema who presents as a transfer due to DARCY and obstructing ureteral stone. She initially presented as an outpatient in late July with urinary frequency and fevers and was treated with levaquin with improvement in her symptoms. She then developed left flank pain on 8/3 and presented to [facility] ED. She was found to have obstructing left ureteral calculus with hydronephrosis. Urine culture is negative. Labs on presentation notable for WBC 13.6, Cr 1.6 (Bcr 1.0). She has been maintained on ciprofloxacin since admission. Her WBC is 10.1 most recently. Her creatinine has remained elevated, most recently 1.7 this morning. She was planned for operative intervention at [facility] but they were not able to accommodate due to BMI 59 and lymphedema so she was transferred to [facility].    Of note, she also has experienced abnormal uterine bleeding and was found to have thickened endometrium on ultrasound. Gynecology was planning endometrial biopsy at [facility].    Medical History:   Past Medical History:   Diagnosis Date   • COVID-19 vaccine series completed     Moderna Boster 10/2021   • CTS (carpal tunnel syndrome)    • DJD (degenerative joint disease)    • Edema    • Frozen shoulder    • HL (hearing loss)    • Hypertension    • Hypothyroidism    • Kidney stones    • Lipid disorder    • Lymphedema    • Obesity    • Sciatica    • Stasis dermatitis    • Type 2 diabetes mellitus (CMS/[facility])        Surgical History:   Past Surgical History:   Procedure Laterality Date   • APPENDECTOMY     •  SECTION  1999   • CYSTOSCOPY  2021    CYSTOSCOPY,INSERT URETERAL STENT   • LAPAROTOMY EXPLORATORY     • OOPHORECTOMY      right side secondary to  torsion       Social History:   Social History     Social History Narrative    Lives with son in a 2 story home has a chair lift       Family History:   Family History   Problem Relation Age of Onset   • Heart attack Biological Father        Allergies:   Cephalexin and Adhesive tape-silicones    Home Medications:  •  ascorbic acid (VITAMIN C ORAL), Take 1 tablet by mouth daily.    •  aspirin, Take 81 mg by mouth daily.  •  cholecalciferol, vitamin D3, (VITAMIN D3 ORAL), Take 1 tablet by mouth daily.    •  DULoxetine, Take 20 mg by mouth 2 (two) times a day.    •  exenatide microspheres, Inject 1 Dose under the skin once a week on Friday.  •  gabapentin, Take 600 mg by mouth 3 (three) times a day.    •  insulin aspart U-100, Inject 0-100 Units under the skin 3 (three) times a day before meals. Per sliding scale based on calorie intake by patient   •  TRESIBA FLEXTOUCH U-200, Inject 78 Units under the skin every evening.    •  irbesartan-hydrochlorothiazide, Take 1 tablet by mouth daily.    •  levothyroxine, Take 75 mcg by mouth daily.  •  meloxicam, Take 15 mg by mouth daily.    •  methocarbamoL, Take 750 mg by mouth 3 (three) times a day as needed. Always takes in am but doesn't always do tid   •  simvastatin, Take 40 mg by mouth daily.      Current Medications:  •  acetaminophen, 650 mg, oral, q4h PRN  •  atorvastatin, 20 mg, oral, Nightly  •  ciprofloxacin, 400 mg, intravenous, q12h INT  •  glucose, 16-32 g of dextrose, oral, PRN **OR** dextrose, 15-30 g of dextrose, oral, PRN **OR** glucagon, 1 mg, intramuscular, PRN **OR** dextrose 50 % in water (D50), 25 mL, intravenous, PRN  •  DULoxetine, 20 mg, oral, BID  •  gabapentin, 300 mg, oral, TID  •  HYDROmorphone, 0.5 mg, intravenous, q4h PRN  •  insulin aspart U-100, 6-10 Units, subcutaneous, TID with meals  •  [START ON 8/8/2022] levothyroxine, 75 mcg, oral, Daily (6:30a)  •  methocarbamoL, 750 mg, oral, 3x daily PRN  •  ondansetron, 4 mg, intravenous, q6h PRN  •   oxyCODONE, 5-10 mg, oral, q6h PRN    Review of Systems:  Constitutional: negative for fevers, chills, and weight loss  Eyes: negative for visual disturbances  Ears, nose, and throat: negative for ear drainage, hearing loss, nasal congestion and sore throat  Respiratory: negative for cough and shortness of breath  Cardiovascular: negative for chest pain, palpitations and syncope  Gastrointestinal: positive for nausea, negative for abdominal pain, constipation, and vomiting  Genitourinary: currently negative for dysuria, hematuria, frequency, urgency  Integument/breast: negative for pruritus and rash  Hematologic/lymphatic: negative for bleeding and easy bruising  Musculoskeletal: negative for arthralgias and myalgias  Neurological: negative for headaches and weakness  Behavioral/Psych: negative for anxiety and fatigue  Allergic/Immunologic: negative for urticaria      Objective     Physicial Exam  There were no vitals taken for this visit.     General appearance: alert, cooperative, no acute distress  Head: normocephalic, atraumatic  Eyes: lids and lashes normal, sclerae white  Nose: no discharge  Throat: lips normal without lesions  Neck: supple, symmetrical, trachea midline  Lungs: Unlabored respirations, symmetric chest expansion  Heart: regular rate and rhythm  Abdomen: soft, non-tender to palpation, non-distended. Obese. No CVA tenderness.  Extremities: Severe lymphadema of lower extremities  Lymph nodes: no inguinal adenopathy  Neurologic: Alert and oriented X 3     Labs  Results from last 7 days   Lab Units 08/07/22  0825 08/06/22  1126 08/05/22  1127   WBC K/uL 10.19 12.62* 12.20*   HEMOGLOBIN g/dL 10.2* 11.3* 10.9*   HEMATOCRIT % 32.0* 36.1 34.9*   PLATELETS K/uL 246 268 254     Results from last 7 days   Lab Units 08/07/22  0826 08/06/22  1047 08/05/22  1127   SODIUM mEQ/L 137 134* 138   POTASSIUM mEQ/L 3.8 4.5 4.3   CHLORIDE mEQ/L 102 100 102   CO2 mEQ/L 27 23 30   BUN mg/dL 31* 32* 30*   CREATININE mg/dL  1.7* 1.8* 1.6*   GLUCOSE mg/dL 126* 133* 110*   CALCIUM mg/dL 8.4* 8.9 8.9       UA Results       08/03/22     1345    Color Yellow    Clarity Cloudy    Glucose Negative    Bilirubin Negative    Ketones Negative    Sp Grav 1.030    Blood +3    Ph 5.5    Protein +1    Urobilinogen 0.2    Nitrite Negative    Leuk Est Trace    WBC 4 TO 10    RBC Too Numerous To Count    Bacteria Rare         Comment for Blood at 1345 on 08/03/22: The sensitivity of the occult blood test is equivalent to approximately 4 intact RBC/HPF.        Microbiology Results     Procedure Component Value Units Date/Time    Blood Culture Blood, Venous [[id number]]  (Normal) Collected: 08/05/22 1152    Specimen: Blood, Venous Updated: 08/06/22 1601     Culture No growth at 18-24 hours    SARS-CoV-2 (COVID-19), PCR Nasopharynx [[id number]]  (Normal) Collected: 08/03/22 1724    Specimen: Nasopharyngeal Swab from Nasopharynx Updated: 08/03/22 1814    Narrative:      The following orders were created for panel order SARS-CoV-2 (COVID-19), PCR Nasopharynx.  Procedure                               Abnormality         Status                     ---------                               -----------         ------                     SARS-CoV-2 (COVID-19), P...[[id number]]  Normal              Final result                 Please view results for these tests on the individual orders.    SARS-CoV-2 (COVID-19), PCR Nasopharynx [[id number]]  (Normal) Collected: 08/03/22 1724    Specimen: Nasopharyngeal Swab from Nasopharynx Updated: 08/03/22 1814     SARS-CoV-2 (COVID-19) Negative    Narrative:      Testing performed using real-time PCR for detection of COVID-19. EUA approved validation studies performed on site.     Blood Culture Blood, Venous [[id number]]  (Normal) Collected: 08/03/22 1716    Specimen: Blood, Venous Updated: 08/06/22 2201     Culture No growth at 72 hours    Urine culture Urine, Clean Catch [[id number]]  (Normal) Collected: 08/03/22 1345    Specimen:  Urine, Clean Catch Updated: 08/04/22 1304     Urine Culture No Growth (Limit of detection 1000 cfu/mL)            Imaging  I have reviewed the Imaging from the last 24 hrs.    CT Abdomen/Pelvis 8/5  --  IMPRESSION: Mild to moderate left hydronephrosis with a 2 mm calculus in the  proximal left ureter.      Assessment    60 yo female with pmh of T2DM, HTN, hypothyroidism, lymphedema who presents as a transfer due to DARCY and obstructing ureteral stone.        Plan    - patient added on for left ureteral stent placement, possible ureteroscopy/laser lithotripsy tomorrow  - continue ciprofloxacin perioperatively. Anticipate an antibiotic course lasting until ~3 days post-operatively  - COVID test ordered  - NPO at midnight  - discussed with gynecology regarding performing endometrial biopsy at the same time in the operating room. They will discuss with weekday team and we will continue coordination tomorrow  - flomax 0.4 mg nightly for medical expulsive therapy    Please page [phone] with questions or concerns.    [facility]  Alan Henry MD, PGY2  [facility] Pager [phone]  Adrien Dorado Pager [phone]

## 2022-08-07 NOTE — PLAN OF CARE
Problem: Adult Inpatient Plan of Care  Goal: Plan of Care Review  Outcome: Progressing  Flowsheets (Taken 8/7/2022 6486)  Progress: no change  Plan of Care Reviewed With: patient  Outcome Summary: Patient resting in bed comfortably, independent in the room, ambulating to the bathroom independent with the walker, iurine strainined no stones present, reported having vaginal bloody discharge due to fibroid, tolerating antibiotics, patient free from falls and injuries     Problem: Skin Injury Risk Increased  Goal: Skin Health and Integrity  Outcome: Progressing     Problem: Fall Injury Risk  Goal: Absence of Fall and Fall-Related Injury  Outcome: Progressing   Plan of Care Review  Plan of Care Reviewed With: patient  Progress: no change  Outcome Summary: Patient resting in bed comfortably, independent in the room, ambulating to the bathroom independent with the walker, iurine strainined no stones present, reported having vaginal bloody discharge due to fibroid, tolerating antibiotics, patient free from falls and injuries

## 2022-08-07 NOTE — DISCHARGE INSTRUCTIONS
You will be going to [facility] for cystoscopy and stent. You will also need endometrial biopsy.

## 2022-08-07 NOTE — DISCHARGE INSTRUCTIONS
Ms. Melendez,    You were admitted to [facility] with complaints of left sided abdominal pain. CT scan showed a left sided obstructing kidney stone. You were seen by urology who recommended that you be transferred to [facility] for further surgical intervention. You also were experiencing intermittent vaginal bleed and were seen by GYN. It was recommended that you have a endometrial biopsy and pap smear at the same time of your cystoscopy. On 8/8 you had a the endometrial biopsy, pap smear, cystoscopy and left ureteral stent placement. You were treated with Ciprofloxacin for presumptive kidney infection. Your urine and blood cultures on admission were negative. So far your urine cultures from surgery are negative. It is recommended that you complete a 10 day course of Ciprofloxacin. Today is day 8; your last day of antibiotics is on 8/12. It is important that you finish the course until it is complete. You will need to follow up with urology; instructions are listed below.     Your renal function was elevated, this is likely from the kidney stone. You were give IV fluids. After the stent placement your renal function continued to trend back to normal levels. It is recommended that you have blood work (BMP) drawn within 1 week to ensure your renal function returns completely to normal. You should STOP your irbesartan-HCTZ as well as your meloxicam, which can potentially be resumed as an outpatient once your kidneys are back to functioning normally.    Your blood pressure medication was held due to the abnormal kidney function. You blood pressure was normal off the medication. It is recommended that you STOP your Irbesartan-HCTZ for now and monitor your blood pressures at home. Please follow up with your PCP for blood pressure check.     Follow-Ups:  Please follow up with your PCP in 1-2 weeks.   Please follow up with urology, Dr. Marroquin in 2 weeks.   Please follow up with GYN , Dr. Schwartz, in 2 weeks.   Please follow  up with Dr. Oconnor in 2 weeks.   Please have repeat BMP within 1 week.     It has been a pleasure taking care of you during your hospital stay. We wish you the best of health moving forward.     DISCHARGE INSTRUCTIONS - CYSTOSCOPIC PROCEDURE     ACTIVITY:  You should be physically active and try to do a little more each day to build your stamina.  You may walk and climb stairs without limitations.  Prolonged sitting or lying in bed should be avoided  DRIVING:  You should not drive if taking narcotic pain killers  PAIN:  You can expect to have some pain for a few days after discharge. Tylenol should be sufficient to control your pain.  Burning and bleeding with urination are common after this procedure.  It will gradually become less intense after first few times you pass your urine.  MEDICATIONS:  After surgery you should immediately resume your regular medications.  DIET AND BOWELS:  Drink plenty of fluids during the day.  Water is the best, but juices, coffee, tea are all acceptable.  The purpose is to increase the urine output enough to flush out any blood.  If needed, you may use over the counter stool softeners (such as miralax) to help your bowels get started.  WHEN TO CALL US:  Temperature of 101.0 degrees or above (fevers <101 may be normal after surgery).  Severe pain - not relieved with pain medication, especially if it is associated with nausea, vomiting, or dizziness  Pain, burning or other difficulty urinating  Unable to pass your urine  APPOINTMENTS:  You have a left ureteroscopy and laser lithotripsy scheduled for 8/29. Call Dr. Marroquin's office after discharge ([phone]) for instructions regarding pre-operative requirements.   Interval appointments as needed

## 2022-08-07 NOTE — ASSESSMENT & PLAN NOTE
Home: on Irbesartan-HCTZ 150mg-12.5mg daily  - hold home ARB-HCTZ for now given DARCY, worsening  - can be on IV hydralazine prn temporarily until DARCY resolves

## 2022-08-07 NOTE — ASSESSMENT & PLAN NOTE
- Asymmetric L perinephric stranding noted on CT scan, with associated flank pain, c/f possible pyelonephritis, but U/A only w/ 4-10 WBC, trace LE, and UCx no growth, BCx no growth  - Allergic to cephalexin, recently on Levaquin outpatient  - Started on IV ciprofloxacin on arrival to ED.  - Unclear if her allergy to cephalexin extends to all penicillins and cephalosporins, would continue with ciprofloxacin for now  - ID was following at [facility], recommends 10d course ciprofloxacin, last day of abx on 8/12/22

## 2022-08-07 NOTE — PLAN OF CARE
Problem: Adult Inpatient Plan of Care  Goal: Plan of Care Review  Outcome: Progressing  Flowsheets (Taken 8/7/2022 4090)  Progress: no change  Plan of Care Reviewed With: patient  Outcome Summary: Transferred from [facility] with urology tomorrow.   Plan of Care Review  Plan of Care Reviewed With: patient  Progress: no change  Outcome Summary: Transferred from [facility] with urology tomorrow.

## 2022-08-07 NOTE — H&P
Hospital Medicine Service -  History & Physical        CHIEF COMPLAINT   Left ureteral stone with obstruction.     HISTORY OF PRESENT ILLNESS      62 yo female with PMHx of morbid obesity, HTN, lymphedema, DM type 2, hypothyroid, and post menopausal bleeding who presented to [facility] on 8/3/2022 with L sided abdominal pain/ L flank pain. Imaging showed a 4 mm obstructing stone in the proximal left ureteral with hydro. Urology evaluated the patient. Initially pt had trial of passage but stone has not passed and her pain, DARCY persisted. Urology then decided to take pt to OR for cystoscopy/ stent. However due to her body habitus it was unable to be done at [place]. After discussion with urology, pt was transferred to [facility] on , for cystoscopy procedure this week.    Pt was also complaining of intermittent vaginal bleed with occasional clots. Pt's hemoglobin is slightly low but no significant drop while in the hospital. Pelvis U/S show a large uterine fibroid and 2 cm thickened endometrium. Gynecologist was consulted and plan is for endometrial biopsy the same time as her cystoscopy.        PAST MEDICAL AND SURGICAL HISTORY      Past Medical History:   Diagnosis Date   • COVID-19 vaccine series completed     Moderna Boster 10/2021   • CTS (carpal tunnel syndrome)    • DJD (degenerative joint disease)    • Edema    • Frozen shoulder    • HL (hearing loss)    • Hypertension    • Hypothyroidism    • Kidney stones    • Lipid disorder    • Lymphedema    • Obesity    • Sciatica    • Stasis dermatitis    • Type 2 diabetes mellitus (CMS/HCC)        Past Surgical History:   Procedure Laterality Date   • APPENDECTOMY     •  SECTION  1999   • CYSTOSCOPY  2021    CYSTOSCOPY,INSERT URETERAL STENT   • LAPAROTOMY EXPLORATORY     • OOPHORECTOMY      right side secondary to torsion       PCP: Rasta Ortiz MD    MEDICATIONS      Prior to Admission medications    Medication Sig Start Date End Date  Taking? Authorizing Provider   ascorbic acid (VITAMIN C ORAL) Take 1 tablet by mouth daily.      Fred Dow MD   aspirin 81 mg enteric coated tablet Take 81 mg by mouth daily.    Fred Dow MD   cholecalciferol, vitamin D3, (VITAMIN D3 ORAL) Take 1 tablet by mouth daily.      Fred Dow MD   DULoxetine 20 mg capsule Take 20 mg by mouth 2 (two) times a day.   7/6/21   Fred Dow MD   exenatide microspheres 2 mg/0.85 mL auto-injector Inject 1 Dose under the skin once a week on Friday. 8/6/21   Fred Dow MD   gabapentin 600 mg tablet Take 600 mg by mouth 3 (three) times a day.   7/6/21   Fred Dow MD   insulin aspart U-100 100 unit/mL (3 mL) pen Inject 0-100 Units under the skin 3 (three) times a day before meals. Per sliding scale based on calorie intake by patient  10/20/21   Fred Dow MD   insulin degludec U-200 CONC (TRESIBA FLEXTOUCH U-200) 200 unit/mL (3 mL) pen Inject 78 Units under the skin every evening.   3/30/21   Fred Dow MD   irbesartan-hydrochlorothiazide 150-12.5 mg per tablet Take 1 tablet by mouth daily.   10/8/21   Fred Dow MD   levothyroxine 75 mcg tablet Take 75 mcg by mouth daily.    Fred Dow MD   meloxicam 15 mg tablet Take 15 mg by mouth daily.   10/20/21   Fred Dow MD   methocarbamoL 750 mg tablet Take 750 mg by mouth 3 (three) times a day as needed. Always takes in am but doesn't always do tid  8/9/21   Fred Dow MD   simvastatin 40 mg tablet Take 40 mg by mouth daily.   11/25/20   Fred Dow MD       ALLERGIES      Cephalexin and Adhesive tape-silicones    FAMILY HISTORY      Family History   Problem Relation Age of Onset   • Heart attack Biological Father        SOCIAL HISTORY      Social History     Socioeconomic History   • Marital status:    • Number of children: 1   Tobacco Use   • Smoking status: Former Smoker     Quit date:  1982     Years since quittin.6   • Smokeless tobacco: Never Used   • Tobacco comment: quit    Substance and Sexual Activity   • Alcohol use: Not Currently   • Drug use: Never   • Sexual activity: Defer   Social History Narrative    Lives with son in a 2 story home has a chair lift     Social Determinants of Health     Food Insecurity: No Food Insecurity   • Worried About Running Out of Food in the Last Year: Never true   • Ran Out of Food in the Last Year: Never true       REVIEW OF SYSTEMS      All other systems reviewed and negative except as noted in HPI    PHYSICAL EXAMINATION      Temp:  [36.7 °C (98 °F)-36.9 °C (98.4 °F)] 36.7 °C (98 °F)  Heart Rate:  [79-87] 87  Resp:  [18-20] 18  BP: (102-121)/(55-65) 102/55  There is no height or weight on file to calculate BMI.    Physical Exam  Constitutional:       Appearance: She is obese.   HENT:      Head: Normocephalic and atraumatic.      Mouth/Throat:      Mouth: Mucous membranes are moist.      Pharynx: Oropharynx is clear.   Cardiovascular:      Rate and Rhythm: Normal rate and regular rhythm.      Pulses: Normal pulses.      Heart sounds: Normal heart sounds.   Pulmonary:      Effort: Pulmonary effort is normal.      Breath sounds: Normal breath sounds.   Abdominal:      General: Bowel sounds are normal.      Palpations: Abdomen is soft.      Tenderness: There is left CVA tenderness.   Musculoskeletal:         General: Normal range of motion.      Right lower leg: Edema present.      Left lower leg: Edema present.      Comments: Bilat lymphedema. Post LE with ABD dressing which are clean and dry and secured with netting. Skin with sloughing.    Neurological:      General: No focal deficit present.      Mental Status: She is alert and oriented to person, place, and time. Mental status is at baseline.   Psychiatric:         Mood and Affect: Mood normal.         Behavior: Behavior normal.         LABS / IMAGING / EKG        Labs  Results from last 7 days   Lab  Units 08/07/22  0825 08/06/22  1126 08/05/22  1127   WBC K/uL 10.19 12.62* 12.20*   HEMOGLOBIN g/dL 10.2* 11.3* 10.9*   HEMATOCRIT % 32.0* 36.1 34.9*   PLATELETS K/uL 246 268 254     Results from last 7 days   Lab Units 08/07/22  0826 08/06/22  1047 08/05/22  1127 08/04/22  0944 08/03/22  1149   SODIUM mEQ/L 137 134* 138   < > 139   POTASSIUM mEQ/L 3.8 4.5 4.3   < > 4.3   CHLORIDE mEQ/L 102 100 102   < > 104   CO2 mEQ/L 27 23 30   < > 26   BUN mg/dL 31* 32* 30*   < > 33*   CREATININE mg/dL 1.7* 1.8* 1.6*   < > 1.6*   CALCIUM mg/dL 8.4* 8.9 8.9   < > 9.3   ALBUMIN g/dL  --   --   --   --  3.5   BILIRUBIN TOTAL mg/dL  --   --   --   --  0.7   ALK PHOS IU/L  --   --   --   --  62   ALT IU/L  --   --   --   --  10*   AST IU/L  --   --   --   --  18   GLUCOSE mg/dL 126* 133* 110*   < > 92    < > = values in this interval not displayed.     UA Results       08/03/22     1345    Color Yellow    Clarity Cloudy    Glucose Negative    Bilirubin Negative    Ketones Negative    Sp Grav 1.030    Blood +3    Ph 5.5    Protein +1    Urobilinogen 0.2    Nitrite Negative    Leuk Est Trace    WBC 4 TO 10    RBC Too Numerous To Count    Bacteria Rare         Comment for Blood at 1345 on 08/03/22: The sensitivity of the occult blood test is equivalent to approximately 4 intact RBC/HPF.        Microbiology Results     Procedure Component Value Units Date/Time    Blood Culture Blood, Venous [[id number]]  (Normal) Collected: 08/05/22 1152    Specimen: Blood, Venous Updated: 08/06/22 1601     Culture No growth at 18-24 hours    SARS-CoV-2 (COVID-19), PCR Nasopharynx [[id number]]  (Normal) Collected: 08/03/22 1724    Specimen: Nasopharyngeal Swab from Nasopharynx Updated: 08/03/22 1814    Narrative:      The following orders were created for panel order SARS-CoV-2 (COVID-19), PCR Nasopharynx.  Procedure                               Abnormality         Status                     ---------                               -----------          ------                     SARS-CoV-2 (COVID-19), P...[[id number]]  Normal              Final result                 Please view results for these tests on the individual orders.    SARS-CoV-2 (COVID-19), PCR Nasopharynx [[id number]]  (Normal) Collected: 08/03/22 1724    Specimen: Nasopharyngeal Swab from Nasopharynx Updated: 08/03/22 1814     SARS-CoV-2 (COVID-19) Negative    Narrative:      Testing performed using real-time PCR for detection of COVID-19. EUA approved validation studies performed on site.     Blood Culture Blood, Venous [[id number]]  (Normal) Collected: 08/03/22 1716    Specimen: Blood, Venous Updated: 08/06/22 2201     Culture No growth at 72 hours    Urine culture Urine, Clean Catch [[id number]]  (Normal) Collected: 08/03/22 1345    Specimen: Urine, Clean Catch Updated: 08/04/22 1304     Urine Culture No Growth (Limit of detection 1000 cfu/mL)            Imaging  CT ABDOMEN PELVIS WITHOUT IV CONTRAST    Result Date: 8/5/2022  Narrative: CLINICAL HISTORY: Flank pain, kidney stone suspected COMPARISON: CT abdomen pelvis 8/3/2022     Impression: IMPRESSION: Mild to moderate left hydronephrosis with a 2 mm calculus in the proximal left ureter. TECHNIQUE: CT of the abdomen and pelvis was performed without IV contrast. The lack of IV contrast limits the sensitivity for detecting certain disease processes, particularly infection and malignancy. Oral contrast was not administered. CT DOSE: One or more dose reduction techniques (e.g. automated exposure control, adjustment of the mA and/or kV according to patient size, use of iterative reconstruction technique) were utilized for this examination. COMMENT: LOWER CHEST: Small hiatal hernia. ABDOMEN: LIVER: within normal limits. BILE DUCTS: normal caliber. GALLBLADDER: Cholelithiasis. PANCREAS: within normal limits. SPLEEN: within normal limits. ADRENALS: within normal limits. KIDNEYS & URETERS: There are a few scattered punctate nonobstructing right renal  calculi. There is mild to moderate left hydronephrosis and proximal hydroureter with a 2 mm calculus in the proximal left ureter (axial series 4, image 79). PELVIS: REPRODUCTIVE ORGANS: Heterogeneous uterus with coarse calcifications, most likely due to fibroids. BLADDER: within normal limits. BOWEL: Normal caliber. Sigmoid diverticulosis. PERITONEUM: no ascites or free air, no fluid collection. VESSELS: within normal limits. LYMPH NODES: No enlarged nodes. RETROPERITONEUM: within normal limits. ABDOMINAL WALL: within normal limits. BONES: within normal limits.     CT ABDOMEN PELVIS WITHOUT IV CONTRAST    Result Date: 8/3/2022  Narrative: CLINICAL HISTORY: Flank pain, kidney stone suspected Left-sided flank pain, history of infected, obstructed stone COMPARISON: CT abdomen/pelvis 11/4/2021. COMMENT: TECHNIQUE: CT of the abdomen and pelvis was performed with the patient in the supine position. Images reconstructed/reformatted in the axial, coronal and sagittal planes. CT DOSE:  One or more dose reduction techniques (e.g. automated exposure control, adjustment of the mA and/or kV according to patient size, use of iterative reconstruction technique) utilized for this examination. ORAL CONTRAST: None IV contrast: None The lack of intravenous contrast limits interpretation of the solid organs, vessels and certain processes. LOWER CHEST: Within normal limits. LIVER: Within normal limits. BILE DUCTS: Normal caliber. GALLBLADDER: Cholelithiasis without CT findings of acute cholecystitis. PANCREAS: Within normal limits. SPLEEN: Within normal limits. ADRENALS: Within normal limits. KIDNEYS/URETERS/BLADDER: There is a 4 mm obstructing calculus in the left proximal ureter with moderate resultant left hydroureteronephrosis. There is asymmetric left perinephric stranding, correlate with urinalysis. There are multiple nonobstructing right renal calculi noted. REPRODUCTIVE ORGANS: Leiomyomatous uterus. BOWEL: Diverticulosis without  CT evidence of diverticulitis. Normal bowel caliber. PERITONEUM: No ascites or free air, no fluid collection VESSELS: Within normal limits. LYMPH NODES: within normal limits. ABDOMINAL WALL: Within normal limits. BONES: Mild degenerative changes are noted in the spine.     Impression: IMPRESSION: 4 mm obstructing calculus in the left proximal ureter with moderate resultant left hydroureteronephrosis. Asymmetric left perinephric stranding, correlate with urinalysis. Additional nonobstructing right renal calculi are also noted. Cholelithiasis without CT findings of acute cholecystitis.    X-RAY ABDOMEN 1 VIEW    Result Date: 8/3/2022  Narrative: CLINICAL HISTORY: Nephrolithiasis, symptomatic/complicated shunt series COMPARISON:CT 8/3/2022 COMMENT: Single view of the abdomen is obtained. Bowel gas pattern is nonspecific but appears nonobstructive. A known left renal calculus is better visualized on prior CT. Overlying soft tissues are unremarkable.     Impression: IMPRESSION: See comment.     ULTRASOUND PELVIS TRANSVAGINAL ONLY    Result Date: 8/3/2022  Narrative: CLINICAL HISTORY: Vaginal bleeding; 7 years postmenopausal COMMENT:  Transabdominal and endovaginal ultrasound the pelvis complete was performed. The transabdominal ultrasound is performed to assess the entirety of the pelvis while the endovaginal ultrasound is utilized to further evaluate the uterus and ovaries. The uterus measures 11.3 x 7.7 x 7.2 cm and is best evaluated transabdominally. On the endovaginal imaging, the endometrium measures 2 cm. There are multiple fibroids with a calcified fibroid to the left measuring 1.3 x 1.3 x 2.0 cm. A left fibroid measures 3.0 x 3.0 x 3.1 cm. A fibroid to the right measures 2.1 x 1.9 x 2.2 cm. Neither ovary is visualized. No evidence for adnexal mass. It is noted that the endometrial stripe is better assessed on the endovaginal imaging.     Impression: IMPRESSION: 1. Enlarged fibroid uterus. 2. Thickened endometrium  measuring up to 2 cm. Endometrial biopsy may be considered. In accordance with [place] Act 112,  the patient will receive a letter notifying them to follow up with their physician. ENDOMETRIAL THICKNESS MEASUREMENTS FOR RECOMMENDING BIOPSY Premenopausal: Endometrial measurement greater than or equal to 18 mm (after repeat ultrasound in proliferative phase) Postmenopausal with vaginal bleeding, no HRT: Endometrial measurement greater than 5 mm Postmenopausal with vaginal bleeding, on HRT: Endometrial measurement greater than 5 mm Postmenopausal without vaginal bleeding, no HRT: No consensus: greater than or equal to 8 mm or greater than 11 mm Postmenopausal without vaginal bleeding or taking tamoxifen or HRT: No consensus: greater than or equal to 8 mm or greater than 11 mm Valerie balderas al.  ARRS 2016     ULTRASOUND PELVIS LIMITED TRANSABDOMINAL ONLY    Result Date: 8/3/2022  Narrative: CLINICAL HISTORY: Vaginal bleeding; 7 years postmenopausal COMMENT:  Transabdominal and endovaginal ultrasound the pelvis complete was performed. The transabdominal ultrasound is performed to assess the entirety of the pelvis while the endovaginal ultrasound is utilized to further evaluate the uterus and ovaries. The uterus measures 11.3 x 7.7 x 7.2 cm and is best evaluated transabdominally. On the endovaginal imaging, the endometrium measures 2 cm. There are multiple fibroids with a calcified fibroid to the left measuring 1.3 x 1.3 x 2.0 cm. A left fibroid measures 3.0 x 3.0 x 3.1 cm. A fibroid to the right measures 2.1 x 1.9 x 2.2 cm. Neither ovary is visualized. No evidence for adnexal mass. It is noted that the endometrial stripe is better assessed on the endovaginal imaging.     Impression: IMPRESSION: 1. Enlarged fibroid uterus. 2. Thickened endometrium measuring up to 2 cm. Endometrial biopsy may be considered. In accordance with [place] Act 112,  the patient will receive a letter notifying them to follow up with their physician.  ENDOMETRIAL THICKNESS MEASUREMENTS FOR RECOMMENDING BIOPSY Premenopausal: Endometrial measurement greater than or equal to 18 mm (after repeat ultrasound in proliferative phase) Postmenopausal with vaginal bleeding, no HRT: Endometrial measurement greater than 5 mm Postmenopausal with vaginal bleeding, on HRT: Endometrial measurement greater than 5 mm Postmenopausal without vaginal bleeding, no HRT: No consensus: greater than or equal to 8 mm or greater than 11 mm Postmenopausal without vaginal bleeding or taking tamoxifen or HRT: No consensus: greater than or equal to 8 mm or greater than 11 mm Valerie et al.  ARRS 2016      SARS-CoV-2 (COVID-19) (no units)   Date/Time Value   08/03/2022 1724 Negative       ECG/Telemetry  8/5/2022  0827  Normal sinus rhythm. Rate 86.  QTc 452  Low voltage QRS   Abnormal ECG   When compared with ECG of 05-AUG-2022 08:26, (unconfirmed)   No significant change was found   Confirmed by Jessu Rodriguez ([id number]) on 8/6/2022 2:11:31 PM    ASSESSMENT AND PLAN           * Left ureteral stone  Assessment & Plan  - Admitted to [facility] 8/3/22 with flank pain. Imaging showed Left sided ureteral stone, with associated hydroureteronephrosis  - History of previous nephrolithiasis, last cysto 11/2021 at [place] with follow up at office of Dr. Marroquin.   - At [place] [place], pt failed trial of observation for stone to pass.   - Pt went to OR 8/5 at [facility] for ureteral stent. However procedure unable to be performed due to pt's body habitus and the lack of bariatric leg stirrups. Urologist Dr. Tinsley advised transfer patient to [facility]. Transferred 8/7 , plan urologic procedure 8/8  - Pt still with left flank pain, requires regular PRN doses.     Postmenopausal bleeding  Assessment & Plan  - Ongoing for about 3 weeks now. LMP was age 54.  - Transvaginal US - showed enlarged fibroid uterus and thickened endometrium, measuring up to 2cm  - Patient reports difficulty getting a gynecology visit OP.  Last visit age 54.  - Gyn was consulted at [facility], and plan is for endometrial biopsy at the same time as her cystoscopy    Pyelonephritis  Assessment & Plan  - Acute pyelonephritis noted on CT scan, with associated flank pain  - Allergic to cephalexin, recently on levaquin outpatient  - Started on IV ciprofloxacin on arrival to ED.  - Unclear if her allergy to cephalexin extends to all penicillins and cephalosporins, would continue with ciprofloxacin for now  - ID following. Recommends continue ciprofloxacin for one more week to complete 10 day course with urine cx showing no growth to date now.  Last day of Abx 8/12.     DARCY (acute kidney injury) (CMS/[facility])  Assessment & Plan  Acute kidney Injury, Cr 1.6 on admission, secondary to post renal calculi obstruction  Baseline Cr 1.0 - 1.1  Cr 1.7, no improvement, still persistent L flank pain. Hopefully after urologic procedure her DARCY will improve.     Hyperlipidemia  Assessment & Plan  Continue simvastatin.     Lymphedema due to venous insufficiency  Assessment & Plan  - Chronic lymphedema.   - Evaluated by Wound Care on 8/5 while admitted to [facility].   - Patient noted to have chronic lymphedema with weeping (posterior>anterior). BMI-59.34. Patient manages her own dressings at home. Maintain PI PREVENT bundle.   - Podiatry team was following patient during [facility] admit. Consult here for continued care.   - ABD and net compression dressings  - Podiatry recommend elevation if tolerable by patient. Pt states unable to do this since she can not tolerate a pad or any pressure under her LE.   - May follow with wound care at [place] upon discharge with Dr. Oconnor    Type 2 diabetes mellitus (CMS/[facility])  Assessment & Plan  On 78iu tresiba, SSI - held for now     Tresiba on hold, only on SSI the last few days and bl glc has been good. Continue to monitor bl glc. Once pt's appetite improves and is eating well then she will likely need long acting insulin to be restarted    HTN  (hypertension)  Assessment & Plan  Home:  on Irbesartan-HCTZ  Hold both meds for now, given DARCY on admission  Can be on IV hydralazine prn temporarily till DARCY resolves  Monitor BP per unit protocol  BP has been in normal range.     Morbid obesity with BMI of 50.0-59.9, adult (CMS/[facility])  Assessment & Plan  - Dietary and lifestyle counseling.   - Limited mobility due to lymphedema, knee pain and hip pain, uses walker  - Pt agreeable to PT in home. [facility] was following while pt at [place].        VTE Assessment: [place]    VTE Prophylaxis: Current anticoagulants:    •None      Code Status: Full Code      Discussed advanced care planning. Full  Estimated Discharge Date:   Disposition Planning: VITA 2 days     HOPE Basurto  8/7/2022

## 2022-08-08 ENCOUNTER — ANESTHESIA (INPATIENT)
Dept: OPERATING ROOM | Facility: HOSPITAL | Age: 62
End: 2022-08-08
Payer: COMMERCIAL

## 2022-08-08 ENCOUNTER — ANESTHESIA EVENT (INPATIENT)
Dept: OPERATING ROOM | Facility: HOSPITAL | Age: 62
End: 2022-08-08
Payer: COMMERCIAL

## 2022-08-08 LAB
ANION GAP SERPL CALC-SCNC: 11 MEQ/L (ref 3–15)
BACTERIA BLD CULT: NORMAL
BASOPHILS # BLD: 0.08 K/UL (ref 0.01–0.1)
BASOPHILS NFR BLD: 0.6 %
BUN SERPL-MCNC: 27 MG/DL (ref 8–20)
CALCIUM SERPL-MCNC: 9 MG/DL (ref 8.9–10.3)
CHLORIDE SERPL-SCNC: 98 MEQ/L (ref 98–109)
CO2 SERPL-SCNC: 27 MEQ/L (ref 22–32)
CREAT SERPL-MCNC: 1.9 MG/DL (ref 0.6–1.1)
DIFFERENTIAL METHOD BLD: ABNORMAL
EOSINOPHIL # BLD: 0.21 K/UL (ref 0.04–0.36)
EOSINOPHIL NFR BLD: 1.6 %
ERYTHROCYTE [DISTWIDTH] IN BLOOD BY AUTOMATED COUNT: 14.1 % (ref 11.7–14.4)
GFR SERPL CREATININE-BSD FRML MDRD: 26.9 ML/MIN/1.73M*2
GLUCOSE BLD-MCNC: 117 MG/DL (ref 70–99)
GLUCOSE BLD-MCNC: 132 MG/DL (ref 70–99)
GLUCOSE BLD-MCNC: 140 MG/DL (ref 70–99)
GLUCOSE BLD-MCNC: 148 MG/DL (ref 70–99)
GLUCOSE BLD-MCNC: 160 MG/DL (ref 70–99)
GLUCOSE SERPL-MCNC: 136 MG/DL (ref 70–99)
HCT VFR BLDCO AUTO: 35.1 % (ref 35–45)
HGB BLD-MCNC: 10.9 G/DL (ref 11.8–15.7)
IMM GRANULOCYTES # BLD AUTO: 0.12 K/UL (ref 0–0.08)
IMM GRANULOCYTES NFR BLD AUTO: 0.9 %
LYMPHOCYTES # BLD: 1.25 K/UL (ref 1.2–3.5)
LYMPHOCYTES NFR BLD: 9.6 %
MCH RBC QN AUTO: 27 PG (ref 28–33.2)
MCHC RBC AUTO-ENTMCNC: 31.1 G/DL (ref 32.2–35.5)
MCV RBC AUTO: 87.1 FL (ref 83–98)
MONOCYTES # BLD: 1.35 K/UL (ref 0.28–0.8)
MONOCYTES NFR BLD: 10.3 %
NEUTROPHILS # BLD: 10.05 K/UL (ref 1.7–7)
NEUTS SEG NFR BLD: 77 %
NRBC BLD-RTO: 0 %
PDW BLD AUTO: 9.4 FL (ref 9.4–12.3)
PLATELET # BLD AUTO: 299 K/UL (ref 150–369)
POCT TEST: ABNORMAL
POTASSIUM SERPL-SCNC: 3.9 MEQ/L (ref 3.6–5.1)
RBC # BLD AUTO: 4.03 M/UL (ref 3.93–5.22)
SODIUM SERPL-SCNC: 136 MEQ/L (ref 136–144)
WBC # BLD AUTO: 13.06 K/UL (ref 3.8–10.5)

## 2022-08-08 PROCEDURE — 88305 TISSUE EXAM BY PATHOLOGIST: CPT | Performed by: UROLOGY

## 2022-08-08 PROCEDURE — 87086 URINE CULTURE/COLONY COUNT: CPT | Performed by: UROLOGY

## 2022-08-08 PROCEDURE — 63600105 HC IODINE BASED CONTRAST: Performed by: UROLOGY

## 2022-08-08 PROCEDURE — 12000000 HC ROOM AND CARE MED/SURG

## 2022-08-08 PROCEDURE — 63600000 HC DRUGS/DETAIL CODE: Performed by: NURSE ANESTHETIST, CERTIFIED REGISTERED

## 2022-08-08 PROCEDURE — 36000003 HC OR LEVEL 3 INITIAL 30MIN: Performed by: UROLOGY

## 2022-08-08 PROCEDURE — 71000001 HC PACU PHASE 1 INITIAL 30MIN: Performed by: UROLOGY

## 2022-08-08 PROCEDURE — 63700000 HC SELF-ADMINISTRABLE DRUG: Performed by: HOSPITALIST

## 2022-08-08 PROCEDURE — 25000000 HC PHARMACY GENERAL: Performed by: NURSE ANESTHETIST, CERTIFIED REGISTERED

## 2022-08-08 PROCEDURE — 85025 COMPLETE CBC W/AUTO DIFF WBC: CPT | Performed by: INTERNAL MEDICINE

## 2022-08-08 PROCEDURE — 63600000 HC DRUGS/DETAIL CODE: Performed by: STUDENT IN AN ORGANIZED HEALTH CARE EDUCATION/TRAINING PROGRAM

## 2022-08-08 PROCEDURE — 36415 COLL VENOUS BLD VENIPUNCTURE: CPT | Performed by: INTERNAL MEDICINE

## 2022-08-08 PROCEDURE — 200200 PR NO CHARGE: Performed by: OBSTETRICS & GYNECOLOGY

## 2022-08-08 PROCEDURE — 63600000 HC DRUGS/DETAIL CODE: Performed by: ANESTHESIOLOGY

## 2022-08-08 PROCEDURE — 63700000 HC SELF-ADMINISTRABLE DRUG: Performed by: INTERNAL MEDICINE

## 2022-08-08 PROCEDURE — 36000013 HC OR LEVEL 3 EA ADDL MIN: Performed by: UROLOGY

## 2022-08-08 PROCEDURE — BT1F1ZZ FLUOROSCOPY OF LEFT KIDNEY, URETER AND BLADDER USING LOW OSMOLAR CONTRAST: ICD-10-PCS | Performed by: UROLOGY

## 2022-08-08 PROCEDURE — C1769 GUIDE WIRE: HCPCS | Performed by: UROLOGY

## 2022-08-08 PROCEDURE — 63600000 HC DRUGS/DETAIL CODE: Performed by: INTERNAL MEDICINE

## 2022-08-08 PROCEDURE — 0T778DZ DILATION OF LEFT URETER WITH INTRALUMINAL DEVICE, VIA NATURAL OR ARTIFICIAL OPENING ENDOSCOPIC: ICD-10-PCS | Performed by: UROLOGY

## 2022-08-08 PROCEDURE — 80048 BASIC METABOLIC PNL TOTAL CA: CPT | Performed by: INTERNAL MEDICINE

## 2022-08-08 PROCEDURE — C2617 STENT, NON-COR, TEM W/O DEL: HCPCS | Performed by: UROLOGY

## 2022-08-08 PROCEDURE — 63700000 HC SELF-ADMINISTRABLE DRUG: Performed by: STUDENT IN AN ORGANIZED HEALTH CARE EDUCATION/TRAINING PROGRAM

## 2022-08-08 PROCEDURE — 25800000 HC PHARMACY IV SOLUTIONS: Performed by: NURSE ANESTHETIST, CERTIFIED REGISTERED

## 2022-08-08 PROCEDURE — C1758 CATHETER, URETERAL: HCPCS | Performed by: UROLOGY

## 2022-08-08 PROCEDURE — 57410 PELVIC EXAMINATION: CPT | Performed by: OBSTETRICS & GYNECOLOGY

## 2022-08-08 PROCEDURE — 99233 SBSQ HOSP IP/OBS HIGH 50: CPT | Performed by: STUDENT IN AN ORGANIZED HEALTH CARE EDUCATION/TRAINING PROGRAM

## 2022-08-08 PROCEDURE — 0T9B70Z DRAINAGE OF BLADDER WITH DRAINAGE DEVICE, VIA NATURAL OR ARTIFICIAL OPENING: ICD-10-PCS | Performed by: UROLOGY

## 2022-08-08 PROCEDURE — 25000000 HC PHARMACY GENERAL: Performed by: ANESTHESIOLOGY

## 2022-08-08 PROCEDURE — 27200000 HC STERILE SUPPLY: Performed by: UROLOGY

## 2022-08-08 PROCEDURE — 37000001 HC ANESTHESIA GENERAL: Performed by: UROLOGY

## 2022-08-08 PROCEDURE — 71000011 HC PACU PHASE 1 EA ADDL MIN: Performed by: UROLOGY

## 2022-08-08 DEVICE — SOF-CURL URETERAL STENT 6.0FRX24CM: Type: IMPLANTABLE DEVICE | Site: URETER | Status: FUNCTIONAL

## 2022-08-08 RX ORDER — HYDROMORPHONE HYDROCHLORIDE 1 MG/ML
0.5 INJECTION, SOLUTION INTRAMUSCULAR; INTRAVENOUS; SUBCUTANEOUS
Status: DISCONTINUED | OUTPATIENT
Start: 2022-08-08 | End: 2022-08-08 | Stop reason: HOSPADM

## 2022-08-08 RX ORDER — FENTANYL CITRATE 50 UG/ML
50 INJECTION, SOLUTION INTRAMUSCULAR; INTRAVENOUS
Status: DISCONTINUED | OUTPATIENT
Start: 2022-08-08 | End: 2022-08-08 | Stop reason: HOSPADM

## 2022-08-08 RX ORDER — HEPARIN SODIUM 5000 [USP'U]/ML
5000 INJECTION, SOLUTION INTRAVENOUS; SUBCUTANEOUS EVERY 8 HOURS
Status: DISCONTINUED | OUTPATIENT
Start: 2022-08-08 | End: 2022-08-10 | Stop reason: HOSPADM

## 2022-08-08 RX ORDER — IODIXANOL 320 MG/ML
INJECTION, SOLUTION INTRAVASCULAR
Status: DISCONTINUED | OUTPATIENT
Start: 2022-08-08 | End: 2022-08-08 | Stop reason: HOSPADM

## 2022-08-08 RX ORDER — SODIUM CHLORIDE, SODIUM LACTATE, POTASSIUM CHLORIDE, CALCIUM CHLORIDE 600; 310; 30; 20 MG/100ML; MG/100ML; MG/100ML; MG/100ML
INJECTION, SOLUTION INTRAVENOUS CONTINUOUS
Status: DISCONTINUED | OUTPATIENT
Start: 2022-08-08 | End: 2022-08-10 | Stop reason: HOSPADM

## 2022-08-08 RX ORDER — FENTANYL CITRATE 50 UG/ML
INJECTION, SOLUTION INTRAMUSCULAR; INTRAVENOUS AS NEEDED
Status: DISCONTINUED | OUTPATIENT
Start: 2022-08-08 | End: 2022-08-08 | Stop reason: SURG

## 2022-08-08 RX ORDER — LIDOCAINE HYDROCHLORIDE 10 MG/ML
INJECTION, SOLUTION INFILTRATION; PERINEURAL AS NEEDED
Status: DISCONTINUED | OUTPATIENT
Start: 2022-08-08 | End: 2022-08-08 | Stop reason: SURG

## 2022-08-08 RX ORDER — ONDANSETRON HYDROCHLORIDE 2 MG/ML
INJECTION, SOLUTION INTRAVENOUS AS NEEDED
Status: DISCONTINUED | OUTPATIENT
Start: 2022-08-08 | End: 2022-08-08 | Stop reason: SURG

## 2022-08-08 RX ORDER — LABETALOL HCL 20 MG/4 ML
5 SYRINGE (ML) INTRAVENOUS AS NEEDED
Status: DISCONTINUED | OUTPATIENT
Start: 2022-08-08 | End: 2022-08-08 | Stop reason: HOSPADM

## 2022-08-08 RX ORDER — MIDAZOLAM HYDROCHLORIDE 2 MG/2ML
INJECTION, SOLUTION INTRAMUSCULAR; INTRAVENOUS AS NEEDED
Status: DISCONTINUED | OUTPATIENT
Start: 2022-08-08 | End: 2022-08-08 | Stop reason: SURG

## 2022-08-08 RX ORDER — ONDANSETRON HYDROCHLORIDE 2 MG/ML
4 INJECTION, SOLUTION INTRAVENOUS
Status: DISCONTINUED | OUTPATIENT
Start: 2022-08-08 | End: 2022-08-08 | Stop reason: HOSPADM

## 2022-08-08 RX ORDER — ONDANSETRON HYDROCHLORIDE 2 MG/ML
4 INJECTION, SOLUTION INTRAVENOUS EVERY 6 HOURS PRN
Status: DISCONTINUED | OUTPATIENT
Start: 2022-08-08 | End: 2022-08-10 | Stop reason: HOSPADM

## 2022-08-08 RX ORDER — HYDRALAZINE HYDROCHLORIDE 20 MG/ML
5 INJECTION INTRAMUSCULAR; INTRAVENOUS
Status: DISCONTINUED | OUTPATIENT
Start: 2022-08-08 | End: 2022-08-08 | Stop reason: HOSPADM

## 2022-08-08 RX ORDER — ONDANSETRON 4 MG/1
4 TABLET, ORALLY DISINTEGRATING ORAL EVERY 6 HOURS PRN
Status: DISCONTINUED | OUTPATIENT
Start: 2022-08-08 | End: 2022-08-10 | Stop reason: HOSPADM

## 2022-08-08 RX ORDER — PHENYLEPHRINE HYDROCHLORIDE 10 MG/ML
INJECTION INTRAVENOUS AS NEEDED
Status: DISCONTINUED | OUTPATIENT
Start: 2022-08-08 | End: 2022-08-08 | Stop reason: SURG

## 2022-08-08 RX ORDER — PROPOFOL 10 MG/ML
INJECTION, EMULSION INTRAVENOUS AS NEEDED
Status: DISCONTINUED | OUTPATIENT
Start: 2022-08-08 | End: 2022-08-08 | Stop reason: SURG

## 2022-08-08 RX ORDER — SODIUM CHLORIDE 9 MG/ML
INJECTION, SOLUTION INTRAVENOUS CONTINUOUS PRN
Status: DISCONTINUED | OUTPATIENT
Start: 2022-08-08 | End: 2022-08-08 | Stop reason: SURG

## 2022-08-08 RX ORDER — DEXAMETHASONE SODIUM PHOSPHATE 4 MG/ML
INJECTION, SOLUTION INTRA-ARTICULAR; INTRALESIONAL; INTRAMUSCULAR; INTRAVENOUS; SOFT TISSUE AS NEEDED
Status: DISCONTINUED | OUTPATIENT
Start: 2022-08-08 | End: 2022-08-08 | Stop reason: SURG

## 2022-08-08 RX ADMIN — ONDANSETRON HYDROCHLORIDE 4 MG: 2 SOLUTION INTRAMUSCULAR; INTRAVENOUS at 19:12

## 2022-08-08 RX ADMIN — PHENYLEPHRINE HYDROCHLORIDE 100 MCG: 10 INJECTION INTRAVENOUS at 19:10

## 2022-08-08 RX ADMIN — PHENYLEPHRINE HYDROCHLORIDE 100 MCG: 10 INJECTION INTRAVENOUS at 19:27

## 2022-08-08 RX ADMIN — SODIUM CHLORIDE: 9 INJECTION, SOLUTION INTRAVENOUS at 18:58

## 2022-08-08 RX ADMIN — GABAPENTIN 300 MG: 300 CAPSULE ORAL at 22:06

## 2022-08-08 RX ADMIN — ACETAMINOPHEN 650 MG: 325 TABLET, FILM COATED ORAL at 06:11

## 2022-08-08 RX ADMIN — PHENYLEPHRINE HYDROCHLORIDE 100 MCG: 10 INJECTION INTRAVENOUS at 19:15

## 2022-08-08 RX ADMIN — SODIUM CHLORIDE, POTASSIUM CHLORIDE, SODIUM LACTATE AND CALCIUM CHLORIDE: 600; 310; 30; 20 INJECTION, SOLUTION INTRAVENOUS at 22:23

## 2022-08-08 RX ADMIN — CIPROFLOXACIN 400 MG: 2 INJECTION, SOLUTION INTRAVENOUS at 22:06

## 2022-08-08 RX ADMIN — GABAPENTIN 300 MG: 300 CAPSULE ORAL at 09:16

## 2022-08-08 RX ADMIN — DULOXETINE 20 MG: 20 CAPSULE, DELAYED RELEASE ORAL at 09:16

## 2022-08-08 RX ADMIN — LIDOCAINE HYDROCHLORIDE 10 ML: 10 INJECTION, SOLUTION INFILTRATION; PERINEURAL at 19:05

## 2022-08-08 RX ADMIN — CIPROFLOXACIN 400 MG: 2 INJECTION, SOLUTION INTRAVENOUS at 09:56

## 2022-08-08 RX ADMIN — FENTANYL CITRATE 50 MCG: 50 INJECTION, SOLUTION INTRAMUSCULAR; INTRAVENOUS at 19:05

## 2022-08-08 RX ADMIN — ONDANSETRON 4 MG: 4 TABLET, ORALLY DISINTEGRATING ORAL at 06:18

## 2022-08-08 RX ADMIN — PHENYLEPHRINE HYDROCHLORIDE 100 MCG: 10 INJECTION INTRAVENOUS at 19:25

## 2022-08-08 RX ADMIN — TAMSULOSIN HYDROCHLORIDE 0.4 MG: 0.4 CAPSULE ORAL at 22:06

## 2022-08-08 RX ADMIN — MIDAZOLAM HYDROCHLORIDE 2 MG: 1 INJECTION, SOLUTION INTRAMUSCULAR; INTRAVENOUS at 18:58

## 2022-08-08 RX ADMIN — FENTANYL CITRATE 50 MCG: 50 INJECTION, SOLUTION INTRAMUSCULAR; INTRAVENOUS at 19:00

## 2022-08-08 RX ADMIN — DEXAMETHASONE SODIUM PHOSPHATE 4 MG: 4 INJECTION, SOLUTION INTRAMUSCULAR; INTRAVENOUS at 19:12

## 2022-08-08 RX ADMIN — ONDANSETRON 4 MG: 4 TABLET, ORALLY DISINTEGRATING ORAL at 12:00

## 2022-08-08 RX ADMIN — OXYCODONE HYDROCHLORIDE 5 MG: 5 TABLET ORAL at 06:11

## 2022-08-08 RX ADMIN — PHENYLEPHRINE HYDROCHLORIDE 200 MCG: 10 INJECTION INTRAVENOUS at 19:20

## 2022-08-08 RX ADMIN — DULOXETINE 20 MG: 20 CAPSULE, DELAYED RELEASE ORAL at 22:06

## 2022-08-08 RX ADMIN — PROPOFOL 200 MG: 10 INJECTION, EMULSION INTRAVENOUS at 19:06

## 2022-08-08 RX ADMIN — SODIUM CHLORIDE, POTASSIUM CHLORIDE, SODIUM LACTATE AND CALCIUM CHLORIDE: 600; 310; 30; 20 INJECTION, SOLUTION INTRAVENOUS at 13:46

## 2022-08-08 NOTE — PLAN OF CARE
Problem: Adult Inpatient Plan of Care  Goal: Plan of Care Review  Outcome: Progressing  Goal: Readiness for Transition of Care  Outcome: Progressing  Intervention: Mutually Develop Transition Plan  Flowsheets (Taken 8/8/2022 9822)  Anticipated Discharge Disposition: home with home health  Equipment Needed After Discharge:   glucometer   walker, standard   lift device  Discharge Coordination/Progress: D/C home with [facility]  Assistive Device/Animal Currently Used at Home:   glucometer   walker, standard   lift device  Anticipated Changes Related to Illness: inability to care for self  Outpatient/Agency/Support Group Needs: homecare agency  Transportation Concerns: car, none  Readmission Within the Last 30 Days: no previous admission in last 30 days  Patient/Family Anticipated Services at Transition: home health care  Patient/Family Anticipates Transition to:   home with family   home with help/services  Transportation Anticipated: family or friend will provide  Concerns to be Addressed: discharge planning  Patient's Choice of Community Agency(s): [facility]  Offered/Gave Vendor List: yes  Current Outpatient/Agency/Support Group: homecare agency  Type of Home Care Services: nursing     Pt discussed @ the Care Progression Rounds today. Pt lives with her son/2 story home. Pt has a walker, chairlift, & walker @ home. Pt has a pending referral to [facility]. Discussed D/C plan home with pt. Offered pt a choice of home care providers. Pt chose [facility]. Referral made to [facility] for VN. D/C plan home with home care services.

## 2022-08-08 NOTE — OP NOTE
Exam Under Anesthesia, Endometrial Biopsy, Pap Smear  Procedure Note    Pre-op Diagnosis: Abnormal uterine bleeding, thickened endometrium    Post-op Diagnosis: Same as above    Procedure:  DE PELVIC EXAMINATION W ANESTH (aka EUA) [82378]  DE BIOPSY OF UTERUS LINING (aka ENDOMETRIAL BIOPSY) [18400]  PAP SMEAR    Surgeon: Lionel Willingham MD    Assistant: Jerad Gooden MD, PGY3    Findings: Normal-appearing external female genitalia, normal-appearing cervix with no lesions or active bleeding from the os    Estimated Blood Loss: 0 mL           Specimens:   ID Type Source Tests Collected by Time Destination   1 : Pap Smear Thinprep Pap Cervix CYTOLOGY, THINPREP PAP (INPT/REQ ENTRY) Lionel Willingham MD 8/8/2022 1921    2 : Endometrial Biopsy Tissue Uterus PATHOLOGY TISSUE EXAM Lionel Willingham MD 8/8/2022 1922      Complications: None           Disposition: Nicholas Melendez presented to the preoperative area on 8/8/2022 from her inpatient. The surgeon and patient were mutually identified in the preoperative area. Her questions were answered and consent for our portion of the procedure was signed. She was taken to the operating suite and administered general LMA anesthesia. She was placed in the dorsal lithotomy position with care taken to pad all pressure points and bony prominences. A timeout was performed.     A speculum was placed in the patient’s vagina. A pap smear was obtained using a spatula and cytobrush, and the specimens set aside and sent to pathology for permanent evaluation. Her cervix was then prepped in the usual fashion with betadine. The anterior lip of the cervix was grasped with a single-toothed tenaculum. The uterus was sounded to 13 cm using the endometrial Pipelle, and with two passes of the Pipelle an adequate specimen obtained and sent off to pathology for permanent evaluation.    No bleeding was noted at the cervical os. The tenaculum was removed from the cervix and the site  was hemostatic. All instruments were removed from the patient’s vagina. At this point our portion of the procedure was considered completed. The patient was left under the care of her Urology surgical team to undergo the remainder of the planned procedures.    Dr. Willingham was present and scrubbed and participated in the entirety of our portion of the procedure.     Jerad Gooden MD, PGY3  Pager #[phone]  Obstetrics & Gynecology

## 2022-08-08 NOTE — PLAN OF CARE
Problem: Adult Inpatient Plan of Care  Goal: Plan of Care Review  Outcome: Progressing  Flowsheets (Taken 8/8/2022 1026)  Plan of Care Reviewed With: patient  Outcome Summary: Pt scheduled for the OR today.  NPO.  Denies discomfort.  No distress noted.   Plan of Care Review  Plan of Care Reviewed With: patient  Outcome Summary: Pt scheduled for the OR today.  NPO.  Denies discomfort.  No distress noted.

## 2022-08-08 NOTE — NURSING NOTE
Pt c/o lethargy, dizziness,flashes in front of her eyes MD aware.  Spoke to pt on the phone.   IVF started awating for the OR today .  VSS.

## 2022-08-08 NOTE — ANESTHESIA PROCEDURE NOTES
Airway  Urgency: elective    Start Time: 8/8/2022 7:08 PM  Airway not difficult    General Information and Staff    Patient location during procedure: OR  Anesthesiologist: Jameson Osorio MD  Resident/CRNA: Sondra Oneill CRNA  Performed: resident/CRNA     Indications and Patient Condition  Indications for airway management: anesthesia  Sedation level: deep  Preoxygenated: yes  Patient position: sniffing  Mask difficulty assessment: 0 - not attempted    Final Airway Details  Final airway type: supraglottic airway      Successful airway: iGel  Size 4     Number of attempts at approach: 1  Atraumatic airway insertion

## 2022-08-08 NOTE — OP NOTE
Operative Note     Preoperative Diagnosis: Obstructing left proximal ureteral calculus    Postoperative Diagnosis: Obstructing left proximal ureteral calculus    Procedure: Cystoscopy, left retrograde pyelogram and insertion of left ureteral stent    Surgeon: Antionette Marroquin DO    Assistants: Junior Doe DO     Anesthesia: General LMA anesthesia    Antibiotic(s): Ciprofloxacin 400 mg IV x1    Complications: None           Drains: 6 Turkish by 24 cm double-pigtail left ureteral stent, 16 Turkish latex Campoverde catheter    Specimens: Urine culture-left kidney    Estimated Blood Loss:  No blood loss documented.    Findings: Normal-appearing bladder, mild left hydronephrosis on retrograde pyelogram, purulent drainage from left kidney after ureteral stent placement        Disposition: PACU - hemodynamically stable.    Indications of Procedure: 61-year-old female with a 2 mm obstructing proximal left ureteral calculus, persistent left flank pain and acute kidney injury.  Surgical intervention was recommended.  Risks, benefits and complications of cystoscopy, left retrograde pyelogram and left ureteral stent insertion were discussed with the patient in detail and she has agreed to proceed.    Details of Procedure: Patient was identified by name and medical record number in the holding area.  Informed consent was confirmed in the chart.  Patient was transported to the operating room and placed supine on the operative table.  Bilateral lower extremity compression devices were applied and in good working order.  Perioperative antibiotics were administered.  General LMA anesthesia was administered.  Patient was then positioned in lithotomy with care to pad all pressure points and bony prominences. Surgical timeout was performed and all parties were in agreement. First, gynecology performed a Pap smear and endometrial biopsy.  See their note for perioperative details. Patient was subsequently prepped with Betadine and  draped in sterile fashion.  A 21 Andorran rigid cystoscope with a 30 degree lens was inserted per urethra and atraumatically advanced into the bladder.  Cystoscopy was performed.  Both ureteral orifices were identified in their orthotopic position.  The rest of the bladder was unremarkable.  The left UO was intubated with a sensor wire, which was advanced atraumatically under fluoroscopic guidance to the left upper pole.  Over our sensor wire, a 5 Andorran open-ended catheter was advanced to a distance of 25 cm.  Here, a gentle left retrograde pyelogram was performed to opacify the left collecting system.  There is mild left hydronephrosis without filling defects.  There were no radiopaque renal or ureteral calculi.  The 5 Andorran open-ended catheter was exchanged for a sensor wire.  In the standard fashion, a 6 Andorran by 24 cm double-pigtail left ureteral stent was inserted.  After deployment of the stent, there was a good proximal curl in the left upper pole and a good distal curl in the bladder.  The stent drained purulent material through the sideholes.  Therefore, the decision was made to place a 16 Andorran Campoverde catheter. Patient was then undraped and cleaned placed back in supine position.  Patient was awoke from anesthesia without incident and transferred to PACU in stable condition.    Antionette Marroquin DO was present and performed all critical portions of this procedure.

## 2022-08-08 NOTE — CONSULTS
Gynecology Consult    HPI     Patient is a 61 y.o.  with a past medical history of morbid obesity, type 2 diabetes, hypertension, lower extremity lymphedema, and hypothyroidism admitted with obstructive nephrolithiasis and DARCY, with gynecology consulted secondary to postmenopausal/abnormal uterine bleeding and thickened endometrial stripe.  The patient has been postmenopausal since age 54.  About 1 month ago she began experiencing light vaginal spotting with the rare instance of passage of dime size clots.  She reports bleeding daily to every other day.  She denies lightheadedness, dizziness, headache, chest pain, shortness of breath, cramping, nausea, vomiting, fever, or chills.  She denies ever taking hormone placement therapy.     The patient had a pelvic ultrasound 8/3/2022 secondary to postmenopausal bleeding which showed an endometrial stripe of 2 cm as well as an enlarged fibroid uterus.    OB History:   CSx1    Medical History:   Past Medical History:   Diagnosis Date   • COVID-19 vaccine series completed     Moderna Boster 10/2021   • CTS (carpal tunnel syndrome)    • DJD (degenerative joint disease)    • Edema    • Frozen shoulder    • HL (hearing loss)    • Hypertension    • Hypothyroidism    • Kidney stones    • Lipid disorder    • Lymphedema    • Obesity    • Sciatica    • Stasis dermatitis    • Type 2 diabetes mellitus (CMS/HCC)        Surgical History:   Past Surgical History:   Procedure Laterality Date   • APPENDECTOMY     •  SECTION  1999   • CYSTOSCOPY  2021    CYSTOSCOPY,INSERT URETERAL STENT   • LAPAROTOMY EXPLORATORY     • OOPHORECTOMY      right side secondary to torsion       GYN History: pt denies history of abnormal pap smears, ovarian cysts or STIs. Fibroid uterus.     Social history: No history of tobacco, alcohol, or recreational drug use    Family History: no family history of breast, ovarian, uterine, or colon cancer     Allergies: Cephalexin and Adhesive  tape-silicones    Prior to Admission medications    Medication Sig Start Date End Date Taking? Authorizing Provider   ascorbic acid (VITAMIN C ORAL) Take 1 tablet by mouth daily.      Fred Dow MD   aspirin 81 mg enteric coated tablet Take 81 mg by mouth daily.    Fred Dow MD   cholecalciferol, vitamin D3, (VITAMIN D3 ORAL) Take 1 tablet by mouth daily.      Fred Dow MD   DULoxetine 20 mg capsule Take 20 mg by mouth 2 (two) times a day.   7/6/21   Fred Dow MD   exenatide microspheres 2 mg/0.85 mL auto-injector Inject 1 Dose under the skin once a week on Friday. 8/6/21   Fred Dow MD   gabapentin 600 mg tablet Take 600 mg by mouth 3 (three) times a day.   7/6/21   Fred Dow MD   insulin aspart U-100 100 unit/mL (3 mL) pen Inject 0-100 Units under the skin 3 (three) times a day before meals. Per sliding scale based on calorie intake by patient  10/20/21   Fred Dow MD   insulin degludec U-200 CONC (TRESIBA FLEXTOUCH U-200) 200 unit/mL (3 mL) pen Inject 78 Units under the skin every evening.   3/30/21   Fred Dow MD   irbesartan-hydrochlorothiazide 150-12.5 mg per tablet Take 1 tablet by mouth daily.   10/8/21   Fred Dow MD   levothyroxine 75 mcg tablet Take 75 mcg by mouth daily.    Fred Dow MD   meloxicam 15 mg tablet Take 15 mg by mouth daily.   10/20/21   Fred Dow MD   methocarbamoL 750 mg tablet Take 750 mg by mouth 3 (three) times a day as needed. Always takes in am but doesn't always do tid  8/9/21   Fred Dow MD   simvastatin 40 mg tablet Take 40 mg by mouth daily.   11/25/20   Fred Dow MD       Review of Systems  All systems were reviewed and normal with the exception of the above.     Objective     Vital Signs for the last 24 hours:  Temp:  [36.4 °C (97.5 °F)-36.8 °C (98.3 °F)] 36.8 °C (98.3 °F)  Heart Rate:  [81-88] 88  Resp:  [17-18] 17  BP:  (100-129)/(67-76) 129/67    Exam  General Appearance: Alert, cooperative, no acute distress  Lungs: Clear to auscultation bilaterally, respirations unlabored  Heart: Regular rate and rhythm  Abdomen: Soft, obese, nontender to palpation throughout. No rebound or guarding.  Positive bowel sounds. No suprapubic tenderness.  Back: CVA tenderness present bilaterally  Genitalia: Normal external genitalia.  Bimanual exam attempted however unable to palpate cervix or uterus secondary to body habitus.  No blood on exam glove.  Rectal: Deferred  Extremities: no edema or calf tenderness    Labs  Results from last 7 days   Lab Units 08/08/22  0957 08/07/22  0825 08/06/22  1126   WBC K/uL 13.06* 10.19 12.62*   HEMOGLOBIN g/dL 10.9* 10.2* 11.3*   HEMATOCRIT % 35.1 32.0* 36.1   PLATELETS K/uL 299 246 268      Results from last 7 days   Lab Units 08/08/22  0957 08/07/22  0826 08/06/22  1047   SODIUM mEQ/L 136 137 134*   POTASSIUM mEQ/L 3.9 3.8 4.5   CHLORIDE mEQ/L 98 102 100   CO2 mEQ/L 27 27 23   BUN mg/dL 27* 31* 32*   CREATININE mg/dL 1.9* 1.7* 1.8*   GLUCOSE mg/dL 136* 126* 133*   CALCIUM mg/dL 9.0 8.4* 8.9        Imaging  CLINICAL HISTORY: Vaginal bleeding; 7 years postmenopausal     COMMENT:  Transabdominal and endovaginal ultrasound the pelvis complete was  performed. The transabdominal ultrasound is performed to assess the entirety of  the pelvis while the endovaginal ultrasound is utilized to further evaluate the  uterus and ovaries.     The uterus measures 11.3 x 7.7 x 7.2 cm and is best evaluated transabdominally.  On the endovaginal imaging, the endometrium measures 2 cm.     There are multiple fibroids with a calcified fibroid to the left measuring 1.3 x  1.3 x 2.0 cm. A left fibroid measures 3.0 x 3.0 x 3.1 cm. A fibroid to the right  measures 2.1 x 1.9 x 2.2 cm.     Neither ovary is visualized. No evidence for adnexal mass.     It is noted that the endometrial stripe is better assessed on the  endovaginal  imaging.     --  IMPRESSION:  1. Enlarged fibroid uterus.  2. Thickened endometrium measuring up to 2 cm. Endometrial biopsy may be considered.     Assessment/Plan     Xin Melendez is a 61 y.o. female admitted for obstructive nephrolithiasis and DARCY, with gynecology consulted secondary to postmenopausal/abnormal uterine bleeding in the setting of a thickened endometrial stripe.    Postmenopausal/abnormal uterine bleeding:  - Any bleeding in the postmenopausal state is abnormal and an indication for endometrial sampling to rule out malignancy. Furthermore, Xin is at an increased risk for endometrial cancer given her increased BMI. Exam under anesthesia, endometrial sampling, and PAP smear will be completed today in the OR alongside urology. Consents to be signed prior to procedure. No antibiotics indicated. NPO until procedure. CBC on file. Needs up to date T&S.     Discussed with Dr. Schwartz who discussed with Dr. Becka Randall DO     ROB4 addendum:    Patient is a 60yo postmenopausal  admitted for obstructive nephrolithiasis and DARCY, with gynecology consulted secondary to postmenopausal/abnormal uterine bleeding in the setting of a thickened endometrial stripe. US performed at [facility] prior to transfer demonstrated EMS of 2 cm. Will plan for EMB as above. Will also perform pap smear at time of sampling due to lack of gyn care for past 10 years per patient report. Will consent in preop prior to procedure.     Discussed with Dr. Jovel. Please page [phone] with questions.     MD Fam Ojeda MD

## 2022-08-08 NOTE — ANESTHESIOLOGIST PRE-PROCEDURE ATTESTATION
Pre-Procedure Patient Identification:  I am the Primary Anesthesiologist and have identified the patient on 08/08/22 at 6:27 PM.   I have confirmed the procedure(s) will be performed by the following surgeon/proceduralist Antionette Marroquin DO.

## 2022-08-08 NOTE — PLAN OF CARE
Problem: Adult Inpatient Plan of Care  Goal: Plan of Care Review  Outcome: Progressing  Flowsheets (Taken 8/8/2022 0603)  Plan of Care Reviewed With: patient  Outcome Summary: pt resting quietly   Plan of Care Review  Plan of Care Reviewed With: patient  Outcome Summary: pt resting quietly

## 2022-08-08 NOTE — PROGRESS NOTES
Hospital Medicine Service -  Daily Progress Note       SUBJECTIVE   Interval History: Pt seen and examined at bedside. Frustrated that she has had to be transferred to [facility] for stent placement. Frustrated that she is an add-on case today. Endorses continued L flank pain which comes in waves q6h, temporarily w/ some relief w/ pain meds. Denies fevers/chills. Denies CP, SOB, abdominal pain, nausea, vomiting.     OBJECTIVE      Vital signs in last 24 hours:  Temp:  [36.4 °C (97.5 °F)-37 °C (98.6 °F)] 37 °C (98.6 °F)  Heart Rate:  [81-93] 93  Resp:  [17-18] 17  BP: (100-129)/(53-76) 104/53    Intake/Output Summary (Last 24 hours) at 8/8/2022 1357  Last data filed at 8/8/2022 1336  Gross per 24 hour   Intake --   Output 450 ml   Net -450 ml       PHYSICAL EXAMINATION      Temp:  [36.4 °C (97.5 °F)-37 °C (98.6 °F)] 37 °C (98.6 °F)  Heart Rate:  [81-93] 93  Resp:  [17-18] 17  BP: (100-129)/(53-76) 104/53  Gen: morbidly obese female in NAD  HEENT: NC/AT, EOMI  Resp: CTAB no w/r/r  CV: RRR no m/r/g  GI: +BS, soft, NT/ND  : +L flank pain; no Campoverde  Neuro: BLANCA, no focal deficits  Ext: no edema in BLE  Psych: cooperative; appropriate mood and affect       LINES, CATHETERS, DRAINS, AIRWAYS, AND WOUNDS   Lines, Drains, and Airways:  Wounds (agree with documentation and present on admission):  Peripheral IV (Adult) 08/08/22 Anterior;Right Forearm (Active)   Number of days: 0       Wound Right Calf (Active)   Number of days: 2       Wound Left Calf (Active)   Number of days: 2         Comments:      LABS / IMAGING / TELE      Labs  I have reviewed the patient's pertinent labs.  Significant abnormals are Cr 1.9, WBC 13, Hgb 11.    SARS-CoV-2 (COVID-19) (no units)   Date/Time Value   08/07/2022 1428 Negative       Imaging  Applicable imaging for this hospitalization reviewed    ECG/Telemetry  Patient is not on telemetry.    ASSESSMENT AND PLAN      * Left ureteral stone  Assessment & Plan  - Admitted to [facility] 8/3/22  with flank pain. Imaging showed Left sided ureteral stone, with associated hydroureteronephrosis  - History of previous nephrolithiasis, last cysto 11/2021 at [facility] with follow up at office of Dr. Marroquin.   - At [facility], pt failed trial of observation for stone to pass.   - Pt went to OR 8/5/22 at [facility] for ureteral stent. However procedure unable to be performed due to pt's body habitus and the lack of bariatric leg stirrups. Urologist Dr. Tinsley advised transfer patient to [facility]. Transferred 8/7/22, plan left ureteral stent placement, possible ureteroscopy/laser lithotripsy on 8/8/22  - pain control w/ prn Tylenol, prn oxycodone, prn IV Dilaudid  - cont Flomax  - urology c/s, appreciate recs    Postmenopausal bleeding  Assessment & Plan  - Ongoing for about 3 weeks now. LMP was age 54.  - Transvaginal US - showed enlarged fibroid uterus and thickened endometrium, measuring up to 2cm  - Patient reports difficulty getting a gynecology visit OP. Last visit age 54.  - Gyn was consulted at [facility], and plan is for endometrial biopsy at the same time as her cystoscopy    Pyelonephritis  Assessment & Plan  - Asymmetric L perinephric stranding noted on CT scan, with associated flank pain, c/f possible pyelonephritis, but U/A only w/ 4-10 WBC, trace LE, and UCx no growth, BCx no growth  - Allergic to cephalexin, recently on Levaquin outpatient  - Started on IV ciprofloxacin on arrival to ED.  - Unclear if her allergy to cephalexin extends to all penicillins and cephalosporins, would continue with ciprofloxacin for now  - ID was following at [facility], recommends 10d course ciprofloxacin, last day of abx on 8/12/22    DARCY (acute kidney injury) (CMS/[facility])  Assessment & Plan  Cr 1.6 on admission, secondary to post renal calculi obstruction. Baseline Cr 1.0 - 1.1  - Cr slowly rising, 1.9 today, suspect will improve following L ureteral stent placement by urology  - start mIVF in case of component of dehydration and while  NPO  - hold home ARB-HCTZ  - avoid nephrotoxins as able, renally dose all meds    Type 2 diabetes mellitus (CMS/[facility])  Assessment & Plan  On 78U Tresiba, SSI - held Tresiba for now     - Tresiba on hold for now, only on SSI the last few days and BG has been good  - CTM BG  - cont SSI    HTN (hypertension)  Assessment & Plan  Home: on Irbesartan-HCTZ 150mg-12.5mg daily  - hold home ARB-HCTZ for now given DARCY, worsening  - can be on IV hydralazine prn temporarily until DARCY resolves    Hypothyroidism  Assessment & Plan  - cont home Synthroid    Hyperlipidemia  Assessment & Plan  - cont statin    Lymphedema due to venous insufficiency  Assessment & Plan  - Chronic lymphedema.   - Evaluated by Wound Care on 8/5 while admitted to [facility].   - Patient noted to have chronic lymphedema with weeping (posterior>anterior). BMI-59.34. Patient manages her own dressings at home. Maintain PI PREVENT bundle.   - Podiatry team was following patient during [facility] admit. Consult here for continued care.   - ABD and net compression dressings  - Podiatry recommend elevation if tolerable by patient. Pt states unable to do this since she can not tolerate a pad or any pressure under her LE.   - May follow with wound care at [facility] upon discharge with Dr. Oconnor    Morbid obesity with BMI of 50.0-59.9, adult (CMS/[facility])  Assessment & Plan  - Dietary and lifestyle counseling.   - Limited mobility due to lymphedema, knee pain and hip pain, uses walker  - Pt agreeable to PT in home. [facility] was following while pt at [facility].        VTE Assessment: [place]    VTE Prophylaxis:  Current anticoagulants:  [Provider Managed Hold] heparin (porcine) 5,000 unit/mL injection 5,000 Units, subcutaneous, q8h VIOLETA      Code Status: Full Code      Estimated Discharge Date: 8/9/2022     Disposition Planning: pending OR 8/8/22     Luc Short MD  8/8/2022

## 2022-08-08 NOTE — PROGRESS NOTES
Patient seen preoperatively.  Endometrial biopsy and Pap smear discussed.  Patient is comfortable with us proceeding.  She is without questions.  Consent signed.    NAB

## 2022-08-08 NOTE — ANESTHESIA PREPROCEDURE EVALUATION
Relevant Problems   CARDIOVASCULAR   (+) HTN (hypertension)      MUSCULOSKELETAL   (+) Osteoarthritis      NEUROLOGY   (+) Sciatica      Other   (+) Hypothyroidism   (+) Pyelonephritis   (+) Sepsis due to Escherichia coli (CMS/[facility])   (+) Type 2 diabetes mellitus (CMS/HCC)       Anesthesia ROS/MED HX    Anesthesia History    Previous anesthetics  Pulmonary - neg  Neuro/Psych - neg  Cardiovascular   hypertension   Covid19 Test Reviewed and ECG reviewed  Comments: Multiple medical evaluations noted.  Hematological    anemia  GI/Hepatic- neg  Musculoskeletal- neg  Renal Disease   chronic renal insufficiency   renal calculi  Endo/Other   Diabetes   Hypothyroidism  Body Habitus: Morbidly Obese  ROS/MED HX Comments:    Anesthesia History: Successful airway: ETT     Successful intubation technique: video laryngoscopy   Facilitating devices/methods: intubating stylet   Endotracheal tube insertion site: oral   Blade size: #3   ETT size (mm): 7.0   Cormack-Lehane Classification: grade I - full view of glottis   Measured from: teeth   ETT to teeth (cm): 23   Atraumatic airway insertion    ECG: Normal sinus rhythm   Low voltage QRS   Abnormal ECG   When compared with ECG of 05-AUG-2022 08:26, (unconfirmed)   No significant change was found   Confirmed by Jesus Rodriguez ([id number]) on 2022 2:11:31 PM    Specimen Collected: 22 08:27             Past Surgical History:   Procedure Laterality Date   • APPENDECTOMY     •  SECTION  1999   • CYSTOSCOPY  2021    CYSTOSCOPY,INSERT URETERAL STENT   • LAPAROTOMY EXPLORATORY     • OOPHORECTOMY      right side secondary to torsion     Patient Active Problem List   Diagnosis   • Calculus of proximal right ureter   • Morbid obesity with BMI of 50.0-59.9, adult (CMS/[facility])   • HTN (hypertension)   • Type 2 diabetes mellitus (CMS/HCC)   • Lymphedema due to venous insufficiency   • Hyperlipidemia   • Hypothyroidism   • Bacteremia due to Escherichia coli   • Sepsis due to  Escherichia coli (CMS/[facility])   • Osteoarthritis   • Sciatica   • Left ureteral stone   • DARCY (acute kidney injury) (CMS/[facility])   • Pyelonephritis   • Hydroureteronephrosis   • Leucocytosis   • Flank pain   • Postmenopausal bleeding       Current Facility-Administered Medications   Medication Dose Route Frequency   • acetaminophen  650 mg oral q4h PRN   • atorvastatin  20 mg oral Nightly   • ciprofloxacin  400 mg intravenous q12h INT   • glucose  16-32 g of dextrose oral PRN    Or   • dextrose  15-30 g of dextrose oral PRN    Or   • glucagon  1 mg intramuscular PRN    Or   • dextrose 50 % in water (D50)  25 mL intravenous PRN   • DULoxetine  20 mg oral BID   • gabapentin  300 mg oral TID   • [Provider Managed Hold] heparin (porcine)  5,000 Units subcutaneous q8h VIOLETA   • HYDROmorphone  0.5 mg intravenous q4h PRN   • insulin aspart U-100  6-10 Units subcutaneous TID with meals   • lactated ringer's   intravenous Continuous   • levothyroxine  75 mcg oral Daily (6:30a)   • methocarbamoL  750 mg oral 3x daily PRN   • ondansetron ODT  4 mg oral q6h PRN    Or   • ondansetron  4 mg intravenous q6h PRN   • oxyCODONE  5-10 mg oral q6h PRN   • tamsulosin  0.4 mg oral Nightly       Prior to Admission medications    Medication Sig Start Date End Date Taking? Authorizing Provider   ascorbic acid (VITAMIN C ORAL) Take 1 tablet by mouth daily.      Fred Dow MD   aspirin 81 mg enteric coated tablet Take 81 mg by mouth daily.    Fred Dow MD   cholecalciferol, vitamin D3, (VITAMIN D3 ORAL) Take 1 tablet by mouth daily.      Fred Dow MD   DULoxetine 20 mg capsule Take 20 mg by mouth 2 (two) times a day.   7/6/21   Fred Dow MD   exenatide microspheres 2 mg/0.85 mL auto-injector Inject 1 Dose under the skin once a week on Friday. 8/6/21   Fred Dow MD   gabapentin 600 mg tablet Take 600 mg by mouth 3 (three) times a day.   7/6/21   Fred Dow MD   insulin aspart U-100  100 unit/mL (3 mL) pen Inject 0-100 Units under the skin 3 (three) times a day before meals. Per sliding scale based on calorie intake by patient  10/20/21   Fred Dow MD   insulin degludec U-200 CONC (TRESIBA FLEXTOUCH U-200) 200 unit/mL (3 mL) pen Inject 78 Units under the skin every evening.   3/30/21   Fred Dow MD   irbesartan-hydrochlorothiazide 150-12.5 mg per tablet Take 1 tablet by mouth daily.   10/8/21   Fred Dow MD   levothyroxine 75 mcg tablet Take 75 mcg by mouth daily.    Fred Dow MD   meloxicam 15 mg tablet Take 15 mg by mouth daily.   10/20/21   Fred Dow MD   methocarbamoL 750 mg tablet Take 750 mg by mouth 3 (three) times a day as needed. Always takes in am but doesn't always do tid  8/9/21   Fred Dow MD   simvastatin 40 mg tablet Take 40 mg by mouth daily.   11/25/20   Fred Dow MD       CBC Results       08/08/22 08/07/22 08/06/22     0957 0825 1126    WBC 13.06 10.19 12.62    RBC 4.03 3.56 4.04    HGB 10.9 10.2 11.3    HCT 35.1 32.0 36.1    MCV 87.1 89.9 89.4    MCH 27.0 28.7 28.0    MCHC 31.1 31.9 31.3     246 268          BMP Results       08/08/22 08/07/22 08/06/22     0957 0826 1047     137 134    K 3.9 3.8 4.5    Cl 98 102 100    CO2 27 27 23    Glucose 136 126 133    BUN 27 31 32    Creatinine 1.9 1.7 1.8    Calcium 9.0 8.4 8.9    Anion Gap 11 8 11    EGFR 26.9 30.6 28.6         Comment for K at 1047 on 08/06/22: SLIGHT HEMOLYSIS, RESULT MAY BE INCREASED.          Physical Exam    Airway   Mallampati: I   TM distance: >3 FB   Neck ROM: full  Cardiovascular - normal   Rhythm: regular   Rate: normalPulmonary - normal   clear to auscultation  Other Findings   Multiple medical evaluations noted.  Dental - normal        Anesthesia Plan    Plan: general    Technique: general LMA     Lines and Monitors: PIV   ASA 3  Anesthetic plan and risks discussed with: patient  Induction:    intravenous    Postop Plan:   Pain Management: IV analgesics

## 2022-08-08 NOTE — PATIENT CARE CONFERENCE
Care Progression Rounds Note  Date: 8/8/2022  Time: 12:42 PM     Patient Name: Xin Melendez     Medical Record Number: [id number]   YOB: 1960  Sex: Female      Room/Bed: 0282    Admitting Diagnosis: Left ureteral stone [N20.1]   Admit Date/Time: 8/7/2022 11:05 AM    Primary Diagnosis: Left ureteral stone  Principal Problem: Left ureteral stone    GMLOS: pending  Anticipated Discharge Date: 8/9/2022    AM-PAC:  Mobility Score:      Discharge Planning:  Concerns to be Addressed: discharge planning  Anticipated Discharge Disposition: home with home health  Type of Home Care Services: nursing    Barriers to Discharge:  Medical issues not resolved    Comments:  [facility]    Participants:  social work/services, occupational therapy, nursing, physician,

## 2022-08-08 NOTE — PROGRESS NOTES
Urology Daily Progress Note    Subjective     Interval History: No acute events overnight. Continues to endorse left flank pain. Labs pending from this morning.      Objective     Vital signs in last 24 hours:  Temp:  [36.4 °C (97.5 °F)-36.8 °C (98.3 °F)] 36.8 °C (98.3 °F)  Heart Rate:  [81-88] 88  Resp:  [17-18] 17  BP: (100-129)/(67-76) 129/67      Intake/Output Summary (Last 24 hours) at 8/8/2022 0934  Last data filed at 8/7/2022 1751  Gross per 24 hour   Intake --   Output 200 ml   Net -200 ml     Intake/Output this shift:  No intake/output data recorded.    Labs  Results from last 7 days   Lab Units 08/07/22  0825 08/06/22  1126 08/05/22  1127   WBC K/uL 10.19 12.62* 12.20*   HEMOGLOBIN g/dL 10.2* 11.3* 10.9*   HEMATOCRIT % 32.0* 36.1 34.9*   PLATELETS K/uL 246 268 254     Results from last 7 days   Lab Units 08/07/22  0826 08/06/22  1047 08/05/22  1127   SODIUM mEQ/L 137 134* 138   POTASSIUM mEQ/L 3.8 4.5 4.3   CHLORIDE mEQ/L 102 100 102   CO2 mEQ/L 27 23 30   BUN mg/dL 31* 32* 30*   CREATININE mg/dL 1.7* 1.8* 1.6*   GLUCOSE mg/dL 126* 133* 110*   CALCIUM mg/dL 8.4* 8.9 8.9       UA Results       08/03/22     1345    Color Yellow    Clarity Cloudy    Glucose Negative    Bilirubin Negative    Ketones Negative    Sp Grav 1.030    Blood +3    Ph 5.5    Protein +1    Urobilinogen 0.2    Nitrite Negative    Leuk Est Trace    WBC 4 TO 10    RBC Too Numerous To Count    Bacteria Rare         Comment for Blood at 1345 on 08/03/22: The sensitivity of the occult blood test is equivalent to approximately 4 intact RBC/HPF.        Microbiology Results     Procedure Component Value Units Date/Time    SARS-CoV-2 (COVID-19), PCR Nasopharynx [[id number]]  (Normal) Collected: 08/07/22 1428    Specimen: Nasopharyngeal Swab from Nasopharynx Updated: 08/07/22 1629    Narrative:      The following orders were created for panel order SARS-CoV-2 (COVID-19), PCR Nasopharynx.  Procedure                               Abnormality          Status                     ---------                               -----------         ------                     SARS-CoV-2 (COVID-19), P...[[id number]]  Normal              Final result                 Please view results for these tests on the individual orders.    SARS-CoV-2 (COVID-19), PCR Nasopharynx [[id number]]  (Normal) Collected: 08/07/22 1428    Specimen: Nasopharyngeal Swab from Nasopharynx Updated: 08/07/22 1629     SARS-CoV-2 (COVID-19) Negative    Narrative:      Testing performed using real-time PCR for detection of COVID-19. EUA approved validation studies performed on site.     Blood Culture Blood, Venous [[id number]]  (Normal) Collected: 08/05/22 1152    Specimen: Blood, Venous Updated: 08/07/22 1601     Culture No growth at 48 hours    SARS-CoV-2 (COVID-19), PCR Nasopharynx [[id number]]  (Normal) Collected: 08/03/22 1724    Specimen: Nasopharyngeal Swab from Nasopharynx Updated: 08/03/22 1814    Narrative:      The following orders were created for panel order SARS-CoV-2 (COVID-19), PCR Nasopharynx.  Procedure                               Abnormality         Status                     ---------                               -----------         ------                     SARS-CoV-2 (COVID-19), P...[[id number]]  Normal              Final result                 Please view results for these tests on the individual orders.    SARS-CoV-2 (COVID-19), PCR Nasopharynx [[id number]]  (Normal) Collected: 08/03/22 1724    Specimen: Nasopharyngeal Swab from Nasopharynx Updated: 08/03/22 1814     SARS-CoV-2 (COVID-19) Negative    Narrative:      Testing performed using real-time PCR for detection of COVID-19. EUA approved validation studies performed on site.     Blood Culture Blood, Venous [[id number]]  (Normal) Collected: 08/03/22 1716    Specimen: Blood, Venous Updated: 08/07/22 2201     Culture No growth at 96 hours    Urine culture Urine, Clean Catch [[id number]]  (Normal) Collected: 08/03/22 1345    Specimen:  Urine, Clean Catch Updated: 08/04/22 1304     Urine Culture No Growth (Limit of detection 1000 cfu/mL)          Imaging  CT Abdomen/Pelvis 8/5  --  IMPRESSION: Mild to moderate left hydronephrosis with a 2 mm calculus in the  proximal left ureter.    Physicial Exam  Visit Vitals  /67 (BP Location: Right forearm, Patient Position: Sitting)   Pulse 88   Temp 36.8 °C (98.3 °F) (Oral)   Resp 17   SpO2 98%   Breastfeeding No       General appearance: alert, cooperative, no acute distress  Head: normocephalic, atraumatic  Eyes: lids and lashes normal, sclerae white  Nose: no discharge  Throat: lips normal without lesions  Neck: supple, symmetrical, trachea midline  Lungs: Unlabored respirations, symmetric chest expansion  Heart: regular rate and rhythm  Abdomen: soft, non-tender to palpation, non-distended. Obese. No CVA tenderness.  Extremities: Severe lymphadema of lower extremities  Lymph nodes: no inguinal adenopathy  Neurologic: Alert and oriented X 3       Assessment     60 yo female with pmh of T2DM, HTN, hypothyroidism, lymphedema who presents as a transfer due to DARCY and obstructing ureteral stone.        Plan     - patient added on for left ureteral stent placement, possible ureteroscopy/laser lithotripsy today  - continue ciprofloxacin perioperatively. Anticipate an antibiotic course lasting until ~3 days post-operatively  - will follow up with gynecology today to determine if they wish to perform endometrial biopsy while she is under anesthesia  - flomax 0.4 mg nightly for medical expulsive therapy     Please page [phone] with questions or concerns.     [facility]  Alan Henry MD, PGY2  [facility] Pager #[phone]  Adrien Dorado Pager #[phone]

## 2022-08-08 NOTE — OR SURGEON
Pre-Procedure patient identification:  I am the primary operating surgeon/proceduralist and I have identified the patient and confirmed laterality is left on 08/08/22 at 4:28 PM Antionette Marroquin DO  Phone Number: [phone]

## 2022-08-09 ENCOUNTER — APPOINTMENT (INPATIENT)
Dept: RADIOLOGY | Facility: HOSPITAL | Age: 62
End: 2022-08-09
Attending: UROLOGY
Payer: COMMERCIAL

## 2022-08-09 LAB
ANION GAP SERPL CALC-SCNC: 9 MEQ/L (ref 3–15)
BASOPHILS # BLD: 0.03 K/UL (ref 0.01–0.1)
BASOPHILS NFR BLD: 0.2 %
BUN SERPL-MCNC: 26 MG/DL (ref 8–20)
CALCIUM SERPL-MCNC: 9 MG/DL (ref 8.9–10.3)
CHLORIDE SERPL-SCNC: 101 MEQ/L (ref 98–109)
CO2 SERPL-SCNC: 26 MEQ/L (ref 22–32)
CREAT SERPL-MCNC: 1.6 MG/DL (ref 0.6–1.1)
DIFFERENTIAL METHOD BLD: ABNORMAL
EOSINOPHIL # BLD: 0 K/UL (ref 0.04–0.36)
EOSINOPHIL NFR BLD: 0 %
ERYTHROCYTE [DISTWIDTH] IN BLOOD BY AUTOMATED COUNT: 14 % (ref 11.7–14.4)
GFR SERPL CREATININE-BSD FRML MDRD: 32.8 ML/MIN/1.73M*2
GLUCOSE BLD-MCNC: 132 MG/DL (ref 70–99)
GLUCOSE BLD-MCNC: 159 MG/DL (ref 70–99)
GLUCOSE BLD-MCNC: 184 MG/DL (ref 70–99)
GLUCOSE BLD-MCNC: 186 MG/DL (ref 70–99)
GLUCOSE BLD-MCNC: 202 MG/DL (ref 70–99)
GLUCOSE SERPL-MCNC: 186 MG/DL (ref 70–99)
HCT VFR BLDCO AUTO: 31.9 % (ref 35–45)
HGB BLD-MCNC: 10.2 G/DL (ref 11.8–15.7)
IMM GRANULOCYTES # BLD AUTO: 0.08 K/UL (ref 0–0.08)
IMM GRANULOCYTES NFR BLD AUTO: 0.7 %
LYMPHOCYTES # BLD: 0.84 K/UL (ref 1.2–3.5)
LYMPHOCYTES NFR BLD: 6.9 %
MCH RBC QN AUTO: 27.5 PG (ref 28–33.2)
MCHC RBC AUTO-ENTMCNC: 32 G/DL (ref 32.2–35.5)
MCV RBC AUTO: 86 FL (ref 83–98)
MONOCYTES # BLD: 0.65 K/UL (ref 0.28–0.8)
MONOCYTES NFR BLD: 5.3 %
NEUTROPHILS # BLD: 10.65 K/UL (ref 1.7–7)
NEUTS SEG NFR BLD: 86.9 %
NRBC BLD-RTO: 0 %
PDW BLD AUTO: 9.5 FL (ref 9.4–12.3)
PLATELET # BLD AUTO: 279 K/UL (ref 150–369)
POCT TEST: ABNORMAL
POTASSIUM SERPL-SCNC: 4.4 MEQ/L (ref 3.6–5.1)
RBC # BLD AUTO: 3.71 M/UL (ref 3.93–5.22)
SODIUM SERPL-SCNC: 136 MEQ/L (ref 136–144)
WBC # BLD AUTO: 12.25 K/UL (ref 3.8–10.5)

## 2022-08-09 PROCEDURE — 85025 COMPLETE CBC W/AUTO DIFF WBC: CPT | Performed by: STUDENT IN AN ORGANIZED HEALTH CARE EDUCATION/TRAINING PROGRAM

## 2022-08-09 PROCEDURE — 12000000 HC ROOM AND CARE MED/SURG

## 2022-08-09 PROCEDURE — 36415 COLL VENOUS BLD VENIPUNCTURE: CPT | Performed by: STUDENT IN AN ORGANIZED HEALTH CARE EDUCATION/TRAINING PROGRAM

## 2022-08-09 PROCEDURE — 63700000 HC SELF-ADMINISTRABLE DRUG: Performed by: STUDENT IN AN ORGANIZED HEALTH CARE EDUCATION/TRAINING PROGRAM

## 2022-08-09 PROCEDURE — 63600000 HC DRUGS/DETAIL CODE: Performed by: STUDENT IN AN ORGANIZED HEALTH CARE EDUCATION/TRAINING PROGRAM

## 2022-08-09 PROCEDURE — 80048 BASIC METABOLIC PNL TOTAL CA: CPT | Performed by: STUDENT IN AN ORGANIZED HEALTH CARE EDUCATION/TRAINING PROGRAM

## 2022-08-09 PROCEDURE — 99233 SBSQ HOSP IP/OBS HIGH 50: CPT | Performed by: STUDENT IN AN ORGANIZED HEALTH CARE EDUCATION/TRAINING PROGRAM

## 2022-08-09 RX ORDER — POLYETHYLENE GLYCOL 3350 17 G/17G
17 POWDER, FOR SOLUTION ORAL DAILY
Status: DISCONTINUED | OUTPATIENT
Start: 2022-08-09 | End: 2022-08-10 | Stop reason: HOSPADM

## 2022-08-09 RX ORDER — SENNOSIDES 8.6 MG/1
2 TABLET ORAL DAILY
Status: DISCONTINUED | OUTPATIENT
Start: 2022-08-09 | End: 2022-08-09

## 2022-08-09 RX ORDER — AMOXICILLIN 250 MG
1 CAPSULE ORAL 2 TIMES DAILY
Status: DISCONTINUED | OUTPATIENT
Start: 2022-08-09 | End: 2022-08-10 | Stop reason: HOSPADM

## 2022-08-09 RX ORDER — POLYETHYLENE GLYCOL 3350 17 G/17G
17 POWDER, FOR SOLUTION ORAL DAILY PRN
Status: DISCONTINUED | OUTPATIENT
Start: 2022-08-09 | End: 2022-08-09

## 2022-08-09 RX ADMIN — DULOXETINE 20 MG: 20 CAPSULE, DELAYED RELEASE ORAL at 20:56

## 2022-08-09 RX ADMIN — LEVOTHYROXINE SODIUM 75 MCG: 75 TABLET ORAL at 06:31

## 2022-08-09 RX ADMIN — CIPROFLOXACIN 400 MG: 2 INJECTION, SOLUTION INTRAVENOUS at 08:42

## 2022-08-09 RX ADMIN — SENNOSIDES 2 TABLET: 8.6 TABLET, FILM COATED ORAL at 10:20

## 2022-08-09 RX ADMIN — TAMSULOSIN HYDROCHLORIDE 0.4 MG: 0.4 CAPSULE ORAL at 22:00

## 2022-08-09 RX ADMIN — DULOXETINE 20 MG: 20 CAPSULE, DELAYED RELEASE ORAL at 08:42

## 2022-08-09 RX ADMIN — GABAPENTIN 300 MG: 300 CAPSULE ORAL at 13:32

## 2022-08-09 RX ADMIN — CIPROFLOXACIN 400 MG: 2 INJECTION, SOLUTION INTRAVENOUS at 20:56

## 2022-08-09 RX ADMIN — GABAPENTIN 300 MG: 300 CAPSULE ORAL at 08:42

## 2022-08-09 RX ADMIN — GABAPENTIN 300 MG: 300 CAPSULE ORAL at 20:55

## 2022-08-09 RX ADMIN — INSULIN ASPART 6 UNITS: 100 INJECTION, SOLUTION INTRAVENOUS; SUBCUTANEOUS at 18:50

## 2022-08-09 NOTE — PROGRESS NOTES
Urology Daily Progress Note    Subjective   Interval History: Postoperative day 1 status post pap smear, endometrial biopsy, cystoscopy and left ureteral stent placement.  Afebrile overnight.  Left flank pain is improved.  Campoverde catheter draining bloody urine.    Objective     Vital signs in last 24 hours:  Temp:  [36.1 °C (97 °F)-37 °C (98.6 °F)] 36.8 °C (98.3 °F)  Heart Rate:  [86-93] 89  Resp:  [13-18] 17  BP: (104-136)/(50-66) 112/55      Intake/Output Summary (Last 24 hours) at 8/9/2022 0714  Last data filed at 8/9/2022 0600  Gross per 24 hour   Intake 1200 ml   Output 1103 ml   Net 97 ml     Intake/Output this shift:  No intake/output data recorded.    Labs  BMP Results       08/08/22 08/07/22 08/06/22     0957 0826 1047     137 134    K 3.9 3.8 4.5    Cl 98 102 100    CO2 27 27 23    Glucose 136 126 133    BUN 27 31 32    Creatinine 1.9 1.7 1.8    Calcium 9.0 8.4 8.9    Anion Gap 11 8 11    EGFR 26.9 30.6 28.6         Comment for K at 1047 on 08/06/22: SLIGHT HEMOLYSIS, RESULT MAY BE INCREASED.        CBC Results       08/08/22 08/07/22 08/06/22     0957 0825 1126    WBC 13.06 10.19 12.62    RBC 4.03 3.56 4.04    HGB 10.9 10.2 11.3    HCT 35.1 32.0 36.1    MCV 87.1 89.9 89.4    MCH 27.0 28.7 28.0    MCHC 31.1 31.9 31.3     246 268        UA Results       08/03/22     1345    Color Yellow    Clarity Cloudy    Glucose Negative    Bilirubin Negative    Ketones Negative    Sp Grav 1.030    Blood +3    Ph 5.5    Protein +1    Urobilinogen 0.2    Nitrite Negative    Leuk Est Trace    WBC 4 TO 10    RBC Too Numerous To Count    Bacteria Rare         Comment for Blood at 1345 on 08/03/22: The sensitivity of the occult blood test is equivalent to approximately 4 intact RBC/HPF.        Microbiology Results     Procedure Component Value Units Date/Time    SARS-CoV-2 (COVID-19), PCR Nasopharynx [[id number]]  (Normal) Collected: 08/07/22 1428    Specimen: Nasopharyngeal Swab from Nasopharynx Updated: 08/07/22  1629    Narrative:      The following orders were created for panel order SARS-CoV-2 (COVID-19), PCR Nasopharynx.  Procedure                               Abnormality         Status                     ---------                               -----------         ------                     SARS-CoV-2 (COVID-19), P...[[id number]]  Normal              Final result                 Please view results for these tests on the individual orders.    SARS-CoV-2 (COVID-19), PCR Nasopharynx [[id number]]  (Normal) Collected: 08/07/22 1428    Specimen: Nasopharyngeal Swab from Nasopharynx Updated: 08/07/22 1629     SARS-CoV-2 (COVID-19) Negative    Narrative:      Testing performed using real-time PCR for detection of COVID-19. EUA approved validation studies performed on site.     Blood Culture Blood, Venous [[id number]]  (Normal) Collected: 08/05/22 1152    Specimen: Blood, Venous Updated: 08/08/22 1601     Culture No growth at 72 hours    SARS-CoV-2 (COVID-19), PCR Nasopharynx [[id number]]  (Normal) Collected: 08/03/22 1724    Specimen: Nasopharyngeal Swab from Nasopharynx Updated: 08/03/22 1814    Narrative:      The following orders were created for panel order SARS-CoV-2 (COVID-19), PCR Nasopharynx.  Procedure                               Abnormality         Status                     ---------                               -----------         ------                     SARS-CoV-2 (COVID-19), P...[[id number]]  Normal              Final result                 Please view results for these tests on the individual orders.    SARS-CoV-2 (COVID-19), PCR Nasopharynx [[id number]]  (Normal) Collected: 08/03/22 1724    Specimen: Nasopharyngeal Swab from Nasopharynx Updated: 08/03/22 1814     SARS-CoV-2 (COVID-19) Negative    Narrative:      Testing performed using real-time PCR for detection of COVID-19. EUA approved validation studies performed on site.     Blood Culture Blood, Venous [[id number]]  (Normal) Collected: 08/03/22 1716     "Specimen: Blood, Venous Updated: 08/08/22 2201     Culture No growth at 120 hours    Urine culture Urine, Clean Catch [[id number]]  (Normal) Collected: 08/03/22 1345    Specimen: Urine, Clean Catch Updated: 08/04/22 1304     Urine Culture No Growth (Limit of detection 1000 cfu/mL)          Imaging  Reviewed.  See previous hospital notes.    Physicial Exam  Visit Vitals  BP (!) 112/55 (BP Location: Left forearm, Patient Position: Lying)   Pulse 89   Temp 36.8 °C (98.3 °F) (Oral)   Resp 17   Ht 1.6 m (5' 3\")   Wt (!) 152 kg (335 lb)   SpO2 93%   Breastfeeding No   BMI 59.34 kg/m²     General appearance: Nontoxic-appearing, no acute distress  Abdomen: Soft, nontender, nondistended, no guarding  Genitalia: Campoverde catheter draining bloody urine without clots  Back: No CVA tenderness  Extremities: Bilateral lower extremity edema  Neurologic: Alert and oriented      Assessment   61-year-old female transferred from [facility] with left flank pain secondary to 2 mm proximal obstructing left ureteral calculus.  Now postoperative day 1 status post cystoscopy and left ureteral stent insertion.  Concomitant endometrial biopsy and Pap smear performed by GYN.        Plan   - Campoverde inserted intraoperatively for purulent appearing urine.  She has remained afebrile since surgery and overall feels well.  Her Campoverde catheter can be removed this morning.  Please obtain page urology with first void and postvoid residual.  - Continue antibiotics.  Follow-up intraoperative urine cultures.  Ultimately, she should receive 7 to 10 total days of antibiotics for presumed pyelonephritis.  - Trend creatinine to baseline.  Outlet obstruction is resolved, hopefully her creatinine will normalize.  - Ultimately, she needs ureteroscopy and laser lithotripsy in 2 weeks.  We will work on scheduling this as an outpatient procedure.    Attending comments to follow.    [facility]  Clement Doe DO PGY-4  [place] Pager #[phone]  [place] Pager " #[phone]

## 2022-08-09 NOTE — PATIENT CARE CONFERENCE
Care Progression Rounds Note  Date: 8/9/2022  Time: 12:15 PM     Patient Name: Xin Melendez     Medical Record Number: [id number]   YOB: 1960  Sex: Female      Room/Bed: 0282    Admitting Diagnosis: Left ureteral stone [N20.1]   Admit Date/Time: 8/7/2022 11:05 AM    Primary Diagnosis: Left ureteral stone  Principal Problem: Left ureteral stone    GMLOS: 3.1  Anticipated Discharge Date: 8/12/2022    AM-PAC:  Mobility Score:      Discharge Planning:  Concerns to be Addressed: discharge planning  Anticipated Discharge Disposition: home with home health  Type of Home Care Services: nursing    Barriers to Discharge:  Medical issues not resolved    Comments:  [facility]    Participants:  social work/services, occupational therapy, nursing, physician,

## 2022-08-09 NOTE — PROGRESS NOTES
Hospital Medicine Service -  Daily Progress Note       SUBJECTIVE   Interval History:     PAULFERNY. Patient seen and examined in room 282. Patient was sitting in the chair, in NAD. Patient reports feeling 1,000 times better. She reports left flank pain has completely resolved. Campoverde catheter was removed around 8:45 AM. She reports trying to urinate around 11:45, only a little urine came out. She is trying to drink plenty of fluids. Patient otherwise offers no new complaints. Verbalizes good understanding of the plan. Patient denies any dizziness, lightheadedness, chest pain, dyspnea, cough, abdominal pain, n/v/d.      OBJECTIVE      Vital signs in last 24 hours:  Temp:  [36.1 °C (97 °F)-37 °C (98.6 °F)] 36.7 °C (98 °F)  Heart Rate:  [86-93] 88  Resp:  [13-18] 18  BP: (104-136)/(50-88) 136/88    Intake/Output Summary (Last 24 hours) at 8/9/2022 1324  Last data filed at 8/9/2022 1011  Gross per 24 hour   Intake 1400 ml   Output 1103 ml   Net 297 ml       PHYSICAL EXAMINATION      Physical Exam  Constitutional:       Appearance: She is obese.   HENT:      Head: Normocephalic.      Mouth/Throat:      Mouth: Mucous membranes are moist.   Eyes:      Extraocular Movements: Extraocular movements intact.      Pupils: Pupils are equal, round, and reactive to light.   Cardiovascular:      Rate and Rhythm: Normal rate and regular rhythm.      Pulses: Normal pulses.      Heart sounds: Normal heart sounds.   Pulmonary:      Effort: Pulmonary effort is normal.      Breath sounds: Normal breath sounds. No wheezing, rhonchi or rales.   Abdominal:      General: Bowel sounds are normal. There is no distension.      Palpations: Abdomen is soft.      Tenderness: There is no abdominal tenderness.      Comments: obese   Musculoskeletal:      Cervical back: Normal range of motion and neck supple.      Comments: Chr lymphedema bl/le   Skin:     General: Skin is warm and dry.   Neurological:      Mental Status: She is alert and oriented to  person, place, and time. Mental status is at baseline.            LINES, CATHETERS, DRAINS, AIRWAYS, AND WOUNDS   Lines, Drains, and Airways:  Wounds (agree with documentation and present on admission):  Peripheral IV (Adult) 08/08/22 Anterior;Right Forearm (Active)   Number of days: 1       Urethral Catheter 16 Fr (Active)   Number of days: 1       Wound Right Calf (Active)   Number of days: 3       Wound Left Calf (Active)   Number of days: 3       Surgical Incision Vagina Left (Active)   Number of days: 1         Comments:      LABS / IMAGING / TELE      Labs  Results from last 7 days   Lab Units 08/09/22  1005 08/08/22  0957 08/07/22  0826   SODIUM mEQ/L 136 136 137   POTASSIUM mEQ/L 4.4 3.9 3.8   CHLORIDE mEQ/L 101 98 102   CO2 mEQ/L 26 27 27   BUN mg/dL 26* 27* 31*   CREATININE mg/dL 1.6* 1.9* 1.7*   GLUCOSE mg/dL 186* 136* 126*   CALCIUM mg/dL 9.0 9.0 8.4*     Results from last 7 days   Lab Units 08/09/22  1005 08/08/22  0957 08/07/22  0825   WBC K/uL 12.25* 13.06* 10.19   HEMOGLOBIN g/dL 10.2* 10.9* 10.2*   HEMATOCRIT % 31.9* 35.1 32.0*   PLATELETS K/uL 279 299 246         SARS-CoV-2 (COVID-19) (no units)   Date/Time Value   08/07/2022 1428 Negative       Imaging  No new imaging.     ECG/Telemetry  Not on telemetry    ASSESSMENT AND PLAN      * Left ureteral stone  Assessment & Plan  - Admitted to [facility] 8/3/22 with flank pain. Imaging showed Left sided ureteral stone, with associated hydroureteronephrosis  - History of previous nephrolithiasis, last cysto 11/2021 at [facility] with follow up at office of Dr. Marroquin.   - At [facility], pt failed trial of observation for stone to pass.   - Pt went to OR 8/5/22 at [facility] for ureteral stent. However procedure unable to be performed due to pt's body habitus and the lack of bariatric leg stirrups. Urologist Dr. Tinsley advised transfer patient to [facility]-->Transferred 8/7/22  -s/p cystoscopy and left ureteral stent placement on 8/8    Plan:  -Campoverde  removed 8/9 AM; CTM PVR and bladder scans   - f/u intraoperative UCx  - pain control w/ prn Tylenol, prn oxycodone--> will d/c prn IV Dilaudid  - cont Flomax  - urology c/s, appreciate recs  - will need to follow up with urology to schedule ureteroscopy and laser lithotripsy in 2 weeks      Postmenopausal bleeding  Assessment & Plan  - Ongoing for about 3 weeks now. LMP was age 54.  - Transvaginal US - showed enlarged fibroid uterus and thickened endometrium, measuring up to 2cm  - Patient reports difficulty getting a gynecology visit OP. Last visit age 54.    Plan:  -s/p EMB and pap on 8/8  -pt will need to f/u w/OBGYN for biopsy results     Pyelonephritis  Assessment & Plan  - Asymmetric L perinephric stranding noted on CT scan, with associated flank pain, c/f possible pyelonephritis, but U/A only w/ 4-10 WBC, trace LE, and UCx no growth, BCx no growth  - Allergic to cephalexin, recently on Levaquin outpatient  - Started on IV ciprofloxacin on arrival to ED.  - Unclear if her allergy to cephalexin extends to all penicillins and cephalosporins, would continue with ciprofloxacin for now  - ID was following at [facility], recommends 10d course ciprofloxacin, last day of abx on 8/12/22    DARCY (acute kidney injury) (CMS/HCC)  Assessment & Plan  Cr 1.6 on admission, secondary to post renal calculi obstruction. Baseline Cr 1.0 - 1.1  - Cr slowly improving-->Cr 1.6 today  - start mIVF in case of component of dehydration and while NPO  - hold home ARB-HCTZ  - avoid nephrotoxins as able, renally dose all meds    Type 2 diabetes mellitus (CMS/HCC)  Assessment & Plan  On 78U Tresiba, SSI - held Tresiba for now     - Tresiba on hold for now, only on SSI the last few days and BG has been good  - CTM BG  - cont SSI    HTN (hypertension)  Assessment & Plan  Home: on Irbesartan-HCTZ 150mg-12.5mg daily  - hold home ARB-HCTZ for now given DARCY, worsening  - can be on IV hydralazine prn temporarily until DARCY  resolves    Hypothyroidism  Assessment & Plan  - cont home Synthroid    Hyperlipidemia  Assessment & Plan  - cont statin    Lymphedema due to venous insufficiency  Assessment & Plan  - Chronic lymphedema.   - Evaluated by Wound Care on 8/5 while admitted to [facility].   - Patient noted to have chronic lymphedema with weeping (posterior>anterior). BMI-59.34. Patient manages her own dressings at home. Maintain PI PREVENT bundle.   - Podiatry team was following patient during [facility] admit. Consult here for continued care.   - ABD and net compression dressings  - Podiatry recommend elevation if tolerable by patient. Pt states unable to do this since she can not tolerate a pad or any pressure under her LE.   - May follow with wound care at [place] upon discharge with Dr. Oconnor    Morbid obesity with BMI of 50.0-59.9, adult (CMS/[facility])  Assessment & Plan  - Dietary and lifestyle counseling.   - Limited mobility due to lymphedema, knee pain and hip pain, uses walker  - Pt agreeable to PT in home. [facility] was following while pt at [facility].        VTE Assessment: Pad[place]    VTE Prophylaxis:  Current anticoagulants:  heparin (porcine) 5,000 unit/mL injection 5,000 Units, subcutaneous, q8h VIOLETA      Code Status: Full Code      Estimated Discharge Date: 8/10/2022     Disposition Planning: f/u urine culture from OR, anticipate discharge home w/HHC within 1-2 days.      FELIPE New  8/9/2022

## 2022-08-09 NOTE — PLAN OF CARE
Plan of Care Review  Plan of Care Reviewed With: patient  Progress: improving  Outcome Summary: VSS afebrile, no complaints of chest pain or shortness of breath.  No complaints of pain.  Campoverde cath out, voided 100cc, pink tinged.  Urology aware.  Plan of care gone over with patient, questions answered.

## 2022-08-09 NOTE — ANESTHESIA POSTPROCEDURE EVALUATION
Patient: Xin Melendez    Procedure Summary     Date: 08/08/22 Room / [facility] OR 9 / [facility] OR    Anesthesia Start: 1858 Anesthesia Stop: 1952    Procedures:       CYSTO, RETRO, STENT, URETEROSCOPY, LASER LITHOTRIPSY Conf#[id number] (Left )      EUA  Endometrial Biopsy Pap Smear (N/A ) Diagnosis:       Left ureteral stone      (Left ureteral stone [N20.1])    Surgeons: Antionette Marroquin DO; Lionel Willingham MD Responsible Provider: Jameson Osorio MD    Anesthesia Type: general ASA Status: 3          Anesthesia Type: general  PACU Vitals  8/8/2022 1942 - 8/8/2022 2024 8/8/2022  1950 8/8/2022 2000 8/8/2022 2015       BP: 114/57 131/58 118/59     Temp: 36.8 °C (98.3 °F) -- --     Pulse: 88 92 90     Resp: 13 18 15     SpO2: 97 % 98 % 96 %             Anesthesia Post Evaluation    Pain management: satisfactory to patient  Mode of pain management: IV medication  Patient location during evaluation: PACU  Patient participation: complete - patient participated  Level of consciousness: awake and alert  Cardiovascular status: acceptable and hemodynamically stable  Airway Patency: adequate  Respiratory status: acceptable  Hydration status: stable  Anesthetic complications: no

## 2022-08-09 NOTE — PLAN OF CARE
Problem: Adult Inpatient Plan of Care  Goal: Plan of Care Review  Outcome: Progressing  Flowsheets (Taken 8/9/2022 0320)  Progress: improving  Plan of Care Reviewed With: patient  Outcome Summary: Pt back from OR. Campoverde in Place. Ambulating, safety precautions in place. No c/o pain or discomfort. Plan to D/C to home today.   Plan of Care Review  Plan of Care Reviewed With: patient  Progress: improving  Outcome Summary: Pt back from OR. Campoverde in Place. Ambulating, safety precautions in place. No c/o pain or discomfort. Plan to D/C to home today.

## 2022-08-10 VITALS
RESPIRATION RATE: 20 BRPM | WEIGHT: 293 LBS | BODY MASS INDEX: 51.91 KG/M2 | HEART RATE: 74 BPM | SYSTOLIC BLOOD PRESSURE: 108 MMHG | DIASTOLIC BLOOD PRESSURE: 57 MMHG | TEMPERATURE: 97.7 F | HEIGHT: 63 IN | OXYGEN SATURATION: 100 %

## 2022-08-10 LAB
ANION GAP SERPL CALC-SCNC: 12 MEQ/L (ref 3–15)
BACTERIA BLD CULT: NORMAL
BACTERIA UR CULT: NORMAL
BASOPHILS # BLD: 0.06 K/UL (ref 0.01–0.1)
BASOPHILS NFR BLD: 0.6 %
BUN SERPL-MCNC: 31 MG/DL (ref 8–20)
CALCIUM SERPL-MCNC: 8.8 MG/DL (ref 8.9–10.3)
CASE RPRT: NORMAL
CHLORIDE SERPL-SCNC: 101 MEQ/L (ref 98–109)
CLINICAL INFO: NORMAL
CO2 SERPL-SCNC: 20 MEQ/L (ref 22–32)
CREAT SERPL-MCNC: 1.5 MG/DL (ref 0.6–1.1)
DIFFERENTIAL METHOD BLD: ABNORMAL
EOSINOPHIL # BLD: 0.06 K/UL (ref 0.04–0.36)
EOSINOPHIL NFR BLD: 0.6 %
ERYTHROCYTE [DISTWIDTH] IN BLOOD BY AUTOMATED COUNT: 14.1 % (ref 11.7–14.4)
GFR SERPL CREATININE-BSD FRML MDRD: 35.3 ML/MIN/1.73M*2
GLUCOSE BLD-MCNC: 147 MG/DL (ref 70–99)
GLUCOSE BLD-MCNC: 155 MG/DL (ref 70–99)
GLUCOSE SERPL-MCNC: 196 MG/DL (ref 70–99)
HCT VFR BLDCO AUTO: 30.6 % (ref 35–45)
HGB BLD-MCNC: 9.9 G/DL (ref 11.8–15.7)
IMM GRANULOCYTES # BLD AUTO: 0.1 K/UL (ref 0–0.08)
IMM GRANULOCYTES NFR BLD AUTO: 1 %
LYMPHOCYTES # BLD: 2.03 K/UL (ref 1.2–3.5)
LYMPHOCYTES NFR BLD: 20 %
MCH RBC QN AUTO: 27.4 PG (ref 28–33.2)
MCHC RBC AUTO-ENTMCNC: 32.4 G/DL (ref 32.2–35.5)
MCV RBC AUTO: 84.8 FL (ref 83–98)
MONOCYTES # BLD: 0.82 K/UL (ref 0.28–0.8)
MONOCYTES NFR BLD: 8.1 %
NEUTROPHILS # BLD: 7.1 K/UL (ref 1.7–7)
NEUTS SEG NFR BLD: 69.7 %
NRBC BLD-RTO: 0 %
PATH REPORT.FINAL DX SPEC: NORMAL
PATH REPORT.FINAL DX SPEC: NORMAL
PATH REPORT.GROSS SPEC: NORMAL
PDW BLD AUTO: 9.7 FL (ref 9.4–12.3)
PLATELET # BLD AUTO: 298 K/UL (ref 150–369)
POCT TEST: ABNORMAL
POCT TEST: ABNORMAL
POTASSIUM SERPL-SCNC: 4.1 MEQ/L (ref 3.6–5.1)
RBC # BLD AUTO: 3.61 M/UL (ref 3.93–5.22)
SODIUM SERPL-SCNC: 133 MEQ/L (ref 136–144)
WBC # BLD AUTO: 10.17 K/UL (ref 3.8–10.5)

## 2022-08-10 PROCEDURE — 63700000 HC SELF-ADMINISTRABLE DRUG: Performed by: STUDENT IN AN ORGANIZED HEALTH CARE EDUCATION/TRAINING PROGRAM

## 2022-08-10 PROCEDURE — 80048 BASIC METABOLIC PNL TOTAL CA: CPT | Performed by: STUDENT IN AN ORGANIZED HEALTH CARE EDUCATION/TRAINING PROGRAM

## 2022-08-10 PROCEDURE — 63600000 HC DRUGS/DETAIL CODE: Performed by: STUDENT IN AN ORGANIZED HEALTH CARE EDUCATION/TRAINING PROGRAM

## 2022-08-10 PROCEDURE — 85025 COMPLETE CBC W/AUTO DIFF WBC: CPT | Performed by: STUDENT IN AN ORGANIZED HEALTH CARE EDUCATION/TRAINING PROGRAM

## 2022-08-10 PROCEDURE — 99239 HOSP IP/OBS DSCHRG MGMT >30: CPT | Performed by: STUDENT IN AN ORGANIZED HEALTH CARE EDUCATION/TRAINING PROGRAM

## 2022-08-10 PROCEDURE — 63700000 HC SELF-ADMINISTRABLE DRUG: Performed by: INTERNAL MEDICINE

## 2022-08-10 PROCEDURE — 36415 COLL VENOUS BLD VENIPUNCTURE: CPT | Performed by: STUDENT IN AN ORGANIZED HEALTH CARE EDUCATION/TRAINING PROGRAM

## 2022-08-10 RX ORDER — TRAMADOL HYDROCHLORIDE 50 MG/1
50 TABLET ORAL EVERY 8 HOURS PRN
Qty: 9 TABLET | Refills: 0 | Status: SHIPPED | OUTPATIENT
Start: 2022-08-10 | End: 2022-08-13

## 2022-08-10 RX ORDER — TAMSULOSIN HYDROCHLORIDE 0.4 MG/1
0.4 CAPSULE ORAL NIGHTLY
Qty: 30 CAPSULE | Refills: 0 | Status: SHIPPED | OUTPATIENT
Start: 2022-08-10 | End: 2022-10-12 | Stop reason: ENTERED-IN-ERROR

## 2022-08-10 RX ORDER — CIPROFLOXACIN 500 MG/1
500 TABLET ORAL 2 TIMES DAILY
Qty: 6 TABLET | Refills: 0 | Status: SHIPPED | OUTPATIENT
Start: 2022-08-10 | End: 2022-08-13

## 2022-08-10 RX ADMIN — GABAPENTIN 300 MG: 300 CAPSULE ORAL at 13:52

## 2022-08-10 RX ADMIN — CIPROFLOXACIN 400 MG: 2 INJECTION, SOLUTION INTRAVENOUS at 08:27

## 2022-08-10 RX ADMIN — GABAPENTIN 300 MG: 300 CAPSULE ORAL at 08:27

## 2022-08-10 RX ADMIN — LEVOTHYROXINE SODIUM 75 MCG: 75 TABLET ORAL at 06:16

## 2022-08-10 RX ADMIN — DULOXETINE 20 MG: 20 CAPSULE, DELAYED RELEASE ORAL at 08:27

## 2022-08-10 NOTE — NURSING NOTE
Patient discharged home, both verbal and written discharge instructions gone over with patient with positive feedback.  HL removed.  Questions answered.  Patient able to verbalize who/when to contact if any issues.  Patient stable, discharged home with family.

## 2022-08-10 NOTE — DISCHARGE SUMMARY
Hospital Medicine Service -  Inpatient Discharge Summary        BRIEF OVERVIEW   Admitting Provider: Africa Waters MD  Attending Provider: Luc Short MD Attending phys phone: [phone]    PCP: Rasta Ortiz [phone]    Admission Date: 8/7/2022  Discharge Date: 8/10/2022     DISCHARGE DIAGNOSES      Primary Discharge Diagnosis  Left ureteral stone    Secondary Discharge Diagnoses  Active Hospital Problems    Diagnosis Date Noted   • Left ureteral stone 08/03/2022     Priority: High   • DARCY (acute kidney injury) (CMS/[facility]) 08/03/2022     Priority: Medium   • Postmenopausal bleeding 08/03/2022     Priority: Medium   • Pyelonephritis 08/03/2022     Priority: Medium   • HTN (hypertension) 11/04/2021     Priority: Medium   • Type 2 diabetes mellitus (CMS/[facility]) 11/04/2021     Priority: Medium   • Morbid obesity with BMI of 50.0-59.9, adult (CMS/[facility]) 11/04/2021     Priority: Low   • Hyperlipidemia 11/04/2021     Priority: Low   • Lymphedema due to venous insufficiency 11/04/2021     Priority: Low   • Hypothyroidism 11/04/2021     Priority: Low      Resolved Hospital Problems    Diagnosis Date Noted Date Resolved   • UTI (urinary tract infection) 11/04/2021 08/07/2022         SUMMARY OF HOSPITALIZATION      Presenting Problem/History of Present Illness    Xin Melendez is a 61 y.o. female with a past medical history of HTN, Type 2 DM, hypothyroidism, who presents to [facility] ER on 8/3/2022 with left sided abdominal pain, located in her left flank, sharp, radiating to the down, associated with nausea but no fevers or vomiting.    Hospital Course      Xin Melendez is a 61 y.o. female with a past medical history of HTN, Type 2 DM, hypothyroidism, who presents to [facility] ER on 8/3/2022 with left sided abdominal pain, located in her left flank, sharp, radiating to the down, associated with nausea but no fevers or vomiting. Her symptoms were preceded by a 1 week history of dysuria and urinary  frequency. She was placed on a 7 day course of levaquin, which improved her urinary symptoms but she then developed left flank pain.On admission labs showed Cr 1.6, BUN 33, WBC 13.69, UA was cloudy, with 4-10 wbc, rare bacteria. Imaging CT abd showed left sided 4mm ureteral obstructing calculus with resultant moderate left hydroureteronephrosis and assymmetric perinephric stranding, multiple non-obstructing right renal calculi, cholelithiasis without acute cholecystitis. Urology evaluated the patient. Initially pt had trial of passage but stone has not passed and her pain, DARCY persisted. On 8/5/2022 went to OR for ureteral stent. However procedure unable to be performed due to pt's body habitus and the lack of bariatric leg stirrups. Urologist Dr. Tinsley advised transfer patient to [facility]. [place] was [place] [facility] [place] for cystoscopy. Of note, patient also complained of intermittent vaginal bleeding. Pelvic US showed thickened endometrium. She was seen by GYN who recommended endometrial biopsy and pap smear during her cystoscopy. On 8/8 patient underwent endometiral biopsy, pap smear, cystoscopy and left ureteral stent placement. Campoverde was inserted intraoperatively and removed on 8/9. Patient was able to void without any difficulty. Patient was treated for pyelonephritis with IV ciprofloxacin. UCx and BCx negative. Intra-operative UCx NGTD. Per ID patient should complete 10 day course Ciprofloxacin, last day 8/12. Urology also started her on Flomax. Patient will need to follow up with urology to schedule ureteroscopy and laser lithotripsy in 2 weeks and to determine duration of flomax therapy.     Cr 1.6 on admission; likely post-obstructive and possibly some component of pre-renal. Baseline Cr 1.0.  Patients home irbesartan-HCTZ was held. Patient now s/p left ureteral stent; was also treated with IVF. Cr continued to trend down. Cr 1.5 on day of discharge, which is acceptable with repeat BMP within 1 week  to ensure resolution.     Patient with postmenopausal bleeding for 3 weeks. LMP at age 54. Now s/p EMB and pap on 8/8. Patient should follow up with GYN to review biopsy results.     Home antihypertensive regimen of Irbesartan-HCTZ 150mg-12.5mg daily; which was held in setting of DARCY. Patients blood pressure was well controlled off medication. Home Irbesartan-HCTZ was not continued at discharge. Patient should continue to monitor her BP at home.     Patient with chronic lymphedema with weeping (posterior>anterior). She was evaluated by Wound Care on 8/5 while admitted to [place] who recommended ABD and net compression dressing. Patient should continue to follow up with Dr. Oconnor.     On day of discharge patient was feeling much better. Flank pain completely resolved. Shew as voiding without difficulty, PVR negative. Cr improving. Patient was considered hemodynamically stable for discharge home with [facility].     Important Issues to Address in Follow-Up  Please follow up with your PCP in 1-2 weeks.   Please follow up with urology, Dr. Marroquin in 2 weeks.   Please follow up with GYN , Dr. Schwartz, in 2 weeks.   Please follow up with Dr. Oconnor in 2 weeks.   Please have repeat BMP within 1 week.     Exam on Day of Discharge  Physical Exam  Constitutional:       Appearance: She is obese.   HENT:      Head: Normocephalic.      Mouth/Throat:      Mouth: Mucous membranes are moist.   Eyes:      Extraocular Movements: Extraocular movements intact.      Pupils: Pupils are equal, round, and reactive to light.   Cardiovascular:      Rate and Rhythm: Normal rate and regular rhythm.      Pulses: Normal pulses.      Heart sounds: Normal heart sounds. No murmur heard.    No friction rub. No gallop.   Pulmonary:      Effort: Pulmonary effort is normal.      Breath sounds: Normal breath sounds. No wheezing, rhonchi or rales.   Abdominal:      General: Bowel sounds are normal. There is no distension.      Palpations: Abdomen is soft.       Tenderness: There is no abdominal tenderness.   Musculoskeletal:         General: Normal range of motion.      Cervical back: Normal range of motion and neck supple.      Comments: Bl/le lymphedema   Neurological:      Mental Status: She is alert and oriented to person, place, and time. Mental status is at baseline.         Consults During Admission  IP CONSULT TO UROLOGY  IP CONSULT TO OB GYN    DISCHARGE MEDICATIONS               Medication List      ASK your doctor about these medications    aspirin 81 mg enteric coated tablet  Take 81 mg by mouth daily.  Dose: 81 mg     DULoxetine 20 mg capsule  Commonly known as: CYMBALTA  Take 20 mg by mouth 2 (two) times a day.  Dose: 20 mg     exenatide microspheres 2 mg/0.85 mL auto-injector  Inject 1 Dose under the skin once a week on Friday.  Dose: 1 Dose     gabapentin 600 mg tablet  Commonly known as: NEURONTIN  Take 600 mg by mouth 3 (three) times a day.  Dose: 600 mg     insulin aspart U-100 100 unit/mL (3 mL) pen  Commonly known as: NovoLOG  Inject 0-100 Units under the skin 3 (three) times a day before meals. Per sliding scale based on calorie intake by patient  Dose: 0-100 Units     irbesartan-hydrochlorothiazide 150-12.5 mg per tablet  Commonly known as: AVALIDE  Take 1 tablet by mouth daily.  Dose: 1 tablet     levothyroxine 75 mcg tablet  Commonly known as: SYNTHROID  Take 75 mcg by mouth daily.  Dose: 75 mcg     meloxicam 15 mg tablet  Commonly known as: MOBIC  Take 15 mg by mouth daily.  Dose: 15 mg     methocarbamoL 750 mg tablet  Commonly known as: ROBAXIN  Take 750 mg by mouth 3 (three) times a day as needed. Always takes in am but doesn't always do tid  Dose: 750 mg     simvastatin 40 mg tablet  Commonly known as: ZOCOR  Take 40 mg by mouth daily.  Dose: 40 mg     TRESIBA FLEXTOUCH U-200 200 unit/mL (3 mL) pen  Inject 78 Units under the skin every evening.  Dose: 78 Units  Generic drug: insulin degludec U-200 CONC     VITAMIN C ORAL  Take 1 tablet by mouth  daily.  Dose: 1 tablet     VITAMIN D3 ORAL  Take 1 tablet by mouth daily.  Dose: 1 tablet                      PROCEDURES / LABS / IMAGING      Operative Procedures  None     Other Procedures  8/8 endometrial biopsy, pap smear, cystoscopy and left ureteral stent placement     Pertinent Labs  Results from last 7 days   Lab Units 08/10/22  0918 08/09/22  1005 08/08/22  0957   SODIUM mEQ/L 133* 136 136   POTASSIUM mEQ/L 4.1 4.4 3.9   CHLORIDE mEQ/L 101 101 98   CO2 mEQ/L 20* 26 27   BUN mg/dL 31* 26* 27*   CREATININE mg/dL 1.5* 1.6* 1.9*   GLUCOSE mg/dL 196* 186* 136*   CALCIUM mg/dL 8.8* 9.0 9.0     Results from last 7 days   Lab Units 08/10/22  0918 08/09/22  1005 08/08/22  0957   WBC K/uL 10.17 12.25* 13.06*   HEMOGLOBIN g/dL 9.9* 10.2* 10.9*   HEMATOCRIT % 30.6* 31.9* 35.1   PLATELETS K/uL 298 279 299         SARS-CoV-2 (COVID-19) (no units)   Date/Time Value   08/07/2022 1428 Negative       Pertinent Imaging  CT ABDOMEN PELVIS WITHOUT IV CONTRAST    Result Date: 8/5/2022  IMPRESSION: Mild to moderate left hydronephrosis with a 2 mm calculus in the proximal left ureter. TECHNIQUE: CT of the abdomen and pelvis was performed without IV contrast. The lack of IV contrast limits the sensitivity for detecting certain disease processes, particularly infection and malignancy. Oral contrast was not administered. CT DOSE: One or more dose reduction techniques (e.g. automated exposure control, adjustment of the mA and/or kV according to patient size, use of iterative reconstruction technique) were utilized for this examination. COMMENT: LOWER CHEST: Small hiatal hernia. ABDOMEN: LIVER: within normal limits. BILE DUCTS: normal caliber. GALLBLADDER: Cholelithiasis. PANCREAS: within normal limits. SPLEEN: within normal limits. ADRENALS: within normal limits. KIDNEYS & URETERS: There are a few scattered punctate nonobstructing right renal calculi. There is mild to moderate left hydronephrosis and proximal hydroureter with a 2 mm  calculus in the proximal left ureter (axial series 4, image 79). PELVIS: REPRODUCTIVE ORGANS: Heterogeneous uterus with coarse calcifications, most likely due to fibroids. BLADDER: within normal limits. BOWEL: Normal caliber. Sigmoid diverticulosis. PERITONEUM: no ascites or free air, no fluid collection. VESSELS: within normal limits. LYMPH NODES: No enlarged nodes. RETROPERITONEUM: within normal limits. ABDOMINAL WALL: within normal limits. BONES: within normal limits.     CT ABDOMEN PELVIS WITHOUT IV CONTRAST    Result Date: 8/3/2022  IMPRESSION: 4 mm obstructing calculus in the left proximal ureter with moderate resultant left hydroureteronephrosis. Asymmetric left perinephric stranding, correlate with urinalysis. Additional nonobstructing right renal calculi are also noted. Cholelithiasis without CT findings of acute cholecystitis.    X-RAY ABDOMEN 1 VIEW    Result Date: 8/3/2022  IMPRESSION: See comment.     ULTRASOUND PELVIS TRANSVAGINAL ONLY    Result Date: 8/3/2022  IMPRESSION: 1. Enlarged fibroid uterus. 2. Thickened endometrium measuring up to 2 cm. Endometrial biopsy may be considered. In accordance with [place] Act 112,  the patient will receive a letter notifying them to follow up with their physician. ENDOMETRIAL THICKNESS MEASUREMENTS FOR RECOMMENDING BIOPSY Premenopausal: Endometrial measurement greater than or equal to 18 mm (after repeat ultrasound in proliferative phase) Postmenopausal with vaginal bleeding, no HRT: Endometrial measurement greater than 5 mm Postmenopausal with vaginal bleeding, on HRT: Endometrial measurement greater than 5 mm Postmenopausal without vaginal bleeding, no HRT: No consensus: greater than or equal to 8 mm or greater than 11 mm Postmenopausal without vaginal bleeding or taking tamoxifen or HRT: No consensus: greater than or equal to 8 mm or greater than 11 mm Valerie balderas al.  ARRS 2016     ULTRASOUND PELVIS LIMITED TRANSABDOMINAL ONLY    Result Date: 8/3/2022  IMPRESSION:  1. Enlarged fibroid uterus. 2. Thickened endometrium measuring up to 2 cm. Endometrial biopsy may be considered. In accordance with [place] Act 112,  the patient will receive a letter notifying them to follow up with their physician. ENDOMETRIAL THICKNESS MEASUREMENTS FOR RECOMMENDING BIOPSY Premenopausal: Endometrial measurement greater than or equal to 18 mm (after repeat ultrasound in proliferative phase) Postmenopausal with vaginal bleeding, no HRT: Endometrial measurement greater than 5 mm Postmenopausal with vaginal bleeding, on HRT: Endometrial measurement greater than 5 mm Postmenopausal without vaginal bleeding, no HRT: No consensus: greater than or equal to 8 mm or greater than 11 mm Postmenopausal without vaginal bleeding or taking tamoxifen or HRT: No consensus: greater than or equal to 8 mm or greater than 11 mm Radha.  ARRS 2016    FL FLUOROSCOPY TECHNICAL ASSISTANCE    Result Date: 8/10/2022  IMPRESSION:  Technical assistance was provided for fluoroscopy.  Fluoro time is 0.3 minutes.  Dose is 7.5 mGy.       OUTPATIENT  FOLLOW-UP / REFERRALS / PENDING TESTS        Outpatient Follow-Up Appointments  Encounter Information    This patient does not currently have any appointments scheduled.         Referrals  No orders of the defined types were placed in this encounter.        DISCHARGE DISPOSITION AND DESTINATION      Disposition: Home Health Care - [facility]  Destination: Home                            Code Status At Discharge: Full Code    Physician Order for Life-Sustaining Treatment Document Status      No documents found

## 2022-08-10 NOTE — PLAN OF CARE
Voiced no complaint maintained on safety precautions and call light within reach in no acute distress will continue to monitor

## 2022-08-10 NOTE — PROGRESS NOTES
[facility]: Chart reviewed. Referral for home care with SN, PT and OT visits completed. Garland Rod RN [id number]

## 2022-08-10 NOTE — PATIENT CARE CONFERENCE
Care Progression Rounds Note  Date: 8/10/2022  Time: 8:47 AM     Patient Name: Xin Melendez     Medical Record Number: [id number]   YOB: 1960  Sex: Female      Room/Bed: 0282    Admitting Diagnosis: Left ureteral stone [N20.1]   Admit Date/Time: 8/7/2022 11:05 AM    Primary Diagnosis: Left ureteral stone  Principal Problem: Left ureteral stone    GMLOS: 3.1  Anticipated Discharge Date: 8/12/2022    AM-PAC:  Mobility Score:      Discharge Planning:  Concerns to be Addressed: discharge planning  Anticipated Discharge Disposition: home with home health  Type of Home Care Services: nursing    Barriers to Discharge:  Medical issues not resolved    Comments:  [facility]    Participants:  social work/services, occupational therapy, nursing, physician,

## 2022-08-10 NOTE — PROGRESS NOTES
Patient refused dressing changes. States that she can change her own dressings and would rather do it on her own.   Podiatry will sign off.

## 2022-08-12 ENCOUNTER — TELEPHONE (OUTPATIENT)
Dept: OBSTETRICS AND GYNECOLOGY | Facility: HOSPITAL | Age: 62
End: 2022-08-12
Payer: COMMERCIAL

## 2022-08-12 ENCOUNTER — TELEPHONE (OUTPATIENT)
Dept: OBSTETRICS AND GYNECOLOGY | Facility: CLINIC | Age: 62
End: 2022-08-12
Payer: COMMERCIAL

## 2022-08-12 LAB — SPECIMEN STATUS: NORMAL

## 2022-08-12 NOTE — TELEPHONE ENCOUNTER
Pt called to inquire abt the results from her EMB 8/8. She says that Dr Willingham was one of the Dr's that treated her

## 2022-08-12 NOTE — TELEPHONE ENCOUNTER
I spoke with Xin. I explained to her that it looks like she was under the clinic service while admitted so she should contact their office for follow-up. She has the phone number already on her discharge paperwork. She will call there for next steps and follow-up.

## 2022-08-12 NOTE — TELEPHONE ENCOUNTER
Called Ms. Guy regarding pathology results showing EIN.  We discussed that she will likely need additional sampling in the form of dilation and curettage.  Arranged for Ms. Guy to be seen in GYN clinic next Friday at 12:40 PM.  Of note, the patient is scheduled for cystoscopy on 8/2/2022 in the OR.  Consider reaching out to urology for combined D&C at that time, if possible.  Will discuss with gynecology chief.    Jesús Randall,

## 2022-08-17 ENCOUNTER — APPOINTMENT (OUTPATIENT)
Dept: LAB | Facility: HOSPITAL | Age: 62
End: 2022-08-17
Attending: UROLOGY
Payer: COMMERCIAL

## 2022-08-17 ENCOUNTER — TRANSCRIBE ORDERS (OUTPATIENT)
Dept: LAB | Facility: HOSPITAL | Age: 62
End: 2022-08-17

## 2022-08-17 ENCOUNTER — APPOINTMENT (OUTPATIENT)
Dept: LAB | Facility: HOSPITAL | Age: 62
End: 2022-08-17
Attending: STUDENT IN AN ORGANIZED HEALTH CARE EDUCATION/TRAINING PROGRAM
Payer: COMMERCIAL

## 2022-08-17 DIAGNOSIS — N20.0 CALCULUS OF KIDNEY: Primary | ICD-10-CM

## 2022-08-17 DIAGNOSIS — N17.9 AKI (ACUTE KIDNEY INJURY) (CMS/HCC): ICD-10-CM

## 2022-08-17 DIAGNOSIS — N20.0 CALCULUS OF KIDNEY: ICD-10-CM

## 2022-08-17 LAB
ANION GAP SERPL CALC-SCNC: 10 MEQ/L (ref 3–15)
BUN SERPL-MCNC: 13 MG/DL (ref 8–20)
CALCIUM SERPL-MCNC: 8.7 MG/DL (ref 8.9–10.3)
CHLORIDE SERPL-SCNC: 105 MEQ/L (ref 98–109)
CO2 SERPL-SCNC: 26 MEQ/L (ref 22–32)
CREAT SERPL-MCNC: 1 MG/DL (ref 0.6–1.1)
GFR SERPL CREATININE-BSD FRML MDRD: 56.4 ML/MIN/1.73M*2
GLUCOSE SERPL-MCNC: 118 MG/DL (ref 70–99)
POTASSIUM SERPL-SCNC: 4.2 MEQ/L (ref 3.6–5.1)
SARS-COV-2 RNA RESP QL NAA+PROBE: NEGATIVE
SODIUM SERPL-SCNC: 141 MEQ/L (ref 136–144)

## 2022-08-17 PROCEDURE — U0003 INFECTIOUS AGENT DETECTION BY NUCLEIC ACID (DNA OR RNA); SEVERE ACUTE RESPIRATORY SYNDROME CORONAVIRUS 2 (SARS-COV-2) (CORONAVIRUS DISEASE [COVID-19]), AMPLIFIED PROBE TECHNIQUE, MAKING USE OF HIGH THROUGHPUT TECHNOLOGIES AS DESCRIBED BY CMS-2020-01-R: HCPCS

## 2022-08-17 PROCEDURE — 80048 BASIC METABOLIC PNL TOTAL CA: CPT

## 2022-08-17 PROCEDURE — C9803 HOPD COVID-19 SPEC COLLECT: HCPCS

## 2022-08-17 PROCEDURE — 36415 COLL VENOUS BLD VENIPUNCTURE: CPT

## 2022-08-18 NOTE — H&P
Chief complaint: No chief complaint on file.    HPI     Patient is a 61 y.o. female who presents for left ureteroscopy and laser lithotripsy  for a 2 mm proximal left ureteral stone.     Medical History:   Past Medical History:   Diagnosis Date    COVID-19 vaccine series completed     Moderna Boster 10/2021    CTS (carpal tunnel syndrome)     DJD (degenerative joint disease)     Edema     Frozen shoulder     HL (hearing loss)     Hypertension     Hypothyroidism     Kidney stones     Lipid disorder     Lymphedema     Obesity     Sciatica     Stasis dermatitis     Type 2 diabetes mellitus (CMS/HCC)        Surgical History:   Past Surgical History:   Procedure Laterality Date    APPENDECTOMY       SECTION  1999    CYSTOSCOPY  2021    CYSTOSCOPY,INSERT URETERAL STENT    LAPAROTOMY EXPLORATORY      OOPHORECTOMY      right side secondary to torsion       Social History:   Social History     Social History Narrative    Lives with son in a 2 story home has a chair lift       Family History:   Family History   Problem Relation Age of Onset    Heart attack Biological Father        Allergies: Cephalexin and Adhesive tape-silicones       Medication List       Notice    Cannot display discharge medications because the patient has not yet been admitted.       Review of Systems  All other systems reviewed and negative except as noted in the HPI.    Objective     Vital Signs for the last 24 hours:  BP: ()/()   Arterial Line BP: ()/()     Physical Exam:  No exam performed today, exam peformed during last clinical encounter.    Labs  I have reviewed the patient's labs.  Current labs are within normal limits.    Imaging  I have reviewed the Imaging from the last 24 hrs.        Assessment/Plan     Code Status: Prior    NPO for left ureteroscopy   ABX on hold to OR    [facility]  Clement Doe DO PGY-4  Adrien Dorado Pager #[phone]  Tio Pager #[phone]

## 2022-08-19 ENCOUNTER — TELEPHONE (OUTPATIENT)
Dept: OBSTETRICS AND GYNECOLOGY | Facility: HOSPITAL | Age: 62
End: 2022-08-19

## 2022-08-19 ENCOUNTER — OFFICE VISIT (OUTPATIENT)
Dept: OBSTETRICS AND GYNECOLOGY | Facility: HOSPITAL | Age: 62
End: 2022-08-19
Payer: COMMERCIAL

## 2022-08-19 VITALS
WEIGHT: 293 LBS | OXYGEN SATURATION: 100 % | DIASTOLIC BLOOD PRESSURE: 58 MMHG | HEIGHT: 63 IN | BODY MASS INDEX: 51.91 KG/M2 | TEMPERATURE: 98.1 F | HEART RATE: 76 BPM | SYSTOLIC BLOOD PRESSURE: 113 MMHG

## 2022-08-19 DIAGNOSIS — Z12.4 SCREENING FOR CERVICAL CANCER: ICD-10-CM

## 2022-08-19 DIAGNOSIS — E66.01 MORBID OBESITY WITH BMI OF 50.0-59.9, ADULT (CMS/HCC): ICD-10-CM

## 2022-08-19 DIAGNOSIS — N97.9 INFERTILITY, FEMALE: ICD-10-CM

## 2022-08-19 DIAGNOSIS — N85.02 EIN (ENDOMETRIAL INTRAEPITHELIAL NEOPLASIA): Primary | ICD-10-CM

## 2022-08-19 LAB
CYTOLOGIST CVX/VAG CYTO: NORMAL
CYTOLOGY CVX/VAG DOC CYTO: NORMAL
CYTOLOGY CVX/VAG DOC THIN PREP: NORMAL
DX ICD CODE: NORMAL
LAB CORP .: NORMAL
LAB CORP NOTE:: NORMAL
OTHER STN SPEC: NORMAL
STAT OF ADQ CVX/VAG CYTO-IMP: NORMAL

## 2022-08-19 PROCEDURE — G0145 SCR C/V CYTO,THINLAYER,RESCR: HCPCS | Performed by: STUDENT IN AN ORGANIZED HEALTH CARE EDUCATION/TRAINING PROGRAM

## 2022-08-19 PROCEDURE — 87624 HPV HI-RISK TYP POOLED RSLT: CPT | Performed by: STUDENT IN AN ORGANIZED HEALTH CARE EDUCATION/TRAINING PROGRAM

## 2022-08-19 PROCEDURE — G0463 HOSPITAL OUTPT CLINIC VISIT: HCPCS | Performed by: STUDENT IN AN ORGANIZED HEALTH CARE EDUCATION/TRAINING PROGRAM

## 2022-08-19 RX ORDER — MELOXICAM 15 MG/1
15 TABLET ORAL EVERY MORNING
COMMUNITY

## 2022-08-19 ASSESSMENT — PAIN SCALES - GENERAL: PAINLEVEL: 0-NO PAIN

## 2022-08-19 NOTE — ASSESSMENT & PLAN NOTE
Reviewed pathology results from EMB which show foci of EIN in a background of hyperplasia. Recommended D&C hysteroscopy with placement of IUD. D&C will allow us to obtain a more adequate specimen to guide treatment decisions. Reviewed if predominantly hyperplasia is found this is treated with progesterone and we will preemptively place IUD at time of D&C in order for patient to have progesterone treatment if result comes back as hyperplasia. Reviewed if result consistent with endometrial cancer would plan for hysterectomy at that time. Reviewed if result consistent with EIN that this is definitively treated with hysterectomy but can also be treated with progesterone therapy and serial sampling. Reviewed if hysterectomy ultimately needed then patient has many risk factors which put her at increased risk of complications including multiple previous surgeries, obesity, diabetes, hypertension, lymphedema.     Patient is scheduled for procedure with urology on Monday. Will see if we can coordinate to perform D&C at that time. If not will plan for D&C over the next week weeks.

## 2022-08-19 NOTE — ASSESSMENT & PLAN NOTE
Reviewed her obesity increases her risk of surgery. It may be difficult for appropriate ventilation in trendelenburg. This increases her risk of post operative wound infections. This also increases her risk for endometrial cancer.

## 2022-08-19 NOTE — TELEPHONE ENCOUNTER
Discussed missing pap result with Emerson, Supervisor in [facility] Cytology lab. Per her report, specimen was inadvertently sent to [facility] by a technician. Emerson is going to call [facility] today to rectify the issue. Clinical history provided as well as specific order for Cytology with HPV Cotesting and history of post-menopausal bleeding.     Marlon Mauricio MD PGY4  Obstetrics and Gynecology  Beeper #[id number]

## 2022-08-19 NOTE — PROGRESS NOTES
PATIENT ID:  Xin Melendez is a 61 y.o. female.  REFERRING PHYSICIAN:   No referring provider defined for this encounter.  PRIMARY CARE PROVIDER:  Rasta Ortiz MD    HPI:  Ms. Xin Melendez is a 61 y.o. lady who is referred by Dr. Jesús Randall/Dr. Fam Schwartz. She presented to [facility] with left sided flank pain on 8/3/22 and was found to have a 4mm obstructing L proximal ureteral stone c/b hydroureteronephrosis, JUAN and pyelonephritis. Patient also endorsed post menopausal bleeding and was found to have a thickended endometrial stripe of 2cm. Patient was transferred to [facility] as [place] didn't have appropriate equipment. She had a left ureteral stent placement with endometrial biopsy on 2022. EMB showed hyperplasia with foci of EIN.     Patient reports she has had post menopausal bleeding for 6 weeks. It occurs off and on and is light. She denies lightheadedness or dizziness. She reports recent weight change with weight gain of 7 pounds she attributes to her hospitilization, denies abdominal pelvic pain/bloating, denies chest pain/shortness of breath.    Patient is scheduled for a lithotripsy with stent exchange on Monday Aug 22.     Oncology History    No history exists.       Medical History:   Past Medical History:   Diagnosis Date   • Back pain    • COVID-19 vaccine series completed     Moderna Boster 10/2021   • CTS (carpal tunnel syndrome)    • DJD (degenerative joint disease)    • Edema    • Frozen shoulder    • HL (hearing loss)    • Hypertension    • Hypothyroidism    • Kidney stones    • Lipid disorder    • Lymphedema    • Obesity    • Sciatica    • Stasis dermatitis    • Type 2 diabetes mellitus (CMS/HCC)        Surgical History:   Past Surgical History:   Procedure Laterality Date   • APPENDECTOMY      laparotomy   •  SECTION  1999   • CYSTOSCOPY  2021    CYSTOSCOPY,INSERT URETERAL STENT   • DILATION AND CURETTAGE OF UTERUS      when patient was age 26   • OOPHORECTOMY       open RSO, right side secondary to torsion       Gynecologic/Obstetric History:  Menstrual History:  OB History        1    Para   1    Term                AB        Living   1       SAB        IAB        Ectopic        Multiple        Live Births                    No LMP recorded. Patient is postmenopausal.     Age at menopause: 54       Prior abnormal pap: no   HRT use: never     Social History:   Social History     Tobacco Use   • Smoking status: Former Smoker     Quit date:      Years since quittin.6   • Smokeless tobacco: Never Used   • Tobacco comment: quit    Substance Use Topics   • Alcohol use: Not Currently   • Drug use: Never       Family History:   Family History   Problem Relation Age of Onset   • Heart attack Biological Father        Allergies: Cephalexin and Adhesive tape-silicones    Medications:   Current Outpatient Medications   Medication Sig Dispense Refill   • ascorbic acid (VITAMIN C ORAL) Take 1 tablet by mouth daily.       • aspirin 81 mg enteric coated tablet Take 81 mg by mouth daily.     • cholecalciferol, vitamin D3, (VITAMIN D3 ORAL) Take 1 tablet by mouth daily.       • DULoxetine 20 mg capsule Take 20 mg by mouth 2 (two) times a day.       • exenatide microspheres 2 mg/0.85 mL auto-injector Inject 1 Dose under the skin once a week on Friday.     • gabapentin 600 mg tablet Take 600 mg by mouth 3 (three) times a day.       • insulin aspart U-100 100 unit/mL (3 mL) pen Inject 0-100 Units under the skin 3 (three) times a day before meals. Per sliding scale based on calorie intake by patient      • insulin degludec U-200 CONC (TRESIBA FLEXTOUCH U-200) 200 unit/mL (3 mL) pen Inject 78 Units under the skin every evening.       • levothyroxine 75 mcg tablet Take 75 mcg by mouth daily.     • meloxicam (MOBIC) 7.5 mg tablet Take 15 mg by mouth daily.     • methocarbamoL 750 mg tablet Take 750 mg by mouth 3 (three) times a day as needed. Always takes in am but doesn't  "always do tid      • simvastatin 40 mg tablet Take 40 mg by mouth daily.       • tamsulosin (FLOMAX) 0.4 mg capsule Take 1 capsule (0.4 mg total) by mouth nightly. (Patient not taking: Reported on 8/19/2022) 30 capsule 0     No current facility-administered medications for this visit.       Physical Exam:  Vital Signs:  Visit Vitals  BP (!) 113/58 (BP Location: Left upper arm, Patient Position: Sitting)   Pulse 76   Temp 36.7 °C (98.1 °F) (Temporal)   Ht 1.6 m (5' 3\")   Wt (!) 156 kg (344 lb 3.2 oz)   SpO2 100%   BMI 60.97 kg/m²       General:  no apparent distress  Neck: supple, no masses  Respiratory: lungs clear to auscultation bilaterally, normal respiratory effort  Cardiovascular: regular rate and rhythm, no murmurs, rubs, gallops.   Extremity: lower extremities with significant edema bilaterally   GI: abdomen soft, non-distended, non-tender, no hepatosplenomegaly, no masses  Neurologic: alert & oriented x3, no gross deficits  Psychiatric: mood and affect normal  Musculoskeletal: no deformity or gross strength deficit  Gynecologic: normal external female genitalia.    Speculum: cervix not visualized due to difficulty with positioning - blind pap performed    Bimanual: uterus not palpable due to body habitus, cervix palpated and normal size     Imaging: I personally reviewed the images of the pelvic ultrasound      Assessment   61 y.o. lady with   Problem List Items Addressed This Visit        Genitourinary    EIN (endometrial intraepithelial neoplasia) - Primary    Overview     8/8/22 Endometrial Biopsy:    Fragments of endometrium with  hyperplasia and foci of EIN.  Also acute inflammatory infiltrate and necrosis           Current Assessment & Plan     Reviewed pathology results from EMB which show foci of EIN in a background of hyperplasia. Recommended D&C hysteroscopy with placement of IUD. D&C will allow us to obtain a more adequate specimen to guide treatment decisions. Reviewed if predominantly " hyperplasia is found this is treated with progesterone and we will preemptively place IUD at time of D&C in order for patient to have progesterone treatment if result comes back as hyperplasia. Reviewed if result consistent with endometrial cancer would plan for hysterectomy at that time. Reviewed if result consistent with EIN that this is definitively treated with hysterectomy but can also be treated with progesterone therapy and serial sampling. Reviewed if hysterectomy ultimately needed then patient has many risk factors which put her at increased risk of complications including multiple previous surgeries, obesity, diabetes, hypertension, lymphedema.     Patient is scheduled for procedure with urology on Monday. Will see if we can coordinate to perform D&C at that time. If not will plan for D&C over the next week weeks.            Infertility, female    Overview     Reports hx of infertility and received IVF for pregnancy in 1999. During treatment she reports she was told by her DONI Dr. Cantor that her tubes were scarred and he suspected she may have had a history of pelvic infection.               Endocrine/Metabolic    Morbid obesity with BMI of 50.0-59.9, adult (CMS/[facility])    Current Assessment & Plan     Reviewed her obesity increases her risk of surgery. It may be difficult for appropriate ventilation in trendelenburg. This increases her risk of post operative wound infections. This also increases her risk for endometrial cancer.              Other Visit Diagnoses     Screening for cervical cancer        Relevant Orders    Cytology, Thinprep Pap        Patient seen with Dr. Marla Watson MD

## 2022-08-21 NOTE — TELEPHONE ENCOUNTER
Late entry for Friday Aug 19 6pm    -patient called to review was able to add on D&C to urologic procedure on Aug22.     Ashley Watson MD PGY-4  Obstetrics and Gynecology   Pager #[phone]

## 2022-08-21 NOTE — H&P (VIEW-ONLY)
HPI:  Ms. Xin Melendez is a 61 y.o. lady who presents for D&C hysteroscopy with Mirena IUD placement for EIN. She initially presented to [facility] with left sided flank pain on 8/3/22 and was found to have a 4mm obstructing L proximal ureteral stone c/b hydroureteronephrosis, JUAN and pyelonephritis. Patient also endorsed post menopausal bleeding and was found to have a thickended endometrial stripe of 2cm. Patient was transferred to [facility] as [place] didn't have appropriate equipment. She had a left ureteral stent placement with endometrial biopsy on 2022. EMB showed hyperplasia with foci of EIN.      Patient reports she has had post menopausal bleeding for 6 weeks. It occurs off and on and is light. She denies lightheadedness or dizziness. She reports recent weight change with weight gain of 7 pounds she attributes to her hospitilization, denies abdominal pelvic pain/bloating, denies chest pain/shortness of breath.     Patient is scheduled for a lithotripsy with stent exchange on Monday Aug 22. Will have combo case with D&C hysteroscopy IUD placement.         Medical History:   Medical History        Past Medical History:   Diagnosis Date    Back pain      COVID-19 vaccine series completed       [facility] 10/2021    CTS (carpal tunnel syndrome)      DJD (degenerative joint disease)      Edema      Frozen shoulder      HL (hearing loss)      Hypertension      Hypothyroidism      Kidney stones      Lipid disorder      Lymphedema      Obesity      Sciatica      Stasis dermatitis      Type 2 diabetes mellitus (CMS/[facility])              Surgical History:   Surgical History         Past Surgical History:   Procedure Laterality Date    APPENDECTOMY         laparotomy     SECTION   1999    CYSTOSCOPY   2021     CYSTOSCOPY,INSERT URETERAL STENT    DILATION AND CURETTAGE OF UTERUS         when patient was age 26    OOPHORECTOMY         open RSO, right side secondary to  torsion            Gynecologic/Obstetric History:  Menstrual History:           OB History         1    Para   1    Term                AB        Living   1        SAB        IAB        Ectopic        Multiple        Live Births                      No LMP recorded. Patient is postmenopausal.  Age at menopause: 54        Prior abnormal pap: no              HRT use: never      Social History:   Social History            Tobacco Use    Smoking status: Former Smoker       Quit date:        Years since quittin.6    Smokeless tobacco: Never Used    Tobacco comment: quit    Substance Use Topics    Alcohol use: Not Currently    Drug use: Never         Family History:         Family History   Problem Relation Age of Onset    Heart attack Biological Father           Allergies: Cephalexin and Adhesive tape-silicones     Medications:   Current Medications          Current Outpatient Medications   Medication Sig Dispense Refill    ascorbic acid (VITAMIN C ORAL) Take 1 tablet by mouth daily.          aspirin 81 mg enteric coated tablet Take 81 mg by mouth daily.        cholecalciferol, vitamin D3, (VITAMIN D3 ORAL) Take 1 tablet by mouth daily.          DULoxetine 20 mg capsule Take 20 mg by mouth 2 (two) times a day.          exenatide microspheres 2 mg/0.85 mL auto-injector Inject 1 Dose under the skin once a week on Friday.        gabapentin 600 mg tablet Take 600 mg by mouth 3 (three) times a day.          insulin aspart U-100 100 unit/mL (3 mL) pen Inject 0-100 Units under the skin 3 (three) times a day before meals. Per sliding scale based on calorie intake by patient         insulin degludec U-200 CONC (TRESIBA FLEXTOUCH U-200) 200 unit/mL (3 mL) pen Inject 78 Units under the skin every evening.          levothyroxine 75 mcg tablet Take 75 mcg by mouth daily.        meloxicam (MOBIC) 7.5 mg tablet Take 15 mg by mouth daily.        methocarbamoL 750 mg tablet Take 750 mg by mouth 3  "(three) times a day as needed. Always takes in am but doesn't always do tid         simvastatin 40 mg tablet Take 40 mg by mouth daily.          tamsulosin (FLOMAX) 0.4 mg capsule Take 1 capsule (0.4 mg total) by mouth nightly. (Patient not taking: Reported on 8/19/2022) 30 capsule 0      No current facility-administered medications for this visit.            Physical Exam:  Vital Signs:  Visit Vitals  BP (!) 113/58 (BP Location: Left upper arm, Patient Position: Sitting)   Pulse 76   Temp 36.7 °C (98.1 °F) (Temporal)   Ht 1.6 m (5' 3\")   Wt (!) 156 kg (344 lb 3.2 oz)   SpO2 100%   BMI 60.97 kg/m²         General:  no apparent distress  Neck: supple, no masses  Respiratory: lungs clear to auscultation bilaterally, normal respiratory effort  Cardiovascular: regular rate and rhythm, no murmurs, rubs, gallops.   Extremity: lower extremities with significant edema bilaterally   GI: abdomen soft, non-distended, non-tender, no hepatosplenomegaly, no masses  Neurologic: alert & oriented x3, no gross deficits  Psychiatric: mood and affect normal  Musculoskeletal: no deformity or gross strength deficit  Gynecologic: normal external female genitalia.               Speculum: cervix not visualized due to difficulty with positioning - blind pap performed               Bimanual: uterus not palpable due to body habitus, cervix palpated and normal size      Imaging: I personally reviewed the images of the pelvic ultrasound           Assessment      61 y.o. lady with        Problem List Items Addressed This Visit                 Genitourinary      EIN (endometrial intraepithelial neoplasia) - Primary      Overview        8/8/22 Endometrial Biopsy:    Fragments of endometrium with  hyperplasia and foci of EIN.  Also acute inflammatory infiltrate and necrosis               Current Assessment & Plan        Reviewed pathology results from EMB which show foci of EIN in a background of hyperplasia. Recommended D&C hysteroscopy with " placement of IUD. D&C will allow us to obtain a more adequate specimen to guide treatment decisions. Reviewed if predominantly hyperplasia is found this is treated with progesterone and we will preemptively place IUD at time of D&C in order for patient to have progesterone treatment if result comes back as hyperplasia. Reviewed if result consistent with endometrial cancer would plan for hysterectomy at that time. Reviewed if result consistent with EIN that this is definitively treated with hysterectomy but can also be treated with progesterone therapy and serial sampling. Reviewed if hysterectomy ultimately needed then patient has many risk factors which put her at increased risk of complications including multiple previous surgeries, obesity, diabetes, hypertension, lymphedema.      Patient is scheduled for procedure with urology on Monday. Will see if we can coordinate to perform D&C at that time. If not will plan for D&C over the next week weeks.                                     Endocrine/Metabolic      Morbid obesity with BMI of 50.0-59.9, adult (CMS/[facility])      Current Assessment & Plan        Reviewed her obesity increases her risk of surgery. It may be difficult for appropriate ventilation in trendelenburg. This increases her risk of post operative wound infections. This also increases her risk for endometrial cancer.                                               Ashley Watson MD PGY4

## 2022-08-22 ENCOUNTER — HOSPITAL ENCOUNTER (OUTPATIENT)
Dept: RADIOLOGY | Facility: HOSPITAL | Age: 62
Setting detail: HOSPITAL OUTPATIENT SURGERY
Discharge: HOME | End: 2022-08-22
Attending: UROLOGY
Payer: COMMERCIAL

## 2022-08-22 ENCOUNTER — HOSPITAL ENCOUNTER (OUTPATIENT)
Facility: HOSPITAL | Age: 62
Setting detail: HOSPITAL OUTPATIENT SURGERY
Discharge: HOME | End: 2022-08-22
Attending: UROLOGY | Admitting: UROLOGY
Payer: COMMERCIAL

## 2022-08-22 VITALS
RESPIRATION RATE: 18 BRPM | TEMPERATURE: 98 F | SYSTOLIC BLOOD PRESSURE: 133 MMHG | OXYGEN SATURATION: 97 % | DIASTOLIC BLOOD PRESSURE: 62 MMHG | HEART RATE: 80 BPM

## 2022-08-22 DIAGNOSIS — N28.9 RENAL AND UROLOGIC DISORDERS: ICD-10-CM

## 2022-08-22 DIAGNOSIS — N39.9 RENAL AND UROLOGIC DISORDERS: ICD-10-CM

## 2022-08-22 DIAGNOSIS — N20.0 KIDNEY STONES: ICD-10-CM

## 2022-08-22 LAB
GLUCOSE BLD-MCNC: 80 MG/DL (ref 70–99)
GLUCOSE BLD-MCNC: 86 MG/DL (ref 70–99)
POCT TEST: NORMAL
POCT TEST: NORMAL

## 2022-08-22 PROCEDURE — 37000001 HC ANESTHESIA GENERAL: Performed by: UROLOGY

## 2022-08-22 PROCEDURE — 25800000 HC PHARMACY IV SOLUTIONS: Performed by: NURSE ANESTHETIST, CERTIFIED REGISTERED

## 2022-08-22 PROCEDURE — 0T778DZ DILATION OF LEFT URETER WITH INTRALUMINAL DEVICE, VIA NATURAL OR ARTIFICIAL OPENING ENDOSCOPIC: ICD-10-PCS | Performed by: UROLOGY

## 2022-08-22 PROCEDURE — 25000000 HC PHARMACY GENERAL: Performed by: NURSE ANESTHETIST, CERTIFIED REGISTERED

## 2022-08-22 PROCEDURE — 71000012 HC PACU PHASE 2 EA ADDL MIN: Performed by: UROLOGY

## 2022-08-22 PROCEDURE — 63600000 HC DRUGS/DETAIL CODE: Performed by: NURSE ANESTHETIST, CERTIFIED REGISTERED

## 2022-08-22 PROCEDURE — 71000001 HC PACU PHASE 1 INITIAL 30MIN: Performed by: UROLOGY

## 2022-08-22 PROCEDURE — 27200000 HC STERILE SUPPLY: Performed by: UROLOGY

## 2022-08-22 PROCEDURE — 36000013 HC OR LEVEL 3 EA ADDL MIN: Performed by: UROLOGY

## 2022-08-22 PROCEDURE — C1758 CATHETER, URETERAL: HCPCS | Performed by: UROLOGY

## 2022-08-22 PROCEDURE — 58120 DILATION AND CURETTAGE: CPT | Performed by: OBSTETRICS & GYNECOLOGY

## 2022-08-22 PROCEDURE — 0TC78ZZ EXTIRPATION OF MATTER FROM LEFT URETER, VIA NATURAL OR ARTIFICIAL OPENING ENDOSCOPIC: ICD-10-PCS | Performed by: UROLOGY

## 2022-08-22 PROCEDURE — 25800000 HC PHARMACY IV SOLUTIONS: Performed by: STUDENT IN AN ORGANIZED HEALTH CARE EDUCATION/TRAINING PROGRAM

## 2022-08-22 PROCEDURE — C2617 STENT, NON-COR, TEM W/O DEL: HCPCS | Performed by: UROLOGY

## 2022-08-22 PROCEDURE — 0TP98DZ REMOVAL OF INTRALUMINAL DEVICE FROM URETER, VIA NATURAL OR ARTIFICIAL OPENING ENDOSCOPIC: ICD-10-PCS | Performed by: UROLOGY

## 2022-08-22 PROCEDURE — 88305 TISSUE EXAM BY PATHOLOGIST: CPT | Performed by: UROLOGY

## 2022-08-22 PROCEDURE — 63600000 HC DRUGS/DETAIL CODE: Performed by: STUDENT IN AN ORGANIZED HEALTH CARE EDUCATION/TRAINING PROGRAM

## 2022-08-22 PROCEDURE — 58300 INSERT INTRAUTERINE DEVICE: CPT | Performed by: OBSTETRICS & GYNECOLOGY

## 2022-08-22 PROCEDURE — 71000011 HC PACU PHASE 1 EA ADDL MIN: Performed by: UROLOGY

## 2022-08-22 PROCEDURE — 63600105 HC IODINE BASED CONTRAST: Mod: JW | Performed by: UROLOGY

## 2022-08-22 PROCEDURE — C1769 GUIDE WIRE: HCPCS | Performed by: UROLOGY

## 2022-08-22 PROCEDURE — 71000002 HC PACU PHASE 2 INITIAL 30MIN: Performed by: UROLOGY

## 2022-08-22 PROCEDURE — 82365 CALCULUS SPECTROSCOPY: CPT | Performed by: UROLOGY

## 2022-08-22 PROCEDURE — 36000003 HC OR LEVEL 3 INITIAL 30MIN: Performed by: UROLOGY

## 2022-08-22 DEVICE — SOF-CURL URETERAL STENT 6.0FRX24CM: Type: IMPLANTABLE DEVICE | Site: URETER | Status: FUNCTIONAL

## 2022-08-22 RX ORDER — SODIUM CHLORIDE 9 MG/ML
INJECTION, SOLUTION INTRAVENOUS CONTINUOUS PRN
Status: DISCONTINUED | OUTPATIENT
Start: 2022-08-22 | End: 2022-08-22 | Stop reason: SURG

## 2022-08-22 RX ORDER — HYDROMORPHONE HYDROCHLORIDE 1 MG/ML
0.25 INJECTION, SOLUTION INTRAMUSCULAR; INTRAVENOUS; SUBCUTANEOUS
Status: DISCONTINUED | OUTPATIENT
Start: 2022-08-22 | End: 2022-08-22 | Stop reason: HOSPADM

## 2022-08-22 RX ORDER — ONDANSETRON HYDROCHLORIDE 2 MG/ML
4 INJECTION, SOLUTION INTRAVENOUS
Status: DISCONTINUED | OUTPATIENT
Start: 2022-08-22 | End: 2022-08-22 | Stop reason: HOSPADM

## 2022-08-22 RX ORDER — DEXTROSE 40 %
15-30 GEL (GRAM) ORAL AS NEEDED
Status: DISCONTINUED | OUTPATIENT
Start: 2022-08-22 | End: 2022-08-22 | Stop reason: HOSPADM

## 2022-08-22 RX ORDER — ACETAMINOPHEN 500 MG
500 TABLET ORAL EVERY 6 HOURS PRN
Qty: 15 TABLET | Refills: 0 | Status: SHIPPED | OUTPATIENT
Start: 2022-08-22 | End: 2022-10-12 | Stop reason: ENTERED-IN-ERROR

## 2022-08-22 RX ORDER — PHENYLEPHRINE HYDROCHLORIDE 10 MG/ML
INJECTION INTRAVENOUS AS NEEDED
Status: DISCONTINUED | OUTPATIENT
Start: 2022-08-22 | End: 2022-08-22 | Stop reason: SURG

## 2022-08-22 RX ORDER — IBUPROFEN 200 MG
400 TABLET ORAL EVERY 6 HOURS PRN
Qty: 10 TABLET | Refills: 0 | Status: SHIPPED | OUTPATIENT
Start: 2022-08-22 | End: 2022-10-12 | Stop reason: ENTERED-IN-ERROR

## 2022-08-22 RX ORDER — ACETAMINOPHEN 325 MG/1
650 TABLET ORAL ONCE AS NEEDED
Status: DISCONTINUED | OUTPATIENT
Start: 2022-08-22 | End: 2022-08-22 | Stop reason: HOSPADM

## 2022-08-22 RX ORDER — MIDAZOLAM HYDROCHLORIDE 2 MG/2ML
INJECTION, SOLUTION INTRAMUSCULAR; INTRAVENOUS AS NEEDED
Status: DISCONTINUED | OUTPATIENT
Start: 2022-08-22 | End: 2022-08-22 | Stop reason: SURG

## 2022-08-22 RX ORDER — SULFAMETHOXAZOLE AND TRIMETHOPRIM 800; 160 MG/1; MG/1
1 TABLET ORAL 2 TIMES DAILY
Qty: 6 TABLET | Refills: 0 | Status: SHIPPED | OUTPATIENT
Start: 2022-08-23 | End: 2022-08-26

## 2022-08-22 RX ORDER — FENTANYL CITRATE 50 UG/ML
INJECTION, SOLUTION INTRAMUSCULAR; INTRAVENOUS AS NEEDED
Status: DISCONTINUED | OUTPATIENT
Start: 2022-08-22 | End: 2022-08-22 | Stop reason: SURG

## 2022-08-22 RX ORDER — FENTANYL CITRATE 50 UG/ML
50 INJECTION, SOLUTION INTRAMUSCULAR; INTRAVENOUS
Status: DISCONTINUED | OUTPATIENT
Start: 2022-08-22 | End: 2022-08-22 | Stop reason: HOSPADM

## 2022-08-22 RX ORDER — DEXAMETHASONE SODIUM PHOSPHATE 4 MG/ML
INJECTION, SOLUTION INTRA-ARTICULAR; INTRALESIONAL; INTRAMUSCULAR; INTRAVENOUS; SOFT TISSUE AS NEEDED
Status: DISCONTINUED | OUTPATIENT
Start: 2022-08-22 | End: 2022-08-22 | Stop reason: SURG

## 2022-08-22 RX ORDER — DEXTROSE 50 % IN WATER (D50W) INTRAVENOUS SYRINGE
25 AS NEEDED
Status: DISCONTINUED | OUTPATIENT
Start: 2022-08-22 | End: 2022-08-22 | Stop reason: HOSPADM

## 2022-08-22 RX ORDER — ONDANSETRON HYDROCHLORIDE 2 MG/ML
INJECTION, SOLUTION INTRAVENOUS AS NEEDED
Status: DISCONTINUED | OUTPATIENT
Start: 2022-08-22 | End: 2022-08-22 | Stop reason: SURG

## 2022-08-22 RX ORDER — PROPOFOL 10 MG/ML
INJECTION, EMULSION INTRAVENOUS AS NEEDED
Status: DISCONTINUED | OUTPATIENT
Start: 2022-08-22 | End: 2022-08-22 | Stop reason: SURG

## 2022-08-22 RX ORDER — ROCURONIUM BROMIDE 10 MG/ML
INJECTION, SOLUTION INTRAVENOUS AS NEEDED
Status: DISCONTINUED | OUTPATIENT
Start: 2022-08-22 | End: 2022-08-22 | Stop reason: SURG

## 2022-08-22 RX ORDER — LIDOCAINE HYDROCHLORIDE 10 MG/ML
INJECTION, SOLUTION INFILTRATION; PERINEURAL AS NEEDED
Status: DISCONTINUED | OUTPATIENT
Start: 2022-08-22 | End: 2022-08-22 | Stop reason: SURG

## 2022-08-22 RX ORDER — IBUPROFEN 200 MG
16-32 TABLET ORAL AS NEEDED
Status: DISCONTINUED | OUTPATIENT
Start: 2022-08-22 | End: 2022-08-22 | Stop reason: HOSPADM

## 2022-08-22 RX ORDER — EPHEDRINE SULFATE 50 MG/ML
INJECTION, SOLUTION INTRAVENOUS AS NEEDED
Status: DISCONTINUED | OUTPATIENT
Start: 2022-08-22 | End: 2022-08-22 | Stop reason: SURG

## 2022-08-22 RX ADMIN — PROPOFOL 50 MG: 10 INJECTION, EMULSION INTRAVENOUS at 15:27

## 2022-08-22 RX ADMIN — PHENYLEPHRINE HYDROCHLORIDE 100 MCG: 10 INJECTION INTRAVENOUS at 14:49

## 2022-08-22 RX ADMIN — PHENYLEPHRINE HYDROCHLORIDE 100 MCG: 10 INJECTION INTRAVENOUS at 14:52

## 2022-08-22 RX ADMIN — PHENYLEPHRINE HYDROCHLORIDE 100 MCG: 10 INJECTION INTRAVENOUS at 14:54

## 2022-08-22 RX ADMIN — GENTAMICIN SULFATE 480 MG: 40 INJECTION, SOLUTION INTRAMUSCULAR; INTRAVENOUS at 14:35

## 2022-08-22 RX ADMIN — DEXAMETHASONE SODIUM PHOSPHATE 4 MG: 4 INJECTION, SOLUTION INTRAMUSCULAR; INTRAVENOUS at 14:45

## 2022-08-22 RX ADMIN — LIDOCAINE HYDROCHLORIDE 10 ML: 10 INJECTION, SOLUTION INFILTRATION; PERINEURAL at 14:31

## 2022-08-22 RX ADMIN — FENTANYL CITRATE 50 MCG: 50 INJECTION, SOLUTION INTRAMUSCULAR; INTRAVENOUS at 14:23

## 2022-08-22 RX ADMIN — PROPOFOL 200 MG: 10 INJECTION, EMULSION INTRAVENOUS at 14:31

## 2022-08-22 RX ADMIN — FENTANYL CITRATE 50 MCG: 50 INJECTION, SOLUTION INTRAMUSCULAR; INTRAVENOUS at 14:43

## 2022-08-22 RX ADMIN — ONDANSETRON HYDROCHLORIDE 4 MG: 2 SOLUTION INTRAMUSCULAR; INTRAVENOUS at 14:45

## 2022-08-22 RX ADMIN — SUGAMMADEX 200 MG: 100 INJECTION, SOLUTION INTRAVENOUS at 15:28

## 2022-08-22 RX ADMIN — MIDAZOLAM HYDROCHLORIDE 2 MG: 1 INJECTION, SOLUTION INTRAMUSCULAR; INTRAVENOUS at 14:26

## 2022-08-22 RX ADMIN — EPHEDRINE SULFATE 5 MG: 50 INJECTION, SOLUTION INTRAVENOUS at 15:17

## 2022-08-22 RX ADMIN — SUCCINYLCHOLINE CHLORIDE 120 MG: 20 INJECTION, SOLUTION INTRAMUSCULAR; INTRAVENOUS; PARENTERAL at 14:31

## 2022-08-22 RX ADMIN — ROCURONIUM BROMIDE 30 MG: 10 INJECTION, SOLUTION INTRAVENOUS at 14:39

## 2022-08-22 RX ADMIN — PHENYLEPHRINE HYDROCHLORIDE 150 MCG: 10 INJECTION INTRAVENOUS at 15:09

## 2022-08-22 RX ADMIN — SODIUM CHLORIDE: 9 INJECTION, SOLUTION INTRAVENOUS at 14:23

## 2022-08-22 ASSESSMENT — LIFESTYLE VARIABLES: TOBACCO_USE: 1

## 2022-08-22 NOTE — OR SURGEON
Pre-Procedure patient identification:  I am the primary operating surgeon/proceduralist and I have identified the patient and confirmed laterality is left on 08/22/22 at 1:18 PM Antionette Marroquin DO  Phone Number: [phone]

## 2022-08-22 NOTE — ANESTHESIA PROCEDURE NOTES
Airway  Urgency: elective    Start Time: 8/22/2022 2:35 PM  Airway not difficult    General Information and Staff    Patient location during procedure: OR  Resident/CRNA: Alan Peters CRNA  Performed: resident/CRNA     Indications and Patient Condition  Indications for airway management: anesthesia  Sedation level: general  Preoxygenated: yes  Patient position: sniffing  Mask difficulty assessment: 1 - vent by mask    Final Airway Details  Final airway type: endotracheal airway      Successful airway: ETT  Cuffed: yes   Successful intubation technique: video laryngoscopy  Facilitating devices/methods: intubating stylet  Endotracheal tube insertion site: oral  Blade: Angelic  Blade size: #4  ETT size (mm): 7.0  Cormack-Lehane Classification: grade I - full view of glottis  Placement verified by: capnometry   Measured from: lips  ETT to lips (cm): 22  Number of attempts at approach: 1  Number of other approaches attempted: 0  Atraumatic airway insertion

## 2022-08-22 NOTE — PERIOPERATIVE NURSING NOTE
Discharge instructions given to the patient. She verbalized understanding of all instructions given. VSS.

## 2022-08-22 NOTE — PERIOPERATIVE NURSING NOTE
Received patient into phase 2, AAOx3. Enc pt to deep breathe. Diet gingerale and crackers given. Abner well. IV inf.

## 2022-08-22 NOTE — OP NOTE
Dilation and Curettage with Progestin IUD Insertion    Procedure:    D&C, HYSTEROSCOPY DIAGNOSTIC   CPT(R) Code: 56036     Procedure:    INSERTION OF LEVONORGESTREL RELEASING INTRAUTERINE SYSTEM (MIRENA)   CPT (R) Code: 42547    Pre-Op Diagnosis: EIN    Post-Op Diagnosis: same as above     Supervising Surgeon: Dr. Jovel     Surgeon: Dr. Fam Schwartz MD PGY-4    Assist: Dr. Nay De MD PGY-2    Anesthesia: General    Estimated Blood Loss: Minimal           Specimens:   ID Type Source Tests Collected by Time Destination   1 : endometrial currettings Tissue Endometrium PATHOLOGY TISSUE EXAM Raghu Jovel MD 2022 1509      Complications: None    Condition: Stable    Findings: Normal external genitalia. Anterior lip of cervix visualized without gross dysplasia; posterior lip unable to be visualized. Cervix anterior, likely due to history of  section. Significant redundant vaginal tissue.     Procedure Details:     Xin Melendez is a 61 y.o.  with a history of postmenopausal bleeding. She was found to have a thickened endometrial stripe of 2 cm and endometrial biopsy showed hyperplasia with a foci of EIN.     Patient was explained risks and benefits of procedure and consented for a D&C hysteroscopy with a mirena IUD insertion. Consents were reconfirmed today.     On 22 patient presented to the hospital and denied any significant interval history. The surgeon and patient were mutually identified and the patient was taken to the operating room.  She was given general anesthesia. The patient was placed in the dorsal lithotomy position and prepped and draped in the usual sterile fashion.  A timeout was performed.  Next, a bimanual examination under anesthesia was performed. The cervix was found to be very anterior, immobile, and distal in the vaginal canal. Uterine fundus and bilateral adnexa were unable to be palpated secondary to body habitus. Normal appearing external genitalia  were appreciated.  A Contreras speculum and a Gena retractor were placed in the vagina, the cervix was not easily visualized secondary to the length of the vaginal wall and significant redundant vaginal tissue. Breisky vaginal retractors were utilized to retract that vaginal sidewalls and the anterior lip of the cervix was visualized. A single tooth tenaculum was used to grasp the anterior lip of the cervix. The cervix was serially dilated to accommodate a 7mm hysteroscope. During dilation of the cervix, the uterus was felt to be significantly anteverted. The patient was then straight cathed for 200 cc punch-colored urine. Multiple attempts were made to introduce the hysteroscope in to the cervical os. Given the patients difficult anatomy, decision was made to prioritize endometrial curettage for tissue sampling. After adjusting the Breisky retractors along with the Contreras speculum, the cervical os was visualized and endometrial curettage was performed using sharp curettage in a 360 fashion with good tissue return. The curettings were sent to pathology for permanent evaluation.      The uterus was sounded to 8 cm. Mirena IUD was introduced and placed in the usual fashion. Strings were trimmed.    All instruments were removed from the vagina. All sponge and instrument counts were reported to be correct. The patient tolerated the procedure well and was taken to the recovery room awake and in stable condition.    Dr. Schwartz was scrubbed and present and Dr. Jovel was present for the entire procedure.    Nay De MD

## 2022-08-22 NOTE — OP NOTE
Operative Note     Preoperative Diagnosis: Obstructing proximal left ureteral calculus    Postoperative Diagnosis: Obstructing mid left ureteral calculus    Procedure: Left ureteroscopy, left ureteral stone extraction, left ureteral stent exchange    Surgeon: Antionette Marroquin DO     Assistants: Junior Doe DO, Alan Henry MD    Anesthesia: General LMA anesthesia    Antibiotic(s): Ancef 2 g    Complications: None           Drains: 6 Chadian by 24 cm double-pigtail left ureteral stent    Specimens: Left ureteral stone    Estimated Blood Loss:  No blood loss documented.    Findings: Small mid left ureteral stone        Disposition: PACU - hemodynamically stable.    Indications of Procedure: 61-year-old female who underwent cystoscopy and left ureteral stent placement 8/8/2022 for an obstructing proximal left ureteral calculus on imaging, refractory renal colic and an acute kidney injury.  She also had an endometrial biopsy which showed hyperplasia with foci of EIN.  Today she presents for definitive stone management.  GYN asked to perform a D&C, hysteroscopy and IUD placement concomitantly.  Risks, benefits and complications regarding this procedure were discussed in detail and the patient has agreed to proceed.    Details of Procedure: Patient was identified by name and medical record number in the holding area.  Informed consent was confirmed in the chart.  Patient was transported to the operating room and placed supine on the operative table. Perioperative antibiotics were administered.  General LMA anesthesia was administered.  Patient was then positioned in lithotomy with care to pad all pressure points and bony prominences.  Patient was prepped with Betadine and draped in sterile fashion.  Surgical timeout was performed and all parties were in agreement.    GYN performed their portion of the procedure.  Next, a 21 Chadian rigid cystoscope with a 30 degree lens was inserted per urethra and  atraumatically advanced into the bladder.  Cystoscopy was performed.  There were no abnormalities noted.  An indwelling stent was identified at the left UO.  The distal end of the stent was grabbed with an alligator grasper and brought to the urethral meatus.  Through the lumen of the stent, a sensor wire was advanced to the left upper pole with the assistance of fluoroscopy.  The indwelling stent was removed.  The sensor wire was clamped to the patient's left thigh drape as a safety wire.  Alongside our safety wire, a semirigid ureteroscope was advanced to the mid left ureter.  A small left ureteral stone was identified.  Using an escape basket, the stone was grasped, extracted and sent for stone analysis.  A pullout ureteroscopy did not identify any additional ureteral calculi.  A gentle left retrograde pyelogram was performed and there were no filling defects and no hydronephrosis.  The semirigid ureteroscope was removed and BR cystoscope, a 6 Mongolian by 24 cm double-pigtail left ureteral stent was inserted.  After deployment of stent, there was a good proximal curl in the left upper pole on fluoroscopy and a good distal curl in the bladder on cystoscopy.  The stent was left on tether and secured to the patient's right thigh.  The patient's bladder was emptied through the 21 Mongolian sheath and the procedure concluded. Patient was then undraped and cleaned placed back in supine position.  Patient was awoke from anesthesia without incident and transferred to PACU in stable condition.    Antionette Marroquin DO was present and performed all critical portions of this procedure.

## 2022-08-22 NOTE — DISCHARGE INSTRUCTIONS
DISCHARGE INSTRUCTIONS FOR URETEROSCOPY      ACTIVITY:   You should return to normal activities of daily living as early as tolerated     DRIVING:   You should not drive for 24 hours after general anesthesia     PAIN:  You can expect to have some pain after discharge that may require pain medications.  Take the extra strength Tylenol every 6 hours and ibuprofen every 8 hours as needed     MEDICATIONS:   After surgery you should immediately resume your regular medications.   You may receive the following prescriptions:  Extra strength Tylenol  Ibuprofen  An antibiotic (Bactrim DS twice daily for 3 days)     DIET AND BOWELS:   Drink plenty of fluids during the day.  Water is the best, but juices, coffee, tea are all acceptable.  The purpose is to increase the urine output enough to flush out any blood.   If needed, you may use over the counter oral stool softeners (such as miralax) to help your bowels get started.       YOU MAY HAVE AN INTERNAL STENT:   This device is a long skinny tube that is placed during surgery that extends from the kidney down to the bladder.  It allows urine to drain while things heal.  The stent is left on a string which is secured with an adhesive dressing to your right thigh   On Wednesday, 8/24/2022, undo the adhesive dressing and pull on the string.  The stent should come out swiftly and should look like a hollow tube with 2 pigtails on either end.  It is critical you remove the stent as prolonged indwelling stents can result in encrustation by stone debris, infection, or loss of kidney function.   While the stent is in place, you may experience stent discomfort, such as urinary frequency/urgency, blood in the urine or flank pain with urination.     WHEN TO CALL US:   Temperature of 101.0 degrees or above (fevers <101 may be normal after surgery).   Severe pain - not relieved by narcotic pain medication, especially if it is associated with nausea, vomiting, or dizziness   Pain, burning  or other difficulty urinating     APPOINTMENTS:   Follow-up in 4 weeks with an ultrasound of your kidneys to ensure the swelling has resolved with stone removal and a 24-hour urine study to assess why you are forming stones.  The studies have been ordered by Dr. Marroquin's office.   Interval appointments as needed for problems or concerns.      Dilation and Curettage and Mirena IUD Insertion  Discharge Instructions    PLEASE CALL OUR OFFICE -[phone] TO MAKE A FOLLOW UP APPOINTMENT IN TWO WEEKS    What to Expect    It is normal to have light bleeding.  This can occur immediately after the procedure or in a few days.      It is normal to experience some mild cramping after the procedure but this usually does not last for more than a few days.  You may take Acetaminophen (Tylenol) or Ibuprofen (Motrin, Advil) as directed for pain.    Activity      Please do not get in a swimming pool, hot tub or tub bath for at least 2-3 days after your procedure.      You may resume normal activities in 1-2 days. Please delay sexual intercourse for at least 5-7 days.      PLEASE CALL THE OFFICE -[phone] IF YOU EXPERIENCE ANY OF THE FOLLOWING:    - Heavy bleeding (using one pad an hour)  - Fever greater than 101.0   - Foul smelling vaginal discharge   - Worsening pain or any other concerns

## 2022-08-22 NOTE — ANESTHESIA POSTPROCEDURE EVALUATION
Patient: Xin Melendez    Procedure Summary     Date: 08/22/22 Room / [facility]: [facility] / [facility]    Anesthesia Start: 1423 Anesthesia Stop: 1550    Procedures:       Cystoscopy Retrograde Pyelogram Ureteroscopy Stone Manipulation Stent RePlacement Conf.#[id number] (Left )      D&C, HYSTEROSCOPY DIAGNOSTIC (N/A ) Diagnosis:       Kidney stones      (N20.0 Kidney Stones)    Surgeons: Antionette Marroquin DO; Bisi Gutiérrez MD Responsible Provider: Tavo Fishman MD    Anesthesia Type: general ASA Status: 3          Anesthesia Type: general  PACU Vitals    No data found in the last 10 encounters.           Anesthesia Post Evaluation    Pain management: adequate  Patient participation: complete - patient participated  Level of consciousness: awake and alert  Cardiovascular status: acceptable  Airway Patency: adequate  Respiratory status: acceptable  Hydration status: acceptable  Anesthetic complications: no

## 2022-08-22 NOTE — OR SURGEON
Pre-Procedure patient identification:  I am the primary operating surgeon/proceduralist and I have identified the patient on 08/22/22 at 2:21 PM Antionette Marroquin DO  Phone Number: [phone]

## 2022-08-22 NOTE — ANESTHESIOLOGIST PRE-PROCEDURE ATTESTATION
Pre-Procedure Patient Identification:  I am the Primary Anesthesiologist and have identified the patient on 08/22/22 at 1:33 PM.   I have confirmed the procedure(s) will be performed by the following surgeon/proceduralist Antionette Marroquin DO.

## 2022-08-23 ENCOUNTER — TELEPHONE (OUTPATIENT)
Dept: OBSTETRICS AND GYNECOLOGY | Facility: HOSPITAL | Age: 62
End: 2022-08-23
Payer: COMMERCIAL

## 2022-08-24 LAB
CASE RPRT: NORMAL
CLINICAL INFO: NORMAL
PATH REPORT.FINAL DX SPEC: NORMAL
PATH REPORT.GROSS SPEC: NORMAL

## 2022-08-26 LAB
CASE RPRT: NORMAL
CLINICAL INFO: NORMAL
CLINICAL INFO: NORMAL
HPV E6+E7 MRNA CVX QL NAA+PROBE: NEGATIVE
SPECIMEN PROCESSING COMMENT: NORMAL
THIN PREP CVX: NORMAL

## 2022-08-28 LAB
COMPN STONE: NORMAL
ORIGIN STONE: NORMAL
WT STONE: 0.01 G

## 2022-09-02 ENCOUNTER — OFFICE VISIT (OUTPATIENT)
Dept: OBSTETRICS AND GYNECOLOGY | Facility: HOSPITAL | Age: 62
End: 2022-09-02
Payer: COMMERCIAL

## 2022-09-02 VITALS
DIASTOLIC BLOOD PRESSURE: 58 MMHG | TEMPERATURE: 97.9 F | BODY MASS INDEX: 51.91 KG/M2 | WEIGHT: 293 LBS | HEIGHT: 63 IN | OXYGEN SATURATION: 100 % | HEART RATE: 69 BPM | SYSTOLIC BLOOD PRESSURE: 121 MMHG

## 2022-09-02 DIAGNOSIS — E66.01 MORBID OBESITY WITH BMI OF 50.0-59.9, ADULT (CMS/HCC): ICD-10-CM

## 2022-09-02 DIAGNOSIS — N85.02 EIN (ENDOMETRIAL INTRAEPITHELIAL NEOPLASIA): Primary | ICD-10-CM

## 2022-09-02 PROCEDURE — G0463 HOSPITAL OUTPT CLINIC VISIT: HCPCS | Performed by: STUDENT IN AN ORGANIZED HEALTH CARE EDUCATION/TRAINING PROGRAM

## 2022-09-02 PROCEDURE — 99900024 HB NONBILLABLE HOSPITAL CLINIC SERVICE: Performed by: STUDENT IN AN ORGANIZED HEALTH CARE EDUCATION/TRAINING PROGRAM

## 2022-09-02 ASSESSMENT — PAIN SCALES - GENERAL: PAINLEVEL: 0-NO PAIN

## 2022-09-02 NOTE — ASSESSMENT & PLAN NOTE
Reviewed results of pathology as well as treatment plan with patient. Given her multiple and significant comorbidities, counseled patient regarding recommendation for continued treatment with progestin IUD and repeat sampling in 6 months. Patient remains interested in hysterectomy but was again counseled that we do not feel this is in her best interest at this time. Offered patient option of obtaining a second opinion but she declined. She was advised to call should she experience any concerning symptoms such as vaginal bleeding, pelvic or abdominal pain, bloating, or any other concerns. All questions were answered and the patient agrees with the plan.

## 2022-09-02 NOTE — PROGRESS NOTES
HPI: Ms. Xin Melendez is a 61 y.o. lady with EIN.     She returns today for a post operative visit. She denies vaginal bleeding, denies pelvic/abdominal pain/bloating, denies change in bowel/bladder habits, denies other suspicious symptoms.    Medical History:   Past Medical History:   Diagnosis Date    Back pain     COVID-19 vaccine series completed     Moderna Boster 10/2021    CTS (carpal tunnel syndrome)     DJD (degenerative joint disease)     Edema     Frozen shoulder     HL (hearing loss)     Hypertension     Hypothyroidism     Kidney stones     Lipid disorder     Lymphedema     Obesity     Sciatica     Stasis dermatitis     Type 2 diabetes mellitus (CMS/HCC)      Surgical History:   Past Surgical History:   Procedure Laterality Date    APPENDECTOMY      laparotomy     SECTION  1999    CYSTOSCOPY  2021    CYSTOSCOPY,INSERT URETERAL STENT    DILATION AND CURETTAGE OF UTERUS      when patient was age 26    OOPHORECTOMY      open RSO, right side secondary to torsion     Gynecologic/Obstetric History:  Menstrual History:  OB History        1    Para   1    Term                AB        Living   1       SAB        IAB        Ectopic        Multiple        Live Births                    No LMP recorded. Patient is postmenopausal.       Social History:   Social History     Tobacco Use    Smoking status: Former Smoker     Quit date:      Years since quittin.6    Smokeless tobacco: Never Used    Tobacco comment: quit    Substance Use Topics    Alcohol use: Not Currently    Drug use: Never      Family History:   Family History   Problem Relation Age of Onset    Heart attack Biological Father      Allergies: Cephalexin and Adhesive tape-silicones    Medications:   Current Outpatient Medications   Medication Sig Dispense Refill    acetaminophen (TYLENOL EXTRA STRENGTH) 500 mg tablet Take 1 tablet (500 mg total) by mouth every 6 (six) hours as needed  "for pain for up to 15 doses. 15 tablet 0    ascorbic acid (VITAMIN C ORAL) Take 1 tablet by mouth daily.        aspirin 81 mg enteric coated tablet Take 81 mg by mouth daily.      cholecalciferol, vitamin D3, (VITAMIN D3 ORAL) Take 1 tablet by mouth daily.        DULoxetine 20 mg capsule Take 20 mg by mouth 2 (two) times a day.        exenatide microspheres 2 mg/0.85 mL auto-injector Inject 1 Dose under the skin once a week on Friday.      gabapentin 600 mg tablet Take 600 mg by mouth 3 (three) times a day.        ibuprofen (MOTRIN) 200 mg tablet Take 2 tablets (400 mg total) by mouth every 6 (six) hours as needed for pain for up to 10 doses. 10 tablet 0    insulin aspart U-100 100 unit/mL (3 mL) pen Inject 0-100 Units under the skin 3 (three) times a day before meals. Per sliding scale based on calorie intake by patient       insulin degludec U-200 CONC (TRESIBA FLEXTOUCH U-200) 200 unit/mL (3 mL) pen Inject 78 Units under the skin every evening.        levothyroxine 75 mcg tablet Take 75 mcg by mouth daily.      meloxicam (MOBIC) 7.5 mg tablet Take 15 mg by mouth daily.      methocarbamoL 750 mg tablet Take 750 mg by mouth 3 (three) times a day as needed. Always takes in am but doesn't always do tid       simvastatin 40 mg tablet Take 40 mg by mouth daily.        tamsulosin (FLOMAX) 0.4 mg capsule Take 1 capsule (0.4 mg total) by mouth nightly. 30 capsule 0     No current facility-administered medications for this visit.       Objective     Vital Signs:  Visit Vitals  BP (!) 121/58 (BP Location: Left upper arm, Patient Position: Sitting)   Pulse 69   Temp 36.6 °C (97.9 °F) (Temporal)   Ht 1.6 m (5' 3\")   Wt (!) 160 kg (353 lb 9.6 oz)   SpO2 100%   BMI 62.64 kg/m²       Physical Exam:  General: well-developed, well-nourished, no apparent distress  Respiratory: lungs clear to auscultation bilaterally, normal respiratory effort  Cardiovascular: regular rate and rhythm, no murmurs, rubs, gallops. "   Gynecologic: deferred  Extremities: significant lower extremity edema bilaterally    Assessment/Plan     Ms. Xin Melendez is a 61 y.o. lady with EIN.    Problem List Items Addressed This Visit        Genitourinary    EIN (endometrial intraepithelial neoplasia) - Primary    Overview     8/8/22 Endometrial Biopsy:    Fragments of endometrium with  hyperplasia and foci of EIN. Also acute inflammatory infiltrate and necrosis    8/22/22: endometrial curettage and IUD placement   Fragments of endometrium with complex atypical hyperplasia (endometrial intraepithelial neoplasia).           Current Assessment & Plan     Reviewed results of pathology as well as treatment plan with patient. Given her multiple and significant comorbidities, counseled patient regarding recommendation for continued treatment with progestin IUD and repeat sampling in 6 months. Patient remains interested in hysterectomy but was again counseled that we do not feel this is in her best interest at this time. Offered patient option of obtaining a second opinion but she declined. She was advised to call should she experience any concerning symptoms such as vaginal bleeding, pelvic or abdominal pain, bloating, or any other concerns. All questions were answered and the patient agrees with the plan.               Endocrine/Metabolic    Morbid obesity with BMI of 50.0-59.9, adult (CMS/[facility])    Overview     BMI 63, weight 353 lbs on 9/2/22           Current Assessment & Plan     Counseled patient regarding need for significant weight loss for both risk reduction in regard to endometrial cancer development, and surgical optimization. Patient adamant that she is not interested in bariatric surgery but would be willing to talk to a bariatric surgeon. Will provide referrals to Dr. Sewell and Dr. Nye. All questions were answered and the patient agrees with the plan.              Relevant Orders    Ambulatory referral to Bariatric Surgery    Ambulatory  referral to Internal Medicine        Dr. Andrew was present for assessment and plan.     Fam Schwartz MD

## 2022-09-02 NOTE — ASSESSMENT & PLAN NOTE
Counseled patient regarding need for significant weight loss for both risk reduction in regard to endometrial cancer development, and surgical optimization. Patient adamant that she is not interested in bariatric surgery but would be willing to talk to a bariatric surgeon. Will provide referrals to Dr. Sewell and Dr. Nye. All questions were answered and the patient agrees with the plan.

## 2022-10-11 ENCOUNTER — HOSPITAL ENCOUNTER (INPATIENT)
Facility: HOSPITAL | Age: 62
LOS: 6 days | Discharge: NURSING, REHAB OR LONG-TERM CARE | End: 2022-10-17
Attending: EMERGENCY MEDICINE | Admitting: HOSPITALIST
Payer: COMMERCIAL

## 2022-10-11 DIAGNOSIS — M79.605 BILATERAL LEG PAIN: Primary | ICD-10-CM

## 2022-10-11 DIAGNOSIS — M79.604 BILATERAL LEG PAIN: Primary | ICD-10-CM

## 2022-10-11 DIAGNOSIS — M79.605 LEG PAIN, LEFT: ICD-10-CM

## 2022-10-11 DIAGNOSIS — L03.116 CELLULITIS OF LEFT LOWER EXTREMITY: ICD-10-CM

## 2022-10-11 PROBLEM — E87.6 HYPOKALEMIA: Status: ACTIVE | Noted: 2022-10-11

## 2022-10-11 PROBLEM — R26.2 AMBULATORY DYSFUNCTION: Status: ACTIVE | Noted: 2022-10-11

## 2022-10-11 PROBLEM — Z79.4 TYPE 2 DIABETES MELLITUS, WITH LONG-TERM CURRENT USE OF INSULIN (CMS/HCC): Status: ACTIVE | Noted: 2022-10-11

## 2022-10-11 PROBLEM — R79.89 AZOTEMIA: Status: ACTIVE | Noted: 2022-10-11

## 2022-10-11 PROBLEM — E11.9 TYPE 2 DIABETES MELLITUS, WITH LONG-TERM CURRENT USE OF INSULIN (CMS/HCC): Status: ACTIVE | Noted: 2022-10-11

## 2022-10-11 LAB
ALBUMIN SERPL-MCNC: 3 G/DL (ref 3.4–5)
ALP SERPL-CCNC: 84 IU/L (ref 35–126)
ALT SERPL-CCNC: 12 IU/L (ref 11–54)
ANION GAP SERPL CALC-SCNC: 13 MEQ/L (ref 3–15)
AST SERPL-CCNC: 18 IU/L (ref 15–41)
BASOPHILS # BLD: 0.06 K/UL (ref 0.01–0.1)
BASOPHILS NFR BLD: 0.4 %
BILIRUB SERPL-MCNC: 1.3 MG/DL (ref 0.3–1.2)
BUN SERPL-MCNC: 36 MG/DL (ref 8–20)
CALCIUM SERPL-MCNC: 8.7 MG/DL (ref 8.9–10.3)
CHLORIDE SERPL-SCNC: 99 MEQ/L (ref 98–109)
CK SERPL-CCNC: 63 U/L (ref 15–200)
CO2 SERPL-SCNC: 21 MEQ/L (ref 22–32)
CREAT SERPL-MCNC: 1.5 MG/DL (ref 0.6–1.1)
DIFFERENTIAL METHOD BLD: ABNORMAL
EOSINOPHIL # BLD: 0.15 K/UL (ref 0.04–0.36)
EOSINOPHIL NFR BLD: 1 %
ERYTHROCYTE [DISTWIDTH] IN BLOOD BY AUTOMATED COUNT: 14.7 % (ref 11.7–14.4)
GFR SERPL CREATININE-BSD FRML MDRD: 35.3 ML/MIN/1.73M*2
GLUCOSE SERPL-MCNC: 91 MG/DL (ref 70–99)
HCT VFR BLDCO AUTO: 38.3 % (ref 35–45)
HGB BLD-MCNC: 11.9 G/DL (ref 11.8–15.7)
IMM GRANULOCYTES # BLD AUTO: 0.19 K/UL (ref 0–0.08)
IMM GRANULOCYTES NFR BLD AUTO: 1.3 %
LYMPHOCYTES # BLD: 1.11 K/UL (ref 1.2–3.5)
LYMPHOCYTES NFR BLD: 7.5 %
MAGNESIUM SERPL-MCNC: 2 MG/DL (ref 1.8–2.5)
MCH RBC QN AUTO: 26.9 PG (ref 28–33.2)
MCHC RBC AUTO-ENTMCNC: 31.1 G/DL (ref 32.2–35.5)
MCV RBC AUTO: 86.7 FL (ref 83–98)
MONOCYTES # BLD: 1.35 K/UL (ref 0.28–0.8)
MONOCYTES NFR BLD: 9.1 %
NEUTROPHILS # BLD: 11.97 K/UL (ref 1.7–7)
NEUTS SEG NFR BLD: 80.7 %
NRBC BLD-RTO: 0 %
PDW BLD AUTO: 10.4 FL (ref 9.4–12.3)
PHOSPHATE SERPL-MCNC: 2.5 MG/DL (ref 2.4–4.7)
PLATELET # BLD AUTO: 305 K/UL (ref 150–369)
POTASSIUM SERPL-SCNC: 3.5 MEQ/L (ref 3.6–5.1)
PROT SERPL-MCNC: 7.1 G/DL (ref 6–8.2)
RBC # BLD AUTO: 4.42 M/UL (ref 3.93–5.22)
SARS-COV-2 RNA RESP QL NAA+PROBE: NEGATIVE
SODIUM SERPL-SCNC: 133 MEQ/L (ref 136–144)
TROPONIN I SERPL HS-MCNC: 4.9 PG/ML
TROPONIN I SERPL HS-MCNC: 5.4 PG/ML
TSH SERPL DL<=0.05 MIU/L-ACNC: 2.97 MIU/L (ref 0.34–5.6)
WBC # BLD AUTO: 14.83 K/UL (ref 3.8–10.5)

## 2022-10-11 PROCEDURE — 25000000 HC PHARMACY GENERAL: Performed by: PHYSICIAN ASSISTANT

## 2022-10-11 PROCEDURE — 25800000 HC PHARMACY IV SOLUTIONS: Performed by: PHYSICIAN ASSISTANT

## 2022-10-11 PROCEDURE — U0002 COVID-19 LAB TEST NON-CDC: HCPCS | Performed by: PHYSICIAN ASSISTANT

## 2022-10-11 PROCEDURE — 83735 ASSAY OF MAGNESIUM: CPT | Performed by: HOSPITALIST

## 2022-10-11 PROCEDURE — 1123F ACP DISCUSS/DSCN MKR DOCD: CPT | Performed by: HOSPITALIST

## 2022-10-11 PROCEDURE — 63700000 HC SELF-ADMINISTRABLE DRUG: Performed by: HOSPITALIST

## 2022-10-11 PROCEDURE — 80053 COMPREHEN METABOLIC PANEL: CPT | Performed by: EMERGENCY MEDICINE

## 2022-10-11 PROCEDURE — 84484 ASSAY OF TROPONIN QUANT: CPT | Performed by: PHYSICIAN ASSISTANT

## 2022-10-11 PROCEDURE — 99222 1ST HOSP IP/OBS MODERATE 55: CPT | Performed by: HOSPITALIST

## 2022-10-11 PROCEDURE — 12000000 HC ROOM AND CARE MED/SURG

## 2022-10-11 PROCEDURE — 84443 ASSAY THYROID STIM HORMONE: CPT | Performed by: HOSPITALIST

## 2022-10-11 PROCEDURE — 87040 BLOOD CULTURE FOR BACTERIA: CPT | Performed by: PHYSICIAN ASSISTANT

## 2022-10-11 PROCEDURE — 84100 ASSAY OF PHOSPHORUS: CPT | Performed by: HOSPITALIST

## 2022-10-11 PROCEDURE — 36415 COLL VENOUS BLD VENIPUNCTURE: CPT

## 2022-10-11 PROCEDURE — 99285 EMERGENCY DEPT VISIT HI MDM: CPT | Mod: 25

## 2022-10-11 PROCEDURE — 63600000 HC DRUGS/DETAIL CODE: Performed by: PHYSICIAN ASSISTANT

## 2022-10-11 PROCEDURE — 82550 ASSAY OF CK (CPK): CPT | Performed by: HOSPITALIST

## 2022-10-11 PROCEDURE — 25800000 HC PHARMACY IV SOLUTIONS: Performed by: HOSPITALIST

## 2022-10-11 PROCEDURE — 93005 ELECTROCARDIOGRAM TRACING: CPT | Performed by: PHYSICIAN ASSISTANT

## 2022-10-11 PROCEDURE — 85025 COMPLETE CBC W/AUTO DIFF WBC: CPT | Performed by: EMERGENCY MEDICINE

## 2022-10-11 PROCEDURE — 96374 THER/PROPH/DIAG INJ IV PUSH: CPT | Mod: 59

## 2022-10-11 RX ORDER — BISACODYL 10 MG/1
10 SUPPOSITORY RECTAL DAILY PRN
Status: DISCONTINUED | OUTPATIENT
Start: 2022-10-11 | End: 2022-10-17 | Stop reason: HOSPADM

## 2022-10-11 RX ORDER — HEPARIN SODIUM 5000 [USP'U]/ML
5000 INJECTION, SOLUTION INTRAVENOUS; SUBCUTANEOUS
Status: DISCONTINUED | OUTPATIENT
Start: 2022-10-12 | End: 2022-10-17 | Stop reason: HOSPADM

## 2022-10-11 RX ORDER — DEXTROSE 50 % IN WATER (D50W) INTRAVENOUS SYRINGE
25 AS NEEDED
Status: DISCONTINUED | OUTPATIENT
Start: 2022-10-11 | End: 2022-10-17 | Stop reason: HOSPADM

## 2022-10-11 RX ORDER — OXYCODONE HYDROCHLORIDE 5 MG/1
10 TABLET ORAL EVERY 4 HOURS PRN
Status: DISCONTINUED | OUTPATIENT
Start: 2022-10-11 | End: 2022-10-17 | Stop reason: HOSPADM

## 2022-10-11 RX ORDER — VANCOMYCIN/0.9 % SOD CHLORIDE 1.5G/250ML
1.5 PLASTIC BAG, INJECTION (ML) INTRAVENOUS ONCE
Status: COMPLETED | OUTPATIENT
Start: 2022-10-11 | End: 2022-10-12

## 2022-10-11 RX ORDER — ACETAMINOPHEN 325 MG/1
650 TABLET ORAL EVERY 4 HOURS PRN
Status: DISCONTINUED | OUTPATIENT
Start: 2022-10-11 | End: 2022-10-17 | Stop reason: HOSPADM

## 2022-10-11 RX ORDER — IBUPROFEN 200 MG
16-32 TABLET ORAL AS NEEDED
Status: DISCONTINUED | OUTPATIENT
Start: 2022-10-11 | End: 2022-10-17 | Stop reason: HOSPADM

## 2022-10-11 RX ORDER — ONDANSETRON HYDROCHLORIDE 2 MG/ML
4 INJECTION, SOLUTION INTRAVENOUS EVERY 8 HOURS PRN
Status: DISCONTINUED | OUTPATIENT
Start: 2022-10-11 | End: 2022-10-16

## 2022-10-11 RX ORDER — CLOTRIMAZOLE 1 %
CREAM (GRAM) TOPICAL 2 TIMES DAILY
Status: DISCONTINUED | OUTPATIENT
Start: 2022-10-11 | End: 2022-10-17 | Stop reason: HOSPADM

## 2022-10-11 RX ORDER — POTASSIUM CHLORIDE 750 MG/1
40 TABLET, EXTENDED RELEASE ORAL ONCE
Status: COMPLETED | OUTPATIENT
Start: 2022-10-11 | End: 2022-10-11

## 2022-10-11 RX ORDER — HYDROMORPHONE HYDROCHLORIDE 1 MG/ML
1 INJECTION, SOLUTION INTRAMUSCULAR; INTRAVENOUS; SUBCUTANEOUS ONCE
Status: COMPLETED | OUTPATIENT
Start: 2022-10-11 | End: 2022-10-12

## 2022-10-11 RX ORDER — SODIUM CHLORIDE 9 MG/ML
INJECTION, SOLUTION INTRAVENOUS CONTINUOUS
Status: DISCONTINUED | OUTPATIENT
Start: 2022-10-11 | End: 2022-10-17 | Stop reason: HOSPADM

## 2022-10-11 RX ORDER — DEXTROSE 40 %
15-30 GEL (GRAM) ORAL AS NEEDED
Status: DISCONTINUED | OUTPATIENT
Start: 2022-10-11 | End: 2022-10-17 | Stop reason: HOSPADM

## 2022-10-11 RX ORDER — AMOXICILLIN 250 MG
1 CAPSULE ORAL 2 TIMES DAILY
Status: DISCONTINUED | OUTPATIENT
Start: 2022-10-11 | End: 2022-10-17 | Stop reason: HOSPADM

## 2022-10-11 RX ORDER — DIPHENHYDRAMINE HCL 50 MG/ML
25 VIAL (ML) INJECTION EVERY 6 HOURS PRN
Status: DISCONTINUED | OUTPATIENT
Start: 2022-10-11 | End: 2022-10-17 | Stop reason: HOSPADM

## 2022-10-11 RX ORDER — VANCOMYCIN HYDROCHLORIDE
1250
Status: DISCONTINUED | OUTPATIENT
Start: 2022-10-12 | End: 2022-10-13

## 2022-10-11 RX ORDER — POLYETHYLENE GLYCOL 3350 17 G/17G
17 POWDER, FOR SOLUTION ORAL DAILY PRN
Status: DISCONTINUED | OUTPATIENT
Start: 2022-10-11 | End: 2022-10-17 | Stop reason: HOSPADM

## 2022-10-11 RX ORDER — OXYCODONE HYDROCHLORIDE 5 MG/1
5 TABLET ORAL EVERY 4 HOURS PRN
Status: DISCONTINUED | OUTPATIENT
Start: 2022-10-11 | End: 2022-10-17 | Stop reason: HOSPADM

## 2022-10-11 RX ADMIN — POTASSIUM CHLORIDE 40 MEQ: 750 TABLET, EXTENDED RELEASE ORAL at 22:17

## 2022-10-11 RX ADMIN — VANCOMYCIN HYDROCHLORIDE 1500 MG: 500 INJECTION, POWDER, LYOPHILIZED, FOR SOLUTION INTRAVENOUS at 22:35

## 2022-10-11 RX ADMIN — SODIUM CHLORIDE 1000 ML: 9 INJECTION, SOLUTION INTRAVENOUS at 23:59

## 2022-10-11 RX ADMIN — SODIUM CHLORIDE: 9 INJECTION, SOLUTION INTRAVENOUS at 23:59

## 2022-10-11 ASSESSMENT — ENCOUNTER SYMPTOMS
SHORTNESS OF BREATH: 0
NAUSEA: 0
DIZZINESS: 0
SORE THROAT: 0
COUGH: 0
FEVER: 0
FLANK PAIN: 0
WEAKNESS: 0
COLOR CHANGE: 0
BACK PAIN: 0
TROUBLE SWALLOWING: 0
CHILLS: 0
PSYCHIATRIC NEGATIVE: 1
LIGHT-HEADEDNESS: 0
SEIZURES: 0
HEADACHES: 0
PALPITATIONS: 0
VOMITING: 0
ABDOMINAL PAIN: 0
WOUND: 1
CHEST TIGHTNESS: 0
NECK PAIN: 0
HEMATURIA: 0
DIARRHEA: 0
DYSURIA: 0

## 2022-10-11 NOTE — Clinical Note
Future Attending Provider: NAILA GUTIÉRREZ [[id number]]   Send to the following Provider Care Team:: [facility] TEAM 4 [[id number]]

## 2022-10-12 ENCOUNTER — APPOINTMENT (INPATIENT)
Dept: RADIOLOGY | Facility: HOSPITAL | Age: 62
End: 2022-10-12
Attending: HOSPITALIST
Payer: COMMERCIAL

## 2022-10-12 PROBLEM — M79.605 BILATERAL LEG PAIN: Status: ACTIVE | Noted: 2022-10-12

## 2022-10-12 PROBLEM — M79.604 BILATERAL LEG PAIN: Status: ACTIVE | Noted: 2022-10-12

## 2022-10-12 LAB
ANION GAP SERPL CALC-SCNC: 11 MEQ/L (ref 3–15)
ATRIAL RATE: 90
BASOPHILS # BLD: 0.08 K/UL (ref 0.01–0.1)
BASOPHILS NFR BLD: 0.5 %
BUN SERPL-MCNC: 34 MG/DL (ref 8–20)
CALCIUM SERPL-MCNC: 8.1 MG/DL (ref 8.9–10.3)
CHLORIDE SERPL-SCNC: 103 MEQ/L (ref 98–109)
CO2 SERPL-SCNC: 22 MEQ/L (ref 22–32)
CREAT SERPL-MCNC: 1.3 MG/DL (ref 0.6–1.1)
DIFFERENTIAL METHOD BLD: ABNORMAL
EOSINOPHIL # BLD: 0.16 K/UL (ref 0.04–0.36)
EOSINOPHIL NFR BLD: 1 %
ERYTHROCYTE [DISTWIDTH] IN BLOOD BY AUTOMATED COUNT: 15 % (ref 11.7–14.4)
GFR SERPL CREATININE-BSD FRML MDRD: 41.6 ML/MIN/1.73M*2
GLUCOSE BLD-MCNC: 108 MG/DL (ref 70–99)
GLUCOSE BLD-MCNC: 130 MG/DL (ref 70–99)
GLUCOSE BLD-MCNC: 156 MG/DL (ref 70–99)
GLUCOSE BLD-MCNC: 94 MG/DL (ref 70–99)
GLUCOSE BLOOD, POC: 94
GLUCOSE SERPL-MCNC: 96 MG/DL (ref 70–99)
HCT VFR BLDCO AUTO: 32.5 % (ref 35–45)
HGB BLD-MCNC: 10.1 G/DL (ref 11.8–15.7)
IMM GRANULOCYTES # BLD AUTO: 0.19 K/UL (ref 0–0.08)
IMM GRANULOCYTES NFR BLD AUTO: 1.2 %
LYMPHOCYTES # BLD: 1.5 K/UL (ref 1.2–3.5)
LYMPHOCYTES NFR BLD: 9.5 %
MCH RBC QN AUTO: 27.3 PG (ref 28–33.2)
MCHC RBC AUTO-ENTMCNC: 31.1 G/DL (ref 32.2–35.5)
MCV RBC AUTO: 87.8 FL (ref 83–98)
MONOCYTES # BLD: 2.26 K/UL (ref 0.28–0.8)
MONOCYTES NFR BLD: 14.4 %
NEUTROPHILS # BLD: 11.54 K/UL (ref 1.7–7)
NEUTS SEG NFR BLD: 73.4 %
NRBC BLD-RTO: 0 %
P AXIS: 44
PDW BLD AUTO: 10.6 FL (ref 9.4–12.3)
PLATELET # BLD AUTO: 213 K/UL (ref 150–369)
POCT TEST: ABNORMAL
POCT TEST: NORMAL
POTASSIUM SERPL-SCNC: 3.8 MEQ/L (ref 3.6–5.1)
PR INTERVAL: 136
QRS DURATION: 98
QT INTERVAL: 386
QTC CALCULATION(BAZETT): 472
R AXIS: 10
RBC # BLD AUTO: 3.7 M/UL (ref 3.93–5.22)
SODIUM SERPL-SCNC: 136 MEQ/L (ref 136–144)
T WAVE AXIS: 50
VENTRICULAR RATE: 90
WBC # BLD AUTO: 15.73 K/UL (ref 3.8–10.5)

## 2022-10-12 PROCEDURE — 63700000 HC SELF-ADMINISTRABLE DRUG: Performed by: HOSPITALIST

## 2022-10-12 PROCEDURE — 25000000 HC PHARMACY GENERAL: Performed by: HOSPITALIST

## 2022-10-12 PROCEDURE — 25800000 HC PHARMACY IV SOLUTIONS: Performed by: STUDENT IN AN ORGANIZED HEALTH CARE EDUCATION/TRAINING PROGRAM

## 2022-10-12 PROCEDURE — 99233 SBSQ HOSP IP/OBS HIGH 50: CPT | Performed by: STUDENT IN AN ORGANIZED HEALTH CARE EDUCATION/TRAINING PROGRAM

## 2022-10-12 PROCEDURE — 25800000 HC PHARMACY IV SOLUTIONS: Performed by: HOSPITALIST

## 2022-10-12 PROCEDURE — 85025 COMPLETE CBC W/AUTO DIFF WBC: CPT | Performed by: HOSPITALIST

## 2022-10-12 PROCEDURE — 63600000 HC DRUGS/DETAIL CODE: Performed by: HOSPITALIST

## 2022-10-12 PROCEDURE — 12000000 HC ROOM AND CARE MED/SURG

## 2022-10-12 PROCEDURE — 87205 SMEAR GRAM STAIN: CPT

## 2022-10-12 PROCEDURE — 63600000 HC DRUGS/DETAIL CODE: Performed by: STUDENT IN AN ORGANIZED HEALTH CARE EDUCATION/TRAINING PROGRAM

## 2022-10-12 PROCEDURE — 93970 EXTREMITY STUDY: CPT

## 2022-10-12 PROCEDURE — 87070 CULTURE OTHR SPECIMN AEROBIC: CPT

## 2022-10-12 PROCEDURE — 80048 BASIC METABOLIC PNL TOTAL CA: CPT | Performed by: HOSPITALIST

## 2022-10-12 PROCEDURE — 87077 CULTURE AEROBIC IDENTIFY: CPT

## 2022-10-12 PROCEDURE — 36415 COLL VENOUS BLD VENIPUNCTURE: CPT | Performed by: HOSPITALIST

## 2022-10-12 RX ORDER — IBUPROFEN 100 MG/5ML
1000 SUSPENSION, ORAL (FINAL DOSE FORM) ORAL DAILY
COMMUNITY

## 2022-10-12 RX ORDER — LEVOTHYROXINE SODIUM 75 UG/1
75 TABLET ORAL
Status: DISCONTINUED | OUTPATIENT
Start: 2022-10-12 | End: 2022-10-17 | Stop reason: HOSPADM

## 2022-10-12 RX ORDER — IRBESARTAN AND HYDROCHLOROTHIAZIDE 150; 12.5 MG/1; MG/1
1 TABLET, FILM COATED ORAL EVERY MORNING
COMMUNITY

## 2022-10-12 RX ORDER — DULOXETIN HYDROCHLORIDE 20 MG/1
20 CAPSULE, DELAYED RELEASE ORAL 2 TIMES DAILY
COMMUNITY

## 2022-10-12 RX ORDER — DULOXETIN HYDROCHLORIDE 20 MG/1
20 CAPSULE, DELAYED RELEASE ORAL 2 TIMES DAILY
Status: DISCONTINUED | OUTPATIENT
Start: 2022-10-12 | End: 2022-10-17 | Stop reason: HOSPADM

## 2022-10-12 RX ORDER — INSULIN GLARGINE 100 [IU]/ML
60 INJECTION, SOLUTION SUBCUTANEOUS DAILY
Status: DISCONTINUED | OUTPATIENT
Start: 2022-10-12 | End: 2022-10-14

## 2022-10-12 RX ORDER — ATORVASTATIN CALCIUM 20 MG/1
20 TABLET, FILM COATED ORAL
Status: DISCONTINUED | OUTPATIENT
Start: 2022-10-12 | End: 2022-10-17 | Stop reason: HOSPADM

## 2022-10-12 RX ORDER — INSULIN ASPART 100 [IU]/ML
2-5 INJECTION, SOLUTION INTRAVENOUS; SUBCUTANEOUS
Status: DISCONTINUED | OUTPATIENT
Start: 2022-10-12 | End: 2022-10-17 | Stop reason: HOSPADM

## 2022-10-12 RX ORDER — GABAPENTIN 600 MG/1
600 TABLET ORAL 3 TIMES DAILY
COMMUNITY

## 2022-10-12 RX ORDER — GABAPENTIN 300 MG/1
600 CAPSULE ORAL 3 TIMES DAILY
Status: DISCONTINUED | OUTPATIENT
Start: 2022-10-12 | End: 2022-10-17 | Stop reason: HOSPADM

## 2022-10-12 RX ORDER — ASCORBIC ACID 500 MG
1000 TABLET ORAL DAILY
Status: DISCONTINUED | OUTPATIENT
Start: 2022-10-12 | End: 2022-10-17 | Stop reason: HOSPADM

## 2022-10-12 RX ORDER — CHOLECALCIFEROL (VITAMIN D3) 25 MCG
1000 TABLET ORAL DAILY
Status: DISCONTINUED | OUTPATIENT
Start: 2022-10-12 | End: 2022-10-17 | Stop reason: HOSPADM

## 2022-10-12 RX ORDER — METHOCARBAMOL 750 MG/1
750 TABLET, FILM COATED ORAL 3 TIMES DAILY PRN
Status: DISCONTINUED | OUTPATIENT
Start: 2022-10-12 | End: 2022-10-17 | Stop reason: HOSPADM

## 2022-10-12 RX ADMIN — OXYCODONE HYDROCHLORIDE 5 MG: 5 TABLET ORAL at 18:12

## 2022-10-12 RX ADMIN — OXYCODONE HYDROCHLORIDE AND ACETAMINOPHEN 1000 MG: 500 TABLET ORAL at 07:44

## 2022-10-12 RX ADMIN — VANCOMYCIN HYDROCHLORIDE 1250 MG: 1 INJECTION, POWDER, LYOPHILIZED, FOR SOLUTION INTRAVENOUS at 10:43

## 2022-10-12 RX ADMIN — LEVOTHYROXINE SODIUM 75 MCG: 75 TABLET ORAL at 05:59

## 2022-10-12 RX ADMIN — HEPARIN SODIUM 5000 UNITS: 5000 INJECTION, SOLUTION INTRAVENOUS; SUBCUTANEOUS at 17:00

## 2022-10-12 RX ADMIN — ACETAMINOPHEN 650 MG: 325 TABLET, FILM COATED ORAL at 18:10

## 2022-10-12 RX ADMIN — HEPARIN SODIUM 5000 UNITS: 5000 INJECTION, SOLUTION INTRAVENOUS; SUBCUTANEOUS at 07:46

## 2022-10-12 RX ADMIN — CEFTRIAXONE 2 G: 2 INJECTION, POWDER, FOR SOLUTION INTRAMUSCULAR; INTRAVENOUS at 12:42

## 2022-10-12 RX ADMIN — ACETAMINOPHEN 650 MG: 325 TABLET, FILM COATED ORAL at 11:33

## 2022-10-12 RX ADMIN — GABAPENTIN 600 MG: 300 CAPSULE ORAL at 20:23

## 2022-10-12 RX ADMIN — CLOTRIMAZOLE: 1 CREAM TOPICAL at 07:46

## 2022-10-12 RX ADMIN — GABAPENTIN 600 MG: 300 CAPSULE ORAL at 07:43

## 2022-10-12 RX ADMIN — SODIUM CHLORIDE: 9 INJECTION, SOLUTION INTRAVENOUS at 18:20

## 2022-10-12 RX ADMIN — HEPARIN SODIUM 5000 UNITS: 5000 INJECTION, SOLUTION INTRAVENOUS; SUBCUTANEOUS at 01:18

## 2022-10-12 RX ADMIN — SENNOSIDES AND DOCUSATE SODIUM 1 TABLET: 50; 8.6 TABLET ORAL at 01:16

## 2022-10-12 RX ADMIN — CLOTRIMAZOLE: 1 CREAM TOPICAL at 01:19

## 2022-10-12 RX ADMIN — CLOTRIMAZOLE: 1 CREAM TOPICAL at 20:00

## 2022-10-12 RX ADMIN — Medication 1000 UNITS: at 07:44

## 2022-10-12 RX ADMIN — DULOXETINE HYDROCHLORIDE 20 MG: 20 CAPSULE, DELAYED RELEASE PELLETS ORAL at 05:59

## 2022-10-12 RX ADMIN — OXYCODONE HYDROCHLORIDE 5 MG: 5 TABLET ORAL at 11:33

## 2022-10-12 RX ADMIN — VANCOMYCIN HYDROCHLORIDE 1250 MG: 1 INJECTION, POWDER, LYOPHILIZED, FOR SOLUTION INTRAVENOUS at 22:01

## 2022-10-12 RX ADMIN — DULOXETINE HYDROCHLORIDE 20 MG: 20 CAPSULE, DELAYED RELEASE PELLETS ORAL at 20:22

## 2022-10-12 RX ADMIN — HYDROMORPHONE HYDROCHLORIDE 1 MG: 1 INJECTION, SOLUTION INTRAMUSCULAR; INTRAVENOUS; SUBCUTANEOUS at 00:02

## 2022-10-12 RX ADMIN — GABAPENTIN 600 MG: 300 CAPSULE ORAL at 16:59

## 2022-10-12 RX ADMIN — INSULIN GLARGINE 60 UNITS: 100 INJECTION, SOLUTION SUBCUTANEOUS at 17:03

## 2022-10-12 NOTE — CONSULTS
Podiatry Consult Note    Subjective     Xin Melendez is a 61 y.o. female who was admitted for Cellulitis of left lower extremity [L03.116]. Patient was referred by the primary team for management recommendations. Patient was admitted for left lower extremity cellulitis.  She has had lymphedema with occasional weeping to the posterior legs wound for the past 3 years.  She does not follow with any physicians for her wound care or lymphedema.  She typically just washes the wounds and applies gauze to the area and keep them clean.  She is unable to tolerate any compression dressings due to pain.  She has never had cellulitis of the extremities previously.  She states that her left leg started swelling about last Friday with increasing pain.  She later slid down the steps and her bed, causing superficial excoriations to the posterior aspect of the left leg.  She states that the infection may have began then.  She has associated generalized weakness with fatigue.  Last night, she was in the shower when she suddenly became weak and was unable to get up.  The ambulance was called and several firemen and paramedics had to cut away her shower door to get her off the floor.  Patient was then admitted to the hospital for management of her left lower extremity cellulitis. She states she can not tolerate any compression and therefore lymphedema therapy she would not tolerate.    Outside records reviewed..    Medical History:   Past Medical History:   Diagnosis Date    Back pain     COVID-19 vaccine series completed     Moderna Boster 10/2021    CTS (carpal tunnel syndrome)     DJD (degenerative joint disease)     Edema     Frozen shoulder     HL (hearing loss)     Hypertension     Hypothyroidism     Kidney stones     Lipid disorder     Lymphedema     Obesity     Sciatica     Stasis dermatitis     Type 2 diabetes mellitus (CMS/HCC)        Surgical History:   Past Surgical History:   Procedure Laterality Date     APPENDECTOMY      laparotomy     SECTION  1999    CYSTOSCOPY  2021    CYSTOSCOPY,INSERT URETERAL STENT    DILATION AND CURETTAGE OF UTERUS      when patient was age 26    OOPHORECTOMY      open RSO, right side secondary to torsion       Social History:   Social History     Social History Narrative    Lives with son, Edd, in a 2 story home has a chair lift.  She is .  She designates her close friend, Akila León, as an emergency medical proxy.  Akila can be reached at [phone].        The patient is currently on disability.  She previously worked as a  as well as an .        The patient tells me that she smoked socially in college for 4 years.    No current alcohol use.  She consumes alcohol socially in college only.    Denies substance use.        Patient currently using a walker for unsteady gait.           Family History:   Family History   Problem Relation Age of Onset    Heart attack Biological Father        Allergies: Adhesive tape-silicones and Cephalexin    Home Medications:  Not in a hospital admission.    Current Medications:    acetaminophen, 650 mg, oral, q4h PRN    ascorbic acid, 1,000 mg, oral, Daily    atorvastatin, 20 mg, oral, Daily (6p)    bisacodyL, 10 mg, rectal, Daily PRN    cholecalciferol (vitamin D3), 1,000 Units, oral, Daily    clotrimazole, , Topical, BID    glucose, 16-32 g of dextrose, oral, PRN **OR** dextrose, 15-30 g of dextrose, oral, PRN **OR** glucagon, 1 mg, intramuscular, PRN **OR** dextrose 50 % in water (D50), 25 mL, intravenous, PRN    diphenhydrAMINE, 25 mg, intravenous, q6h PRN    DULoxetine, 20 mg, oral, BID    [START ON 10/14/2022] exenatide microspheres, 2 mg, subcutaneous, Fri    gabapentin, 600 mg, oral, TID    heparin (porcine), 5,000 Units, subcutaneous, q8h INT    insulin aspart U-100, 2-5 Units, subcutaneous, With meals & nightly    insulin glargine U-100, 60 Units,  "subcutaneous, Daily    levothyroxine, 75 mcg, oral, Daily (6:30a)    methocarbamoL, 750 mg, oral, 3x daily PRN    ondansetron, 4 mg, intravenous, q8h PRN    oxyCODONE, 10 mg, oral, q4h PRN    oxyCODONE, 5 mg, oral, q4h PRN    polyethylene glycol, 17 g, oral, Daily PRN    sennosides-docusate sodium, 1 tablet, oral, BID    sodium chloride 0.9 %, , intravenous, Continuous    vancomycin, 1,250 mg, intravenous, q12h INT **AND** [] IV Vancomycin Therapy by Pharmacy Protocol, , , Once    acetaminophen (TYLENOL ARTHRITIS PAIN ORAL)    ascorbic acid (vitamin C)    aspirin    cholecalciferol, vitamin D3, (VITAMIN D3 ORAL)    DULoxetine    exenatide microspheres    gabapentin    TRESIBA FLEXTOUCH U-200    irbesartan-hydrochlorothiazide    levothyroxine    meloxicam    methocarbamoL    simvastatin    Labs  No new labs.    Imaging  I have reviewed the Imaging from the last 24 hrs.    Review of Systems  Pertinent items are noted in HPI.    Objective     Physicial Exam  Visit Vitals  BP (!) 119/53   Pulse 96   Temp 37.8 °C (100 °F)   Resp 20   Ht 1.626 m (5' 4\")   Wt (!) 156 kg (344 lb)   SpO2 99%   BMI 59.05 kg/m²         General appearance: alert, appears stated age and cooperative  Head: normocephalic, without obvious abnormality, atraumatic  Eyes: conjunctivae/corneas clear. PERRL, EOM's intact. Fundi benign.  Ears: normal TM's and external ear canals both ears  Nose: Nares normal. Septum midline. Mucosa normal. No drainage or sinus tenderness.    Physical Exam:  -Vascular: Unable to palpate pulses secondary to edema.  Bilateral DP pulses were strongly biphasic on Doppler.  CRT intact all digits.  severe edema and mild erythema with associated warmth noted to the left lower extremity comparison to the right.  -Orthopedic: decreased ROM at ankle jt b/l, +DF/PF all digits, morbidly enlarged lower extremities secondary to obesity and pitting edema.  Diffuse tenderness to palpation of the left lower " extremity.  -Neurologic: light touch and epicritic sensation intact.  -Dermatologic:   Bilateral lower extremity lymphedema with cobblestoning noted to the posterior aspect of both legs.  Moderate to heavy serous weeping noted to both lower extremities, left worse than right with mild maceration.  No deep wounds noted.  No crepitus or fluctuance.  No purulence.  Mild malodor noted.  To both lower extremities    Assessment 61-year-old female presents with bilateral lower extremity lymphedema, left lower extremity cellulitis    Plan   -Left lower extremity was dressed with Aquacel, gauze, ABD, checks and secured with tape.  Patient is unable to tolerate compression dressings due to pain.  Podiatry continue to monitor and perform dressing changes while patient is in-house.  Because she can not tolerate compression an unna boot is not an option  -Superficial wound culture of the left leg was taken.  Orders placed.  -Patient was started on vancomycin.  Antibiotics per ID.  -Weightbearing as clinically tolerated bilaterally.  -Rest of care per primary team.  - Labs and radiographs reviewed.  -Thank you for this consult, please contact on call podiatry resident with any questions or concerns      Ap Baltazar DPM PGY-2  Pager #[phone]

## 2022-10-12 NOTE — ED PROVIDER NOTES
Emergency Medicine Note  HPI   HISTORY OF PRESENT ILLNESS     HPI   Patient is a 61-year-old female with past medical history of hyperlipidemia type 2 diabetes lymphedema obesity kidney stones hypertension stasis dermatitis among other things is present emergency room today by EMS due to fall generalized fatigue weakness and worsening right lower extremity swelling and redness.  The patient tells me that over the last week she has noticed increasing fatigue and lack of energy.  She also reports that sometimes her lower extremities become red and more swollen however a lot of times to go away on their own this time reports her left lower extremity has become red and warm and been getting worse.  Patient denies any known fevers.  Patient reports tonight she was actually trying to get into the shower getting help and was too weak to get her left leg up into the shower had somebody help her and then ended up becoming very weak and sliding down slowly the wall.  Denies any injury from the fall.        Patient History   PAST HISTORY     Reviewed from Nursing Triage:  Tobacco  Allergies  Meds  Problems  Med Hx  Surg Hx  Fam Hx  Soc   Hx      Past Medical History:   Diagnosis Date    Back pain     COVID-19 vaccine series completed     Moderna Boster 10/2021    CTS (carpal tunnel syndrome)     DJD (degenerative joint disease)     Edema     Frozen shoulder     HL (hearing loss)     Hypertension     Hypothyroidism     Kidney stones     Lipid disorder     Lymphedema     Obesity     Sciatica     Stasis dermatitis     Type 2 diabetes mellitus (CMS/[facility])        Past Surgical History:   Procedure Laterality Date    APPENDECTOMY      laparotomy     SECTION  1999    CYSTOSCOPY  2021    CYSTOSCOPY,INSERT URETERAL STENT    DILATION AND CURETTAGE OF UTERUS      when patient was age 26    OOPHORECTOMY      open RSO, right side secondary to torsion       Family History   Problem Relation Age  of Onset    Heart attack Biological Father        Social History     Tobacco Use    Smoking status: Former     Types: Cigarettes     Quit date:      Years since quittin.8    Smokeless tobacco: Never    Tobacco comments:     Ex-smoker who smoked socially in college for approximately 4 years.   Substance Use Topics    Alcohol use: Not Currently     Comment: No current alcohol use, socially in college.    Drug use: Never         Review of Systems   REVIEW OF SYSTEMS     Review of Systems   Constitutional: Negative for chills and fever.   HENT: Negative for sore throat and trouble swallowing.    Respiratory: Negative for cough, chest tightness and shortness of breath.    Cardiovascular: Negative for chest pain and palpitations.   Gastrointestinal: Negative for abdominal pain, diarrhea, nausea and vomiting.   Genitourinary: Negative for dysuria, flank pain and hematuria.   Musculoskeletal: Negative for back pain and neck pain.   Skin: Positive for rash and wound. Negative for color change.   Neurological: Negative for dizziness, seizures, syncope, weakness, light-headedness and headaches.   Psychiatric/Behavioral: Negative.    All other systems reviewed and are negative.        VITALS     ED Vitals    Date/Time Temp Pulse Resp BP SpO2 [facility]   10/12/22 1015 -- 96 18 -- 96 %    10/12/22 0740 -- 96 -- 119/53 99 %    10/12/22 0607 37.8 °C (100 °F) 96 -- 128/61 97 %    10/12/22 0605 37.8 °C (100 °F) -- -- 128/68 --    10/12/22 0439 -- 88 -- 95/48 95 %    10/12/22 0257 -- 90 20 98/50 97 %    10/12/22 0243 -- -- -- 98/50 --    10/12/22 0128 -- -- -- -- 99 %    10/12/22 0115 -- 98 -- -- 90 %    10/12/22 0000 37.2 °C (98.9 °F) -- -- -- --    10/12/22 0000 -- 98 -- -- 97 %    10/11/22 2339 -- 96 -- 151/60 97 %    10/11/22 2230 -- 90 -- 119/48 95 %    10/11/22 2052 -- -- -- 110/50 --    10/11/22 2039 -- 88 -- -- 98 %    10/11/22 1945 -- 89 18 101/58 98 %    10/11/22 1941 36.9 °C (98.4  °F) -- -- -- -- AC        Pulse Ox %: 98 % (10/11/22 2047)  Pulse Ox Interpretation: Normal (10/11/22 2047)  Heart Rate: 89 (10/11/22 2047)  Rhythm Strip Interpretation: Normal Sinus Rhythm (10/11/22 2047)     Physical Exam   PHYSICAL EXAM     Physical Exam  Vitals and nursing note reviewed.   Constitutional:       General: She is not in acute distress.     Appearance: She is well-developed and well-nourished. She is not diaphoretic.   HENT:      Head: Normocephalic and atraumatic.   Cardiovascular:      Rate and Rhythm: Normal rate and regular rhythm.      Heart sounds: No murmur heard.  Pulmonary:      Effort: Pulmonary effort is normal. No respiratory distress.      Breath sounds: Normal breath sounds. No wheezing or rales.   Chest:      Chest wall: No tenderness.   Abdominal:      General: Bowel sounds are normal. There is no distension.      Palpations: Abdomen is soft.      Tenderness: There is no abdominal tenderness. There is no guarding or rebound.   Musculoskeletal:      Comments: Patient with very large lower extremities because of the lymphedema her left lower extremity is red warm and reportedly more swollen than normal reports her folds are deeper and bigger than normal.   Skin:     General: Skin is warm and dry.   Neurological:      Mental Status: She is alert and oriented to person, place, and time.      Cranial Nerves: No cranial nerve deficit.      Sensory: No sensory deficit.      Coordination: Coordination normal.   Psychiatric:         Mood and Affect: Mood and affect normal.         Behavior: Behavior normal.           PROCEDURES     Procedures     DATA     Results     Procedure Component Value Units Date/Time    SARS-CoV-2 (COVID-19), PCR Nasopharynx [[id number]]  (Normal) Collected: 10/11/22 2205    Specimen: Nasopharyngeal Swab from Nasopharynx Updated: 10/11/22 2243    Narrative:      The following orders were created for panel order SARS-CoV-2 (COVID-19), PCR Nasopharynx.  Procedure                                Abnormality         Status                     ---------                               -----------         ------                     SARS-CoV-2 (COVID-19), P...[[id number]]  Normal              Final result                 Please view results for these tests on the individual orders.    SARS-CoV-2 (COVID-19), PCR Nasopharynx [[id number]]  (Normal) Collected: 10/11/22 2205    Specimen: Nasopharyngeal Swab from Nasopharynx Updated: 10/11/22 2243     SARS-CoV-2 (COVID-19) Negative    Narrative:      Testing performed using real-time PCR for detection of COVID-19. EUA approved validation studies performed on site.     Blood Culture Blood, Venous [[id number]] Collected: 10/11/22 2032    Specimen: Blood, Venous Updated: 10/11/22 2215    HS Troponin I (with 2 hour reflex) [[id number]]  (Normal) Collected: 10/11/22 2032    Specimen: Blood, Venous Updated: 10/11/22 2118     High Sens Troponin I 5.40 pg/mL     CBC and differential [[id number]]  (Abnormal) Collected: 10/11/22 1944    Specimen: Blood, Venous Updated: 10/11/22 2039     WBC 14.83 K/uL      RBC 4.42 M/uL      Hemoglobin 11.9 g/dL      Hematocrit 38.3 %      MCV 86.7 fL      MCH 26.9 pg      MCHC 31.1 g/dL      RDW 14.7 %      Platelets 305 K/uL      MPV 10.4 fL      Differential Type Auto     nRBC 0.0 %      Immature Granulocytes 1.3 %      Neutrophils 80.7 %      Lymphocytes 7.5 %      Monocytes 9.1 %      Eosinophils 1.0 %      Basophils 0.4 %      Immature Granulocytes, Absolute 0.19 K/uL      Neutrophils, Absolute 11.97 K/uL      Lymphocytes, Absolute 1.11 K/uL      Monocytes, Absolute 1.35 K/uL      Eosinophils, Absolute 0.15 K/uL      Basophils, Absolute 0.06 K/uL     Blood Culture Blood, Venous [[id number]] Collected: 10/11/22 2032    Specimen: Blood, Venous Updated: 10/11/22 2034    Comprehensive metabolic panel [[id number]]  (Abnormal) Collected: 10/11/22 1944    Specimen: Blood, Venous Updated: 10/11/22 2020     Sodium 133 mEQ/L       Potassium 3.5 mEQ/L      Comment: Results obtained on plasma. Plasma Potassium values may be up to 0.4 mEQ/L less than serum values. The differences may be greater for patients with high platelet or white cell counts.  MODERATE HEMOLYSIS, RESULT MAY BE INCREASED.        Chloride 99 mEQ/L      CO2 21 mEQ/L      BUN 36 mg/dL      Creatinine 1.5 mg/dL      Glucose 91 mg/dL      Calcium 8.7 mg/dL      AST (SGOT) 18 IU/L      Comment: MODERATE HEMOLYSIS, RESULT MAY BE INCREASED.        ALT (SGPT) 12 IU/L      Comment: MODERATE HEMOLYSIS, RESULT MAY BE INCREASED.        Alkaline Phosphatase 84 IU/L      Total Protein 7.1 g/dL      Comment: Test performed on plasma which typically contains approximately 0.4 g/dL more protein than serum.        Albumin 3.0 g/dL      Bilirubin, Total 1.3 mg/dL      Comment: MODERATE HEMOLYSIS, RESULT MAY BE INCREASED.        eGFR 35.3 mL/min/1.73m*2      Anion Gap 13 mEQ/L           Imaging Results    None         ECG 12 lead             Scoring tools                                  ED Course & MDM   MDM / ED COURSE / CLINICAL IMPRESSION / DISPO     [facility]    ED Course as of 10/12/22 1513   Tue Oct 11, 2022   2342 Patient with leukocytosis here, COVID-negative, negative CK, flat troponins  Will admit for IV antibiotics. [PT]      ED Course User Index  [PT] Gregory Cottrell DO     Clinical Impression      Cellulitis of left lower extremity     Disposition  Admit / Observation         William Jarquin PA C  10/12/22 1515

## 2022-10-12 NOTE — ASSESSMENT & PLAN NOTE
Fall precautions  PT/OT rec SNF, patient agreeable  
Lower extremity ultrasound negative for DVT  
Patient's most recent HbA1c was performed on August 3, 2022 with her A1c at 5.5 noting excellent control with current diabetic regiment.  Adjusted insulin degludec    
The patient has concerns for a left lower extremity cellulitis with fungal infiltration along her back of her left knee.    ID following, DC on Doxycycline 100 BID, cefpodoxime 200 BID until 10/19/22  Also started clotrimazole topical cream for the fungal rash along the back of her posterior knee crease  Consulted podiatry to assist with wound care given the significant amount of lymphedema and hyperkeratotic skin which potentially may need debridement  Wound care, podiatry following  Lower extremity ultrasound negative for DVT  Soft tissue LE ultrasound negative for abscess or collection      
resolved  
resolved  Hydrated patient with intravenous fluids, encouraged PO hydration   Held patient's Avalide (irbesartan and hydrochlorothiazide) secondary to azotemia, now resumed        
DISPLAY PLAN FREE TEXT
DISPLAY PLAN FREE TEXT

## 2022-10-12 NOTE — PROGRESS NOTES
Vancomycin Dosing by Pharmacy Consult Initiated    Xin Melendez is a 61 y.o. female who has been consulted for vancomycin dosing for bacterial skin and skin structure infection prescribed by Luc Bowers MD.    Reviewed relevant clinical data including weight, renal function, previous vancomycin doses, and vancomycin levels:  Creatinine   Date/Time Value Ref Range Status   10/11/2022 1944 1.5 (H) 0.6 - 1.1 mg/dL Final     No results found for: VANCORANDOM, VANCOTROUGH, VANCOPEAK      Vancomycin Administrations (last 96 hours)       Date/Time Action Medication Dose Rate    10/11/22 2230 New Bag    vancomycin 1.5 g/500 mL IVPB in NSS 1,500 mg 250 mL/hr            Assessment/Plan  The patient is ordered vancomycin dosing by pharmacy.    The dose will be based on:         Wt Readings from Last 1 Encounters:   10/12/22 (!) 156 kg (344 lb)         Estimated Creatinine Clearance: 59.2 mL/min by (C-G formula)      Already received a loading dose 1500 mg IV x1 and will initiate maintenance dose of 1250 mg IV every 12 hours.    Target a trough of 10-15 ug/mL per vancomycin dosing per pharmacy order.  Pharmacy will continue to follow the patient's vancomycin dosing daily.      Please call vancomycin levels to the pharmacy.  Kezia Howard, RaheelD

## 2022-10-12 NOTE — ED ATTESTATION NOTE
I have personally seen and examined the patient.  I reviewed and agree with physician assistant / nurse practitioners assessment and plan of care, with the following exceptions: None  My examination, assessment, and plan of care of Xin Melendez is as follows:  61-year-old female presents to the emergency department via EMS for evaluation after a fall.  Patient states that her left lower extremity has been red for the past week.  She also states that she has felt generally weak.  Has a history of severe lymphedema.  Denies fevers, chest pain.  Denies vomiting.  States she has been unable to reliably ambulate secondary to pain and swelling in her leg.    Vital signs noted  Chronically ill-appearing 61-year-old female  Heart regular, lungs clear  Abdomen soft nontender  There is extensive lymphedema to bilateral lower extremities, but the left lower extremity distal to the knee shows ulceration over the gastroc, with significant skin weeping, induration.  Right lower extremity shows no cellulitic changes    Plan labs, IV antibiotics, likely admission.   presentation today seems much more consistent with cellulitis and stasis dermatitis with her significant asymmetric findings.     I was physically present for the key/critical portions of the following procedures: None       Gregory Cottrell DO  10/11/22 9483

## 2022-10-12 NOTE — H&P
"   Hospital Medicine Service -  History & Physical        CHIEF COMPLAINT   \"Both of my legs are painful and I can't walk. My left leg is red, swollen, and painful.\"     HISTORY OF PRESENT ILLNESS      Xin Melendez is a 61 y.o. female with a past medical history of super obesity with BMI 59.0.  Patient has a history of hypertension, hypothyroidism, type 2 diabetes mellitus.  She has a history of lumbar degenerative disc disease with chronic low back pain and occasional right-sided sciatica.  She is ambulatory dysfunction and uses a walker at baseline.    The patient presented to the emergency department with complaints \" both of my legs are painful and I cannot walk.\"  \"My left leg is red, swollen, and painful.\".    Patient further elaborates that she has had a 1 week history of left leg redness, warmth along with weeping from the back of her leg and difficulty ambulating due to the pain and discomfort in her legs.  She currently rates her pain in her legs as severe.  She tells me that she has been taking Tylenol arthritis twice a day as needed as well as Motrin 800 mg and sometimes 400 mg twice this week.  She denies any numbness or tingling.  She does report to me that she has chronic lymphedema, but has not seen a podiatrist \"in a while\".    Patient also complains to me that she has had generalized weakness over the course of the week with decrease energy levels, fatigue.  She tells me that over the last day, she has been feeling extremely weak.  She was sitting at the edge of her bed, slipped off her bed and onto the floor and was unable to get herself off the floor.  Today, she tells me that she was in the shower, and her legs gave out on her.  She slipped to the shower floor and was not able to get herself up.  911 was called and several firemen and paramedics had a cut away her shower door in order to get her off the floor.  The paramedics noted that her left leg appeared red, swollen and they brought her to " the emergency department for further evaluation.    Patient tells me that she has not had fever, chills or rigors.  She has had difficulty ambulating with her walker and has been unable to transfer herself out of bed or chair to use a walker.  She tells me that she lives with her son, Edd, but he is unable to help her due to her size and weight.    She was further evaluated by ER staff.  They diagnosed her with a left lower extremity cellulitis and started the patient on vancomycin.  They tell me that they also brandon blood cultures as the patient had complained of chills and occasional rigors in the emergency department during their evaluation.    Review of systems:    The patient reports to me that she has had constipation, last bowel movement approximately 2 days ago.  Patient complains of occasional stiffness in her low back.  Patient also experiences intermittent episodes of numbness and tingling in her hands from her carpal tunnel syndrome.    No rhinorrhea, sore throat, otalgia, postnasal drip.  No visual changes, headaches, dizziness, focal weakness, speech disturbances.  No dysphagia.  No cough or shortness of breath or dyspnea on exertion.  No chest pain, palpitations, orthopnea, paroxysmal nocturnal dyspnea.  No melena or bright red blood per rectum.  No abdominal pain, nausea or vomiting, diarrhea.  No dysuria or hematuria.  No flank pain.  No fever, chills, rigors.  No depression or anxiety. No suicidal thoughts or homicidal thoughts.  The patient denies saddle anesthesia, urinary retention and stool incontinence.    Other than what has been mentioned, the remainder of the review of systems otherwise negative.    PAST MEDICAL AND SURGICAL HISTORY        Past medical history: COVID 19 vaccination with Moderna and had booster.  Chronic lymphedema with stasis dermatitis and venous stasis.  History of bilateral urolithiasis.  Type 2 diabetes mellitus.  History of lumbar degenerative disc disease with lumbar  radiculopathy, and sciatica on the right side in her past.  Patient's had a history of carpal tunnel syndrome in both hands with intermittent episodes of numbness.  The patient's had a history of rotator cuff tendinopathy with adhesive capsulitis -improved significantly with physical therapy and Occupational Therapy.  Hypertension.  Acquired hypothyroidism.  History of right ovarian torsion.  History of endometrial intraepithelial neoplasia.      Past surgical history: Gamete intrafallopian transfer (GIFT) fertility treatment.  Laparoscopic appendectomy.   section x1.  History of cystoscopies with stone extraction and prior ureteral stent placement for ureteral lithiasis.  D&C x2 for evaluation of menorrhagia and Endometrial intraepithelial neoplasia.  Right oophorectomy for treatment of ovarian torsion.      MEDICATIONS      Prior to Admission medications    Medication Sig Start Date End Date Taking? Authorizing Provider   acetaminophen (TYLENOL ARTHRITIS PAIN ORAL) Take 2 tablets by mouth 2 (two) times a day as needed (pain).   Yes Suaypa Dow MD   ascorbic acid, vitamin C, (VITAMIN C) 1,000 mg tablet Take 1,000 mg by mouth daily.   Yes Suyapa Dow MD   aspirin 81 mg enteric coated tablet Take 81 mg by mouth daily.   Yes Fred Dow MD   cholecalciferol, vitamin D3, (VITAMIN D3 ORAL) Take 1,000 Units by mouth daily.   Yes Fred Dow MD   DULoxetine (CYMBALTA) 20 mg capsule Take 20 mg by mouth 2 (two) times a day.   Yes Suyapa Dow MD   exenatide microspheres 2 mg/0.85 mL auto-injector Inject 2 mg under the skin once a week on Friday. 21  Yes Fred oDw MD   gabapentin (NEURONTIN) 600 mg tablet Take 600 mg by mouth 3 (three) times a day.   Yes Suyapa Dow MD   insulin degludec U-200 CONC (TRESIBA FLEXTOUCH U-200) 200 unit/mL (3 mL) pen Inject 78 Units under the skin every evening.   3/30/21  Yes Fred Dow  MD   irbesartan-hydrochlorothiazide (AVALIDE) 150-12.5 mg per tablet Take 1 tablet by mouth daily.   Yes ProviderSuyapa MD   levothyroxine 75 mcg tablet Take 75 mcg by mouth daily.   Yes Fred Dow MD   meloxicam (MOBIC) 15 mg tablet Take 15 mg by mouth daily.   Yes Fred Dow MD           simvastatin 40 mg tablet Take 40 mg by mouth daily.   20  Yes Fred Dow MD                   DULoxetine 20 mg capsule Take 20 mg by mouth 2 (two) times a day.   7/6/21 10/12/22  Fred Dow MD   gabapentin 600 mg tablet Take 600 mg by mouth 3 (three) times a day.   7/6/21 10/12/22  Fred Dow MD                               ALLERGIES      Adhesive tape-silicones and Cephalexin    FAMILY HISTORY        Family history: Mother is alive at age 85.    Her father  of a heart attack at age 55.    SOCIAL HISTORY      Social History     Socioeconomic History    Marital status:      Spouse name: None    Number of children: 1    Years of education: None    Highest education level: None   Tobacco Use    Smoking status: Former     Types: Cigarettes     Quit date:      Years since quittin.8    Smokeless tobacco: Never    Tobacco comments:     Ex-smoker who smoked socially in college for approximately 4 years.   Substance and Sexual Activity    Alcohol use: Not Currently     Comment: No current alcohol use, socially in college.    Drug use: Never   Social History Narrative    Lives with son, Edd, in a 2 story home has a chair lift.  She is .  She designates her close friend, Akila León, as an emergency medical proxy.  Akila can be reached at [phone].        The patient is currently on disability.  She previously worked as a  as well as an .        The patient tells me that she smoked socially in college for 4 years.    No current alcohol use.  She consumes alcohol socially in college  only.    Denies substance use.        Patient currently using a walker for unsteady gait.         Social Determinants of Health     Food Insecurity: No Food Insecurity    Worried About Running Out of Food in the Last Year: Never true    Ran Out of Food in the Last Year: Never true       REVIEW OF SYSTEMS      All other systems reviewed and negative except as noted in HPI    PHYSICAL EXAMINATION      Temp:  [36.9 °C (98.4 °F)-37.2 °C (98.9 °F)] 37.2 °C (98.9 °F)  Heart Rate:  [88-98] 88  Resp:  [18-20] 20  BP: ()/(48-60) 95/48  Body mass index is 59.05 kg/m².    Physical Exam     General: Alert and oriented ×3, not in acute distress.  HEENT: Normocephalic atraumatic.  Pupils equal round reactive to light.  Extraocular movements are intact.  Sclera anicteric.  TMs are clear bilaterally.  Nares are patent.  Oropharynx is clear without exudates.  There is no sinus tenderness.  Dry mucous membranes.  Cardiovascular: S1, S2 without S3 or S4.  There is no murmurs, rubs, or gallops.  Pulses are 2+.  Neck: No jugular venous distention, no carotid bruits, no thyromegaly.  Pulmonary: Clear to auscultation bilaterally.  There is no wheezing, rhonchi, or crackles.  Abdomen: Soft, bowel sounds are present, nontender, nondistended.  No rebound or guarding.  Super obese.  Extremities: Bilateral lower extremity lymphedema.  Over the patient's pretibial skin on her left leg there is noted overlying erythema, warmth, tenderness to palpation as well as the edema.  Stasis dermatitis changes are noted with both legs.  Both legs are painful on palpation.  No cyanosis or lymphadenopathy.  Neurologic examination: Cranial nerves II through XII are grossly intact.  Sensation intact to light touch.  Power is noted to be equal and symmetric at 5 out of 5 in her upper extremities bilaterally.  Due to her lymphedema, patient is unable to participate in strength testing of her lower extremities as she is unable to lift both of her legs.   There is no pronator drift, slurring of speech or facial droop.  Babinski signs are downgoing bilaterally.  Deep tendon reflexes noted be equal and symmetric at 2+.  Finger to nose and heel to shin coordination or intact and symmetric.  Skin:  Over the patient's pretibial skin on her left leg there is noted overlying erythema, warmth, tenderness to palpation as well as the edema.  Stasis dermatitis changes are noted with both legs. Skin is warm, dry, well perfused.  Musculoskeletal: Full range of motion in all extremities without joint effusions or noted tenderness.  No CVA tenderness.  Both legs are painful on palpation.  Psychiatric: Appropriate mood and affect.  Normal insight and cognition.  No hallucinations, delusions, or suicidal thoughts.      LABS / IMAGING / EKG        Labs    Sodium 133.  Potassium 3.5.  Chloride 99.  Bicarb 21.  BUN 36.  Creatinine 1.5.  Glucose 91.  Calcium 8.7.  AST 18.  ALT 12.  Alkaline phosphatase 84.  Total protein 7.1.  Albumin 3.0.  Total bilirubin 1.3.  Anion gap 13.  Magnesium 2.0.  Phosphorus 2.5.    Total CK 63.    High-sensitivity troponins were 5.4 and 4.9, respectively.    TSH is normal at 2.97.    White cell count 14.83.  Hemoglobin 11.9.  Hematocrit 38.3.  MCV 86.7.  MCH 26.9.  MCHC 31.1.  RDW 14.7.  Platelet count 305.  Differential: 80.7% neutrophils, 7.5% lymphocytes, 9.1% monocytes, 1% eosinophils, 0.4% basophils.    Blood cultures pending.    COVID test negative.        ASSESSMENT AND PLAN           * Cellulitis of left lower extremity  Assessment & Plan  The patient has concerns for a left lower extremity cellulitis with fungal infiltration along her back of her left knee.    Admit patient to medical floor and [facility] team for further care  Start empiric antibiotics with vancomycin  Follow cultures  Consult infectious disease for anti-infective stewardship  Also start, clotrimazole topical cream for the fungal rash along the back of her posterior knee crease  I  reviewed patient's PDMP record and note no suspicious activity.  We will administer a one-time dose of IV Dilaudid to get on top of patient's severe pain at this time  Will transition back to nonnarcotics with Tylenol and consider oxycodone as needed for breakthrough moderate to severe pain.  Will start stool softeners and/or laxatives to help prevent narcotic induced constipation.  Consult podiatry to assist with wound care given the significant amount of lymphedema and hyperkeratotic skin which potentially may need debridement  Wound care  Check ultrasounds of her lower extremities to rule out DVT      Patient confirms that she is a full code.  She designates her friend as her emergency medical proxy, Akila León, who is reachable at [phone].  I offered to update her proxy tonight, but patient declined and told me that she will text Akila to update her.  All the patient's questions were answered to her satisfaction.    Bilateral leg pain  Assessment & Plan  Check ultrasounds of her lower extremities to r/o DVTs    Azotemia  Assessment & Plan  Hydrate patient with intravenous fluids.  Holding patient's Avalide (irbesartan and hydrochlorothiazide) secondary to azotemia  Holding NSAIDs as this will worsen her renal function.    Avoid nephrotoxins  Recheck renal function test and electrolytes in the morning          Hypokalemia  Assessment & Plan  I checked a magnesium level and reviewed and this is within normal limits.  Replete deficient potassium with oral K Dur.  Recheck electrolytes in the morning  Holding patient's Avalide at this time, given concerns of low electrolytes as well as azotemia    Ambulatory dysfunction  Assessment & Plan  Fall precautions  Obtain physical therapy and Occupational Therapy evaluation  Patient may potentially benefit from placement in a skilled nursing facility for rehabilitation as she has not been able to make transfers or get out of bed    Type 2 diabetes mellitus, with  long-term current use of insulin (CMS/[facility])  Assessment & Plan  I reviewed the patient's most recent HbA1c results.  This was performed on August 3, 2022 with her A1c at 5.5 noting excellent control with current diabetic regiment.  Will restart patient on slightly lower dose of long-acting insulin (tresiba is not on hospital formulary and will interchange to Lantus) given that the patient has not been eating much.  Perform Accu-Cheks.  Sliding scale insulin  Patient may use own Exenatide weekly on Friday as this is not on hospital formulary       VTE Assessment: Padua VTE Score: 5 - patient stated on Heparin for DVT prophylaxis.  Code Status: Full Code  Estimated discharge date: 10/15/2022     Luc Bowers MD  10/12/2022

## 2022-10-12 NOTE — PROGRESS NOTES
Hospital Medicine Service -  Daily Progress Note       SUBJECTIVE   Interval History: Seen and examined at bedside, no acute events overnight. Complains of leg pain whenever she moves, is concerned that she may need to go to rehab after the hospital.      OBJECTIVE      Vital signs in last 24 hours:  Temp:  [36.9 °C (98.4 °F)-37.8 °C (100 °F)] 37.8 °C (100 °F)  Heart Rate:  [88-98] 96  Resp:  [18-20] 18  BP: ()/(48-68) 119/53    Intake/Output Summary (Last 24 hours) at 10/12/2022 1407  Last data filed at 10/12/2022 1312  Gross per 24 hour   Intake 1600 ml   Output --   Net 1600 ml       PHYSICAL EXAMINATION      Physical Exam    GENERAL APPEARANCE Well developed, well nourished, AAOx3, NAD   MUCUS MEMBRANES Moist   LUNGS CTAB, no w/r/r, no signs of respiratory distress   CHEST S1/S2, RRR, no m/r/g appreciated   ABDOMEN  GENITOURINARY Soft, NT, ND, +BS  No suprapubic TTP   EXTREMITIES Severely swollen bilateral lower extremities with chronic changes consistent with lymphedema   SKIN LLE rash consistent with cellulitis   HEENT  NEURO Pupils equal, Anicteric  Follows commands, no dysarthria or facial asymmetry         LINES, CATHETERS, DRAINS, AIRWAYS, AND WOUNDS   Lines, Drains, and Airways:  Wounds (agree with documentation and present on admission):  Peripheral IV (Adult) 10/11/22 Anterior;Right Forearm (Active)   Number of days: 1         Comments:      LABS / IMAGING / TELE      Labs  I have reviewed the patient's labs to the time of note. No new clinical concern.    SARS-CoV-2 (COVID-19) (no units)   Date/Time Value   10/11/2022 2205 Negative       Imaging  I have independently reviewed the pertinent imaging from the last 24 hrs.    ECG/Telemetry  I have independently reviewed the telemetry. No events for the last 24 hours.    ASSESSMENT AND PLAN      Bilateral leg pain  Assessment & Plan  Lower extremity ultrasound negative for DVT    Type 2 diabetes mellitus, with long-term current use of insulin  (CMS/[facility])  Assessment & Plan  Patient's most recent HbA1c was performed on August 3, 2022 with her A1c at 5.5 noting excellent control with current diabetic regiment.  Will restart patient on slightly lower dose of long-acting insulin (tresiba is not on hospital formulary and will interchange to Lantus) given that the patient has not been eating much.  Perform Accu-Cheks.  Sliding scale insulin      Azotemia  Assessment & Plan  Hydrate patient with intravenous fluids.  Holding patient's Avalide (irbesartan and hydrochlorothiazide) secondary to azotemia  Holding NSAIDs as this will worsen her renal function.    Avoid nephrotoxins  Monitor BMP          Hypokalemia  Assessment & Plan  Replete as needed  Monitor BMP    Ambulatory dysfunction  Assessment & Plan  Fall precautions  Obtain physical therapy and Occupational Therapy evaluation  Patient may potentially benefit from placement in a skilled nursing facility for rehabilitation as she has not been able to make transfers or get out of bed    * Cellulitis of left lower extremity  Assessment & Plan  The patient has concerns for a left lower extremity cellulitis with fungal infiltration along her back of her left knee.    Admit patient to medical floor and [facility] team for further care  Start empiric antibiotics with vancomycin  Follow cultures  Also start clotrimazole topical cream for the fungal rash along the back of her posterior knee crease  Consult podiatry to assist with wound care given the significant amount of lymphedema and hyperkeratotic skin which potentially may need debridement  Wound care  Lower extremity ultrasound negative for DVT               VTE Assessment: Padua VTE Score: 5  VTE Prophylaxis:  Current anticoagulants:  heparin (porcine) 5,000 unit/mL injection 5,000 Units, subcutaneous, q8h INT      Code Status: Full Code      Estimated Discharge Date: 10/15/2022     Disposition Planning: Pending PT/OT martell Sky, DO  10/12/2022      >35 minutes were spent, mostly counseling and coordinating care

## 2022-10-12 NOTE — CONSULTS
Division of Infectious Diseases [phone]  Consultation Report    Patient Name: Xin Melendez  MR#: [id number]  : 1960  Admission Date: 10/11/2022  Consult Date: 10/12/22 11:46 AM   Consultant: Betzy Carter DO  Referring Provider: Everardo Sky DO  Reason for Consult: Left lower extremity cellulitis, Anti-infective stewardship    History of Present Illness:      61-year-old female with multiple comorbid conditions, including history of lymphedema complicated by chronic wounds who was admitted to [facility] on 10/11/2022 in the setting of evolving left-sided erythema and swelling.      Her symptoms started several days ago, when she started noticing that her left leg was even more swollen compared to its baseline with increased redness.  She stated over the weekend she experienced a mechanical fall and had worsening redness in the setting of a carpet burn.  She went to use her shower, expecting to clean off her leg, however in addition to the erythema not improving, she wound up having a fall in the shower, and wound up having to be lifted out of her bathroom by the fire department, and then was brought to the hospital via ambulance.    In the ER, patient was afebrile and hemodynamically stable.  Triage labs were notable for leukocytosis.  Lower extremity Dopplers of legs were unrevealing for deep vein thrombosis.  She was started on empiric IV vancomycin due to concern for left lower extremity cellulitis.    ID was consulted due to concern for cellulitis.  Chart reviewed.  Patient confirmed the story above.    She denied any fevers at home or any chills or rigors.  She denied nausea, vomiting, or diarrhea.  She endorsed profound weakness, and increased pain.  She denied dysuria.  She denied any new rashes elsewhere in her body.  Denied any coughing, respiratory distress, rhinorrhea, nasal congestion, or sinus pain.  She denied having a sore throat.    Allergies:   Allergies   Allergen  Reactions    Adhesive Tape-Silicones Rash    Cephalexin Rash     Medical History:   Past Medical History:   Diagnosis Date    Back pain     COVID-19 vaccine series completed     Moderna Boster 10/2021    CTS (carpal tunnel syndrome)     DJD (degenerative joint disease)     Edema     Frozen shoulder     HL (hearing loss)     Hypertension     Hypothyroidism     Kidney stones     Lipid disorder     Lymphedema     Obesity     Sciatica     Stasis dermatitis     Type 2 diabetes mellitus (CMS/HCC)      Surgical History:   Past Surgical History:   Procedure Laterality Date    APPENDECTOMY      laparotomy     SECTION  1999    CYSTOSCOPY  2021    CYSTOSCOPY,INSERT URETERAL STENT    DILATION AND CURETTAGE OF UTERUS      when patient was age 26    OOPHORECTOMY      open RSO, right side secondary to torsion     Social History     Tobacco Use    Smoking status: Former     Types: Cigarettes     Quit date:      Years since quittin.8    Smokeless tobacco: Never    Tobacco comments:     Ex-smoker who smoked socially in college for approximately 4 years.   Substance Use Topics    Alcohol use: Not Currently     Comment: No current alcohol use, socially in college.    Drug use: Never     Family History: noncontributory    Review of Systems:  As noted in HPI    Medications:  Current IP Meds (From admission, onward)        Frequency     exenatide microspheres auto-injector 2 mg         Weekly     atorvastatin (LIPITOR) tablet 20 mg         Daily (6p)     insulin glargine U-100 (LANTUS SOLOSTAR/BASAGLAR) pen 60 Units        Note to Pharmacy: Patient takes her long-acting insulin everyday at 1530 pm at advice of her endocrinologist, Dr. Reyes    Daily     cefTRIAXone (ROCEPHIN) IVPB 2 g in 100 mL NSS vial in bag        Note to Pharmacy: Antibiotic does not cross-react with Keflex, would not anticipate adverse reaction on antibiotic.  Patient agreeable to receive this antibiotic.     Every 24 hours interval     vancomycin 1.25 gram/250 mL IVPB in NSS  (Vancomycin IV therapy by Pharmacy Protocol)        See Hyperspace for full Linked Orders Report.    Every 12 hours interval     ascorbic acid (VITAMIN C) tablet 1,000 mg         Daily     cholecalciferol (vitamin D3) tablet 1,000 Units         Daily     gabapentin (NEURONTIN) capsule 600 mg         3 times daily     insulin aspart U-100 (NovoLOG) pen 2-5 Units         4 times daily with meals and nightly     levothyroxine (SYNTHROID) tablet 75 mcg         Daily (6:30a)     DULoxetine (CYMBALTA) capsule,delayed release(DR/EC) 20 mg         2 times daily     methocarbamoL (ROBAXIN) tablet 750 mg         3 times daily PRN     heparin (porcine) 5,000 unit/mL injection 5,000 Units  (Assessed at increased VTE risk (Padua score greater than or equal to 4))         Every 8 hours interval     oxyCODONE (ROXICODONE) immediate release tablet 5 mg         Every 4 hours PRN     oxyCODONE (ROXICODONE) immediate release tablet 10 mg         Every 4 hours PRN     sodium chloride 0.9 % bolus 1,000 mL         Once     sodium chloride 0.9 % infusion         Continuous     HYDROmorphone (DILAUDID) 1 mg/mL injection 1 mg         Once     sennosides-docusate sodium (SENOKOT-S) 8.6-50 mg per tablet 1 tablet         2 times daily     clotrimazole (LOTRIMIN) 1 % topical cream         2 times daily     diphenhydrAMINE (BENADRYL) injection 25 mg  (Itching/Pruritis)         Every 6 hours PRN     bisacodyL (DULCOLAX) 10 mg suppository 10 mg  (Constipation)         Daily PRN     ondansetron (ZOFRAN) injection 4 mg  (Nausea/Vomiting)         Every 8 hours PRN     polyethylene glycol (MIRALAX) 17 gram packet 17 g         Daily PRN     acetaminophen (TYLENOL) tablet 650 mg  (Analgesics - Acetaminophen pain or fever)         Every 4 hours PRN     glucose chewable tablet 16-32 g of dextrose  (Hypoglycemia Treatment Protocol and Hyperglycemia Validation Protocol)        See  "Hyperspace for full Linked Orders Report.    As needed     dextrose 40 % oral gel 15-30 g of dextrose  (Hypoglycemia Treatment Protocol and Hyperglycemia Validation Protocol)        Note to Pharmacy: Pharmacist: [facility] is the depot location for this medication.   See Hyperspace for full Linked Orders Report.    As needed     glucagon (GLUCAGEN) injection 1 mg  (Hypoglycemia Treatment Protocol and Hyperglycemia Validation Protocol)        See Hyperspace for full Linked Orders Report.    As needed     dextrose 50 % in water (D50) injection 12.5 g  (Hypoglycemia Treatment Protocol and Hyperglycemia Validation Protocol)        See Hyperspace for full Linked Orders Report.    As needed     vancomycin 1.5 g/500 mL IVPB in NSS         Once     potassium chloride (KLOR-CON M) ER tablet (particles/crystals) 40 mEq         Once        Physical Examination:  Vital signs reviewed  Temp (24hrs), Av.4 °C (99.3 °F), Min:36.9 °C (98.4 °F), Max:37.8 °C (100 °F)    Visit Vitals  BP (!) 119/53   Pulse 96   Temp 37.8 °C (100 °F)   Resp 18   Ht 1.626 m (5' 4\")   Wt (!) 156 kg (344 lb)   SpO2 96%   BMI 59.05 kg/m²       General: no acute distress, non-toxic appearing  Head: normocephalic, atraumatic  ENMT: anicteric, dry mucous membranes  Neck: supple  Heart: Tachycardic, no murmurs auscultated  Lungs: no tachypnea.  No wheezes/rhonchi/crackles auscultated.  Abdomen: Obese abdomen, no tenderness to palpation  Skin: Profound bright red erythema with superimposed yellow crusts over left lower extremity (refer to picture scanned into clinical media), subtle tinea pedis of feet bilaterally, marked chronic venous stasis changes of right lower extremity  Hematologic: no bruising  Extremities: Profound lymphedema of lower extremities bilaterally-left more pronounced than right with associated buffalo hump on dorsum of feet  Neurological: Answers questions appropriately, follows commands,     Labs:  Lab Results   Component Value Date    WBC " 15.73 (H) 10/12/2022    HGB 10.1 (L) 10/12/2022    HCT 32.5 (L) 10/12/2022    MCV 87.8 10/12/2022     10/12/2022     Lab Results   Component Value Date    GLUCOSE 96 10/12/2022    CALCIUM 8.1 (L) 10/12/2022     10/12/2022    K 3.8 10/12/2022    CO2 22 10/12/2022     10/12/2022    BUN 34 (H) 10/12/2022    CREATININE 1.3 (H) 10/12/2022     Lab Results   Component Value Date    ALT 12 10/11/2022    AST 18 10/11/2022    ALKPHOS 84 10/11/2022    BILITOT 1.3 (H) 10/11/2022       Microbiology:    10/11/2022 COVID-19 PCR: Negative  10/11/2022 blood cultures: In process    Imaging:     10/12/2022 bilateral lower extremity doppler venous ultrasound:  No evidence of deep vein thrombosis in lower extremities bilaterally    Antimicrobial Therapy Timeout:  Treatment Indication: Cellulitis  Active Therapy:  -IV vancomycin per pharmacy protocol-10/11-present-day 2 of therapy  -IV ceftriaxone 2 g every 24 hours-to be initiated 10/12-day 1 of therapy    Oral antibiotic transition date: To be determined  Anticipated stop date: To be determined  Supportive microbiology: Empiric    Assessment:    Sepsis POA, left lower extremity cellulitis  Patient presents with tachycardia and leukocytosis in the setting of a left lower extremity cellulitis    [place] likely underlying venous stasis changes/lymphedema -makes her more prone to skin/soft tissue infections    Leukocytosis  Consistent with acute infection    Acute kidney injury  Estimated Creatinine Clearance: 68.3 mL/min (A) (by C-G formula based on SCr of 1.3 mg/dL (H)).   Improving  All antimicrobial therapy is being dosed appropriately based on degree of renal function    Documented cephalexin allergy  Patient admitted to having a rash when taking cephalexin many years ago  She stated that she has amoxicillin-clavulanate without any major adverse reactions  Ceftriaxone unlikely to have a cross reaction with cephalexin/do not have a high clinical index of suspicion for  major adverse reaction on this antibiotic -patient agreeable to receive it during hospital stay      Plan:  -Continue IV vancomycin per pharmacy protocol  -IV ceftriaxone 2 g every 24 hours initiated (slightly closer monitoring while in hospital) -if patient tolerates medication, and addendum will be placed in patient chart  -All admission blood cultures for completeness  -Wound care and podiatry following  -Trend CBC with differential and fever curve  -All other chronic medical conditions and supportive care per primary service    Thank you for the courtesy of the consultation request. Please contact me if you have any questions about this case.     Betzy Carter, DO  Infectious Diseases

## 2022-10-13 ENCOUNTER — APPOINTMENT (INPATIENT)
Dept: RADIOLOGY | Facility: HOSPITAL | Age: 62
End: 2022-10-13
Payer: COMMERCIAL

## 2022-10-13 LAB
ANION GAP SERPL CALC-SCNC: 8 MEQ/L (ref 3–15)
BUN SERPL-MCNC: 24 MG/DL (ref 8–20)
CALCIUM SERPL-MCNC: 8.2 MG/DL (ref 8.9–10.3)
CHLORIDE SERPL-SCNC: 103 MEQ/L (ref 98–109)
CO2 SERPL-SCNC: 24 MEQ/L (ref 22–32)
CREAT SERPL-MCNC: 0.8 MG/DL (ref 0.6–1.1)
ERYTHROCYTE [DISTWIDTH] IN BLOOD BY AUTOMATED COUNT: 15 % (ref 11.7–14.4)
GFR SERPL CREATININE-BSD FRML MDRD: >60 ML/MIN/1.73M*2
GLUCOSE BLD-MCNC: 110 MG/DL (ref 70–99)
GLUCOSE BLD-MCNC: 111 MG/DL (ref 70–99)
GLUCOSE BLD-MCNC: 135 MG/DL (ref 70–99)
GLUCOSE BLD-MCNC: 94 MG/DL (ref 70–99)
GLUCOSE SERPL-MCNC: 110 MG/DL (ref 70–99)
HCT VFR BLDCO AUTO: 29.9 % (ref 35–45)
HGB BLD-MCNC: 9.1 G/DL (ref 11.8–15.7)
MCH RBC QN AUTO: 26.8 PG (ref 28–33.2)
MCHC RBC AUTO-ENTMCNC: 30.4 G/DL (ref 32.2–35.5)
MCV RBC AUTO: 88.2 FL (ref 83–98)
PDW BLD AUTO: 10.1 FL (ref 9.4–12.3)
PLATELET # BLD AUTO: 260 K/UL (ref 150–369)
POCT TEST: ABNORMAL
POCT TEST: NORMAL
POTASSIUM SERPL-SCNC: 3.1 MEQ/L (ref 3.6–5.1)
RBC # BLD AUTO: 3.39 M/UL (ref 3.93–5.22)
SODIUM SERPL-SCNC: 135 MEQ/L (ref 136–144)
WBC # BLD AUTO: 14.62 K/UL (ref 3.8–10.5)

## 2022-10-13 PROCEDURE — 97162 PT EVAL MOD COMPLEX 30 MIN: CPT | Mod: GP,U8

## 2022-10-13 PROCEDURE — 76882 US LMTD JT/FCL EVL NVASC XTR: CPT

## 2022-10-13 PROCEDURE — 63700000 HC SELF-ADMINISTRABLE DRUG: Performed by: HOSPITALIST

## 2022-10-13 PROCEDURE — 36415 COLL VENOUS BLD VENIPUNCTURE: CPT | Performed by: STUDENT IN AN ORGANIZED HEALTH CARE EDUCATION/TRAINING PROGRAM

## 2022-10-13 PROCEDURE — 99233 SBSQ HOSP IP/OBS HIGH 50: CPT | Performed by: STUDENT IN AN ORGANIZED HEALTH CARE EDUCATION/TRAINING PROGRAM

## 2022-10-13 PROCEDURE — 97535 SELF CARE MNGMENT TRAINING: CPT | Mod: GO,U8

## 2022-10-13 PROCEDURE — 63700000 HC SELF-ADMINISTRABLE DRUG: Performed by: STUDENT IN AN ORGANIZED HEALTH CARE EDUCATION/TRAINING PROGRAM

## 2022-10-13 PROCEDURE — 97166 OT EVAL MOD COMPLEX 45 MIN: CPT | Mod: GO,U8

## 2022-10-13 PROCEDURE — 63600000 HC DRUGS/DETAIL CODE: Performed by: STUDENT IN AN ORGANIZED HEALTH CARE EDUCATION/TRAINING PROGRAM

## 2022-10-13 PROCEDURE — 25800000 HC PHARMACY IV SOLUTIONS: Performed by: STUDENT IN AN ORGANIZED HEALTH CARE EDUCATION/TRAINING PROGRAM

## 2022-10-13 PROCEDURE — 97530 THERAPEUTIC ACTIVITIES: CPT | Mod: GP,U8

## 2022-10-13 PROCEDURE — 80048 BASIC METABOLIC PNL TOTAL CA: CPT | Performed by: STUDENT IN AN ORGANIZED HEALTH CARE EDUCATION/TRAINING PROGRAM

## 2022-10-13 PROCEDURE — 25800000 HC PHARMACY IV SOLUTIONS: Performed by: HOSPITALIST

## 2022-10-13 PROCEDURE — 85027 COMPLETE CBC AUTOMATED: CPT | Performed by: STUDENT IN AN ORGANIZED HEALTH CARE EDUCATION/TRAINING PROGRAM

## 2022-10-13 PROCEDURE — 97530 THERAPEUTIC ACTIVITIES: CPT | Mod: GO,U8

## 2022-10-13 PROCEDURE — 63600000 HC DRUGS/DETAIL CODE: Performed by: HOSPITALIST

## 2022-10-13 PROCEDURE — 12000000 HC ROOM AND CARE MED/SURG

## 2022-10-13 RX ORDER — POTASSIUM CHLORIDE 750 MG/1
20 TABLET, EXTENDED RELEASE ORAL AS NEEDED
Status: DISCONTINUED | OUTPATIENT
Start: 2022-10-13 | End: 2022-10-17 | Stop reason: HOSPADM

## 2022-10-13 RX ORDER — POTASSIUM CHLORIDE 750 MG/1
40 TABLET, EXTENDED RELEASE ORAL AS NEEDED
Status: DISCONTINUED | OUTPATIENT
Start: 2022-10-13 | End: 2022-10-17 | Stop reason: HOSPADM

## 2022-10-13 RX ADMIN — DULOXETINE HYDROCHLORIDE 20 MG: 20 CAPSULE, DELAYED RELEASE PELLETS ORAL at 08:50

## 2022-10-13 RX ADMIN — HEPARIN SODIUM 5000 UNITS: 5000 INJECTION, SOLUTION INTRAVENOUS; SUBCUTANEOUS at 16:38

## 2022-10-13 RX ADMIN — HEPARIN SODIUM 5000 UNITS: 5000 INJECTION, SOLUTION INTRAVENOUS; SUBCUTANEOUS at 00:55

## 2022-10-13 RX ADMIN — CEFTRIAXONE 2 G: 2 INJECTION, POWDER, FOR SOLUTION INTRAMUSCULAR; INTRAVENOUS at 12:22

## 2022-10-13 RX ADMIN — OXYCODONE HYDROCHLORIDE 5 MG: 5 TABLET ORAL at 19:22

## 2022-10-13 RX ADMIN — Medication 1000 UNITS: at 08:50

## 2022-10-13 RX ADMIN — SENNOSIDES AND DOCUSATE SODIUM 1 TABLET: 50; 8.6 TABLET ORAL at 08:50

## 2022-10-13 RX ADMIN — ACETAMINOPHEN 650 MG: 325 TABLET, FILM COATED ORAL at 07:44

## 2022-10-13 RX ADMIN — VANCOMYCIN HYDROCHLORIDE 1000 MG: 1 INJECTION, POWDER, LYOPHILIZED, FOR SOLUTION INTRAVENOUS at 18:30

## 2022-10-13 RX ADMIN — INSULIN GLARGINE 60 UNITS: 100 INJECTION, SOLUTION SUBCUTANEOUS at 18:22

## 2022-10-13 RX ADMIN — HEPARIN SODIUM 5000 UNITS: 5000 INJECTION, SOLUTION INTRAVENOUS; SUBCUTANEOUS at 08:51

## 2022-10-13 RX ADMIN — GABAPENTIN 600 MG: 300 CAPSULE ORAL at 13:08

## 2022-10-13 RX ADMIN — CLOTRIMAZOLE: 1 CREAM TOPICAL at 20:55

## 2022-10-13 RX ADMIN — VANCOMYCIN HYDROCHLORIDE 1250 MG: 1 INJECTION, POWDER, LYOPHILIZED, FOR SOLUTION INTRAVENOUS at 10:30

## 2022-10-13 RX ADMIN — GABAPENTIN 600 MG: 300 CAPSULE ORAL at 20:57

## 2022-10-13 RX ADMIN — POTASSIUM CHLORIDE 40 MEQ: 750 TABLET, EXTENDED RELEASE ORAL at 13:07

## 2022-10-13 RX ADMIN — OXYCODONE HYDROCHLORIDE AND ACETAMINOPHEN 1000 MG: 500 TABLET ORAL at 08:50

## 2022-10-13 RX ADMIN — DULOXETINE HYDROCHLORIDE 20 MG: 20 CAPSULE, DELAYED RELEASE PELLETS ORAL at 20:57

## 2022-10-13 RX ADMIN — SODIUM CHLORIDE: 9 INJECTION, SOLUTION INTRAVENOUS at 10:20

## 2022-10-13 RX ADMIN — LEVOTHYROXINE SODIUM 75 MCG: 75 TABLET ORAL at 05:46

## 2022-10-13 RX ADMIN — ACETAMINOPHEN 650 MG: 325 TABLET, FILM COATED ORAL at 19:21

## 2022-10-13 RX ADMIN — SODIUM CHLORIDE: 9 INJECTION, SOLUTION INTRAVENOUS at 10:19

## 2022-10-13 RX ADMIN — CLOTRIMAZOLE: 1 CREAM TOPICAL at 09:02

## 2022-10-13 RX ADMIN — SODIUM CHLORIDE: 9 INJECTION, SOLUTION INTRAVENOUS at 03:42

## 2022-10-13 RX ADMIN — GABAPENTIN 600 MG: 300 CAPSULE ORAL at 08:50

## 2022-10-13 ASSESSMENT — COGNITIVE AND FUNCTIONAL STATUS - GENERAL
DRESSING REGULAR UPPER BODY CLOTHING: 2 - A LOT
HELP NEEDED FOR BATHING: 2 - A LOT
AFFECT: ANXIOUS
STANDING UP FROM CHAIR USING ARMS: 3 - A LITTLE
MOVING TO AND FROM BED TO CHAIR: 2 - A LOT
DRESSING REGULAR LOWER BODY CLOTHING: 1 - TOTAL
TOILETING: 1 - TOTAL
AFFECT: ANXIOUS
WALKING IN HOSPITAL ROOM: 2 - A LOT
EATING MEALS: 4 - NONE
HELP NEEDED FOR PERSONAL GROOMING: 3 - A LITTLE
CLIMB 3 TO 5 STEPS WITH RAILING: 1 - TOTAL

## 2022-10-13 NOTE — PLAN OF CARE
Problem: Adult Inpatient Plan of Care  Goal: Plan of Care Review  Outcome: Progressing  Flowsheets (Taken 10/13/2022 1304)  Progress: improving  Plan of Care Reviewed With: patient  Outcome Summary: OT martell completed. Rec d/c SNF

## 2022-10-13 NOTE — PLAN OF CARE
Problem: Adult Inpatient Plan of Care  Goal: Readiness for Transition of Care  Intervention: Mutually Develop Transition Plan  Flowsheets (Taken 10/13/2022 3969)  Anticipated Discharge Disposition: skilled nursing facility  Equipment Needed After Discharge: none  Discharge Coordination/Progress: SW met w/ pt in room. See below.  Assistive Device/Animal Currently Used at Home:   glucometer   walker, front-wheeled   stair glide   shower chair  Outpatient/Agency/Support Group Needs: skilled nursing facility  Readmission Within the Last 30 Days: no previous admission in last 30 days  Patient/Family Anticipated Services at Transition: skilled nursing  Type of Skilled Nursing Care Services:   nursing   PT   OT    Pt confirmed demographic info, PCP, and pharmacy in the EMR.  Pt states she lives w/ her son.  She states there are 2 LEROY the home, but has a 2nd floor set up for which she has a stair glide.  PT evaled pt and are recommending pt for SNF.  Pt agreed to SNF w/ SW.  SW informed [facility] that pt is for SNF and has [employer] as her insurance. [facility] confirmed she would send out referrals.  SW to follow for d/c planning to SNF.    DCP: SNF   Pt will need [facility] SUES

## 2022-10-13 NOTE — PROGRESS NOTES
"Patient:  Xin Melendez  Location:  [facility]  MRN:  [id number]  Today's date:  10/13/2022     Start: RN aware and cleared for therapy. R'cved in bed , agreeable to OT session    End:  Pt left in bed w/ alarm activated - chair position. Call bell and personal items within reach. NAD and VSS. Nurse notified of session progress/outcome.      Xin is a 61 y.o. female admitted on 10/11/2022 with Bilateral leg pain [M79.604, M79.605]  Leg pain, left [M79.605]  Cellulitis of left lower extremity [L03.116]. Principal problem is Cellulitis of left lower extremity.    Past Medical History  Xin has a past medical history of Back pain, COVID-19 vaccine series completed, CTS (carpal tunnel syndrome), DJD (degenerative joint disease), Edema, Frozen shoulder, HL (hearing loss), Hypertension, Hypothyroidism, Kidney stones, Lipid disorder, Lymphedema, Obesity, Sciatica, Stasis dermatitis, and Type 2 diabetes mellitus (CMS/[facility]).    History of Present Illness   Xin Melendez is a 61 y.o. female with a past medical history of morbid obesity with BMI 59.0.  Patient has a history of hypertension, hypothyroidism, type 2 diabetes mellitus.  She has a history of lumbar degenerative disc disease with chronic low back pain and occasional right-sided sciatica.  She is ambulatory dysfunction and uses a walker at baseline.     The patient presented to the emergency department with complaints \" both of my legs are painful and I cannot walk.\"  \"My left leg is red, swollen, and painful\" - LLE cellulitis. BLE U.S. (-) DVT      OT Vitals    Date/Time Pulse HR Source SpO2 Pt Activity O2 Therapy BP BP Location BP Method Pt Position Observations [facility]   10/13/22 1048 92 Monitor 91 % improved to 94 with deep breathing At rest None (Room air) 115/56 Left upper arm Automatic Lying -- DM   10/13/22 1120 -- -- 93 % At rest None (Room air) -- -- -- -- after activity DM      OT Pain    Date/Time Pain Type Side/Orientation Location Rating: " Rest Rating: Activity Rating: Rest Rating: Activity Interventions [facility]   10/13/22 1048 Pain Assessment left leg 6 7 -- -- position adjusted DM   10/13/22 1100 Pain Assessment left leg -- -- 2 - mild pain 2 - mild pain -- LAG          Prior Living Environment    Flowsheet Row Most Recent Value   People in Home child(greta), adult   Current Living Arrangements home   Home Accessibility stairs to enter home (Group), stairs within home (Group)   Living Environment Comment lives in 2 story house with her son,  2 LEROY with no rail,  bed/ bath on 2nd floor,  son performs IADLs,  uses instacart for groceries,  has stall shower with SC,  no GBs        Prior Level of Function    Flowsheet Row Most Recent Value   Dominant Hand right   Ambulation assistive equipment   Transferring assistive equipment   Toileting independent   Bathing independent   Dressing independent   Eating independent   Communication understands/communicates without difficulty   Prior Level of Function Comment Per pt report she was IND w/ all ADLs and used a RW for ambulation        Occupational Profile    Flowsheet Row Most Recent Value   Reason for Services/Referral Pt adm w/ BLE pain and cellulitis of LLE   Environmental Supports and Barriers From home with son           OT Evaluation and Treatment - 10/13/22 1047        OT Time Calculation    Start Time 1047     Stop Time 1128     Time Calculation (min) 41 min        Session Details    Document Type initial evaluation     Mode of Treatment occupational therapy        General Information    Patient Profile Reviewed yes     Onset of Illness/Injury or Date of Surgery 10/11/22     Referring Physician Jose Daniel     Patient/Family/Caregiver Comments/Observations RN aware and cleared for therapy     General Observations of Patient Pt r'cved in bed, agreeable to OT/PT session     Existing Precautions/Restrictions fall;weight bearing        Weight-bearing Status    Left LE Weight-Bearing Status weight-bearing as  tolerated (WBAT)   per podiatry note    Right LE Weight-Bearing Status weight-bearing as tolerated (WBAT)   per podiatry note       Cognition/Psychosocial    Affect/Mental Status (Cognition) anxious     Behavioral Issues (Cognition) overwhelmed easily     Orientation Status (Cognition) oriented x 3     Follows Commands (Cognition) follows one-step commands     Comment, Cognition Pt overall pleasant and cooperative t/o session. Pt mildly anxious with movement 2* pain - reassurance and emotional support provided. at times easily overwhelmed and frustrated with prompts during mobility. Pt also tearful and frustrated with current LOF and provided w/ emotional support.        Hearing Assessment    Hearing Status WFL        Vision Assessment/Intervention    Visual Impairment/Limitations WFL        Sensory Assessment    Sensory Assessment (Somatosensory) UE sensation intact        Range of Motion (ROM)    Range of Motion bilateral upper extremities;ROM is WFL        Strength (Manual Muscle Testing)    Strength (Manual Muscle Testing) bilateral upper extremities;strength is WFL        Bed Mobility    [place], Supine to Sit 2 person assist;moderate assist (50-74% patient effort)     [place], Sit to Supine 2 person assist;maximum assist (25-49% patient effort)     Assistive Device head of bed elevated;draw sheet;bed rails     Comment (Bed Mobility) OOB to L. Incr time for transition from supine to EOB sitting. Pt able to use BUE on bedrails to (A) with roll and scooting. Ax2 from therapists to advance BLE in increments 2* pain and dcr ability to advance. MAX Ax2 for back to bed - BLE and minimal trunk guidance.        Transfers    Transfers other (see comments)     Maintains Weight-Bearing Status (Transfers) able to maintain     Comment Pt able to tolerate sitting EOB for prolonged period. Also completed seated EOB face grooming tasks with set-up (A). Ultimately able to complete STS from EOB with MIN Ax2 and incr  time. Edu on safe prepatory posture and hand placement. x2 trials of STS and minimal bedside stepping completed. x1 seated rest break provided. Encouragement and emotional support provided t/o        Sit to Stand Transfer    [place], Sit to Stand Transfer 2 person assist;minimum assist (75% or more patient effort)     Verbal Cues hand placement;technique;safety     Assistive Device walker, standard;bariatric     Comment From EOB. x2 trials slow, effortful push to stand. Forward flexed posture - pain with standing upright        Stand to Sit Transfer    [place], Stand to Sit Transfer 2 person assist;minimum assist (75% or more patient effort)     Verbal Cues hand placement;technique;safety     Assistive Device walker, standard;bariatric     Comment To EOB. Cues for safe hand placement and prepatory posture. Fair eccentric control.        Safety Issues, Functional Mobility    Impairments Affecting Function (Mobility) balance;endurance/activity tolerance;pain;range of motion (ROM);strength        Balance    Balance Assessment sitting static balance;sitting dynamic balance;sit to stand dynamic balance;standing static balance;standing dynamic balance     Static Sitting Balance WFL;sitting, edge of bed     Dynamic Sitting Balance mild impairment;sitting, edge of bed     Sit to Stand Dynamic Balance moderate impairment;supported     Static Standing Balance moderate impairment;supported     Comment, Balance Ax2 for safe OOB activity w/ RW. No overt LOB noted. Limited by pain, dcr activity tolerance, and strength        Motor Skills    Coordination WFL        Lower Body Dressing    Tasks socks     [place] dependent (less than 25% patient effort)     Position supine     Comment DEP A to don BLE socks 2* dcr func reach, LE mobility, pain, and body habitus. Would benefit from edu on LH AE        Grooming    Self-Performance washes, rinses and dries face     [place] set up     Position edge of bed sitting      Setup Assistance obtain supplies     Comment Set-up (A) for face grooming tasks - set-up (A) for cool towel.        Toileting    [place] dependent (less than 25% patient effort)     Comment (+) purewick in place at initiation of session        AM-PAC (TM) - ADL (Current Function)    Putting on and taking off regular lower body clothing? 1 - Total     Bathing? 2 - A Lot     Toileting? 1 - Total     Putting on/taking off regular upper body clothing? 2 - A Lot     How much help for taking care of personal grooming? 3 - A Little     Eating meals? 4 - None     AM-PAC (TM) ADL Score 13        Assessment/Plan (OT)    Daily Outcome Statement OT eval completed. Pt is a 61 y.o. female adm w/ LLE cellulitis. Pt required MOD A x2 for OOB transition and MAX A x2 back to bed. CLS for EOB sitting balance. MIN Ax2 for x2 trials of STS from EOB and minimal bedside stepping w/ jordana SW. DEP A for LBD and toileting. Set-up (A) for seated grooming tasks. Pt limited by gross deconditioning, pain, activity tolerance, balance, strength, and body habitus. OT to cont w/ POC in acute setting. Rec d/c SNF for cont skilled services     Rehab Potential fair, will monitor progress closely     Therapy Frequency 3-5 times/wk     Planned Therapy Interventions activity tolerance training;adaptive equipment training;BADL retraining;functional balance retraining;occupation/activity based interventions;patient/caregiver education/training;transfer/mobility retraining               OT Assessment/Plan    Flowsheet Row Most Recent Value   OT Recommended Discharge Disposition skilled nursing facility at 10/13/2022 1047   Anticipated Equipment Needs At Discharge (OT) other (see comments)  [TBD at next level of care] at 10/13/2022 1047   Patient/Family Therapy Goal Statement To get stronger at 10/13/2022 1047                    Education Documentation  Self-Care, taught by Sylwia Laguerre OT at 10/13/2022  1:04 PM.  Learner: Patient  Readiness:  Acceptance  Method: Explanation  Response: Verbalizes Understanding  Comment: OT POC/role, safety and tech w/ func transfers and mobility w/ jordana SW, safety w/ ADLs, econ/pacing, importance of OOB activity, and d/c planning          OT Goals    Flowsheet Row Most Recent Value   Bed Mobility Goal 1    Activity/Assistive Device bed mobility activities, all at 10/13/2022 1047   [place] moderate assist (50-74% patient effort) at 10/13/2022 1047   Time Frame by discharge at 10/13/2022 1047   Progress/Outcome goal ongoing at 10/13/2022 1047   Transfer Goal 1    Activity/Assistive Device all transfers at 10/13/2022 1047   [place] minimum assist (75% or more patient effort) at 10/13/2022 1047   Time Frame by discharge at 10/13/2022 1047   Progress/Outcome goal ongoing at 10/13/2022 1047   Dressing Goal 1    Activity/Adaptive Equipment dressing skills, all at 10/13/2022 1047   [place] moderate assist (50-74% patient effort) at 10/13/2022 1047   Time Frame by discharge at 10/13/2022 1047   Progress/Outcome goal ongoing at 10/13/2022 1047   Toileting Goal 1    Activity/Assistive Device toileting skills, all at 10/13/2022 1047   [place] moderate assist (50-74% patient effort) at 10/13/2022 1047   Time Frame by discharge at 10/13/2022 1047   Progress/Outcome goal ongoing at 10/13/2022 1047

## 2022-10-13 NOTE — PLAN OF CARE
Plan of Care Review  Plan of Care Reviewed With: patient  Progress: no change  Outcome Summary: Pt AOx4, VSS, no c/o pain at this time, IVfs infusing, call bell within reach, bed alarm active.

## 2022-10-13 NOTE — PLAN OF CARE
Problem: Adult Inpatient Plan of Care  Goal: Plan of Care Review  Outcome: Progressing  Flowsheets (Taken 10/13/2022 0762)  Progress: improving  Plan of Care Reviewed With: patient  Outcome Summary: PT IE completed as documented.     Problem: Mobility Impairment  Goal: Optimal Mobility  Outcome: Progressing

## 2022-10-13 NOTE — HOSPITAL COURSE
"Xin is a 61 y.o. female admitted on 10/11/2022 with Bilateral leg pain [M79.604, M79.605]  Leg pain, left [M79.605]  Cellulitis of left lower extremity [L03.116]. Principal problem is Cellulitis of left lower extremity.    Past Medical History  Xin has a past medical history of Back pain, COVID-19 vaccine series completed, CTS (carpal tunnel syndrome), DJD (degenerative joint disease), Edema, Frozen shoulder, HL (hearing loss), Hypertension, Hypothyroidism, Kidney stones, Lipid disorder, Lymphedema, Obesity, Sciatica, Stasis dermatitis, and Type 2 diabetes mellitus (CMS/[facility]).    History of Present Illness   Xin Melendez is a 61 y.o. female with a past medical history of morbid obesity with BMI 59.0.  Patient has a history of hypertension, hypothyroidism, type 2 diabetes mellitus.  She has a history of lumbar degenerative disc disease with chronic low back pain and occasional right-sided sciatica.  She is ambulatory dysfunction and uses a walker at baseline.     The patient presented to the emergency department with complaints \" both of my legs are painful and I cannot walk.\"  \"My left leg is red, swollen, and painful\" - LLE cellulitis. BLE U.S. (-) DVT  "

## 2022-10-13 NOTE — PROGRESS NOTES
Subjective:   Pt seen at bedside for continued valuation of bilateral lower extremity lymphedema, left lower extremity cellulitis.NAD. No overnight events. Dressing clean, dry, and intact. Denies any N/V/F/C/CP/SOB.  Patient had fever earlier this morning.  She is unable to tell if the left lower extremity pain is improved with IV antibiotics.    ROS:   Past Medical/Surgical history, Allergies, Meds reviewed in detail as charted  FH/SH reviewed in detail as charted  Review of Systems:  Head and Neck: No complaints  Chest : No complaints  Abdomen: No Complaints  Constitutional: Unremarkable     Vitals: I have reviewed the patient's current vital signs as documented in the patient's EMR.    Radiology: No new Radiology.    Labs:   CBC Results       10/13/22 10/12/22 10/11/22     [id number]    WBC 14.62 15.73 14.83    RBC 3.39 3.70 4.42    HGB 9.1 10.1 11.9    HCT 29.9 32.5 38.3    MCV 88.2 87.8 86.7    MCH 26.8 27.3 26.9    MCHC 30.4 31.1 31.1     213 305           Objective:     -Vascular: Unable to palpate pulses secondary to edema.  Bilateral DP pulses were strongly biphasic on Doppler.  CRT intact all digits.  Increased edema and erythema with associated warmth noted to the left lower extremity comparison to the right.  -Orthopedic: decreased ROM at ankle jt b/l, +DF/PF all digits, morbidly enlarged lower extremities secondary to obesity and pitting edema.  Diffuse tenderness to palpation of the left lower extremity.  -Neurologic: light touch and epicritic sensation intact.  -Dermatologic:   Bilateral lower extremity lymphedema with cobblestoning noted to the posterior aspect of both legs.  moderate serous weeping noted to both lower extremities, left worse than right with mild maceration.  No deep wounds noted but large wounds covering the entire left posterior leg are noted with a granular wound bed.  No crepitus or fluctuance.  No purulence.  Mild malodor noted to both lower extremities    No  significant changes today in comparison to prior    Assessment:  Xin Melendez is a 61 y.o. female presents with bilateral lower extremity lymphedema, cellulitis of left lower extremity    Plan:  -Left lower extremity was dressed with Aquacel, gauze, ABD, checks and secured with tape.  Patient is unable to tolerate compression dressings due to pain.  Podiatry continue to monitor and perform dressing changes while patient is in-house.  -Dakin's ordered for future dressing changes.  Please leave at bedside  -Superficial wound culture of the left leg: Mixed growth, GPB, GNB, GPC  -Left leg ultrasound ordered to rule out deeper soft tissue infection  -Weightbearing as clinically tolerated bilaterally.  -Rest of care per primary team.  - Labs and radiographs reviewed.  -Please contact the on call Podiatry resident with any questions or concerns. [phone] before 5 pm. Thank you!    Ap Baltazar, PGY-2  Pager #[phone]

## 2022-10-13 NOTE — PROGRESS NOTES
"  Division of Infectious Diseases [phone]  Progress Note    Patient Name: Xin Melendez MR#: [id number] : 1960 Admitted 10/11/2022  Date: 10/14/22  Author: Betzy Carter DO    Subjective: Patient expressed that she was overall feeling better compared to when she came to the hospital.      Physical Examination:  Vital signs reviewed  Temp (24hrs), Av.2 °C (98.9 °F), Min:36.8 °C (98.3 °F), Max:37.8 °C (100 °F)    Visit Vitals  BP (!) 128/56 (BP Location: Right upper arm, Patient Position: Lying)   Pulse 95   Temp 37.8 °C (100 °F) (Oral)   Resp 18   Ht 1.626 m (5' 4\")   Wt (!) 156 kg (344 lb)   SpO2 94%   BMI 59.05 kg/m²       General: Sitting up in bed comfortably eating lunch  Head: normocephalic, atraumatic  ENMT: anicteric, dry mucous membranes  Neck: Supple  Heart: Tachycardic, no murmurs auscultated  Lungs: Clear to auscultation bilaterally  Abdomen: Obese abdomen, no tenderness to palpation  Skin:  Improved erythema of left lower extremity compared to initial presentation in terms of rightness and amount of skin affected  Hematologic: no bruising  Extremities: Profound lymphedema of lower extremities bilaterally-left more pronounced than right with associated buffalo hump on dorsum of feet  Neurological: Answers questions appropriately, follows commands, grossly nonfocal    Labs:  Lab Results   Component Value Date    WBC 15.75 (H) 10/14/2022    HGB 8.5 (L) 10/14/2022    HCT 27.7 (L) 10/14/2022    MCV 86.0 10/14/2022     10/14/2022     Lab Results   Component Value Date    GLUCOSE 54 (L) 10/14/2022    CALCIUM 8.2 (L) 10/14/2022     10/14/2022    K 3.7 10/14/2022    CO2 22 10/14/2022     10/14/2022    BUN 17 10/14/2022    CREATININE 0.8 10/14/2022     Lab Results   Component Value Date    ALT 12 10/11/2022    AST 18 10/11/2022    ALKPHOS 84 10/11/2022    BILITOT 1.3 (H) 10/11/2022       Microbiology:     10/11/2022 COVID-19 PCR: Negative  10/11/2022 blood cultures: No growth " to date    10/12/2022 left lower extremity superficial wound culture: 4+ Morganella morganii, 4+ coagulase-negative staph, 4+ diphtheroids, 3+ Streptococcus alpha hemolytic    Imaging:  No imaging on 10/14/2022      Antimicrobial Therapy Timeout:  Treatment Indication: Cellulitis  Active Therapy:  -Vancomycin per pharmacy protocol-10/11-present-day 4of therapy  -Ceftriaxone 2 g every 24 hours-10/12-present-day 3 of therapy    Oral antibiotic transition date: upon discharge  Anticipated stop date: 10/18/2022  Supportive microbiology: Empiric    Assessment:        Sepsis , left lower extremity cellulitis  Patient with T-max of 102.7 °F with associated tachycardia in the setting of a left lower extremity pain/soft tissue infection     [place] likely underlying venous stasis changes/lymphedema -makes her more prone to skin/soft tissue infections    Cellulitis has improved over the past 48 hours     Leukocytosis  Has been fluctuating, but consistent with acute infection     Acute kidney injury  Estimated Creatinine Clearance: 111 mL/min (by C-G formula based on SCr of 0.8 mg/dL).  Resolved       Documented cephalexin allergy  Patient has tolerated ceftriaxone during hospitalization without any major adverse reactions  Antibiotic tolerance to be added as an addendum to allergy section in electronic medical records    Polymicrobial wound culture  Patient superficial wound culture was polymicrobial, and not clinically meaningful    Plan:   -Continue vancomycin and ceftriaxone while inpatient  -When patient is ready for discharge, she can be transitioned to p.o. doxycycline 100 mg twice daily and p.o. cefpodoxime 200 mg twice daily in order to complete her course of therapy.  The last day of antibiotic should be on 10/18/2022  -No antibiotic dose adjustment needs to be made for superficial wound culture, as its not clinically contributing to patient's acute illness  -Wound care and podiatry following  -All other chronic  medical conditions and supportive care per primary service    Betzy Carter, DO  Infectious Diseases

## 2022-10-13 NOTE — NURSING NOTE
Pt getting IV abx, VSS. Purewick change, bedding changed. Mepilex palced.  Barrier cream applied to skin where moisture rashes appear. Images in pts file.

## 2022-10-13 NOTE — PROGRESS NOTES
Pt referred to [facility] for SNF by [facility] H[facility].  No preferences provided so a bed search within 10 miles of pts home was conducted via [employer] to:  Stephanie Solomon  [facility]  Jarrod Edger  [place] [place] [facility]    Anticipated dispo: SNF    [facility] following.

## 2022-10-13 NOTE — PROGRESS NOTES
"Patient: Xin Melendez  Location:  [facility] 4PAV 4418  MRN:  [id number]  Today's date:  10/13/2022    Patient was received supine in bed with (+) BINA su. Patient was left at end of session supine in bed (chair position) with call bell in reach and alarm on. RN aware of PT encounter.     Xin is a 61 y.o. female admitted on 10/11/2022 with Bilateral leg pain [M79.604, M79.605]  Leg pain, left [M79.605]  Cellulitis of left lower extremity [L03.116]. Principal problem is Cellulitis of left lower extremity.    Past Medical History  Xin has a past medical history of Back pain, COVID-19 vaccine series completed, CTS (carpal tunnel syndrome), DJD (degenerative joint disease), Edema, Frozen shoulder, HL (hearing loss), Hypertension, Hypothyroidism, Kidney stones, Lipid disorder, Lymphedema, Obesity, Sciatica, Stasis dermatitis, and Type 2 diabetes mellitus (CMS/[facility]).    History of Present Illness   Xin Melendez is a 61 y.o. female with a past medical history of morbid obesity with BMI 59.0.  Patient has a history of hypertension, hypothyroidism, type 2 diabetes mellitus.  She has a history of lumbar degenerative disc disease with chronic low back pain and occasional right-sided sciatica.  She is ambulatory dysfunction and uses a walker at baseline.     The patient presented to the emergency department with complaints \" both of my legs are painful and I cannot walk.\"  \"My left leg is red, swollen, and painful\" - LLE cellulitis. BLE U.S. (-) DVT      PT Vitals    Date/Time Pulse HR Source SpO2 Pt Activity O2 Therapy BP BP Location BP Method Pt Position Observations [facility]   10/13/22 1048 92 Monitor 91 % improved to 94 with deep breathing At rest None (Room air) 115/56 Left upper arm Automatic Lying -- DM   10/13/22 1120 -- -- 93 % At rest None (Room air) -- -- -- -- after activity DM      PT Pain    Date/Time Pain Type Side/Orientation Location Rating: Rest Rating: Activity Rating: Rest Rating: Activity " Interventions [facility]   10/13/22 1048 Pain Assessment left leg 6 7 -- -- position adjusted DM   10/13/22 1100 Pain Assessment left leg -- -- 2 - mild pain 2 - mild pain -- LAG          Prior Living Environment    Flowsheet Row Most Recent Value   People in Home child(greta), adult   Current Living Arrangements home   Home Accessibility stairs to enter home (Group), stairs within home (Group)   Living Environment Comment lives in 2 story house with her son,  2 LEROY with no rail,  bed/ bath on 2nd floor,  son performs IADLs,  uses instacart for groceries,  has stall shower with SC,  no GBs   Number of Stairs, Main Entrance 2   Stair Railings, Main Entrance none   Number of Stairs, Within Home, Primary 12        Prior Level of Function    Flowsheet Row Most Recent Value   Dominant Hand right   Ambulation assistive equipment   Transferring assistive equipment   Toileting independent   Bathing independent   Dressing independent   Eating independent   Communication understands/communicates without difficulty   Prior Level of Function Comment Per pt report she was IND w/ all ADLs and used a RW for ambulation   Assistive Device Currently Used at Home glucometer, walker, front-wheeled, stair glide, shower chair           PT Evaluation and Treatment - 10/13/22 1046        PT Time Calculation    Start Time 1046     Stop Time 1128     Time Calculation (min) 42 min        Session Details    Document Type initial evaluation     Mode of Treatment physical therapy        General Information    Patient Profile Reviewed yes     Onset of Illness/Injury or Date of Surgery 10/11/22     General Observations of Patient pt received supine in bed (+) purewick     Existing Precautions/Restrictions fall;weight bearing        Weight-bearing Status    Left LE Weight-Bearing Status weight-bearing as tolerated (WBAT)     Right LE Weight-Bearing Status weight-bearing as tolerated (WBAT)        Living Environment    Name(s) of People in Home alyssa      Primary Care Provided by self        Cognition/Psychosocial    Affect/Mental Status (Cognition) anxious     Behavioral Issues (Cognition) overwhelmed easily     Orientation Status (Cognition) oriented x 3     Follows Commands (Cognition) follows one-step commands     Cognitive Function executive function deficit;safety deficit     Executive Function Deficit (Cognition) insight/awareness of deficits;organization/sequencing;planning/decision-making     Safety Deficit (Cognition) insight into deficits/self-awareness;safety precautions awareness;safety precautions follow-through/compliance     Comment, Cognition pt cooperative; eager to get OOB due to discomfort in supine; ? safety awareness; receptive to safety cueing        Sensory Assessment (Somatosensory)    Sensory Assessment (Somatosensory) sensation intact        Range of Motion (ROM)    Range of Motion ROM is WFL        Strength (Manual Muscle Testing)    Strength (Manual Muscle Testing) --   unable to provide resistive testing due to pt pain in BLE    Hip, Left (Strength) at least 3/5     Knee, Left (Strength) at least 3/5     Ankle, Left (Strength) at least 3/5     Hip, Right (Strength) at least 3/5     Knee, Right (Strength) at least 3/5     Ankle, Right (Strength) at least 3/5        Bed Mobility    [place], Supine to Sit moderate assist (50-74% patient effort);2 person assist     [place], Sit to Supine maximum assist (25-49% patient effort);2 person assist     Assistive Device head of bed elevated;draw sheet;bed rails     Comment (Bed Mobility) pt able to only minimally asisst with BLE ot get EOB; able to assist with lowering trunk to bed with return to supine        Sit to Stand Transfer    [place], Sit to Stand Transfer minimum assist (75% or more patient effort);2 person assist     Verbal Cues hand placement;safety;technique     Assistive Device walker, front-wheeled     Comment 2 trials at minAx2 due to body habitus; pt edu provided on  proper technique; unable to complete STS without minAx2; pt with hunched posture in standing; VCs for upright positioning        Stand to Sit Transfer    [place], Stand to Sit Transfer minimum assist (75% or more patient effort);2 person assist     Verbal Cues hand placement;safety;technique     Assistive Device walker, front-wheeled     Comment trail 1 pt with absent reach back for bed; trial 2 pt able to reach back to assist with eccentric lower        Gait Training    [place], Gait minimum assist (75% or more patient effort);2 person assist     Assistive Device walker, front-wheeled     Distance in Feet 3 feet   1+2    Pattern (Gait) step-to     Deviations/Abnormal Patterns (Gait) step length decreased;stride length decreased;base of support, wide     Comment (Gait/Stairs) pt able to perform 2 trials of side-stepping seperated by seated rest break; pt able to ambulate towards [facility] with VCs for sequencing; pt highly fatigued and noted significant pain; tearful at EOb due to feeling deconditioned from her baseline; emotional support provided        Stairs Training    [place], Stairs unable to assess     Comment not appropriate        Safety Issues, Functional Mobility    Impairments Affecting Function (Mobility) balance;endurance/activity tolerance;strength        Balance    Static Sitting Balance WFL     Dynamic Sitting Balance mild impairment     Sit to Stand Dynamic Balance mild impairment     Static Standing Balance mild impairment     Comment, Balance minAx2 for STS and gait        Motor Skills    Functional Endurance poor        Coping    Observed Emotional State cooperative     Verbalized Emotional State frustration     Trust Relationship/Rapport care explained;emotional support provided        AM-PAC (TM) - Mobility (Current Function)    Turning from your back to your side while in a flat bed without using bedrails? 2 - A Lot     Moving from lying on your back to sitting on the side of a flat  bed without using bedrails? 2 - A Lot     Moving to and from a bed to a chair? 2 - A Lot     Standing up from a chair using your arms? 3 - A Little     To walk in a hospital room? 2 - A Lot     Climbing 3-5 steps with a railing? 1 - Total     AM-PAC (TM) Mobility Score 12        Assessment/Plan (PT)    Daily Outcome Statement pt overall mod-maxAx2 for bed mobility, minAx2 for STS, and minAx1 for gait. Limited by endurance, fatigue, strength, and functional mobility. Dispo rec is SNF at this time due to pt previous functional level of [place] with RW (significant decline). PT to follow and progress as appropriate     Rehab Potential fair, will monitor progress closely     Therapy Frequency 3-5 times/wk     Planned Therapy Interventions balance training;bed mobility training;gait training;transfer training;stretching;strengthening;stair training               PT Assessment/Plan    Flowsheet Row Most Recent Value   PT Recommended Discharge Disposition skilled nursing facility at 10/13/2022 1046   Anticipated Equipment Needs at Discharge (PT) other (see comments)  [TBD by SNF] at 10/13/2022 1046   Patient/Family Therapy Goals Statement to get stronger at 10/13/2022 1046                    Education Documentation  Joint Mobility/Strength, taught by Sandra Willoughby, PT at 10/13/2022  2:46 PM.  Learner: Patient  Readiness: Acceptance  Method: Explanation  Response: Verbalizes Understanding    Home Safety, taught by Sandra Willoughby, PT at 10/13/2022  2:46 PM.  Learner: Patient  Readiness: Acceptance  Method: Explanation  Response: Verbalizes Understanding    Energy Conservation, taught by Sandra Willoughby PT at 10/13/2022  2:46 PM.  Learner: Patient  Readiness: Acceptance  Method: Explanation  Response: Verbalizes Understanding    Assistive/Adaptive Devices, taught by Sandra Willoughby PT at 10/13/2022  2:46 PM.  Learner: Patient  Readiness: Acceptance  Method: Explanation  Response: Verbalizes Understanding          PT Goals     Flowsheet Row Most Recent Value   Bed Mobility Goal 1    Activity/Assistive Device bed mobility activities, all at 10/13/2022 1046   [place] minimum assist (75% or more patient effort)  [x2] at 10/13/2022 1046   Time Frame by discharge at 10/13/2022 1046   Progress/Outcome goal ongoing at 10/13/2022 1046   Transfer Goal 1    Activity/Assistive Device sit-to-stand/stand-to-sit at 10/13/2022 1046   [place] minimum assist (75% or more patient effort)  [x1] at 10/13/2022 1046   Time Frame by discharge at 10/13/2022 1046   Progress/Outcome goal ongoing at 10/13/2022 1046   Gait Training Goal 1    Activity/Assistive Device gait (walking locomotion), assistive device use at 10/13/2022 1046   [place] minimum assist (75% or more patient effort)  [x2] at 10/13/2022 1046   Distance 15 at 10/13/2022 1046   Time Frame by discharge at 10/13/2022 1046   Progress/Outcome goal ongoing at 10/13/2022 1046   Stairs Goal 1    Activity/Assistive Device ascending stairs, descending stairs at 10/13/2022 1046   [place] modified independence at 10/13/2022 1046   Number of Stairs 12 at 10/13/2022 1046   Time Frame 3 weeks at 10/13/2022 1046   Progress/Outcome goal ongoing at 10/13/2022 1046

## 2022-10-13 NOTE — PROGRESS NOTES
Hospital Medicine Service -  Daily Progress Note       SUBJECTIVE   Interval History: Seen and examined at bedside, febrile this morning. Has no new complaints today.      OBJECTIVE      Vital signs in last 24 hours:  Temp:  [37.2 °C (98.9 °F)-39.3 °C (102.7 °F)] 39.3 °C (102.7 °F)  Heart Rate:  [] 107  Resp:  [18-20] 20  BP: ()/(50-58) 106/51    Intake/Output Summary (Last 24 hours) at 10/13/2022 0842  Last data filed at 10/12/2022 1312  Gross per 24 hour   Intake 100 ml   Output --   Net 100 ml       PHYSICAL EXAMINATION      Physical Exam    GENERAL APPEARANCE Well developed, well nourished, AAOx3, NAD   MUCUS MEMBRANES Moist   LUNGS CTAB, no w/r/r, no signs of respiratory distress   CHEST S1/S2, RRR, no m/r/g appreciated   ABDOMEN  GENITOURINARY Soft, NT, ND, +BS  No suprapubic TTP   EXTREMITIES Severely swollen bilateral lower extremities with chronic changes consistent with lymphedema   SKIN LLE rash consistent with cellulitis   HEENT  NEURO Pupils equal, Anicteric  Follows commands, no dysarthria or facial asymmetry        LINES, CATHETERS, DRAINS, AIRWAYS, AND WOUNDS   Lines, Drains, and Airways:  Wounds (agree with documentation and present on admission):  Peripheral IV (Adult) 10/11/22 Anterior;Right Forearm (Active)   Number of days: 2       Peripheral IV (Adult) 10/12/22 Anterior;Left Forearm (Active)   Number of days: 1       Rash 10/12/22 2235 Right posterior greater trochanter (Active)   Number of days: 1       Rash 10/12/22 2236 Left posterior greater trochanter (Active)   Number of days: 1       Rash 10/12/22 2237 Right posterior greater trochanter (Active)   Number of days: 1       Rash 10/12/22 2242 groin (Active)   Number of days: 1       Wound Buttock (Active)   Number of days: 1         Comments:      LABS / IMAGING / TELE      Labs  I have reviewed the patient's labs to the time of note. No new clinical concern.    SARS-CoV-2 (COVID-19) (no units)   Date/Time Value   10/11/2022  2205 Negative       Imaging  I have independently reviewed the patient's pertinent imaging for this hospital visit.     ECG/Telemetry  Patient is not on telemetry.    ASSESSMENT AND PLAN      Bilateral leg pain  Assessment & Plan  Lower extremity ultrasound negative for DVT    Type 2 diabetes mellitus, with long-term current use of insulin (CMS/[facility])  Assessment & Plan  Patient's most recent HbA1c was performed on August 3, 2022 with her A1c at 5.5 noting excellent control with current diabetic regiment.  Will restart patient on slightly lower dose of long-acting insulin (tresiba is not on hospital formulary and will interchange to Lantus) given that the patient has not been eating much.  Perform Accu-Cheks.  Sliding scale insulin      Azotemia  Assessment & Plan  Hydrate patient with intravenous fluids.  Holding patient's Avalide (irbesartan and hydrochlorothiazide) secondary to azotemia  Holding NSAIDs as this will worsen her renal function.    Avoid nephrotoxins  Monitor BMP          Hypokalemia  Assessment & Plan  Replete as needed  Monitor BMP    Ambulatory dysfunction  Assessment & Plan  Fall precautions  Obtain physical therapy and Occupational Therapy evaluation  Patient may potentially benefit from placement in a skilled nursing facility for rehabilitation as she has not been able to make transfers or get out of bed    * Cellulitis of left lower extremity  Assessment & Plan  The patient has concerns for a left lower extremity cellulitis with fungal infiltration along her back of her left knee.    ID following, appreciate recommendations  Start empiric antibiotics with vancomycin  Follow cultures  Also start clotrimazole topical cream for the fungal rash along the back of her posterior knee crease  Consult podiatry to assist with wound care given the significant amount of lymphedema and hyperkeratotic skin which potentially may need debridement  Wound care  Lower extremity ultrasound negative for  DVT               VTE Assessment: Padua VTE Score: 5  VTE Prophylaxis:  Current anticoagulants:  heparin (porcine) 5,000 unit/mL injection 5,000 Units, subcutaneous, q8h INT      Code Status: Full Code      Estimated Discharge Date: 10/15/2022     Disposition Planning: Pending PT/OT martell Sky, DO  10/13/2022     >35 minutes were spent, mostly counseling and coordinating care

## 2022-10-14 LAB
ANION GAP SERPL CALC-SCNC: 8 MEQ/L (ref 3–15)
BUN SERPL-MCNC: 17 MG/DL (ref 8–20)
CALCIUM SERPL-MCNC: 8.2 MG/DL (ref 8.9–10.3)
CHLORIDE SERPL-SCNC: 106 MEQ/L (ref 98–109)
CO2 SERPL-SCNC: 22 MEQ/L (ref 22–32)
CREAT SERPL-MCNC: 0.8 MG/DL (ref 0.6–1.1)
ERYTHROCYTE [DISTWIDTH] IN BLOOD BY AUTOMATED COUNT: 15.1 % (ref 11.7–14.4)
GFR SERPL CREATININE-BSD FRML MDRD: >60 ML/MIN/1.73M*2
GLUCOSE BLD-MCNC: 104 MG/DL (ref 70–99)
GLUCOSE BLD-MCNC: 123 MG/DL (ref 70–99)
GLUCOSE BLD-MCNC: 134 MG/DL (ref 70–99)
GLUCOSE BLD-MCNC: 58 MG/DL (ref 70–99)
GLUCOSE BLD-MCNC: 63 MG/DL (ref 70–99)
GLUCOSE BLD-MCNC: 90 MG/DL (ref 70–99)
GLUCOSE BLD-MCNC: 90 MG/DL (ref 70–99)
GLUCOSE BLD-MCNC: 91 MG/DL (ref 70–99)
GLUCOSE BLD-MCNC: 97 MG/DL (ref 70–99)
GLUCOSE SERPL-MCNC: 54 MG/DL (ref 70–99)
HCT VFR BLDCO AUTO: 27.7 % (ref 35–45)
HGB BLD-MCNC: 8.5 G/DL (ref 11.8–15.7)
MCH RBC QN AUTO: 26.4 PG (ref 28–33.2)
MCHC RBC AUTO-ENTMCNC: 30.7 G/DL (ref 32.2–35.5)
MCV RBC AUTO: 86 FL (ref 83–98)
PDW BLD AUTO: 10.4 FL (ref 9.4–12.3)
PLATELET # BLD AUTO: 299 K/UL (ref 150–369)
POCT TEST: ABNORMAL
POCT TEST: NORMAL
POTASSIUM SERPL-SCNC: 3.7 MEQ/L (ref 3.6–5.1)
RBC # BLD AUTO: 3.22 M/UL (ref 3.93–5.22)
SARS-COV-2 RNA RESP QL NAA+PROBE: NEGATIVE
SODIUM SERPL-SCNC: 136 MEQ/L (ref 136–144)
WBC # BLD AUTO: 15.75 K/UL (ref 3.8–10.5)

## 2022-10-14 PROCEDURE — 12000000 HC ROOM AND CARE MED/SURG

## 2022-10-14 PROCEDURE — 25000000 HC PHARMACY GENERAL

## 2022-10-14 PROCEDURE — 85027 COMPLETE CBC AUTOMATED: CPT | Performed by: STUDENT IN AN ORGANIZED HEALTH CARE EDUCATION/TRAINING PROGRAM

## 2022-10-14 PROCEDURE — 25800000 HC PHARMACY IV SOLUTIONS: Performed by: HOSPITALIST

## 2022-10-14 PROCEDURE — 63600000 HC DRUGS/DETAIL CODE: Performed by: STUDENT IN AN ORGANIZED HEALTH CARE EDUCATION/TRAINING PROGRAM

## 2022-10-14 PROCEDURE — 99233 SBSQ HOSP IP/OBS HIGH 50: CPT | Performed by: STUDENT IN AN ORGANIZED HEALTH CARE EDUCATION/TRAINING PROGRAM

## 2022-10-14 PROCEDURE — 25800000 HC PHARMACY IV SOLUTIONS: Performed by: STUDENT IN AN ORGANIZED HEALTH CARE EDUCATION/TRAINING PROGRAM

## 2022-10-14 PROCEDURE — 63700000 HC SELF-ADMINISTRABLE DRUG: Performed by: HOSPITALIST

## 2022-10-14 PROCEDURE — 80048 BASIC METABOLIC PNL TOTAL CA: CPT | Performed by: STUDENT IN AN ORGANIZED HEALTH CARE EDUCATION/TRAINING PROGRAM

## 2022-10-14 PROCEDURE — 36415 COLL VENOUS BLD VENIPUNCTURE: CPT | Performed by: STUDENT IN AN ORGANIZED HEALTH CARE EDUCATION/TRAINING PROGRAM

## 2022-10-14 PROCEDURE — 63600000 HC DRUGS/DETAIL CODE: Performed by: HOSPITALIST

## 2022-10-14 PROCEDURE — U0002 COVID-19 LAB TEST NON-CDC: HCPCS | Performed by: STUDENT IN AN ORGANIZED HEALTH CARE EDUCATION/TRAINING PROGRAM

## 2022-10-14 PROCEDURE — 25000000 HC PHARMACY GENERAL: Performed by: HOSPITALIST

## 2022-10-14 RX ORDER — INSULIN GLARGINE 100 [IU]/ML
50 INJECTION, SOLUTION SUBCUTANEOUS DAILY
Status: DISCONTINUED | OUTPATIENT
Start: 2022-10-14 | End: 2022-10-16

## 2022-10-14 RX ADMIN — CLOTRIMAZOLE: 1 CREAM TOPICAL at 19:37

## 2022-10-14 RX ADMIN — HEPARIN SODIUM 5000 UNITS: 5000 INJECTION, SOLUTION INTRAVENOUS; SUBCUTANEOUS at 00:16

## 2022-10-14 RX ADMIN — OXYCODONE HYDROCHLORIDE AND ACETAMINOPHEN 1000 MG: 500 TABLET ORAL at 08:58

## 2022-10-14 RX ADMIN — HEPARIN SODIUM 5000 UNITS: 5000 INJECTION, SOLUTION INTRAVENOUS; SUBCUTANEOUS at 08:58

## 2022-10-14 RX ADMIN — LEVOTHYROXINE SODIUM 75 MCG: 75 TABLET ORAL at 06:47

## 2022-10-14 RX ADMIN — CLOTRIMAZOLE: 1 CREAM TOPICAL at 09:00

## 2022-10-14 RX ADMIN — Medication 1000 UNITS: at 08:58

## 2022-10-14 RX ADMIN — GABAPENTIN 600 MG: 300 CAPSULE ORAL at 08:57

## 2022-10-14 RX ADMIN — DEXTROSE MONOHYDRATE 12.5 G: 25 INJECTION, SOLUTION INTRAVENOUS at 09:23

## 2022-10-14 RX ADMIN — ACETAMINOPHEN 650 MG: 325 TABLET, FILM COATED ORAL at 09:13

## 2022-10-14 RX ADMIN — GABAPENTIN 600 MG: 300 CAPSULE ORAL at 19:35

## 2022-10-14 RX ADMIN — DULOXETINE HYDROCHLORIDE 20 MG: 20 CAPSULE, DELAYED RELEASE PELLETS ORAL at 19:35

## 2022-10-14 RX ADMIN — SODIUM HYPOCHLORITE: 2.5 SOLUTION TOPICAL at 08:59

## 2022-10-14 RX ADMIN — VANCOMYCIN HYDROCHLORIDE 1000 MG: 1 INJECTION, POWDER, LYOPHILIZED, FOR SOLUTION INTRAVENOUS at 09:41

## 2022-10-14 RX ADMIN — VANCOMYCIN HYDROCHLORIDE 1000 MG: 1 INJECTION, POWDER, LYOPHILIZED, FOR SOLUTION INTRAVENOUS at 02:05

## 2022-10-14 RX ADMIN — DULOXETINE HYDROCHLORIDE 20 MG: 20 CAPSULE, DELAYED RELEASE PELLETS ORAL at 08:58

## 2022-10-14 RX ADMIN — INSULIN GLARGINE 50 UNITS: 100 INJECTION, SOLUTION SUBCUTANEOUS at 16:08

## 2022-10-14 RX ADMIN — CEFTRIAXONE 2 G: 2 INJECTION, POWDER, FOR SOLUTION INTRAMUSCULAR; INTRAVENOUS at 11:23

## 2022-10-14 RX ADMIN — ACETAMINOPHEN 650 MG: 325 TABLET, FILM COATED ORAL at 22:38

## 2022-10-14 RX ADMIN — GABAPENTIN 600 MG: 300 CAPSULE ORAL at 15:00

## 2022-10-14 RX ADMIN — HEPARIN SODIUM 5000 UNITS: 5000 INJECTION, SOLUTION INTRAVENOUS; SUBCUTANEOUS at 16:07

## 2022-10-14 NOTE — NURSING NOTE
Patient reporting pain at both US guided peripheral IV sites. VAT at bedside to assess the sites. Both US guided PIV's infiltrated. Removed at this time. Two VAT RN's attempted to place a new PIV, both with and without ultrasound. Pt has poor peripheral access options. On US, veins in forearms are not compressible due to infiltrations bilaterally. Veins in upper arm are too deep for US guided PIV bilaterally. Primary RN made aware that VAT is unable to obtain peripheral access. Patient to be transitioned to PO antibiotics tomorrow am, primary RN states she will ask MD if PO antibiotics can be started tonight due to no IV access.

## 2022-10-14 NOTE — PROGRESS NOTES
Hospital Medicine Service -  Daily Progress Note       SUBJECTIVE   Interval History: Seen and examined at bedside, no acute events overnight. Says she feels like she is improving. Has no complaints this morning.      OBJECTIVE      Vital signs in last 24 hours:  Temp:  [36.8 °C (98.3 °F)-37.1 °C (98.8 °F)] 37.1 °C (98.8 °F)  Heart Rate:  [92-95] 95  Resp:  [18] 18  BP: (115-134)/(56-58) 117/57    Intake/Output Summary (Last 24 hours) at 10/14/2022 0847  Last data filed at 10/14/2022 0650  Gross per 24 hour   Intake 1100 ml   Output 1900 ml   Net -800 ml       PHYSICAL EXAMINATION      Physical Exam    GENERAL APPEARANCE Obese, AAOx3, NAD   MUCUS MEMBRANES Moist   LUNGS CTAB, no w/r/r, no signs of respiratory distress   CHEST S1/S2, RRR, no m/r/g appreciated   ABDOMEN  GENITOURINARY Soft, NT, ND, +BS  No suprapubic TTP   EXTREMITIES Severely swollen bilateral lower extremities with chronic changes consistent with lymphedema   SKIN LLE rash consistent with cellulitis   HEENT  NEURO Pupils equal, Anicteric  Follows commands, no dysarthria or facial asymmetry        LINES, CATHETERS, DRAINS, AIRWAYS, AND WOUNDS   Lines, Drains, and Airways:  Wounds (agree with documentation and present on admission):  Peripheral IV (Adult) 10/11/22 Anterior;Right Forearm (Active)   Number of days: 3       Peripheral IV (Adult) 10/12/22 Anterior;Left Forearm (Active)   Number of days: 2       Rash 10/12/22 2235 Right posterior greater trochanter (Active)   Number of days: 2       Rash 10/12/22 2236 Left posterior greater trochanter (Active)   Number of days: 2       Rash 10/12/22 2237 Right posterior greater trochanter (Active)   Number of days: 2       Rash 10/12/22 2242 groin (Active)   Number of days: 2       Wound Buttock (Active)   Number of days: 2         Comments:      LABS / IMAGING / TELE      Labs  I have reviewed the patient's labs to the time of note. No new clinical concern.    SARS-CoV-2 (COVID-19) (no units)    Date/Time Value   10/11/2022 2205 Negative       Imaging  I have independently reviewed the pertinent imaging from the last 24 hrs.    ECG/Telemetry  Patient is not on telemetry.    ASSESSMENT AND PLAN      Bilateral leg pain  Assessment & Plan  Lower extremity ultrasound negative for DVT    Type 2 diabetes mellitus, with long-term current use of insulin (CMS/[facility])  Assessment & Plan  Patient's most recent HbA1c was performed on August 3, 2022 with her A1c at 5.5 noting excellent control with current diabetic regiment.  Will restart patient on slightly lower dose of long-acting insulin (tresiba is not on hospital formulary and will interchange to Lantus) given that the patient has not been eating much.  Perform Accu-Cheks.  Sliding scale insulin      Azotemia  Assessment & Plan  Hydrate patient with intravenous fluids, encourage PO hydration   Holding patient's Avalide (irbesartan and hydrochlorothiazide) secondary to azotemia  Holding NSAIDs as this will worsen her renal function.    Avoid nephrotoxins  Monitor BMP          Hypokalemia  Assessment & Plan  Replete as needed  Monitor BMP    Ambulatory dysfunction  Assessment & Plan  Fall precautions  PT/OT rec SNF, patient agreeable    * Cellulitis of left lower extremity  Assessment & Plan  The patient has concerns for a left lower extremity cellulitis with fungal infiltration along her back of her left knee.    ID following, abx per ID  Follow cultures  Also start clotrimazole topical cream for the fungal rash along the back of her posterior knee crease  Consult podiatry to assist with wound care given the significant amount of lymphedema and hyperkeratotic skin which potentially may need debridement  Wound care, podiatry following  Lower extremity ultrasound negative for DVT  Soft tissue LE ultrasound negative for abscess or collection             VTE Assessment: Padua VTE Score: 5  VTE Prophylaxis:  Current anticoagulants:  heparin (porcine) 5,000 unit/mL  injection 5,000 Units, subcutaneous, q8h INT      Code Status: Full Code      Estimated Discharge Date: 10/15/2022     Disposition Planning: SNF when stable     Everardo Sky,   10/14/2022     >35 minutes were spent, mostly counseling and coordinating care

## 2022-10-14 NOTE — PATIENT CARE CONFERENCE
Care Progression Rounds Note  Date: 10/14/2022  Time: 10:50 AM     Patient Name: Xin Melendez     Medical Record Number: [id number]   YOB: 1960  Sex: Female      Room/Bed: 4418    Admitting Diagnosis: Bilateral leg pain [M79.604, M79.605]  Leg pain, left [M79.605]  Cellulitis of left lower extremity [L03.116]   Admit Date/Time: 10/11/2022  7:33 PM    Primary Diagnosis: Cellulitis of left lower extremity  Principal Problem: Cellulitis of left lower extremity    GMLOS: 3.5  Anticipated Discharge Date: 10/15/2022    AM-PAC:  Mobility Score: 12    Discharge Planning:  Current Living Arrangements: home  Anticipated Discharge Disposition: skilled nursing facility  Type of Skilled Nursing Care Services: nursing, PT, OT    Barriers to Discharge:  Medical issues not resolved    Comments:  sugars not under control, pt on IV ABX at d/c    Participants:  advanced practice provider, , physical therapy, dietitian/nutrition services, nursing

## 2022-10-14 NOTE — PLAN OF CARE
Problem: Adult Inpatient Plan of Care  Goal: Plan of Care Review  Flowsheets (Taken 10/14/2022 1606)  Plan of Care Reviewed With: patient  Outcome Summary: DL met w/ pt in room and informed pt that [facility], and [facility] are willing to accept.   DL gave pt time to think and went back and met pt in room.   Pt agreed to d/c to .   DL contacted Dori, liaison for [facility]. Dori confirmed  can accept pt late in day tomorrow, Saturday 10.15.22. Dori confirmed she would initiate auth today for that d/c.   DL contacted medical team and they agreed for SW to initiate the d/c plan for pt.   Dori stated weekend contact at  is the CareLine - [phone].   DL gave Dori the contact info for the SW who will be on for this pt over the weekend.  DL requested 1500 [facility] AMB from [facility]. DL called Abelardo Milton and confirmed w/ him that  is asking for full ESTRELLA stretcher and not just the stretcher w/ wings.   [facility] confirmed for 1500 tomorrow. DL informed Dori @  of the transport time.   DL informed med team of pt's pending d/c to 1500 tomorrow.   DL left handoff for weekend SW.  DL added pt to PT/OT priority list by epic chatting CC Manager.    DCP:  w/ [facility] for 1500 tomorrow   is obtaining auth  Careline is wknd contact: [phone].

## 2022-10-14 NOTE — PROGRESS NOTES
Subjective:   Pt seen at bedside for continued evaluation of bilateral lower extremity lymphedema, left lower extremity cellulitis.NAD. No overnight events. Dressing clean, dry, and intact. Denies any N/V/F/C/CP/SOB.    She endorses mild improvement in left lower extremity pain this morning  ROS:   Past Medical/Surgical history, Allergies, Meds reviewed in detail as charted  FH/SH reviewed in detail as charted  Review of Systems:  Head and Neck: No complaints  Chest : No complaints  Abdomen: No Complaints  Constitutional: Unremarkable     Vitals: I have reviewed the patient's current vital signs as documented in the patient's EMR.    Radiology: No new Radiology.    Labs:   CBC Results       10/13/22 10/12/22 10/11/22     [id number]    WBC 14.62 15.73 14.83    RBC 3.39 3.70 4.42    HGB 9.1 10.1 11.9    HCT 29.9 32.5 38.3    MCV 88.2 87.8 86.7    MCH 26.8 27.3 26.9    MCHC 30.4 31.1 31.1     213 305           Objective:   -Vascular: Unable to palpate pulses secondary to edema.  Bilateral DP pulses were strongly biphasic on Doppler.  CRT intact all digits.  Increased edema and erythema with associated warmth noted to the left lower extremity comparison to the right.  -Orthopedic: decreased ROM at ankle jt b/l, +DF/PF all digits, morbidly enlarged lower extremities secondary to obesity and pitting edema.  Diffuse tenderness to palpation of the left lower extremity.  -Neurologic: light touch and epicritic sensation intact.  -Dermatologic:   Bilateral lower extremity lymphedema with cobblestoning noted to the posterior aspect of both legs. Moderate serous weeping noted to both lower extremities, left worse than right with mild maceration. No deep wounds noted.  No crepitus or fluctuance.  No purulence.  Mild malodor noted to both lower extremities  Large full thickness wounds covering the entire left post leg.   Mild decrease in erythema noted today.     Assessment:  Xin Melendez is a 61 y.o. female presents  with bilateral lower extremity lymphedema, cellulitis of left lower extremity     Plan:  -Left lower extremity was dressed with Dakin's, Aquacel, gauze, ABD, checks and secured with tape.  Patient is unable to tolerate compression dressings due to pain.  Podiatry continue to monitor and perform dressing changes while patient is in-house.  -Superficial wound culture of the left leg: Mixed growth, GPB, GNB, GPC  -Left leg ultrasound: No deep collections.  -Weightbearing as clinically tolerated bilaterally.  -Rest of care per primary team.  - Labs and radiographs reviewed.  -Please contact the on call Podiatry resident with any questions or concerns. [phone] before 5 pm. Thank you!    Ap Baltazar, PGY-2  Pager #[phone]

## 2022-10-15 LAB
GLUCOSE BLD-MCNC: 111 MG/DL (ref 70–99)
GLUCOSE BLD-MCNC: 130 MG/DL (ref 70–99)
GLUCOSE BLD-MCNC: 134 MG/DL (ref 70–99)
GLUCOSE BLD-MCNC: 77 MG/DL (ref 70–99)
POCT TEST: ABNORMAL
POCT TEST: NORMAL

## 2022-10-15 PROCEDURE — 97530 THERAPEUTIC ACTIVITIES: CPT | Mod: GP,U8 | Performed by: PHYSICAL THERAPIST

## 2022-10-15 PROCEDURE — 99233 SBSQ HOSP IP/OBS HIGH 50: CPT | Performed by: STUDENT IN AN ORGANIZED HEALTH CARE EDUCATION/TRAINING PROGRAM

## 2022-10-15 PROCEDURE — 63600000 HC DRUGS/DETAIL CODE: Performed by: HOSPITALIST

## 2022-10-15 PROCEDURE — 63700000 HC SELF-ADMINISTRABLE DRUG: Performed by: STUDENT IN AN ORGANIZED HEALTH CARE EDUCATION/TRAINING PROGRAM

## 2022-10-15 PROCEDURE — 12000000 HC ROOM AND CARE MED/SURG

## 2022-10-15 PROCEDURE — 63700000 HC SELF-ADMINISTRABLE DRUG: Performed by: HOSPITALIST

## 2022-10-15 PROCEDURE — 97535 SELF CARE MNGMENT TRAINING: CPT | Mod: GO,U8

## 2022-10-15 RX ORDER — DOXYCYCLINE 100 MG/1
100 CAPSULE ORAL 2 TIMES DAILY
Qty: 8 CAPSULE | Refills: 0 | Status: SHIPPED | OUTPATIENT
Start: 2022-10-15 | End: 2022-10-19

## 2022-10-15 RX ORDER — DOXYCYCLINE HYCLATE 100 MG
100 TABLET ORAL EVERY 12 HOURS
Status: DISCONTINUED | OUTPATIENT
Start: 2022-10-15 | End: 2022-10-17 | Stop reason: HOSPADM

## 2022-10-15 RX ORDER — INSULIN DEGLUDEC 200 U/ML
40 INJECTION, SOLUTION SUBCUTANEOUS EVERY EVENING
Qty: 11.7 ML | Refills: 0
Start: 2022-10-15 | End: 2024-03-14

## 2022-10-15 RX ORDER — CEFPODOXIME PROXETIL 200 MG/1
200 TABLET, FILM COATED ORAL 2 TIMES DAILY
Qty: 8 TABLET | Refills: 0 | Status: SHIPPED | OUTPATIENT
Start: 2022-10-15 | End: 2022-10-19

## 2022-10-15 RX ADMIN — SENNOSIDES AND DOCUSATE SODIUM 1 TABLET: 50; 8.6 TABLET ORAL at 08:14

## 2022-10-15 RX ADMIN — Medication 1000 UNITS: at 08:14

## 2022-10-15 RX ADMIN — CLOTRIMAZOLE: 1 CREAM TOPICAL at 20:07

## 2022-10-15 RX ADMIN — HEPARIN SODIUM 5000 UNITS: 5000 INJECTION, SOLUTION INTRAVENOUS; SUBCUTANEOUS at 08:14

## 2022-10-15 RX ADMIN — GABAPENTIN 600 MG: 300 CAPSULE ORAL at 14:38

## 2022-10-15 RX ADMIN — LEVOTHYROXINE SODIUM 75 MCG: 75 TABLET ORAL at 06:34

## 2022-10-15 RX ADMIN — HEPARIN SODIUM 5000 UNITS: 5000 INJECTION, SOLUTION INTRAVENOUS; SUBCUTANEOUS at 22:29

## 2022-10-15 RX ADMIN — DOXYCYCLINE HYCLATE 100 MG: 100 TABLET, FILM COATED ORAL at 20:04

## 2022-10-15 RX ADMIN — OXYCODONE HYDROCHLORIDE 5 MG: 5 TABLET ORAL at 22:27

## 2022-10-15 RX ADMIN — DOXYCYCLINE HYCLATE 100 MG: 100 TABLET, FILM COATED ORAL at 08:14

## 2022-10-15 RX ADMIN — DULOXETINE HYDROCHLORIDE 20 MG: 20 CAPSULE, DELAYED RELEASE PELLETS ORAL at 20:23

## 2022-10-15 RX ADMIN — OXYCODONE HYDROCHLORIDE AND ACETAMINOPHEN 1000 MG: 500 TABLET ORAL at 08:14

## 2022-10-15 RX ADMIN — GABAPENTIN 600 MG: 300 CAPSULE ORAL at 08:14

## 2022-10-15 RX ADMIN — HEPARIN SODIUM 5000 UNITS: 5000 INJECTION, SOLUTION INTRAVENOUS; SUBCUTANEOUS at 16:29

## 2022-10-15 RX ADMIN — INSULIN GLARGINE 50 UNITS: 100 INJECTION, SOLUTION SUBCUTANEOUS at 16:32

## 2022-10-15 RX ADMIN — DULOXETINE HYDROCHLORIDE 20 MG: 20 CAPSULE, DELAYED RELEASE PELLETS ORAL at 08:14

## 2022-10-15 RX ADMIN — HEPARIN SODIUM 5000 UNITS: 5000 INJECTION, SOLUTION INTRAVENOUS; SUBCUTANEOUS at 01:15

## 2022-10-15 RX ADMIN — OXYCODONE HYDROCHLORIDE 10 MG: 5 TABLET ORAL at 16:27

## 2022-10-15 RX ADMIN — GABAPENTIN 600 MG: 300 CAPSULE ORAL at 20:05

## 2022-10-15 RX ADMIN — OXYCODONE HYDROCHLORIDE 10 MG: 5 TABLET ORAL at 09:15

## 2022-10-15 ASSESSMENT — COGNITIVE AND FUNCTIONAL STATUS - GENERAL
MOVING TO AND FROM BED TO CHAIR: 2 - A LOT
DRESSING REGULAR LOWER BODY CLOTHING: 1 - TOTAL
CLIMB 3 TO 5 STEPS WITH RAILING: 1 - TOTAL
TOILETING: 2 - A LOT
HELP NEEDED FOR BATHING: 2 - A LOT
WALKING IN HOSPITAL ROOM: 1 - TOTAL
STANDING UP FROM CHAIR USING ARMS: 1 - TOTAL
HELP NEEDED FOR PERSONAL GROOMING: 2 - A LOT
EATING MEALS: 4 - NONE
DRESSING REGULAR UPPER BODY CLOTHING: 2 - A LOT

## 2022-10-15 NOTE — PROGRESS NOTES
Vancomycin Dosing by Pharmacy Consult Discontinued    IV Vancomycin Dosing by Pharmacy Protocol was discontinued.  Pharmacy will sign off and no longer dose vancomycin for this patient.    Fly Thao, RaheelD

## 2022-10-15 NOTE — NURSING NOTE
Per note from [facility], pt not able to be transported to the [facility] until Monday. Dr Sky paged and made aware. DL paged @ 1922-waiting for call back.

## 2022-10-15 NOTE — NURSING NOTE
Rn tried to call RN to RN report to [facility] X2, Spoke with  X2 who states the nurses must be buys. Asked for a call back for report

## 2022-10-15 NOTE — PROGRESS NOTES
"Patient:  Xin Melendez  Location:  [facility] 4PAV 4418  MRN:  [id number]  Today's date:  10/15/2022     Patient was returned to supine  flat then head elevated bed w/ alarm activated & call bell nearby     Xin is a 61 y.o. female admitted on 10/11/2022 with Bilateral leg pain [M79.604, M79.605]  Leg pain, left [M79.605]  Cellulitis of left lower extremity [L03.116]. Principal problem is Cellulitis of left lower extremity.    Past Medical History  Xin has a past medical history of Back pain, COVID-19 vaccine series completed, CTS (carpal tunnel syndrome), DJD (degenerative joint disease), Edema, Frozen shoulder, HL (hearing loss), Hypertension, Hypothyroidism, Kidney stones, Lipid disorder, Lymphedema, Obesity, Sciatica, Stasis dermatitis, and Type 2 diabetes mellitus (CMS/[facility]).    History of Present Illness   iXn Melendez is a 61 y.o. female with a past medical history of morbid obesity with BMI 59.0.  Patient has a history of hypertension, hypothyroidism, type 2 diabetes mellitus.  She has a history of lumbar degenerative disc disease with chronic low back pain and occasional right-sided sciatica.  She is ambulatory dysfunction and uses a walker at baseline.     The patient presented to the emergency department with complaints \" both of my legs are painful and I cannot walk.\"  \"My left leg is red, swollen, and painful\" - LLE cellulitis. BLE U.S. (-) DVT      OT Vitals    Date/Time Pulse SpO2 Pt Activity O2 Therapy BP [facility]   10/15/22 0902 91 96 % At rest None (Room air) 125/59 CP      OT Pain    Date/Time Pain Type Rating: Rest Rating: Activity [facility]   10/15/22 0902 Pain Assessment 0 10 CP          Prior Living Environment    Flowsheet Row Most Recent Value   People in Home child(greta), adult   Current Living Arrangements home   Home Accessibility stairs to enter home (Group), stairs within home (Group)   Living Environment Comment lives in 2 story house with her son,  2 LEROY with no rail,  bed/ bath on 2nd " floor,  son performs IADLs,  uses instacart for groceries,  has stall shower with SC,  no GBs   Number of Stairs, Main Entrance 2   Stair Railings, Main Entrance none   Number of Stairs, Within Home, Primary 12        Prior Level of Function    Flowsheet Row Most Recent Value   Dominant Hand right   Ambulation assistive equipment   Transferring assistive equipment   Toileting independent   Bathing independent   Dressing independent   Eating independent   Communication understands/communicates without difficulty   Prior Level of Function Comment Per pt report she was IND w/ all ADLs and used a RW for ambulation   Assistive Device Currently Used at Home glucometer, walker, front-wheeled, stair glide, shower chair        Occupational Profile    Flowsheet Row Most Recent Value   Reason for Services/Referral ADL /ambulation dysfx   Environmental Supports and Barriers From home with son   Patient Goals to get stronger           OT Evaluation and Treatment - 10/15/22 0902        OT Time Calculation    Start Time 0902     Stop Time 0926     Time Calculation (min) 24 min        Session Details    Document Type daily treatment/progress note     Mode of Treatment occupational therapy        General Information    Patient Profile Reviewed yes     Patient/Family/Caregiver Comments/Observations RN cleared for therapy     General Observations of Patient Patient received  supine in head elevated bed in RA     Existing Precautions/Restrictions weight bearing        Weight-bearing Status    Left LE Weight-Bearing Status weight-bearing as tolerated (WBAT)     Right LE Weight-Bearing Status weight-bearing as tolerated (WBAT)        Hearing Assessment    Hearing Status WFL        Vision Assessment/Intervention    Visual Impairment/Limitations corrective lenses full-time        Range of Motion (ROM)    Range of Motion ROM is WFL;bilateral upper extremities        Strength (Manual Muscle Testing)    Strength (Manual Muscle Testing)  strength is WFL;bilateral upper extremities        Bed Mobility    [place], Roll Left maximum assist (25-49% patient effort);2 person assist     [place], Roll Right maximum assist (25-49% patient effort);2 person assist     [place], Supine to Sit minimum assist (75% or more patient effort);2 person assist     [place], Sit to Supine maximum assist (25-49% patient effort);2 person assist     Assistive Device head of bed elevated;bed rails;draw sheet     Comment (Bed Mobility) Patient able to raise buttocks to scoot back into bed & Left EOB scooting towards head of bed.She was able to exhibit Fair EOB sitting balance for 9 minutes .Patint also assist w/ side to side rolling to assist w/ changing flat sheet & blue pads        Transfers    Comment MAX x2 w/ RW        Bed to Chair Transfer    Comment patient unable to sustain STS balance w/ RW  x 3 attempts        Sit to Stand Transfer    [place], Sit to Stand Transfer maximum assist (25-49% patient effort);2 person assist     Verbal Cues hand placement;safety;technique     Assistive Device walker, front-wheeled        Stand to Sit Transfer    [place], Stand to Sit Transfer maximum assist (25-49% patient effort);2 person assist     Verbal Cues safety;technique;preparatory posture;proper use of assistive device     Assistive Device walker, front-wheeled        Balance    Static Sitting Balance WFL     Dynamic Sitting Balance mild impairment     Sit to Stand Dynamic Balance moderate impairment     Static Standing Balance severe impairment        Motor Skills    Functional Endurance Fair minus        Lower Body Dressing    [place] Assistance dons/doffs left sock;dons/doffs right sock     [place] dependent (less than 25% patient effort)     Position long sitting        Coping    Observed Emotional State calm;cooperative;anxious        AM-PAC (TM) - ADL (Current Function)    Putting on and taking off regular lower body clothing? 1 - Total      Bathing? 2 - A Lot     Toileting? 2 - A Lot     Putting on/taking off regular upper body clothing? 2 - A Lot     How much help for taking care of personal grooming? 2 - A Lot     Eating meals? 4 - None     AM-PAC (TM) ADL Score 13        Assessment/Plan (OT)    Daily Outcome Statement [facility] 13 : Patient requires MUCH assist w/ bed mobility , LE adls, STS plus RW mobility activities 2* decr strength/endurance .     Rehab Potential good, to achieve stated therapy goals     Therapy Frequency 3-5 times/wk     Planned Therapy Interventions activity tolerance training;adaptive equipment training;functional balance retraining;occupation/activity based interventions;patient/caregiver education/training;transfer/mobility retraining;strengthening exercise               OT Assessment/Plan    Flowsheet Row Most Recent Value   OT Recommended Discharge Disposition skilled nursing facility at 10/15/2022 0902   Anticipated Equipment Needs At Discharge (OT) bathing equipment, dressing equipment, walker, front-wheeled at 10/15/2022 0902   Patient/Family Therapy Goal Statement to get stronger at 10/15/2022 0902                    Education Documentation  No documentation found.        OT Goals    Flowsheet Row Most Recent Value   Bed Mobility Goal 1    Activity/Assistive Device sit to supine, supine to sit at 10/15/2022 0902   [place] moderate assist (50-74% patient effort) at 10/15/2022 0902   Time Frame by discharge at 10/15/2022 0902   Progress/Outcome goal ongoing at 10/15/2022 0902   Transfer Goal 1    Activity/Assistive Device sit-to-stand/stand-to-sit, bed-to-chair/chair-to-bed, walker, front-wheeled at 10/15/2022 0902   [place] minimum assist (75% or more patient effort) at 10/15/2022 0902   Time Frame by discharge at 10/15/2022 0902   Progress/Outcome goal ongoing at 10/15/2022 0902   Dressing Goal 1    Activity/Adaptive Equipment lower body dressing at 10/15/2022 0902   [place] moderate assist (50-74% patient  effort) at 10/15/2022 0902   Time Frame by discharge at 10/15/2022 0902   Progress/Outcome goal ongoing at 10/15/2022 0902   Toileting Goal 1    Activity/Assistive Device toileting skills, all at 10/13/2022 1047   [place] moderate assist (50-74% patient effort) at 10/13/2022 1047   Time Frame by discharge at 10/13/2022 1047   Progress/Outcome goal ongoing at 10/13/2022 1047

## 2022-10-15 NOTE — PROGRESS NOTES
"Patient: Xin Melendez  Location:  [facility] 4PAV 4418  MRN:  [id number]  Today's date:  10/15/2022    Pt left in supine position in bed. (-) Bed/Chair alarm. Pt with nursing for personal hygiene. Nursing aware.       Xin is a 61 y.o. female admitted on 10/11/2022 with Bilateral leg pain [M79.604, M79.605]  Leg pain, left [M79.605]  Cellulitis of left lower extremity [L03.116]. Principal problem is Cellulitis of left lower extremity.    Past Medical History  Xin has a past medical history of Back pain, COVID-19 vaccine series completed, CTS (carpal tunnel syndrome), DJD (degenerative joint disease), Edema, Frozen shoulder, HL (hearing loss), Hypertension, Hypothyroidism, Kidney stones, Lipid disorder, Lymphedema, Obesity, Sciatica, Stasis dermatitis, and Type 2 diabetes mellitus (CMS/[facility]).    History of Present Illness   Xin Melendez is a 61 y.o. female with a past medical history of morbid obesity with BMI 59.0.  Patient has a history of hypertension, hypothyroidism, type 2 diabetes mellitus.  She has a history of lumbar degenerative disc disease with chronic low back pain and occasional right-sided sciatica.  She is ambulatory dysfunction and uses a walker at baseline.     The patient presented to the emergency department with complaints \" both of my legs are painful and I cannot walk.\"  \"My left leg is red, swollen, and painful\" - LLE cellulitis. BLE U.S. (-) DVT      PT Vitals    Date/Time Pulse HR Source SpO2 Pt Activity O2 Therapy BP BP Location BP Method Pt Position [facility]   10/15/22 0900 89 Monitor 96 % At rest None (Room air) 125/59 Right upper arm Automatic Lying SL   10/15/22 0901 91 Monitor 96 % Other (Comment) None (Room air) -- -- -- -- SL      PT Pain    Date/Time Pain Type Rating: Rest Rating: Activity [facility]   10/15/22 0900 Pain Assessment 0 10 SL          Prior Living Environment    Flowsheet Row Most Recent Value   People in Home child(greta), adult   Current Living Arrangements home   Home " Accessibility stairs to enter home (Group), stairs within home (Group)   Living Environment Comment lives in 2 story house with her son,  2 LEROY with no rail,  bed/ bath on 2nd floor,  son performs IADLs,  uses instacart for groceries,  has stall shower with SC,  no GBs   Number of Stairs, Main Entrance 2   Stair Railings, Main Entrance none   Number of Stairs, Within Home, Primary 12        Prior Level of Function    Flowsheet Row Most Recent Value   Dominant Hand right   Ambulation assistive equipment   Transferring assistive equipment   Toileting independent   Bathing independent   Dressing independent   Eating independent   Communication understands/communicates without difficulty   Prior Level of Function Comment Per pt report she was IND w/ all ADLs and used a RW for ambulation   Assistive Device Currently Used at Home glucometer, walker, front-wheeled, stair glide, shower chair           PT Evaluation and Treatment - 10/15/22 0900        PT Time Calculation    Start Time 0900     Stop Time 0945     Time Calculation (min) 45 min        Session Details    Document Type daily treatment/progress note     Mode of Treatment physical therapy        Bed Mobility    [place], Roll Left 2 person assist;maximum assist (25-49% patient effort)     [place], Roll Right 2 person assist;maximum assist (25-49% patient effort)     [place], Supine to Sit 2 person assist     [place], Sit to Supine maximum assist (25-49% patient effort)   x 3 one for trunk and two people with    Comment (Bed Mobility) Able to complete scooting back and scooting up to the head of the bed. with stand by assist. Pt is omar to tolerate assist from theraist with the use of left ankle and left thigh. No shearing at the calf tolerated despite medication use prior to mobility. Pt able to sit supported no LOB.        Sit to Stand Transfer    [place], Sit to Stand Transfer 2 person assist;maximum assist (25-49% patient effort)      Verbal Cues hand placement;safety;technique     Assistive Device walker, standard     Comment from EOB attempt x 3 poor trunk and knee extension        Stand to Sit Transfer    [place], Stand to Sit Transfer 2 person assist;maximum assist (25-49% patient effort)     Verbal Cues hand placement;safety;technique     Assistive Device walker, front-wheeled     Comment to EOB cues for safety. Poor eccentric control        Stairs Training    [place], Stairs unable to assess        Balance    Static Sitting Balance WFL     Dynamic Sitting Balance mild impairment     Sit to Stand Dynamic Balance moderate impairment     Static Standing Balance severe impairment     Comment, Balance A x 2 for attempts to complete bed mobility and transfers. Left calf pain throughout        AM-PAC (TM) - Mobility (Current Function)    Turning from your back to your side while in a flat bed without using bedrails? 2 - A Lot     Moving from lying on your back to sitting on the side of a flat bed without using bedrails? 2 - A Lot     Moving to and from a bed to a chair? 2 - A Lot     Standing up from a chair using your arms? 1 - Total     To walk in a hospital room? 1 - Total     Climbing 3-5 steps with a railing? 1 - Total     AM-PAC (TM) Mobility Score 9        Assessment/Plan (PT)    Daily Outcome Statement Pt with continues left calf pain very painful to any shearing with bed mobility despite the wrap on her LLE. Pt limited with mobility due to habitus, weakness, and pain. She is not safe to return home at this time and would benefit from further PT services to improve the above listed impairments.               PT Assessment/Plan    Flowsheet Row Most Recent Value   PT Recommended Discharge Disposition skilled nursing facility at 10/13/2022 1046   Anticipated Equipment Needs at Discharge (PT) other (see comments)  [TBD by SNF] at 10/13/2022 1046   Patient/Family Therapy Goals Statement to get stronger at 10/13/2022 1046                     Education Documentation  No documentation found.        PT Goals    Flowsheet Row Most Recent Value   Bed Mobility Goal 1    Activity/Assistive Device bed mobility activities, all at 10/13/2022 1046   Dukes minimum assist (75% or more patient effort)  [x2] at 10/13/2022 1046   Time Frame by discharge at 10/13/2022 1046   Progress/Outcome goal ongoing at 10/13/2022 1046   Transfer Goal 1    Activity/Assistive Device sit-to-stand/stand-to-sit at 10/13/2022 1046   Dukes minimum assist (75% or more patient effort)  [x1] at 10/13/2022 1046   Time Frame by discharge at 10/13/2022 1046   Progress/Outcome goal ongoing at 10/13/2022 1046   Gait Training Goal 1    Activity/Assistive Device gait (walking locomotion), assistive device use at 10/13/2022 1046   Dukes minimum assist (75% or more patient effort)  [x2] at 10/13/2022 1046   Distance 15 at 10/13/2022 1046   Time Frame by discharge at 10/13/2022 1046   Progress/Outcome goal ongoing at 10/13/2022 1046   Stairs Goal 1    Activity/Assistive Device ascending stairs, descending stairs at 10/13/2022 1046   Dukes modified independence at 10/13/2022 1046   Number of Stairs 12 at 10/13/2022 1046   Time Frame 3 weeks at 10/13/2022 1046   Progress/Outcome goal ongoing at 10/13/2022 1046

## 2022-10-15 NOTE — PROGRESS NOTES
Subjective:   Pt seen at bedside for continued evaluation of bilateral lower extremity lymphedema, left lower extremity cellulitis.NAD. No overnight events. Dressing clean, dry, and intact. Denies any N/V/F/C/CP/SOB.    She endorses mild improvement in left lower extremity pain this morning  ROS:   Past Medical/Surgical history, Allergies, Meds reviewed in detail as charted  FH/SH reviewed in detail as charted  Review of Systems:  Head and Neck: No complaints  Chest : No complaints  Abdomen: No Complaints  Constitutional: Unremarkable     Vitals: I have reviewed the patient's current vital signs as documented in the patient's EMR.    Radiology: No new Radiology.    Labs:   CBC Results       10/14/22 10/13/22 10/12/22     0839 0736 0605    WBC 15.75 14.62 15.73    RBC 3.22 3.39 3.70    HGB 8.5 9.1 10.1    HCT 27.7 29.9 32.5    MCV 86.0 88.2 87.8    MCH 26.4 26.8 27.3    MCHC 30.7 30.4 31.1     260 213        BMP Results       10/14/22 10/13/22 10/12/22     0839 0736 0605     135 136    K 3.7 3.1 3.8    Cl 106 103 103    CO2 22 24 22    Glucose 54 110 96    BUN 17 24 34    Creatinine 0.8 0.8 1.3    Calcium 8.2 8.2 8.1    Anion Gap 8 8 11    EGFR >60.0 >60.0 41.6         Comment for K at 0839 on 10/14/22: SLIGHT HEMOLYSIS, RESULT MAY BE INCREASED.    Comment for K at 0605 on 10/12/22: Results obtained on plasma. Plasma Potassium values may be up to 0.4 mEQ/L less than serum values. The differences may be greater for patients with high platelet or white cell counts.  SLIGHT HEMOLYSIS, RESULT MAY BE INCREASED.             Objective:   -Vascular: Unable to palpate pulses secondary to edema.  Bilateral DP pulses were strongly biphasic on Doppler.  CRT intact all digits.  Increased edema and erythema with associated warmth noted to the left lower extremity comparison to the right.  -Orthopedic: decreased ROM at ankle jt b/l, +DF/PF all digits, morbidly enlarged lower extremities secondary to obesity and pitting  edema.  Diffuse tenderness to palpation of the left lower extremity.  -Neurologic: light touch and epicritic sensation intact.  -Dermatologic:   Bilateral lower extremity lymphedema with cobblestoning noted to the posterior aspect of both legs.  Moderate serous weeping noted to both lower extremities, left worse than right with mild maceration.  No deep wounds noted.  No crepitus or fluctuance.  No purulence.  Mild malodor noted to both lower extremities  Large granular wound to the entire posterior aspect of the left leg is full thickness and granular       Assessment:  Xin Melendez is a 61 y.o. female presents with bilateral lower extremity lymphedema, cellulitis of left lower extremity     Plan:  -Left lower extremity was dressed with , Aquacel, gauze, ABD, checks and secured with tape.  Patient is unable to tolerate compression dressings due to pain.  Podiatry continue to monitor and perform dressing changes while patient is in-house.  -Nursing appreciated with changing dressings if strikethrough occur, or if dressings become loose or removed.  -Superficial wound culture of the left leg: Morganelli, staph, strep,, diphtheroids  -Left leg ultrasound: No deep collections.  -Weightbearing as clinically tolerated bilaterally.  -Rest of care per primary team.  - Labs and radiographs reviewed.  Kevin Sheets PGY-1  #[id number]
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
## 2022-10-15 NOTE — NURSING NOTE
This RN contacted [facility] to give report [phone]. Reception stated 2 nurses went to lunch, no one answered the phone on unit.

## 2022-10-16 LAB
BACTERIA BLD CULT: NORMAL
GLUCOSE BLD-MCNC: 101 MG/DL (ref 70–99)
GLUCOSE BLD-MCNC: 106 MG/DL (ref 70–99)
GLUCOSE BLD-MCNC: 64 MG/DL (ref 70–99)
GLUCOSE BLD-MCNC: 65 MG/DL (ref 70–99)
GLUCOSE BLD-MCNC: 80 MG/DL (ref 70–99)
GLUCOSE BLD-MCNC: 96 MG/DL (ref 70–99)
POCT TEST: ABNORMAL
POCT TEST: NORMAL
POCT TEST: NORMAL

## 2022-10-16 PROCEDURE — 99233 SBSQ HOSP IP/OBS HIGH 50: CPT | Performed by: STUDENT IN AN ORGANIZED HEALTH CARE EDUCATION/TRAINING PROGRAM

## 2022-10-16 PROCEDURE — 63600000 HC DRUGS/DETAIL CODE: Performed by: HOSPITALIST

## 2022-10-16 PROCEDURE — 63700000 HC SELF-ADMINISTRABLE DRUG: Performed by: HOSPITALIST

## 2022-10-16 PROCEDURE — 12000000 HC ROOM AND CARE MED/SURG

## 2022-10-16 PROCEDURE — 63700000 HC SELF-ADMINISTRABLE DRUG: Performed by: STUDENT IN AN ORGANIZED HEALTH CARE EDUCATION/TRAINING PROGRAM

## 2022-10-16 RX ORDER — ONDANSETRON 4 MG/1
4 TABLET, ORALLY DISINTEGRATING ORAL EVERY 8 HOURS PRN
Status: DISCONTINUED | OUTPATIENT
Start: 2022-10-16 | End: 2022-10-17 | Stop reason: HOSPADM

## 2022-10-16 RX ORDER — INSULIN GLARGINE 100 [IU]/ML
40 INJECTION, SOLUTION SUBCUTANEOUS DAILY
Status: DISCONTINUED | OUTPATIENT
Start: 2022-10-16 | End: 2022-10-17 | Stop reason: HOSPADM

## 2022-10-16 RX ORDER — CEFUROXIME AXETIL 250 MG/1
500 TABLET ORAL EVERY 12 HOURS
Status: COMPLETED | OUTPATIENT
Start: 2022-10-16 | End: 2022-10-16

## 2022-10-16 RX ADMIN — HEPARIN SODIUM 5000 UNITS: 5000 INJECTION, SOLUTION INTRAVENOUS; SUBCUTANEOUS at 08:34

## 2022-10-16 RX ADMIN — GABAPENTIN 600 MG: 300 CAPSULE ORAL at 08:33

## 2022-10-16 RX ADMIN — CLOTRIMAZOLE: 1 CREAM TOPICAL at 08:39

## 2022-10-16 RX ADMIN — Medication 1000 UNITS: at 08:34

## 2022-10-16 RX ADMIN — OXYCODONE HYDROCHLORIDE AND ACETAMINOPHEN 1000 MG: 500 TABLET ORAL at 08:33

## 2022-10-16 RX ADMIN — OXYCODONE HYDROCHLORIDE 5 MG: 5 TABLET ORAL at 08:46

## 2022-10-16 RX ADMIN — DOXYCYCLINE HYCLATE 100 MG: 100 TABLET, FILM COATED ORAL at 07:41

## 2022-10-16 RX ADMIN — CEFUROXIME AXETIL 500 MG: 250 TABLET, FILM COATED ORAL at 08:33

## 2022-10-16 RX ADMIN — DOXYCYCLINE HYCLATE 100 MG: 100 TABLET, FILM COATED ORAL at 20:26

## 2022-10-16 RX ADMIN — GABAPENTIN 600 MG: 300 CAPSULE ORAL at 20:26

## 2022-10-16 RX ADMIN — ACETAMINOPHEN 650 MG: 325 TABLET, FILM COATED ORAL at 09:53

## 2022-10-16 RX ADMIN — LEVOTHYROXINE SODIUM 75 MCG: 75 TABLET ORAL at 06:46

## 2022-10-16 RX ADMIN — CEFUROXIME AXETIL 500 MG: 250 TABLET, FILM COATED ORAL at 20:26

## 2022-10-16 RX ADMIN — HEPARIN SODIUM 5000 UNITS: 5000 INJECTION, SOLUTION INTRAVENOUS; SUBCUTANEOUS at 16:32

## 2022-10-16 RX ADMIN — ONDANSETRON 4 MG: 4 TABLET, ORALLY DISINTEGRATING ORAL at 09:53

## 2022-10-16 RX ADMIN — CLOTRIMAZOLE: 1 CREAM TOPICAL at 20:27

## 2022-10-16 RX ADMIN — DULOXETINE HYDROCHLORIDE 20 MG: 20 CAPSULE, DELAYED RELEASE PELLETS ORAL at 20:26

## 2022-10-16 RX ADMIN — INSULIN GLARGINE 20 UNITS: 100 INJECTION, SOLUTION SUBCUTANEOUS at 16:45

## 2022-10-16 RX ADMIN — GABAPENTIN 600 MG: 300 CAPSULE ORAL at 14:06

## 2022-10-16 RX ADMIN — DULOXETINE HYDROCHLORIDE 20 MG: 20 CAPSULE, DELAYED RELEASE PELLETS ORAL at 08:33

## 2022-10-16 RX ADMIN — HEPARIN SODIUM 5000 UNITS: 5000 INJECTION, SOLUTION INTRAVENOUS; SUBCUTANEOUS at 23:35

## 2022-10-16 NOTE — PROGRESS NOTES
Hospital Medicine Service -  Daily Progress Note       SUBJECTIVE   Interval History: Seen and examined at bedside. No acute events overnight.        OBJECTIVE      Vital signs in last 24 hours:  Temp:  [36.7 °C (98 °F)-37.1 °C (98.7 °F)] 36.7 °C (98 °F)  Heart Rate:  [80-84] 80  Resp:  [16] 16  BP: (105-116)/(51-59) 105/51  No intake or output data in the 24 hours ending 10/16/22 0903    PHYSICAL EXAMINATION      Physical Exam    GENERAL APPEARANCE Obese, AAOx3, NAD   MUCUS MEMBRANES Moist   LUNGS CTAB, no w/r/r, no signs of respiratory distress   CHEST S1/S2, RRR, no m/r/g appreciated   ABDOMEN  GENITOURINARY Soft, NT, ND, +BS  No suprapubic TTP   EXTREMITIES Severely swollen bilateral lower extremities with chronic changes consistent with lymphedema   SKIN LLE rash consistent with cellulitis   HEENT  NEURO Pupils equal, Anicteric  Follows commands, no dysarthria or facial asymmetry        LINES, CATHETERS, DRAINS, AIRWAYS, AND WOUNDS   Lines, Drains, and Airways:  Wounds (agree with documentation and present on admission):  Rash 10/12/22 2235 Right posterior greater trochanter (Active)   Number of days: 4       Rash 10/12/22 2236 Left posterior greater trochanter (Active)   Number of days: 4       Rash 10/12/22 2242 groin (Active)   Number of days: 4       Wound Buttock (Active)   Number of days: 4         Comments:      LABS / IMAGING / TELE      Labs  No new labs.    SARS-CoV-2 (COVID-19) (no units)   Date/Time Value   10/14/2022 2315 Negative       Imaging  I have independently reviewed the patient's pertinent imaging for this hospital visit.     ECG/Telemetry  Patient is not on telemetry.    ASSESSMENT AND PLAN      Bilateral leg pain  Assessment & Plan  Lower extremity ultrasound negative for DVT    Type 2 diabetes mellitus, with long-term current use of insulin (CMS/[facility])  Assessment & Plan  Patient's most recent HbA1c was performed on August 3, 2022 with her A1c at 5.5 noting excellent control with  current diabetic regiment.  Will restart patient on slightly lower dose of long-acting insulin (tresiba is not on hospital formulary and will interchange to Lantus) given that the patient has not been eating much.  Perform Accu-Cheks.  Sliding scale insulin      Azotemia  Assessment & Plan  Hydrate patient with intravenous fluids, encourage PO hydration   Holding patient's Avalide (irbesartan and hydrochlorothiazide) secondary to azotemia  Holding NSAIDs as this will worsen her renal function.    Avoid nephrotoxins  Monitor BMP          Hypokalemia  Assessment & Plan  Replete as needed  Monitor BMP    Ambulatory dysfunction  Assessment & Plan  Fall precautions  PT/OT rec SNF, patient agreeable    * Cellulitis of left lower extremity  Assessment & Plan  The patient has concerns for a left lower extremity cellulitis with fungal infiltration along her back of her left knee.    ID following, DC on Doxycycline 100 BID, cefpodoxime 200 BID until 10/18/22  Also start clotrimazole topical cream for the fungal rash along the back of her posterior knee crease  Consult podiatry to assist with wound care given the significant amount of lymphedema and hyperkeratotic skin which potentially may need debridement  Wound care, podiatry following  Lower extremity ultrasound negative for DVT  Soft tissue LE ultrasound negative for abscess or collection           VTE Assessment: [place] VTE Score: 5  VTE Prophylaxis:  Current anticoagulants:  heparin (porcine) 5,000 unit/mL injection 5,000 Units, subcutaneous, q8h INT      Code Status: Full Code      Estimated Discharge Date: 10/15/2022       Disposition Planning: [facility]     Everardo Sky,   10/16/2022     >35 minutes were spent, mostly counseling and coordinating care

## 2022-10-16 NOTE — PROGRESS NOTES
Hospital Medicine Service -  Daily Progress Note       SUBJECTIVE   Interval History: Seen and examined at bedside. Hypoglycemic this morning, decreased lantus to 40u, likely due to poor PO intake by patient. Patient for discharge yesterday, however ambulance company did not have bariatric bed and one will not be available until Monday 10/17.       OBJECTIVE      Vital signs in last 24 hours:  Temp:  [36.7 °C (98 °F)-37.1 °C (98.7 °F)] 36.7 °C (98 °F)  Heart Rate:  [80-91] 80  Resp:  [16] 16  BP: (105-125)/(51-59) 105/51  No intake or output data in the 24 hours ending 10/16/22 0900    PHYSICAL EXAMINATION      Physical Exam    GENERAL APPEARANCE Obese, AAOx3, NAD   MUCUS MEMBRANES Moist   LUNGS CTAB, no w/r/r, no signs of respiratory distress   CHEST S1/S2, RRR, no m/r/g appreciated   ABDOMEN  GENITOURINARY Soft, NT, ND, +BS  No suprapubic TTP   EXTREMITIES Severely swollen bilateral lower extremities with chronic changes consistent with lymphedema   SKIN LLE rash consistent with cellulitis   HEENT  NEURO Pupils equal, Anicteric  Follows commands, no dysarthria or facial asymmetry        LINES, CATHETERS, DRAINS, AIRWAYS, AND WOUNDS   Lines, Drains, and Airways:  Wounds (agree with documentation and present on admission):  Rash 10/12/22 2235 Right posterior greater trochanter (Active)   Number of days: 4       Rash 10/12/22 2236 Left posterior greater trochanter (Active)   Number of days: 4       Rash 10/12/22 2242 groin (Active)   Number of days: 4       Wound Buttock (Active)   Number of days: 4         Comments:      LABS / IMAGING / TELE      Labs  No new labs.    SARS-CoV-2 (COVID-19) (no units)   Date/Time Value   10/14/2022 2315 Negative       Imaging  I have independently reviewed the patient's pertinent imaging for this hospital visit.     ECG/Telemetry  Patient is not on telemetry.    ASSESSMENT AND PLAN      Bilateral leg pain  Assessment & Plan  Lower extremity ultrasound negative for DVT    Type 2  diabetes mellitus, with long-term current use of insulin (CMS/[facility])  Assessment & Plan  Patient's most recent HbA1c was performed on August 3, 2022 with her A1c at 5.5 noting excellent control with current diabetic regiment.  Will restart patient on slightly lower dose of long-acting insulin (tresiba is not on hospital formulary and will interchange to Lantus) given that the patient has not been eating much.  Perform Accu-Cheks.  Sliding scale insulin      Azotemia  Assessment & Plan  Hydrate patient with intravenous fluids, encourage PO hydration   Holding patient's Avalide (irbesartan and hydrochlorothiazide) secondary to azotemia  Holding NSAIDs as this will worsen her renal function.    Avoid nephrotoxins  Monitor BMP          Hypokalemia  Assessment & Plan  Replete as needed  Monitor BMP    Ambulatory dysfunction  Assessment & Plan  Fall precautions  PT/OT rec SNF, patient agreeable    * Cellulitis of left lower extremity  Assessment & Plan  The patient has concerns for a left lower extremity cellulitis with fungal infiltration along her back of her left knee.    ID following, DC on Doxycycline 100 BID, cefpodoxime 200 BID until 10/18/22  Also start clotrimazole topical cream for the fungal rash along the back of her posterior knee crease  Consult podiatry to assist with wound care given the significant amount of lymphedema and hyperkeratotic skin which potentially may need debridement  Wound care, podiatry following  Lower extremity ultrasound negative for DVT  Soft tissue LE ultrasound negative for abscess or collection             VTE Assessment: [place] VTE Score: 5  VTE Prophylaxis:  Current anticoagulants:  heparin (porcine) 5,000 unit/mL injection 5,000 Units, subcutaneous, q8h INT      Code Status: Full Code      Estimated Discharge Date: 10/15/2022       Disposition Planning: [facility]     Everardo Sky DO  10/16/2022     >35 minutes were spent, mostly counseling and coordinating care

## 2022-10-16 NOTE — NURSING NOTE
Patient's blood sugar this morning is 65. Patient drank 1 orange juice. Rechecked 15 minutes later, blood sugar still 64.    Patient drinking apple juice now and will be rechecked around 0830.

## 2022-10-16 NOTE — PROGRESS NOTES
Subjective:   Pt seen at bedside for continued evaluation of bilateral lower extremity lymphedema, left lower extremity cellulitis.NAD. No overnight events. Dressing clean, dry, and intact. Pt reporting tenderness to b/l lower extremities globally with any manipulation this morning. Denies any N/V/F/C/CP/SOB.       ROS:   Past Medical/Surgical history, Allergies, Meds reviewed in detail as charted  FH/SH reviewed in detail as charted  Review of Systems:  Head and Neck: No complaints  Chest : No complaints  Abdomen: No Complaints  Constitutional: Unremarkable     Vitals: I have reviewed the patient's current vital signs as documented in the patient's EMR.    Radiology: No new Radiology.    Labs:   CBC Results       10/14/22 10/13/22 10/12/22     0839 0736 0605    WBC 15.75 14.62 15.73    RBC 3.22 3.39 3.70    HGB 8.5 9.1 10.1    HCT 27.7 29.9 32.5    MCV 86.0 88.2 87.8    MCH 26.4 26.8 27.3    MCHC 30.7 30.4 31.1     260 213        BMP Results       10/14/22 10/13/22 10/12/22     0839 0736 0605     135 136    K 3.7 3.1 3.8    Cl 106 103 103    CO2 22 24 22    Glucose 54 110 96    BUN 17 24 34    Creatinine 0.8 0.8 1.3    Calcium 8.2 8.2 8.1    Anion Gap 8 8 11    EGFR >60.0 >60.0 41.6         Comment for K at 0839 on 10/14/22: SLIGHT HEMOLYSIS, RESULT MAY BE INCREASED.    Comment for K at 0605 on 10/12/22: Results obtained on plasma. Plasma Potassium values may be up to 0.4 mEQ/L less than serum values. The differences may be greater for patients with high platelet or white cell counts.  SLIGHT HEMOLYSIS, RESULT MAY BE INCREASED.          Objective:   -Vascular: Unable to palpate pulses secondary to edema.  Bilateral DP pulses were strongly biphasic on Doppler.  CRT intact all digits.  Increased edema and erythema with associated warmth noted to the left lower extremity comparison to the right.  -Orthopedic: decreased ROM at ankle jt b/l, +DF/PF all digits, morbidly enlarged lower extremities secondary  to obesity and pitting edema.  Diffuse tenderness to palpation of the left lower extremity.  -Neurologic: light touch and epicritic sensation intact.  -Dermatologic:   Bilateral lower extremity lymphedema with cobblestoning noted to the posterior aspect of both legs.  Moderate serous weeping noted to both lower extremities, left worse than right with mild maceration.  No deep wounds noted.  No crepitus or fluctuance.  No purulence.  Mild malodor noted to both lower extremities  Left large full thickness wound to the left posterior leg is completely granular    Assessment:  Xin Melendez is a 61 y.o. female presents with bilateral lower extremity lymphedema, cellulitis of left lower extremity     Plan:  -Left lower extremity was dressed with , Aquacel, gauze, ABD, checks and secured with tape.  Patient is unable to tolerate compression dressings due to pain.  Podiatry continue to monitor and perform dressing changes while patient is in-house.  -Nursing appreciated with changing dressings if strikethrough occur, or if dressings become loose or removed.  -Superficial wound culture of the left leg: Morganelli, staph, strep, diphtheroids  -Left leg ultrasound: No deep collections.  -Weightbearing as clinically tolerated bilaterally.  -Rest of care per primary team.  - Labs and radiographs reviewed.    Jose Hernandez PGY1  Pager #[phone]

## 2022-10-16 NOTE — NURSING NOTE
Blood sugar this afternoon is 96. Patient due for 40 units of lantus. Checked with Dr. Sky and rec'd order to only give 20 units at this time. Patient has been instructed to snack tonight and to have dinner to which she understands and agrees with the plan. Also ordered chocolate milk with dinner as this is what patient said she'd have at home.

## 2022-10-17 VITALS
SYSTOLIC BLOOD PRESSURE: 144 MMHG | HEART RATE: 97 BPM | RESPIRATION RATE: 16 BRPM | TEMPERATURE: 99.8 F | BODY MASS INDEX: 50.02 KG/M2 | HEIGHT: 64 IN | DIASTOLIC BLOOD PRESSURE: 61 MMHG | OXYGEN SATURATION: 93 % | WEIGHT: 293 LBS

## 2022-10-17 LAB
BACTERIA BLD CULT: NORMAL
GLUCOSE BLD-MCNC: 116 MG/DL (ref 70–99)
GLUCOSE BLD-MCNC: 133 MG/DL (ref 70–99)
GLUCOSE BLD-MCNC: 174 MG/DL (ref 70–99)
GRAM STN SPEC: ABNORMAL
MICROORGANISM SPEC CULT: ABNORMAL
POCT TEST: ABNORMAL
SARS-COV-2 RNA RESP QL NAA+PROBE: NEGATIVE

## 2022-10-17 PROCEDURE — 97530 THERAPEUTIC ACTIVITIES: CPT | Mod: GP,U8

## 2022-10-17 PROCEDURE — U0003 INFECTIOUS AGENT DETECTION BY NUCLEIC ACID (DNA OR RNA); SEVERE ACUTE RESPIRATORY SYNDROME CORONAVIRUS 2 (SARS-COV-2) (CORONAVIRUS DISEASE [COVID-19]), AMPLIFIED PROBE TECHNIQUE, MAKING USE OF HIGH THROUGHPUT TECHNOLOGIES AS DESCRIBED BY CMS-2020-01-R: HCPCS | Performed by: HOSPITALIST

## 2022-10-17 PROCEDURE — 63600000 HC DRUGS/DETAIL CODE: Performed by: HOSPITALIST

## 2022-10-17 PROCEDURE — 97530 THERAPEUTIC ACTIVITIES: CPT | Mod: GO,U8

## 2022-10-17 PROCEDURE — 99239 HOSP IP/OBS DSCHRG MGMT >30: CPT | Performed by: HOSPITALIST

## 2022-10-17 PROCEDURE — 63700000 HC SELF-ADMINISTRABLE DRUG: Performed by: STUDENT IN AN ORGANIZED HEALTH CARE EDUCATION/TRAINING PROGRAM

## 2022-10-17 PROCEDURE — 63700000 HC SELF-ADMINISTRABLE DRUG: Performed by: HOSPITALIST

## 2022-10-17 PROCEDURE — 97116 GAIT TRAINING THERAPY: CPT | Mod: GP,U8

## 2022-10-17 PROCEDURE — 97535 SELF CARE MNGMENT TRAINING: CPT | Mod: GO,U8

## 2022-10-17 RX ADMIN — CLOTRIMAZOLE: 1 CREAM TOPICAL at 08:26

## 2022-10-17 RX ADMIN — Medication 1000 UNITS: at 08:26

## 2022-10-17 RX ADMIN — GABAPENTIN 600 MG: 300 CAPSULE ORAL at 08:26

## 2022-10-17 RX ADMIN — OXYCODONE HYDROCHLORIDE AND ACETAMINOPHEN 1000 MG: 500 TABLET ORAL at 08:25

## 2022-10-17 RX ADMIN — OXYCODONE HYDROCHLORIDE 5 MG: 5 TABLET ORAL at 08:29

## 2022-10-17 RX ADMIN — LEVOTHYROXINE SODIUM 75 MCG: 75 TABLET ORAL at 06:15

## 2022-10-17 RX ADMIN — OXYCODONE HYDROCHLORIDE 5 MG: 5 TABLET ORAL at 14:07

## 2022-10-17 RX ADMIN — SODIUM HYPOCHLORITE: 2.5 SOLUTION TOPICAL at 08:35

## 2022-10-17 RX ADMIN — GABAPENTIN 600 MG: 300 CAPSULE ORAL at 14:07

## 2022-10-17 RX ADMIN — DULOXETINE HYDROCHLORIDE 20 MG: 20 CAPSULE, DELAYED RELEASE PELLETS ORAL at 08:26

## 2022-10-17 RX ADMIN — HEPARIN SODIUM 5000 UNITS: 5000 INJECTION, SOLUTION INTRAVENOUS; SUBCUTANEOUS at 08:34

## 2022-10-17 RX ADMIN — DOXYCYCLINE HYCLATE 100 MG: 100 TABLET, FILM COATED ORAL at 08:26

## 2022-10-17 ASSESSMENT — COGNITIVE AND FUNCTIONAL STATUS - GENERAL
HELP NEEDED FOR PERSONAL GROOMING: 2 - A LOT
AFFECT: WFL;ANXIOUS
WALKING IN HOSPITAL ROOM: 2 - A LOT
MOVING TO AND FROM BED TO CHAIR: 2 - A LOT
AFFECT: WFL;ANXIOUS
TOILETING: 2 - A LOT
STANDING UP FROM CHAIR USING ARMS: 2 - A LOT
HELP NEEDED FOR BATHING: 2 - A LOT
DRESSING REGULAR UPPER BODY CLOTHING: 2 - A LOT
EATING MEALS: 4 - NONE
CLIMB 3 TO 5 STEPS WITH RAILING: 1 - TOTAL
DRESSING REGULAR LOWER BODY CLOTHING: 1 - TOTAL

## 2022-10-17 NOTE — PROGRESS NOTES
"Patient: Xin Melendez  Location:  [facility] 4PAV [id number]  MRN:  [id number]  Today's date:  10/17/2022    Pt returned to supine in bed, positioned for comfort and skin integrity.   Call bell and personal items w/in reach.  Bed alarm activated.    Nsg informed of pt's performance during therapy.       Xin is a 61 y.o. female admitted on 10/11/2022 with Bilateral leg pain [M79.604, M79.605]  Leg pain, left [M79.605]  Cellulitis of left lower extremity [L03.116]. Principal problem is Cellulitis of left lower extremity.    Past Medical History  Xin has a past medical history of Back pain, COVID-19 vaccine series completed, CTS (carpal tunnel syndrome), DJD (degenerative joint disease), Edema, Frozen shoulder, HL (hearing loss), Hypertension, Hypothyroidism, Kidney stones, Lipid disorder, Lymphedema, Obesity, Sciatica, Stasis dermatitis, and Type 2 diabetes mellitus (CMS/[facility]).    History of Present Illness   Xin Melendez is a 61 y.o. female with a past medical history of morbid obesity with BMI 59.0.  Patient has a history of hypertension, hypothyroidism, type 2 diabetes mellitus.  She has a history of lumbar degenerative disc disease with chronic low back pain and occasional right-sided sciatica.  She is ambulatory dysfunction and uses a walker at baseline.     The patient presented to the emergency department with complaints \" both of my legs are painful and I cannot walk.\"  \"My left leg is red, swollen, and painful\" - LLE cellulitis. BLE U.S. (-) DVT      PT Vitals    Date/Time Pulse SpO2 Pt Activity O2 Therapy BP Who   10/17/22 1307 88 93 % At rest None (Room air) 115/55 SGB      PT Pain    Date/Time Location Rating: Rest Who   10/17/22 1307 leg 3 SGB          Prior Living Environment    Flowsheet Row Most Recent Value   People in Home child(greta), adult   Current Living Arrangements home   Home Accessibility stairs to enter home (Group), stairs within home (Group)   Living Environment Comment lives in 2 " story house with her son,  2 LEROY with no rail,  bed/ bath on 2nd floor,  son performs IADLs,  uses instacart for groceries,  has stall shower with SC,  no GBs   Number of Stairs, Main Entrance 2   Stair Railings, Main Entrance none   Number of Stairs, Within Home, Primary 12        Prior Level of Function    Flowsheet Row Most Recent Value   Dominant Hand right   Ambulation assistive equipment   Transferring assistive equipment   Toileting independent   Bathing independent   Dressing independent   Eating independent   Communication understands/communicates without difficulty   Prior Level of Function Comment Per pt report she was IND w/ all ADLs and used a RW for ambulation   Assistive Device Currently Used at Home glucometer, walker, front-wheeled, stair glide, shower chair           PT Evaluation and Treatment - 10/17/22 1307        PT Time Calculation    Start Time 1307     Stop Time 1403     Time Calculation (min) 56 min        Session Details    Document Type daily treatment/progress note     Mode of Treatment physical therapy        General Information    Patient Profile Reviewed yes     Onset of Illness/Injury or Date of Surgery 10/11/22     Referring Physician Jose Dainel     Patient/Family/Caregiver Comments/Observations cleared by nsg for therapy     General Observations of Patient in bed - no current distress.     Existing Precautions/Restrictions fall;weight bearing        Weight-bearing Status    Left LE Weight-Bearing Status weight-bearing as tolerated (WBAT)     Right LE Weight-Bearing Status weight-bearing as tolerated (WBAT)        Cognition/Psychosocial    Affect/Mental Status (Cognition) WFL;anxious     Behavioral Issues (Cognition) overwhelmed easily     Orientation Status (Cognition) oriented x 4     Follows Commands (Cognition) WFL     Cognitive Function WFL        Bed Mobility    [place], Supine to Sit moderate assist (50-74% patient effort);1 person assist;maximum assist (25-49% patient  effort);1 person to manage equipment     [place], Sit to Supine dependent (less than 25% patient effort);other (see comments)     Assistive Device draw sheet;head of bed elevated;bed rails     Comment (Bed Mobility) max(A) for (B)LEs w/ hover mat sling to help lift.  mod(A) x1 for upper body to move supine to sit.  dizzy upon sitting but BP OK.  significant incr'd (B)LE pain w/ mvmt.   req'd (A)x3 when returning to bed 2* less than adequate positioning & ability to move.        Sit to Stand Transfer    [place], Sit to Stand Transfer moderate assist (50-74% patient effort);2 person assist     Verbal Cues hand placement;preparatory posture;safety;technique     Assistive Device walker, standard        Stand to Sit Transfer    [place], Stand to Sit Transfer moderate assist (50-74% patient effort);2 person assist     Verbal Cues hand placement;preparatory posture;safety;technique     Assistive Device walker, standard        Gait Training    [place], Gait moderate assist (50-74% patient effort);2 person assist     Assistive Device walker, standard     Distance in Feet 2 feet     Pattern (Gait) step-to     Comment (Gait/Stairs) pt w/ significant difficulty stepping 2* incr'd LE edema & wt combined w/ (L)LE pain        Balance    Static Sitting Balance WFL     Dynamic Sitting Balance mild impairment     Sit to Stand Dynamic Balance severe impairment     Static Standing Balance severe impairment     Dynamic Standing Balance severe impairment        AM-PAC (TM) - Mobility (Current Function)    Turning from your back to your side while in a flat bed without using bedrails? 2 - A Lot     Moving from lying on your back to sitting on the side of a flat bed without using bedrails? 2 - A Lot     Moving to and from a bed to a chair? 2 - A Lot     Standing up from a chair using your arms? 2 - A Lot     To walk in a hospital room? 2 - A Lot     Climbing 3-5 steps with a railing? 1 - Total     AM-PAC (TM) Mobility  Score 11        Assessment/Plan (PT)    Daily Outcome Statement pt w/ significant (L)LE edema, wounds and pain during all mobility - limiting her overall ability to actively move, tho quite motivated and tolerant of these issues.  Prolonged time req'd during and between bouts of mobility 2* recovery from pain caused during mobility.  will need SNF after [facility] stay to continue advancement toward goals.     Rehab Potential fair, will monitor progress closely     Therapy Frequency 3-5 times/wk     Planned Therapy Interventions balance training;bed mobility training;gait training;patient/family education;strengthening;transfer training               PT Assessment/Plan    Flowsheet Row Most Recent Value   PT Recommended Discharge Disposition skilled nursing facility at 10/17/2022 1307   Anticipated Equipment Needs at Discharge (PT) none  [TBD at SNF] at 10/17/2022 1307   Patient/Family Therapy Goals Statement to get stronger and walk again. at 10/17/2022 1307                    Education Documentation  Treatment Plan, taught by Josh Moreno, PT at 10/17/2022  3:13 PM.  Learner: Patient  Readiness: Acceptance  Method: Explanation  Response: Needs Reinforcement, Demonstrated Understanding, Verbalizes Understanding  Comment: reinforced optimal tech and safety during all mobility and ambulation trials.          PT Goals    Flowsheet Row Most Recent Value   Bed Mobility Goal 1    Activity/Assistive Device bed mobility activities, all at 10/13/2022 1046   [place] minimum assist (75% or more patient effort)  [x2] at 10/13/2022 1046   Time Frame by discharge at 10/13/2022 1046   Progress/Outcome goal ongoing at 10/17/2022 1307   Transfer Goal 1    Activity/Assistive Device sit-to-stand/stand-to-sit at 10/13/2022 1046   [place] minimum assist (75% or more patient effort)  [x1] at 10/13/2022 1046   Time Frame by discharge at 10/13/2022 1046   Progress/Outcome goal ongoing at 10/17/2022 1307   Gait Training Goal 1     Activity/Assistive Device gait (walking locomotion), assistive device use at 10/13/2022 1046   [place] minimum assist (75% or more patient effort)  [x2] at 10/13/2022 1046   Distance 15 at 10/13/2022 1046   Time Frame by discharge at 10/13/2022 1046   Progress/Outcome goal ongoing at 10/17/2022 1307   Stairs Goal 1    Activity/Assistive Device ascending stairs, descending stairs at 10/13/2022 1046   [place] modified independence at 10/13/2022 1046   Number of Stairs 12 at 10/13/2022 1046   Time Frame 3 weeks at 10/13/2022 1046   Progress/Outcome goal ongoing at 10/17/2022 1307

## 2022-10-17 NOTE — PLAN OF CARE
Problem: Adult Inpatient Plan of Care  Goal: Plan of Care Review  Outcome: Progressing  Flowsheets (Taken 10/17/2022 0654)  Progress: improving  Plan of Care Reviewed With: patient  Outcome Summary: pt with no complaint of pain overnight. RLE wound documented. VSS, Blood sugar controlled. No IV access, d/c today.   Plan of Care Review  Plan of Care Reviewed With: patient  Progress: improving  Outcome Summary: pt with no complaint of pain overnight. RLE wound documented. VSS, Blood sugar controlled. No IV access, d/c today.

## 2022-10-17 NOTE — DISCHARGE SUMMARY
Hospital Medicine Service -  Inpatient Discharge Summary        BRIEF OVERVIEW   Admitting Provider: Luc Bowers MD  Attending Provider: Delaney Gibbs,* Attending phys phone: [phone]    PCP: Rasta Ortiz [phone]    Admission Date: 10/11/2022  Discharge Date: 10/17/2022     DISCHARGE DIAGNOSES      Primary Discharge Diagnosis  Cellulitis of left lower extremity    Secondary Discharge Diagnoses  Active Hospital Problems    Diagnosis Date Noted    Cellulitis of left lower extremity 10/11/2022     Priority: High    Bilateral leg pain 10/12/2022    Ambulatory dysfunction 10/11/2022    Hypokalemia 10/11/2022    Azotemia 10/11/2022    Type 2 diabetes mellitus, with long-term current use of insulin (CMS/[facility]) 10/11/2022      Resolved Hospital Problems   No resolved problems to display.       Problem List on Day of Discharge  * Cellulitis of left lower extremity  Assessment & Plan  The patient has concerns for a left lower extremity cellulitis with fungal infiltration along her back of her left knee.    ID following, DC on Doxycycline 100 BID, cefpodoxime 200 BID until 10/19/22  Also started clotrimazole topical cream for the fungal rash along the back of her posterior knee crease  Consulted podiatry to assist with wound care given the significant amount of lymphedema and hyperkeratotic skin which potentially may need debridement  Wound care, podiatry following  Lower extremity ultrasound negative for DVT  Soft tissue LE ultrasound negative for abscess or collection        Bilateral leg pain  Assessment & Plan  Lower extremity ultrasound negative for DVT    Type 2 diabetes mellitus, with long-term current use of insulin (CMS/[facility])  Assessment & Plan  Patient's most recent HbA1c was performed on August 3, 2022 with her A1c at 5.5 noting excellent control with current diabetic regiment.  Adjusted insulin degludec      Azotemia  Assessment & Plan  resolved  Hydrated patient with intravenous  "fluids, encouraged PO hydration   Held patient's Avalide (irbesartan and hydrochlorothiazide) secondary to azotemia, now resumed          Hypokalemia  Assessment & Plan  resolved    Ambulatory dysfunction  Assessment & Plan  Fall precautions  PT/OT rec SNF, patient agreeable      SUMMARY OF HOSPITALIZATION      Presenting Problem/History of Present Illness  FROM [facility]:  Xin Melendez is a 61 y.o. female with a past medical history of super obesity with BMI 59.0.  Patient has a history of hypertension, hypothyroidism, type 2 diabetes mellitus.  She has a history of lumbar degenerative disc disease with chronic low back pain and occasional right-sided sciatica.  She is ambulatory dysfunction and uses a walker at baseline.     The patient presented to the emergency department with complaints \" both of my legs are painful and I cannot walk.\"  \"My left leg is red, swollen, and painful.\".     Patient further elaborates that she has had a 1 week history of left leg redness, warmth along with weeping from the back of her leg and difficulty ambulating due to the pain and discomfort in her legs.  She currently rates her pain in her legs as severe.  She tells me that she has been taking Tylenol arthritis twice a day as needed as well as Motrin 800 mg and sometimes 400 mg twice this week.  She denies any numbness or tingling.  She does report to me that she has chronic lymphedema, but has not seen a podiatrist \"in a while\".     Patient also complains to me that she has had generalized weakness over the course of the week with decrease energy levels, fatigue.  She tells me that over the last day, she has been feeling extremely weak.  She was sitting at the edge of her bed, slipped off her bed and onto the floor and was unable to get herself off the floor.  Today, she tells me that she was in the shower, and her legs gave out on her.  She slipped to the shower floor and was not able to get herself up.  911 was called and several " firemen and paramedics had a cut away her shower door in order to get her off the floor.  The paramedics noted that her left leg appeared red, swollen and they brought her to the emergency department for further evaluation.     Patient tells me that she has not had fever, chills or rigors.  She has had difficulty ambulating with her walker and has been unable to transfer herself out of bed or chair to use a walker.  She tells me that she lives with her son, Edd, but he is unable to help her due to her size and weight.     She was further evaluated by ER staff.  They diagnosed her with a left lower extremity cellulitis and started the patient on vancomycin.  They tell me that they also brandon blood cultures as the patient had complained of chills and occasional rigors in the emergency department during their evaluation.     Review of systems:     The patient reports to me that she has had constipation, last bowel movement approximately 2 days ago.  Patient complains of occasional stiffness in her low back.  Patient also experiences intermittent episodes of numbness and tingling in her hands from her carpal tunnel syndrome.     No rhinorrhea, sore throat, otalgia, postnasal drip.  No visual changes, headaches, dizziness, focal weakness, speech disturbances.  No dysphagia.  No cough or shortness of breath or dyspnea on exertion.  No chest pain, palpitations, orthopnea, paroxysmal nocturnal dyspnea.  No melena or bright red blood per rectum.  No abdominal pain, nausea or vomiting, diarrhea.  No dysuria or hematuria.  No flank pain.  No fever, chills, rigors.  No depression or anxiety. No suicidal thoughts or homicidal thoughts.  The patient denies saddle anesthesia, urinary retention and stool incontinence.     Other than what has been mentioned, the remainder of the review of systems otherwise negative.    Exam on Day of Discharge  Physical Exam  GENERAL APPEARANCE Obese, AAOx3, NAD   MUCUS MEMBRANES Moist   LUNGS  CTAB, no w/r/r, no signs of respiratory distress   CHEST S1/S2, RRR, no m/r/g appreciated   ABDOMEN  GENITOURINARY Soft, NT, ND, +BS  No suprapubic TTP   EXTREMITIES  swollen bilateral lower extremities with chronic changes consistent with lymphedema   SKIN LLE rash consistent with cellulitis   HEENT  NEURO Pupils equal, Anicteric  Follows commands, no dysarthria or facial asymmetry       Consults During Admission  IP CONSULT TO INFECTIOUS DISEASE  IP CONSULT TO PODIATRY    DISCHARGE MEDICATIONS               Medication List      START taking these medications    cefpodoxime 200 mg tablet  Commonly known as: VANTIN  Take 1 tablet (200 mg total) by mouth 2 (two) times a day for 4 days.  Dose: 200 mg     doxycycline hyclate 100 mg capsule  Commonly known as: VIBRAMYCIN  Take 1 capsule (100 mg total) by mouth 2 (two) times a day for 4 days.  Dose: 100 mg        CHANGE how you take these medications    TRESIBA FLEXTOUCH U-200 200 unit/mL (3 mL) pen  Inject 0.2 mL (40 Units total) under the skin every evening.  Dose: 40 Units  Generic drug: insulin degludec U-200 CONC  What changed: how much to take        CONTINUE taking these medications    ascorbic acid (vitamin C) 1,000 mg tablet  Commonly known as: VITAMIN C  Take 1,000 mg by mouth daily.  Dose: 1,000 mg     aspirin 81 mg enteric coated tablet  Take 81 mg by mouth daily.  Dose: 81 mg     AVALIDE 150-12.5 mg per tablet  Take 1 tablet by mouth daily.  Dose: 1 tablet  Generic drug: irbesartan-hydrochlorothiazide     DULoxetine 20 mg capsule  Commonly known as: CYMBALTA  Take 20 mg by mouth 2 (two) times a day.  Dose: 20 mg     exenatide microspheres 2 mg/0.85 mL auto-injector  Inject 2 mg under the skin once a week on Friday.  Dose: 2 mg     gabapentin 600 mg tablet  Commonly known as: NEURONTIN  Take 600 mg by mouth 3 (three) times a day.  Dose: 600 mg     levothyroxine 75 mcg tablet  Commonly known as: SYNTHROID  Take 75 mcg by mouth daily.  Dose: 75 mcg      meloxicam 15 mg tablet  Commonly known as: MOBIC  Take 15 mg by mouth daily.  Dose: 15 mg     methocarbamoL 750 mg tablet  Commonly known as: ROBAXIN  Take 750 mg by mouth 3 (three) times a day as needed. Always takes in am but doesn't always do tid  Dose: 750 mg     simvastatin 40 mg tablet  Commonly known as: ZOCOR  Take 40 mg by mouth daily.  Dose: 40 mg     TYLENOL ARTHRITIS PAIN ORAL  Take 2 tablets by mouth 2 (two) times a day as needed (pain).  Dose: 2 tablet     VITAMIN D3 ORAL  Take 1,000 Units by mouth daily.  Dose: 1,000 Units              PROCEDURES / LABS / IMAGING      Operative Procedures  n/a    Pertinent Labs  Results from last 7 days   Lab Units 10/14/22  0839 10/13/22  0736 10/12/22  0605   WBC K/uL 15.75* 14.62* 15.73*   HEMOGLOBIN g/dL 8.5* 9.1* 10.1*   HEMATOCRIT % 27.7* 29.9* 32.5*   PLATELETS K/uL 299 260 213    Results from last 7 days   Lab Units 10/14/22  0839 10/13/22  0736 10/12/22  0605   SODIUM mEQ/L 136 135* 136   POTASSIUM mEQ/L 3.7 3.1* 3.8   CHLORIDE mEQ/L 106 103 103   CO2 mEQ/L 22 24 22   BUN mg/dL 17 24* 34*   CREATININE mg/dL 0.8 0.8 1.3*   GLUCOSE mg/dL 54* 110* 96                        Results from last 7 days   Lab Units 10/11/22  1944   AST IU/L 18   ALT IU/L 12   ALK PHOS IU/L 84   BILIRUBIN TOTAL mg/dL 1.3*   PROTEIN TOTAL g/dL 7.1   ALBUMIN g/dL 3.0*                   Microbiology Results     Procedure Component Value Units Date/Time    SARS-CoV-2 (COVID-19), PCR Nasopharynx [[id number]]  (Normal) Collected: 10/14/22 2315    Specimen: Nasopharyngeal Swab from Nasopharynx Updated: 10/14/22 2347    Narrative:      The following orders were created for panel order SARS-CoV-2 (COVID-19), PCR Nasopharynx.  Procedure                               Abnormality         Status                     ---------                               -----------         ------                     SARS-CoV-2 (COVID-19), P...[[id number]]  Normal              Final result                 Please  view results for these tests on the individual orders.    SARS-CoV-2 (COVID-19), PCR Nasopharynx [[id number]]  (Normal) Collected: 10/14/22 2315    Specimen: Nasopharyngeal Swab from Nasopharynx Updated: 10/14/22 2347     SARS-CoV-2 (COVID-19) Negative    Narrative:      Testing performed using real-time PCR for detection of COVID-19. EUA approved validation studies performed on site.     Anaerobic Culture / Smear (includes Aerobic Culture) Leg, Left [[id number]]  (Abnormal)  (Susceptibility) Collected: 10/12/22 1045    Specimen: Wound Swab from Leg, Left Updated: 10/17/22 0722     Culture **Positive Culture**      4+ Morganella morganii      3+ Gram Negative Rods      4+ Staphylococcus coagulase negative      4+ Diphtheroids      3+ Streptococcus alpha hemolytic      No Anaerobes Recovered     Gram Stain Result 1+ WBC      4+ Gram positive bacilli      2+ Gram negative bacilli      2+ Gram positive cocci in pairs    SARS-CoV-2 (COVID-19), PCR Nasopharynx [[id number]]  (Normal) Collected: 10/11/22 2205    Specimen: Nasopharyngeal Swab from Nasopharynx Updated: 10/11/22 2243    Narrative:      The following orders were created for panel order SARS-CoV-2 (COVID-19), PCR Nasopharynx.  Procedure                               Abnormality         Status                     ---------                               -----------         ------                     SARS-CoV-2 (COVID-19), P...[[id number]]  Normal              Final result                 Please view results for these tests on the individual orders.    SARS-CoV-2 (COVID-19), PCR Nasopharynx [[id number]]  (Normal) Collected: 10/11/22 2205    Specimen: Nasopharyngeal Swab from Nasopharynx Updated: 10/11/22 2243     SARS-CoV-2 (COVID-19) Negative    Narrative:      Testing performed using real-time PCR for detection of COVID-19. EUA approved validation studies performed on site.     Blood Culture Blood, Venous [[id number]]  (Normal) Collected: 10/11/22 2032    Specimen:  Blood, Venous Updated: 10/17/22 0100     Culture No growth at 120 hours    Blood Culture Blood, Venous [[id number]]  (Normal) Collected: 10/11/22 2032    Specimen: Blood, Venous Updated: 10/16/22 2300     Culture No growth at 120 hours             SARS-CoV-2 (COVID-19) (no units)   Date/Time Value   10/14/2022 2315 Negative       Pertinent Imaging  Ultrasound venous leg bilateral    Result Date: 10/12/2022  IMPRESSION: No evidence of deep vein thrombosis in the lower extremities bilaterally.     ULTRASOUND SOFT TISSUE EXTREMITY    Result Date: 10/13/2022  IMPRESSION: Diffuse soft tissue swelling. No discrete collection.       OUTPATIENT  FOLLOW-UP / REFERRALS / PENDING TESTS        Outpatient Follow-Up Appointments  Encounter Information    This patient does not currently have any appointments scheduled.         Referrals  No orders of the defined types were placed in this encounter.      Test Results Pending at Discharge  Unresulted Labs (From admission, onward)     Start     Ordered    10/15/22 0600  Basic metabolic panel  Morning draw        Question:  Release to patient  Answer:  Immediate    10/14/22 1734    10/15/22 0600  CBC  Morning draw        Question:  Release to patient  Answer:  Immediate    10/14/22 1734                DISCHARGE DISPOSITION AND DESTINATION      Disposition:    Destination:  SNF                            Code Status At Discharge: Full Code    Physician Order for Life-Sustaining Treatment Document Status      No documents found                 Spent more than 35 minutes in patient evaluation, family update, physical evaluation, and  coordination of discharge and care.

## 2022-10-17 NOTE — PROGRESS NOTES
"Patient:  Xin Melendez  Location:  [facility]  MRN:  [id number]  Today's date:  10/17/2022    Therapy communicated with RN regarding pt's appropriateness for therapy. RN approved pt for therapy session. Pt rec'd lying in bed, agreeable to therapy. After session, pt was left lying in bed, all needs met, handed off to RN. RN notified of status/progress/positioning.    Xin is a 61 y.o. female admitted on 10/11/2022 with Bilateral leg pain [M79.604, M79.605]  Leg pain, left [M79.605]  Cellulitis of left lower extremity [L03.116]. Principal problem is Cellulitis of left lower extremity.    Past Medical History  Xin has a past medical history of Back pain, COVID-19 vaccine series completed, CTS (carpal tunnel syndrome), DJD (degenerative joint disease), Edema, Frozen shoulder, HL (hearing loss), Hypertension, Hypothyroidism, Kidney stones, Lipid disorder, Lymphedema, Obesity, Sciatica, Stasis dermatitis, and Type 2 diabetes mellitus (CMS/[facility]).    History of Present Illness   Xin Melendez is a 61 y.o. female with a past medical history of morbid obesity with BMI 59.0.  Patient has a history of hypertension, hypothyroidism, type 2 diabetes mellitus.  She has a history of lumbar degenerative disc disease with chronic low back pain and occasional right-sided sciatica.  She is ambulatory dysfunction and uses a walker at baseline.     The patient presented to the emergency department with complaints \" both of my legs are painful and I cannot walk.\"  \"My left leg is red, swollen, and painful\" - LLE cellulitis. BLE U.S. (-) DVT      OT Vitals    Date/Time Pulse SpO2 Pt Activity O2 Therapy BP Who   10/17/22 1307 88 93 % At rest None (Room air) 115/55 [facility]   10/17/22 1332 97 -- Other (Comment) working w/ therapy -- 144/61 [facility]      OT Pain    Date/Time Location Rating: Rest Who   10/17/22 1307 leg 3 [facility]          Prior Living Environment    Flowsheet Row Most Recent Value   People in Home child(greta), adult "   Current Living Arrangements home   Home Accessibility stairs to enter home (Group), stairs within home (Group)   Living Environment Comment lives in 2 story house with her son,  2 LEROY with no rail,  bed/ bath on 2nd floor,  son performs IADLs,  uses instacart for groceries,  has stall shower with SC,  no GBs   Number of Stairs, Main Entrance 2   Stair Railings, Main Entrance none   Number of Stairs, Within Home, Primary 12        Prior Level of Function    Flowsheet Row Most Recent Value   Dominant Hand right   Ambulation assistive equipment   Transferring assistive equipment   Toileting independent   Bathing independent   Dressing independent   Eating independent   Communication understands/communicates without difficulty   Prior Level of Function Comment Per pt report she was IND w/ all ADLs and used a RW for ambulation   Assistive Device Currently Used at Home glucometer, walker, front-wheeled, stair glide, shower chair        Occupational Profile    Flowsheet Row Most Recent Value   Reason for Services/Referral ADL /ambulation dysfx   Environmental Supports and Barriers From home with son   Patient Goals to get stronger           OT Evaluation and Treatment - 10/17/22 1306        OT Time Calculation    Start Time 1306     Stop Time 1404     Time Calculation (min) 58 min        Session Details    Document Type daily treatment/progress note     Mode of Treatment occupational therapy        General Information    Patient Profile Reviewed yes     Onset of Illness/Injury or Date of Surgery 10/11/22     General Observations of Patient Pt rec'd in bed, agreeable to therapy     Existing Precautions/Restrictions fall;weight bearing        Weight-bearing Status    Left LE Weight-Bearing Status weight-bearing as tolerated (WBAT)     Right LE Weight-Bearing Status weight-bearing as tolerated (WBAT)        Cognition/Psychosocial    Affect/Mental Status (Cognition) WFL;anxious     Behavioral Issues (Cognition) overwhelmed  easily     Orientation Status (Cognition) oriented x 4     Follows Commands (Cognition) WFL     Cognitive Function WFL     Comment, Cognition Pt pleasant and cooperative, however during periods of mobility was tearful, requiring emotional support and encouragement throughout session.        Bed Mobility    [place], Supine to Sit moderate assist (50-74% patient effort);1 person assist;maximum assist (25-49% patient effort);1 person to manage equipment     [place], Sit to Supine dependent (less than 25% patient effort);other (see comments)   x3 person assist    Assistive Device draw sheet;head of bed elevated;bed rails     Comment (Bed Mobility) Pt tearful and c/o LE pain throughout bed mobs. When attempting to return to bed, pt in poor posturing at EOB and sliding forward, requiring Ax3 to mobilize BLE and trunk. Bed put in trendelenberg and pt able to initiate pulling self up in bed using bed rails in addition to assist x2        Transfers    Comment Completed EOB<>recliner. Mod-max Ax2 with RW        Bed to Chair Transfer    [place], Bed to Chair moderate assist (50-74% patient effort);2 person assist     Verbal Cues hand placement;preparatory posture;safety;technique     Assistive Device walker, standard     Comment Required mod Ax2 for initial STS at EOB, but max Ax2 from recliner        Sit to Stand Transfer    [place], Sit to Stand Transfer moderate assist (50-74% patient effort);2 person assist     Verbal Cues hand placement;preparatory posture;safety;technique     Assistive Device walker, standard        Stand to Sit Transfer    [place], Stand to Sit Transfer moderate assist (50-74% patient effort);2 person assist     Verbal Cues hand placement;preparatory posture;safety;technique     Assistive Device walker, standard        Safety Issues, Functional Mobility    Impairments Affecting Function (Mobility) balance;endurance/activity tolerance;pain;strength;range of motion (ROM)   body  habitus       Balance    Balance Assessment sitting static balance;sitting dynamic balance;sit to stand dynamic balance;standing static balance;standing dynamic balance     Static Sitting Balance WFL     Dynamic Sitting Balance mild impairment     Sit to Stand Dynamic Balance severe impairment     Static Standing Balance severe impairment     Dynamic Standing Balance severe impairment        Lower Body Dressing    Tasks don;socks     [place] dependent (less than 25% patient effort)     Position sitting up in bed        Grooming    Self-Performance oral care (brushing teeth, cleaning dentures)     [place] set up     Position supported sitting     Setup Assistance obtain supplies        BADL Safety/Performance    Impairments, BADL Safety/Performance balance;endurance/activity tolerance;pain;strength;range of motion   body habitus    Skilled BADL Treatment/Intervention BADL process/adaptation training     Progress in BADL Status effort and initiation;cueing required;level of supervision required        AM-PAC (TM) - ADL (Current Function)    Putting on and taking off regular lower body clothing? 1 - Total     Bathing? 2 - A Lot     Toileting? 2 - A Lot     Putting on/taking off regular upper body clothing? 2 - A Lot     How much help for taking care of personal grooming? 2 - A Lot     Eating meals? 4 - None     AM-PAC (TM) ADL Score 13        Assessment/Plan (OT)    Daily Outcome Statement Pt seen for OT treatment session. Pt requiring Ax2 for bed mobs, requiring greater assist at end of session to return to supine. Able to complete S-P transfers x2 trials EOB>recliner then recliner>EOB with Mod-max Ax2 with RW to support balance. Pt severely limited in ADL performance due to pain, strength, and body habitus. Rec SNF.               OT Assessment/Plan    Flowsheet Row Most Recent Value   OT Recommended Discharge Disposition skilled nursing facility at 10/15/2022 0902   Anticipated Equipment Needs At Discharge  (OT) bathing equipment, dressing equipment, walker, front-wheeled at 10/15/2022 0902   Patient/Family Therapy Goal Statement to get stronger at 10/15/2022 0902                         OT Goals    Flowsheet Row Most Recent Value   Bed Mobility Goal 1    Activity/Assistive Device sit to supine, supine to sit at 10/15/2022 0902   [place] moderate assist (50-74% patient effort) at 10/15/2022 0902   Time Frame by discharge at 10/15/2022 0902   Progress/Outcome goal ongoing at 10/15/2022 0902   Transfer Goal 1    Activity/Assistive Device sit-to-stand/stand-to-sit, bed-to-chair/chair-to-bed, walker, front-wheeled at 10/15/2022 0902   [place] minimum assist (75% or more patient effort) at 10/15/2022 0902   Time Frame by discharge at 10/15/2022 0902   Progress/Outcome goal ongoing at 10/15/2022 0902   Dressing Goal 1    Activity/Adaptive Equipment lower body dressing at 10/15/2022 0902   [place] moderate assist (50-74% patient effort) at 10/15/2022 0902   Time Frame by discharge at 10/15/2022 0902   Progress/Outcome goal ongoing at 10/15/2022 0902   Toileting Goal 1    Activity/Assistive Device toileting skills, all at 10/13/2022 1047   [place] moderate assist (50-74% patient effort) at 10/13/2022 1047   Time Frame by discharge at 10/13/2022 1047   Progress/Outcome goal ongoing at 10/13/2022 1047

## 2022-10-17 NOTE — PLAN OF CARE
Problem: Adult Inpatient Plan of Care  Goal: Plan of Care Review  Flowsheets (Taken 10/17/2022 4159)  Progress: improving  Plan of Care Reviewed With: patient  Outcome Summary: DC planning    Pt written for DC today, Auth for [facility] complete. JULIANA SANTO sched for  @ 6120, staff and pt aware.    Dispo: [facility]  Trans: JULIANA SANTO

## 2022-10-17 NOTE — PROGRESS NOTES
"  Division of Infectious Diseases [phone]  Progress Note    Patient Name: Xin Melendez MR#: [id number] : 1960 Admitted 10/11/2022  Date: 10/17/22  Author: Betzy Carter DO    Subjective: Patient offered no new complaints.      Physical Examination:  Vital signs reviewed  Temp (24hrs), Av.5 °C (99.5 °F), Min:37.1 °C (98.7 °F), Max:37.7 °C (99.9 °F)    Visit Vitals  BP (!) 144/61   Pulse 97   Temp 37.7 °C (99.8 °F) (Oral)   Resp 16   Ht 1.626 m (5' 4\")   Wt (!) 156 kg (344 lb)   SpO2 93%   BMI 59.05 kg/m²       General: no acute distress  Head: normocephalic, atraumatic  ENMT: anicteric, dry mucous membranes  Neck: Supple  Heart: regular rate and rhythm  Lungs: Clear to auscultation bilaterally  Abdomen: Obese abdomen, no tenderness to palpation  Skin:  Improved erythema of left lower extremity compared to initial presentation in terms of rightness and amount of skin affected  Hematologic: no bruising  Extremities: Profound lymphedema of lower extremities bilaterally-left more pronounced than right with associated buffalo hump on dorsum of feet  Neurological: Answers questions appropriately, follows commands, grossly nonfocal    Labs:  Lab Results   Component Value Date    WBC 15.75 (H) 10/14/2022    HGB 8.5 (L) 10/14/2022    HCT 27.7 (L) 10/14/2022    MCV 86.0 10/14/2022     10/14/2022     Lab Results   Component Value Date    GLUCOSE 54 (L) 10/14/2022    CALCIUM 8.2 (L) 10/14/2022     10/14/2022    K 3.7 10/14/2022    CO2 22 10/14/2022     10/14/2022    BUN 17 10/14/2022    CREATININE 0.8 10/14/2022     Lab Results   Component Value Date    ALT 12 10/11/2022    AST 18 10/11/2022    ALKPHOS 84 10/11/2022    BILITOT 1.3 (H) 10/11/2022       Microbiology:     10/11/2022 COVID-19 PCR: Negative  10/11/2022 blood cultures: sterile    10/12/2022 left lower extremity superficial wound culture: 4+ Morganella morganii, 4+ coagulase-negative staph, 4+ diphtheroids, 3+ Streptococcus alpha " hemolytic    Imaging:  No imaging on 10/17/2022    Antimicrobial Therapy Timeout:  Treatment Indication: Cellulitis  Active Therapy:  -doxycycline 100 mg every 12 hours  -cefuroxime 500 mg every 12 hours  Anticipated stop date: 10/18/2022  Supportive microbiology: Empiric    Assessment:     Sepsis , left lower extremity cellulitis  Patient with T-max of 102.7 °F with associated tachycardia in the setting of a left lower extremity pain/soft tissue infection    She has since defervesced     Nidus likely underlying venous stasis changes/lymphedema -makes her more prone to skin/soft tissue infections     Acute kidney injury  Estimated Creatinine Clearance: 111 mL/min (by C-G formula based on SCr of 0.8 mg/dL).  Resolved       Polymicrobial wound culture  Patient superficial wound culture was polymicrobial, and not clinically meaningful    Plan:   -continue p.o. doxycycline 100 mg twice daily and p.o. cefuroxime 500 mg twice daily in order to complete her course of therapy.  The last day of antibiotic should be on 10/18/2022  -No antibiotic dose adjustment needs to be made for superficial wound culture, as its not clinically contributing to patient's acute illness  -Wound care and podiatry following  -All other chronic medical conditions and supportive care per primary service    ID will sign off at this time.  Please do not hesitate to reach out if additional infectious disease-related questions or concerns should arise!    Betzy Carter, DO  Infectious Diseases

## 2022-10-17 NOTE — PATIENT CARE CONFERENCE
Care Progression Rounds Note  Date: 10/17/2022  Time: 10:39 AM     Patient Name: Xin Melendez     Medical Record Number: [id number]   YOB: 1960  Sex: Female      Room/Bed: 4418    Admitting Diagnosis: Bilateral leg pain [M79.604, M79.605]  Leg pain, left [M79.605]  Cellulitis of left lower extremity [L03.116]   Admit Date/Time: 10/11/2022  7:33 PM    Primary Diagnosis: Cellulitis of left lower extremity  Principal Problem: Cellulitis of left lower extremity    GMLOS: 3.5  Anticipated Discharge Date: 10/17/2022    AM-PAC:  Mobility Score: 9    Discharge Planning:  Current Living Arrangements: home  Anticipated Discharge Disposition: skilled nursing facility  Type of Skilled Nursing Care Services: nursing, PT, OT    Barriers to Discharge:  Delay in insurance authorization    Comments:  waiting for Auth for [facility]    Participants:  advanced practice provider, , physical therapy, dietitian/nutrition services, nursing

## 2022-10-17 NOTE — PROGRESS NOTES
Subjective:   Patient was seen at bedside for continuing evaluation of BLE lymphedema, LLE cellulitis. Patient is in NAD. No overnight events. Dressing is clean, dry, and intact. Denies any N/V/F/CP/SOB.     ROS:   Past Medical/Surgical history, Allergies, Meds reviewed in detail as charted  FH/SH reviewed in detail as charted  Review of Systems:  Head and Neck: No complaints  Chest : No complaints  Abdomen: No Complaints  Constitutional: Unremarkable     Vitals: I have reviewed the patient's current vital signs as documented in the patient's EMR.    Radiology: No new Radiology.    Labs:   CBC Results       10/14/22 10/13/22 10/12/22     [id number]    WBC 15.75 14.62 15.73    RBC 3.22 3.39 3.70    HGB 8.5 9.1 10.1    HCT 27.7 29.9 32.5    MCV 86.0 88.2 87.8    MCH 26.4 26.8 27.3    MCHC 30.7 30.4 31.1     260 213           Objective:   -Vascular: Unable to palpate pulses secondary to edema.  Bilateral DP pulses were strongly biphasic on Doppler.  CRT intact all digits.  Increased edema and erythema with associated warmth noted to the left lower extremity comparison to the right.  -Orthopedic: decreased ROM at ankle jt b/l, +DF/PF all digits, morbidly enlarged lower extremities secondary to obesity and pitting edema.  Diffuse tenderness to palpation of the left lower extremity.  -Neurologic: light touch and epicritic sensation intact.  -Dermatologic:   Bilateral lower extremity lymphedema with cobblestoning noted to the posterior aspect of both legs.  Moderate serous weeping noted to both lower extremities, left worse than right with mild maceration.  No deep wounds noted.  No crepitus or fluctuance.  No purulence.  Mild malodor noted to both lower extremities  Left large full thickness wound to the left posterior leg is completely granular    Assessment/Plan: 61y F w/ BLE lymphedema, LLE cellulitis     -Left lower extremity was dressed with , Aquacel, gauze, ABD, checks and secured with tape.  Patient is  unable to tolerate compression dressings due to pain.  Podiatry continue to monitor and perform dressing changes while patient is in-house.  -Nursing appreciated with changing dressings if strikethrough occur, or if dressings become loose or removed.  -Superficial wound culture of the left leg: Morganelli, staph, strep, diphtheroids  -Left leg ultrasound: No deep collections.  -Weightbearing as clinically tolerated bilaterally.  -Rest of care per primary team.  - Please contact on call podiatry resident with any questions or concerns.    Tommy Gonzalez PGY-2  #[id number]

## 2023-01-01 NOTE — ANESTHESIOLOGIST PRE-PROCEDURE CHART REVIEW
I confirm that I have reviewed the available information in the medical record prior to the scheduled surgery.   Yes

## 2023-03-02 ENCOUNTER — TELEPHONE (OUTPATIENT)
Dept: OBSTETRICS AND GYNECOLOGY | Facility: HOSPITAL | Age: 63
End: 2023-03-02

## 2023-03-02 NOTE — TELEPHONE ENCOUNTER
Patient has many questions regarding appt for pre-op with you 3/10/23. You have a 140 opening that is a little longerfor questions etc? Would it be ok to move her from 340 to 140?

## 2023-03-10 ENCOUNTER — OFFICE VISIT (OUTPATIENT)
Dept: OBSTETRICS AND GYNECOLOGY | Facility: HOSPITAL | Age: 63
End: 2023-03-10
Payer: COMMERCIAL

## 2023-03-10 VITALS
OXYGEN SATURATION: 97 % | SYSTOLIC BLOOD PRESSURE: 140 MMHG | WEIGHT: 293 LBS | BODY MASS INDEX: 50.02 KG/M2 | HEART RATE: 101 BPM | DIASTOLIC BLOOD PRESSURE: 66 MMHG | TEMPERATURE: 97.9 F | HEIGHT: 64 IN

## 2023-03-10 DIAGNOSIS — N85.02 EIN (ENDOMETRIAL INTRAEPITHELIAL NEOPLASIA): Primary | ICD-10-CM

## 2023-03-10 ASSESSMENT — PAIN SCALES - GENERAL: PAINLEVEL: 0-NO PAIN

## 2023-03-10 NOTE — PROGRESS NOTES
"Visit Date: 03/10/2023     Xin Melendez is 62 y.o. female presenting for a preoperative visit today. We are planning on performing D&C hysteroscopy  in the setting of EIN with complex atypia, currently being managed with a LNG-IUD placed on 22.     A preop risk assessment was performed.  This included a review of her allergies and current medications.  Medical clearance, when applicable was also reviewed, as well as the status of preop labs.  VTE risk was assessed.  Prior anethesia history was elicited.  Preop labs were ordered, and reviewed by me, if resulted.    PREOPERATIVE RISK ASSESSMENT    Caprini Score: 6    Prior Blood Transfusion: No  Prior Problems with Anesthesia:No  Significant postop nausea: No  Prior hysterectomy: no   Known Drug Allergies:   Allergies as of 03/10/2023 - Reviewed 03/10/2023   Allergen Reaction Noted   • Adhesive tape-silicones Rash 2012   • Cephalexin Rash 2012     Daily use of aspirin or NSAIDS: Yes Yes; daily ASA 81mg   Known Cardiovascular disease:No  Known Diabetes:yes, most recent HbA1c 5.5 8/3/22  Known Thyroid Disease:no   Known Renal Disease:no   Prior CVA:No  Prior CHF or CAD:no   Prior Bleed problems:No  Family History of bleeding disorder: No  Smoker:  Social History     Tobacco Use   Smoking Status Former   • Types: Cigarettes   • Quit date:    • Years since quittin.2   Smokeless Tobacco Never   Tobacco Comments    Ex-smoker who smoked socially in college for approximately 4 years.   Vaping Use   Vaping Status Not on file     Current or Past Narcotic use:No  Medical/Cardiac clearances: Requested    Vital Signs for this encounter:   Visit Vitals  /66 (BP Location: Left upper arm, Patient Position: Sitting)   Pulse (!) 101   Temp 36.6 °C (97.9 °F) (Temporal)   Ht 1.626 m (5' 4\")   Wt 134 kg (295 lb 4.8 oz)   SpO2 97%   BMI 50.69 kg/m²     Assessment/Plan   Plan:     Xin Melendez is a 62 y.o.  who presented to the office today for " preoperative counseling for undergoing a D&C, hysteroscopy in the OR in the setting of EIN with complex atypia. All pertinent past evaluations and studies were reviewed. Ms Melendez presented to her office visit today accompanied by her friend. Ms Melendez had many questions regarding her current proposed plan of care, the LNG-IUD and future plan of care. I reviewed the current proposed management of her EIN with complex atypia is the LNG-IUD that she has in place. We reviewed that given her diagnosis, we will preform her 6 month endometrial sampling in the operating room (in-office sampling was not successful on past attempt) and replace the IUD. Ms Melendez again re-iterated her interest in a hysterectomy. We reviewed that we will await the results of the D&C to help guide further management however, I again reviewed that Ms Melendez is a poor surgical candidate given her medical co-morbidities. I also offered obtaining a second opinion, however, Ms Melendez declines this at this time.     Given her multiple medical co-morbidities, I discussed that prior to proceeding with a D&C, we will need to obtain medical clearance. Ms Melendez will schedule an appointment with her PCP at [facility]. Once she is medically cleared, we will schedule her surgery. Reviewed obtaining PATs prior to her surgery.     All questions were answered to the best of my abilities.     Discussed with Dr BENITO Whalen, PGY-3  Obstetrics & Gynecology   #[phone]

## 2023-03-17 ENCOUNTER — TELEPHONE (OUTPATIENT)
Dept: OBSTETRICS AND GYNECOLOGY | Facility: HOSPITAL | Age: 63
End: 2023-03-17
Payer: COMMERCIAL

## 2023-03-17 DIAGNOSIS — N85.02 EIN (ENDOMETRIAL INTRAEPITHELIAL NEOPLASIA): Primary | ICD-10-CM

## 2023-03-17 NOTE — TELEPHONE ENCOUNTER
Called patient to see if she had her pre-operative clearance scheduled. She reports that she was seen and was cleared by PCP. Xin will call her PCP's office to have those records faxed to us. Will work on scheduling her D&C in the meantime.     Katina Whalen MD

## 2023-03-21 ENCOUNTER — TELEPHONE (OUTPATIENT)
Dept: OBSTETRICS AND GYNECOLOGY | Facility: HOSPITAL | Age: 63
End: 2023-03-21
Payer: COMMERCIAL

## 2023-03-21 DIAGNOSIS — Z01.818 ENCOUNTER FOR PREADMISSION TESTING: Primary | ICD-10-CM

## 2023-03-21 NOTE — TELEPHONE ENCOUNTER
Patient called to confirm that her pre-op clearance was received. It was received and will be scanned in now. Please call patient to let her know when her procedure will be scheduled.

## 2023-03-24 NOTE — TELEPHONE ENCOUNTER
Called patient to let her know her surgery date is 4/6 at 14:30. All questions were answered to the best of my abilities.     Katina Whalen, PGY-3  Obstetrics & Gynecology   #[phone]

## 2023-03-24 NOTE — H&P
Gynecology History and Physical    HPI      Xin Melendez is a 62 y.o.  who is presenting to undergo a scheduled D&C, hysteroscopy in the setting of EIN with complex atypia currently being managed with a LNG-IUD placed on 22. Ms Melendez has an extensive past medical history including but not limited to morbid obesity (BMI 51), T2DM (managed with insulin, most recent HbA1c 2022 5.5), HTN, hypothyroidism, lymphedema. She received medical clearance from her PCP (scanned into media).     OB History:   OB History    Para Term  AB Living   1 1 0 0 0 1   SAB IAB Ectopic Multiple Live Births   0 0 0 0 0      # Outcome Date GA Lbr Newton/2nd Weight Sex Delivery Anes PTL Lv   1 Para      CS-Unspec          Medical History:   Past Medical History:   Diagnosis Date   • Back pain    • COVID-19 vaccine series completed     Moderna Boster 10/2021   • CTS (carpal tunnel syndrome)    • DJD (degenerative joint disease)    • Edema    • Frozen shoulder    • HL (hearing loss)    • Hypertension    • Hypothyroidism    • Kidney stones    • Lipid disorder    • Lymphedema    • Obesity    • Sciatica    • Stasis dermatitis    • Type 2 diabetes mellitus (CMS/HCC)        Surgical History:   Past Surgical History:   Procedure Laterality Date   • APPENDECTOMY      laparotomy   •  SECTION  1999   • CYSTOSCOPY  2021    CYSTOSCOPY,INSERT URETERAL STENT   • DILATION AND CURETTAGE OF UTERUS      when patient was age 26   • OOPHORECTOMY      open RSO, right side secondary to torsion     Allergies: Adhesive tape-silicones and Cephalexin    Prior to Admission medications    Medication Sig Start Date End Date Taking? Authorizing Provider   acetaminophen (TYLENOL ARTHRITIS PAIN ORAL) Take 2 tablets by mouth 2 (two) times a day as needed (pain).    Provider, Suyapa Ibarra MD   ascorbic acid, vitamin C, (VITAMIN C) 1,000 mg tablet Take 1,000 mg by mouth daily.    Provider, Suyapa Ibarra MD   aspirin 81 mg enteric  coated tablet Take 81 mg by mouth daily.    Fred Dow MD   cholecalciferol, vitamin D3, (VITAMIN D3 ORAL) Take 1,000 Units by mouth daily.    Fred Dow MD   DULoxetine (CYMBALTA) 20 mg capsule Take 20 mg by mouth 2 (two) times a day.    Suyapa Dow MD   exenatide microspheres 2 mg/0.85 mL auto-injector Inject 2 mg under the skin once a week on Friday. 8/6/21   Fred Dow MD   gabapentin (NEURONTIN) 600 mg tablet Take 600 mg by mouth 3 (three) times a day.    Suyapa Dow MD   insulin degludec U-200 CONC (TRESIBA FLEXTOUCH U-200) 200 unit/mL (3 mL) pen Inject 0.2 mL (40 Units total) under the skin every evening. 10/15/22 11/14/22  Everardo Sky,    irbesartan-hydrochlorothiazide (AVALIDE) 150-12.5 mg per tablet Take 1 tablet by mouth daily.    Suyapa Dow MD   levothyroxine 75 mcg tablet Take 75 mcg by mouth daily.    Fred Dow MD   meloxicam (MOBIC) 15 mg tablet Take 15 mg by mouth daily.    Fred Dow MD   methocarbamoL 750 mg tablet Take 750 mg by mouth 3 (three) times a day as needed. Always takes in am but doesn't always do tid  8/9/21   Fred Dow MD   simvastatin 40 mg tablet Take 40 mg by mouth daily.   11/25/20   Fred Dow MD       Review of Systems  All systems were reviewed and normal with the exception of the above.     Assessment/Plan     Xin Melendez is a 62 y.o. female who is presenting to undergo a scheduled D&C, hysteroscopy in the setting of EIN with complex atypia currently being managed with a LNG-IUD placed on 8/22/22.    1. Obtain IV access   2. Keep NPO  3. Antibiotics not indicated per ACOG guidelines     Katina Whalen MD

## 2023-04-04 ENCOUNTER — ANESTHESIA EVENT (OUTPATIENT)
Dept: OPERATING ROOM | Facility: HOSPITAL | Age: 63
Setting detail: HOSPITAL OUTPATIENT SURGERY
End: 2023-04-04
Payer: COMMERCIAL

## 2023-04-04 ENCOUNTER — APPOINTMENT (OUTPATIENT)
Dept: LAB | Facility: HOSPITAL | Age: 63
End: 2023-04-04
Attending: OBSTETRICS & GYNECOLOGY
Payer: COMMERCIAL

## 2023-04-04 ENCOUNTER — APPOINTMENT (OUTPATIENT)
Dept: PREADMISSION TESTING | Facility: HOSPITAL | Age: 63
End: 2023-04-04
Payer: COMMERCIAL

## 2023-04-04 VITALS
OXYGEN SATURATION: 95 % | TEMPERATURE: 97.7 F | BODY MASS INDEX: 51.91 KG/M2 | DIASTOLIC BLOOD PRESSURE: 93 MMHG | SYSTOLIC BLOOD PRESSURE: 135 MMHG | WEIGHT: 293 LBS | HEART RATE: 83 BPM | RESPIRATION RATE: 20 BRPM | HEIGHT: 63 IN

## 2023-04-04 DIAGNOSIS — Z01.818 ENCOUNTER FOR PREADMISSION TESTING: Primary | ICD-10-CM

## 2023-04-04 DIAGNOSIS — Z01.818 ENCOUNTER FOR PREADMISSION TESTING: ICD-10-CM

## 2023-04-04 LAB
ABO + RH BLD: NORMAL
ANION GAP SERPL CALC-SCNC: 13 MEQ/L (ref 3–15)
BLD GP AB SCN SERPL QL: NEGATIVE
BLOOD BANK CMNT PATIENT-IMP: NORMAL
BUN SERPL-MCNC: 19 MG/DL (ref 8–20)
CALCIUM SERPL-MCNC: 9.5 MG/DL (ref 8.9–10.3)
CHLORIDE SERPL-SCNC: 106 MEQ/L (ref 98–109)
CO2 SERPL-SCNC: 22 MEQ/L (ref 22–32)
CREAT SERPL-MCNC: 1.2 MG/DL (ref 0.6–1.1)
D AG BLD QL: POSITIVE
ERYTHROCYTE [DISTWIDTH] IN BLOOD BY AUTOMATED COUNT: 16 % (ref 11.7–14.4)
GFR SERPL CREATININE-BSD FRML MDRD: 45.5 ML/MIN/1.73M*2
GLUCOSE SERPL-MCNC: 153 MG/DL (ref 70–99)
HCT VFR BLDCO AUTO: 44.3 % (ref 35–45)
HGB BLD-MCNC: 14.2 G/DL (ref 11.8–15.7)
LABORATORY COMMENT REPORT: NORMAL
MCH RBC QN AUTO: 26.7 PG (ref 28–33.2)
MCHC RBC AUTO-ENTMCNC: 32.1 G/DL (ref 32.2–35.5)
MCV RBC AUTO: 83.4 FL (ref 83–98)
PDW BLD AUTO: 10 FL (ref 9.4–12.3)
PLATELET # BLD AUTO: 232 K/UL (ref 150–369)
POTASSIUM SERPL-SCNC: 4.1 MEQ/L (ref 3.6–5.1)
RBC # BLD AUTO: 5.31 M/UL (ref 3.93–5.22)
SODIUM SERPL-SCNC: 141 MEQ/L (ref 136–144)
SPECIMEN EXP DATE BLD: NORMAL
WBC # BLD AUTO: 12.74 K/UL (ref 3.8–10.5)

## 2023-04-04 PROCEDURE — 80048 BASIC METABOLIC PNL TOTAL CA: CPT

## 2023-04-04 PROCEDURE — 85027 COMPLETE CBC AUTOMATED: CPT

## 2023-04-04 PROCEDURE — 86901 BLOOD TYPING SEROLOGIC RH(D): CPT

## 2023-04-04 PROCEDURE — 36415 COLL VENOUS BLD VENIPUNCTURE: CPT

## 2023-04-04 RX ORDER — INSULIN ASPART 100 [IU]/ML
INJECTION, SOLUTION INTRAVENOUS; SUBCUTANEOUS
COMMUNITY
Start: 2023-03-20

## 2023-04-04 NOTE — ANESTHESIA PREPROCEDURE EVALUATION
Relevant Problems   CARDIOVASCULAR   (+) HTN (hypertension)      MUSCULOSKELETAL   (+) Osteoarthritis      NEUROLOGY   (+) Sciatica      URINARY SYSTEM   (+) Hypokalemia      Other   (+) Hypothyroidism   (+) Pyelonephritis   (+) Type 2 diabetes mellitus (CMS/HCC)   (+) Type 2 diabetes mellitus, with long-term current use of insulin (CMS/HCC)       Anesthesia ROS/MED HX    Anesthesia History    History of anesthetic complications  - PONV  Neuro/Psych - neg  Cardiovascular   hypertension   ECG reviewed  Hematological - neg  GI/Hepatic- neg  Renal Disease   renal calculi  Endo/Other   Diabetes and patient Insulin dependent   Hypothyroidism  Body Habitus: Morbidly Obese       Past Surgical History:   Procedure Laterality Date   • APPENDECTOMY      laparotomy   •  SECTION  1999   • CYSTOSCOPY  2021    CYSTOSCOPY,INSERT URETERAL STENT   • DILATION AND CURETTAGE OF UTERUS      when patient was age 26   • OOPHORECTOMY      open RSO, right side secondary to torsion       Physical Exam    Airway   Mallampati: II   TM distance: >3 FB   Neck ROM: full  Cardiovascular - normal   Rhythm: regular   Rate: normalPulmonary - normal   clear to auscultation  Dental - normal        Anesthesia Plan    Plan: general    Technique: general endotracheal   3 ASA  Anesthetic plan and risks discussed with: patient  Comments:    Plan: Patient had procedures done in 2022 without incident.

## 2023-04-04 NOTE — PRE-PROCEDURE INSTRUCTIONS
1.      You will be called between 3pm -7pm one business day before your procedure Wednesday April 5, 2023   to tell you where and when to report.  If you do not receive a phone call by 6pm, please call the Admissions office at [phone] to determine the arrival time for your procedure.      2.        Please report to Admissions or Short Procedure Unit, park in lot A, on the day of your procedure.  Detailed directions will be given to you when you are called with arrival time.      3.      No solid food for EIGHT HOURS prior to surgery- No food after midnight.    Unlimited CLEAR liquids, meaning water or PLAIN black coffee (WITHOUT any milk, cream, sugar, or sweetener) are permitted up to TWO HOURS prior to arrival at the hospital.     4.      Early on the morning of the procedure please take your usual dose of the listed medications with a sip of water:    Only take levothyroxine, gabapentin, duloxetine, the morning of surgery    5.      Other Instructions: You may brush your teeth the morning of the procedure. Rinse and spit, do not swallow.  Bring a list of your medications with dosages with you.  Use surgical wash as directed.   6.      If you develop a cold, cough, fever, rash, or other symptom prior to the data of the procedure, please report it to your physician immediately.     7.      If you need to cancel the procedure for any reason, please contact your physician.     8.      Make arrangements to have someone drive you home from the procedure. If you have not arranged for transportation home, your surgery may be cancelled.      9.      You may not take public transportation unless you are accompanied by a responsible person.     10.      You may not drive a car or operate complex or potentially dangerous machinery for 24 hours following anesthesia and/or sedation.      11.      12.      If it is medically necessary for you to have a longer stay, you will be informed as soon as the decision is made.               Only bring essential items to the hospital.  Do not wear or bring anything of value to the hospital including jewelry of any kind, money, or wallet. Do not wear make-up or contact lenses. Do not BRING MEDICATIONS FROM HOME unless instructed to do so.  DO bring your hearing aids, glasses, and a case.        13.      No lotion, creams, powders, or oils on skin the morning of procedure        14.      Dress in comfortable clothes.     15.      If instructed, please bring a copy of your Advanced Directive (Living Will/Durable Power of ) on the day of your procedure.      16.            17.       18.    Ensuring your safety at all times is a very important part of out [facility] Culture of Safety . After having surgery and sedation, you are at risk for falling and balance issues.  Although you may feel awake, the effects of the medication can last up to 24 hours after anesthesia.  If you need to use the bathroom during your recovery period, nursing staff will escort you there and stay with you to ensure your safety.          Refrain from drinking alcohol and smoking cigarettes and marijuana for 24 hours prior to surgery.         Shower with antibacterial soap (DIAL) the night before and morning of your procedure.  If your procedure indicates the need for CHG antiseptic was (Bactoshield or Hibiclens), please use this instead and follow instructions as discussed at the time of your Pre-Admission Testing visit or phone interview.     Above instructions reviewed with patient and patient acknowledges understanding.

## 2023-04-05 ENCOUNTER — APPOINTMENT (OUTPATIENT)
Dept: PREADMISSION TESTING | Facility: HOSPITAL | Age: 63
End: 2023-04-05
Payer: COMMERCIAL

## 2023-04-05 VITALS — WEIGHT: 293 LBS | BODY MASS INDEX: 51.91 KG/M2 | HEIGHT: 63 IN

## 2023-04-05 ASSESSMENT — PAIN SCALES - GENERAL: PAINLEVEL: 5

## 2023-04-06 ENCOUNTER — HOSPITAL ENCOUNTER (OUTPATIENT)
Facility: HOSPITAL | Age: 63
Setting detail: HOSPITAL OUTPATIENT SURGERY
Discharge: HOME | End: 2023-04-06
Payer: COMMERCIAL

## 2023-04-06 ENCOUNTER — ANESTHESIA (OUTPATIENT)
Dept: OPERATING ROOM | Facility: HOSPITAL | Age: 63
Setting detail: HOSPITAL OUTPATIENT SURGERY
End: 2023-04-06
Payer: COMMERCIAL

## 2023-04-06 VITALS
OXYGEN SATURATION: 98 % | RESPIRATION RATE: 21 BRPM | BODY MASS INDEX: 51.91 KG/M2 | HEART RATE: 98 BPM | WEIGHT: 293 LBS | HEIGHT: 63 IN | TEMPERATURE: 97.8 F | SYSTOLIC BLOOD PRESSURE: 147 MMHG | DIASTOLIC BLOOD PRESSURE: 52 MMHG

## 2023-04-06 DIAGNOSIS — N85.02 EIN (ENDOMETRIAL INTRAEPITHELIAL NEOPLASIA): ICD-10-CM

## 2023-04-06 LAB
GLUCOSE BLD-MCNC: 122 MG/DL (ref 70–99)
GLUCOSE BLD-MCNC: 137 MG/DL (ref 70–99)
POCT TEST: ABNORMAL
POCT TEST: ABNORMAL

## 2023-04-06 PROCEDURE — 200200 PR NO CHARGE: Performed by: OBSTETRICS & GYNECOLOGY

## 2023-04-06 PROCEDURE — 63600000 HC DRUGS/DETAIL CODE

## 2023-04-06 PROCEDURE — 63600000 HC DRUGS/DETAIL CODE: Performed by: ANESTHESIOLOGY

## 2023-04-06 PROCEDURE — 63700000 HC SELF-ADMINISTRABLE DRUG: Performed by: ANESTHESIOLOGY

## 2023-04-06 PROCEDURE — 88341 IMHCHEM/IMCYTCHM EA ADD ANTB: CPT | Performed by: OBSTETRICS & GYNECOLOGY

## 2023-04-06 PROCEDURE — 71000001 HC PACU PHASE 1 INITIAL 30MIN: Performed by: OBSTETRICS & GYNECOLOGY

## 2023-04-06 PROCEDURE — 71000012 HC PACU PHASE 2 EA ADDL MIN: Performed by: OBSTETRICS & GYNECOLOGY

## 2023-04-06 PROCEDURE — 37000001 HC ANESTHESIA GENERAL: Performed by: OBSTETRICS & GYNECOLOGY

## 2023-04-06 PROCEDURE — 71000002 HC PACU PHASE 2 INITIAL 30MIN: Performed by: OBSTETRICS & GYNECOLOGY

## 2023-04-06 PROCEDURE — 58558 HYSTEROSCOPY BIOPSY: CPT | Performed by: OBSTETRICS & GYNECOLOGY

## 2023-04-06 PROCEDURE — 88305 TISSUE EXAM BY PATHOLOGIST: CPT | Performed by: OBSTETRICS & GYNECOLOGY

## 2023-04-06 PROCEDURE — 63600000 HC DRUGS/DETAIL CODE: Performed by: OBSTETRICS & GYNECOLOGY

## 2023-04-06 PROCEDURE — 25000000 HC PHARMACY GENERAL: Performed by: ANESTHESIOLOGY

## 2023-04-06 PROCEDURE — 36000002 HC OR LEVEL 2 INITIAL 30MIN: Performed by: OBSTETRICS & GYNECOLOGY

## 2023-04-06 PROCEDURE — 0UDB8ZX EXTRACTION OF ENDOMETRIUM, VIA NATURAL OR ARTIFICIAL OPENING ENDOSCOPIC, DIAGNOSTIC: ICD-10-PCS | Performed by: OBSTETRICS & GYNECOLOGY

## 2023-04-06 PROCEDURE — 71000011 HC PACU PHASE 1 EA ADDL MIN: Performed by: OBSTETRICS & GYNECOLOGY

## 2023-04-06 PROCEDURE — 36000012 HC OR LEVEL 2 EA ADDL MIN: Performed by: OBSTETRICS & GYNECOLOGY

## 2023-04-06 PROCEDURE — 25000000 HC PHARMACY GENERAL

## 2023-04-06 PROCEDURE — 0U2DXHZ CHANGE CONTRACEPTIVE DEVICE IN UTERUS AND CERVIX, EXTERNAL APPROACH: ICD-10-PCS | Performed by: OBSTETRICS & GYNECOLOGY

## 2023-04-06 RX ORDER — ACETAMINOPHEN 325 MG/1
975 TABLET ORAL EVERY 6 HOURS PRN
Status: DISCONTINUED | OUTPATIENT
Start: 2023-04-06 | End: 2023-04-06 | Stop reason: HOSPADM

## 2023-04-06 RX ORDER — DEXTROSE 50 % IN WATER (D50W) INTRAVENOUS SYRINGE
25 AS NEEDED
Status: DISCONTINUED | OUTPATIENT
Start: 2023-04-06 | End: 2023-04-06 | Stop reason: HOSPADM

## 2023-04-06 RX ORDER — ROCURONIUM BROMIDE 10 MG/ML
INJECTION, SOLUTION INTRAVENOUS AS NEEDED
Status: DISCONTINUED | OUTPATIENT
Start: 2023-04-06 | End: 2023-04-06 | Stop reason: SURG

## 2023-04-06 RX ORDER — HYDROMORPHONE HYDROCHLORIDE 1 MG/ML
0.5 INJECTION, SOLUTION INTRAMUSCULAR; INTRAVENOUS; SUBCUTANEOUS
Status: DISCONTINUED | OUTPATIENT
Start: 2023-04-06 | End: 2023-04-06 | Stop reason: HOSPADM

## 2023-04-06 RX ORDER — DEXTROSE 40 %
15-30 GEL (GRAM) ORAL AS NEEDED
Status: DISCONTINUED | OUTPATIENT
Start: 2023-04-06 | End: 2023-04-06 | Stop reason: HOSPADM

## 2023-04-06 RX ORDER — PHENYLEPHRINE HYDROCHLORIDE 10 MG/ML
INJECTION INTRAVENOUS AS NEEDED
Status: DISCONTINUED | OUTPATIENT
Start: 2023-04-06 | End: 2023-04-06 | Stop reason: SURG

## 2023-04-06 RX ORDER — PROPOFOL 10 MG/ML
INJECTION, EMULSION INTRAVENOUS AS NEEDED
Status: DISCONTINUED | OUTPATIENT
Start: 2023-04-06 | End: 2023-04-06 | Stop reason: SURG

## 2023-04-06 RX ORDER — FENTANYL CITRATE 50 UG/ML
INJECTION, SOLUTION INTRAMUSCULAR; INTRAVENOUS AS NEEDED
Status: DISCONTINUED | OUTPATIENT
Start: 2023-04-06 | End: 2023-04-06 | Stop reason: SURG

## 2023-04-06 RX ORDER — OXYCODONE HYDROCHLORIDE 5 MG/1
5 TABLET ORAL EVERY 4 HOURS PRN
Status: DISCONTINUED | OUTPATIENT
Start: 2023-04-06 | End: 2023-04-06 | Stop reason: HOSPADM

## 2023-04-06 RX ORDER — IBUPROFEN 200 MG
16-32 TABLET ORAL AS NEEDED
Status: DISCONTINUED | OUTPATIENT
Start: 2023-04-06 | End: 2023-04-06 | Stop reason: HOSPADM

## 2023-04-06 RX ORDER — LIDOCAINE HYDROCHLORIDE 10 MG/ML
INJECTION, SOLUTION INFILTRATION; PERINEURAL AS NEEDED
Status: DISCONTINUED | OUTPATIENT
Start: 2023-04-06 | End: 2023-04-06 | Stop reason: SURG

## 2023-04-06 RX ORDER — SCOPOLAMINE 1 MG/3D
PATCH, EXTENDED RELEASE TRANSDERMAL AS NEEDED
Status: DISCONTINUED | OUTPATIENT
Start: 2023-04-06 | End: 2023-04-06 | Stop reason: SURG

## 2023-04-06 RX ORDER — KETOROLAC TROMETHAMINE 30 MG/ML
INJECTION, SOLUTION INTRAMUSCULAR; INTRAVENOUS AS NEEDED
Status: DISCONTINUED | OUTPATIENT
Start: 2023-04-06 | End: 2023-04-06 | Stop reason: SURG

## 2023-04-06 RX ORDER — ONDANSETRON HYDROCHLORIDE 2 MG/ML
INJECTION, SOLUTION INTRAVENOUS AS NEEDED
Status: DISCONTINUED | OUTPATIENT
Start: 2023-04-06 | End: 2023-04-06 | Stop reason: SURG

## 2023-04-06 RX ORDER — FENTANYL CITRATE 50 UG/ML
50 INJECTION, SOLUTION INTRAMUSCULAR; INTRAVENOUS EVERY 5 MIN PRN
Status: DISCONTINUED | OUTPATIENT
Start: 2023-04-06 | End: 2023-04-06 | Stop reason: HOSPADM

## 2023-04-06 RX ORDER — ONDANSETRON HYDROCHLORIDE 2 MG/ML
4 INJECTION, SOLUTION INTRAVENOUS
Status: DISCONTINUED | OUTPATIENT
Start: 2023-04-06 | End: 2023-04-06 | Stop reason: HOSPADM

## 2023-04-06 RX ORDER — EPHEDRINE SULFATE 50 MG/ML
INJECTION, SOLUTION INTRAVENOUS AS NEEDED
Status: DISCONTINUED | OUTPATIENT
Start: 2023-04-06 | End: 2023-04-06 | Stop reason: SURG

## 2023-04-06 RX ORDER — ONDANSETRON 4 MG/1
4 TABLET, ORALLY DISINTEGRATING ORAL EVERY 8 HOURS PRN
Status: DISCONTINUED | OUTPATIENT
Start: 2023-04-06 | End: 2023-04-06 | Stop reason: HOSPADM

## 2023-04-06 RX ORDER — DIPHENHYDRAMINE HCL 50 MG/ML
VIAL (ML) INJECTION AS NEEDED
Status: DISCONTINUED | OUTPATIENT
Start: 2023-04-06 | End: 2023-04-06 | Stop reason: SURG

## 2023-04-06 RX ORDER — SODIUM CHLORIDE, SODIUM LACTATE, POTASSIUM CHLORIDE, CALCIUM CHLORIDE 600; 310; 30; 20 MG/100ML; MG/100ML; MG/100ML; MG/100ML
INJECTION, SOLUTION INTRAVENOUS CONTINUOUS
Status: DISCONTINUED | OUTPATIENT
Start: 2023-04-06 | End: 2023-04-06 | Stop reason: HOSPADM

## 2023-04-06 RX ORDER — MIDAZOLAM HYDROCHLORIDE 2 MG/2ML
INJECTION, SOLUTION INTRAMUSCULAR; INTRAVENOUS AS NEEDED
Status: DISCONTINUED | OUTPATIENT
Start: 2023-04-06 | End: 2023-04-06 | Stop reason: SURG

## 2023-04-06 RX ADMIN — SUCCINYLCHOLINE CHLORIDE 140 MG: 20 INJECTION, SOLUTION INTRAMUSCULAR; INTRAVENOUS; PARENTERAL at 16:17

## 2023-04-06 RX ADMIN — ROCURONIUM BROMIDE 20 MG: 10 INJECTION, SOLUTION INTRAVENOUS at 16:45

## 2023-04-06 RX ADMIN — MIDAZOLAM HYDROCHLORIDE 2 MG: 1 INJECTION, SOLUTION INTRAMUSCULAR; INTRAVENOUS at 16:07

## 2023-04-06 RX ADMIN — FENTANYL CITRATE 100 MCG: 50 INJECTION, SOLUTION INTRAMUSCULAR; INTRAVENOUS at 16:17

## 2023-04-06 RX ADMIN — SODIUM CHLORIDE, POTASSIUM CHLORIDE, SODIUM LACTATE AND CALCIUM CHLORIDE: 600; 310; 30; 20 INJECTION, SOLUTION INTRAVENOUS at 16:17

## 2023-04-06 RX ADMIN — DIPHENHYDRAMINE HYDROCHLORIDE 25 MG: 50 INJECTION, SOLUTION INTRAMUSCULAR; INTRAVENOUS at 16:17

## 2023-04-06 RX ADMIN — FENTANYL CITRATE 50 MCG: 50 INJECTION, SOLUTION INTRAMUSCULAR; INTRAVENOUS at 16:46

## 2023-04-06 RX ADMIN — ROCURONIUM BROMIDE 10 MG: 10 INJECTION, SOLUTION INTRAVENOUS at 16:17

## 2023-04-06 RX ADMIN — KETOROLAC TROMETHAMINE 15 MG: 30 INJECTION, SOLUTION INTRAMUSCULAR; INTRAVENOUS at 16:59

## 2023-04-06 RX ADMIN — LIDOCAINE HYDROCHLORIDE 5 ML: 10 INJECTION, SOLUTION INFILTRATION; PERINEURAL at 16:17

## 2023-04-06 RX ADMIN — ONDANSETRON HYDROCHLORIDE 4 MG: 2 SOLUTION INTRAMUSCULAR; INTRAVENOUS at 16:59

## 2023-04-06 RX ADMIN — EPHEDRINE SULFATE 10 MG: 50 INJECTION, SOLUTION INTRAVENOUS at 16:44

## 2023-04-06 RX ADMIN — PHENYLEPHRINE HYDROCHLORIDE 100 MCG: 10 INJECTION INTRAVENOUS at 16:31

## 2023-04-06 RX ADMIN — SUGAMMADEX 400 MG: 100 INJECTION, SOLUTION INTRAVENOUS at 16:59

## 2023-04-06 RX ADMIN — FENTANYL CITRATE 50 MCG: 50 INJECTION, SOLUTION INTRAMUSCULAR; INTRAVENOUS at 16:52

## 2023-04-06 RX ADMIN — SCOPALAMINE 1 PATCH: 1 PATCH, EXTENDED RELEASE TRANSDERMAL at 16:09

## 2023-04-06 RX ADMIN — EPHEDRINE SULFATE 10 MG: 50 INJECTION, SOLUTION INTRAVENOUS at 16:31

## 2023-04-06 RX ADMIN — PROPOFOL 200 MG: 10 INJECTION, EMULSION INTRAVENOUS at 16:17

## 2023-04-06 NOTE — OP NOTE
D&C, HYSTEROSCOPY DIAGNOSTIC Procedure Note     Procedure:    D&C, HYSTEROSCOPY DIAGNOSTIC  CPT(R) Code:  94228 - UT HYSTEROSCOPY,W/ENDO BX      Pre-Op Diagnosis: Endometrial intraepithelial neoplasia with complex atypia     Post-Op Diagnosis: Same as above    Surgeon: Dr. BENITO Durand,      Assist: Dr. Rozina De La Fuente MD PGY-1    Anesthesia: General    Estimated Blood Loss: Minimal           Specimens:   ID Type Source Tests Collected by Time Destination   1 : [facility] Tissue Endometrium PATHOLOGY TISSUE EXAM GERBER Durand III, MD 2023 [id number]               Complications: None    Condition: Stable    Findings: Fluffy endometrial lining     Procedure Details:     Xin Melendez is a 62 y.o.  with a history of endometrial intraepithelial neoplasia with complex atypia currently being managed with LNG-IUD. Patient was explained risks and benefits of procedure and consented for a D&C hysteroscopy. Consents were reconfirmed today.     On 23 patient presented to the hospital and denied any significant interval history. The surgeon and patient were mutually identified and the patient was taken to the operating room.  She was given general anesthesia. The patient was placed in the dorsal lithotomy position and prepped and draped in the usual sterile fashion.  A timeout was performed.  Normal appearing external genitalia were appreciated.  A Contreras speculum and a Gena retractor were placed in the vagina, the cervix was visualized and the anterior lip was grasped with a single toothed tenaculum. The patients IUD was removed and found to be in tact. It was held sterile on the field. The uterus was sounded to 12 cm, and the cervix serially dilated to accommodate a 5mm hysteroscope. Upon insertion of the scope, a shaggy appearing endometrial lining was observed with polypoid tissue protruding into the cavity.  The hysteroscope was then removed. Endometrial curettage was then performed using brief gentle sharp  curettage in a 360 fashion. There was copious amount of endometrial tissue. A size 6 flexible suction curette was then placed into the uterus and advanced to the uterine fundus. Suction tubing was then connected.  Suction evacuation was then preformed to extract additional tissue. The curettings were then sent to pathology for permanent evaluation.  Using a long Francine, the IUD was then re-inserted. The strings were visualized outside of the cervix. The single tooth tenaculum was removed from the cervix and hemostasis was observed from this site as well as from the os. All instruments were removed from the vagina. All sponge and instrument counts were reported to be correct. The patient tolerated the procedure well and was taken to the recovery room awake and in stable condition.     and Dr.L Durand were scrubbed and present for the entire procedure.    Rozina De La Fuente MD

## 2023-04-06 NOTE — NURSING NOTE
Pt appears SOB and stated it is hard to take deep breathes. 98% on 4L NC. Lungs clear, but very diminished. Paged Dr. Hitchcock from anesthesia to assess patient.       Dr. Hitchcock to bedside to assess patient. Pt working with IS- achieving level 300- unable to get to 500. No new orders at this time.     Respiratory status improved. Pt feels better and appears comfortable. Increase in level with IS to over 1250.

## 2023-04-06 NOTE — DISCHARGE INSTRUCTIONS
Dilation and Curettage   Discharge Instructions    What to Expect  It is normal to have light bleeding. This can occur immediately after the procedure or in a few days.   It is normal to experience some mild cramping after the procedure but this usually does not last for more than a few days. You may take Acetaminophen (Tylenol) or Ibuprofen (Motrin, Advil) as directed for pain.      Activity   Please do not get in a swimming pool, hot tub or tub bath for at least 2-3 days after your procedure.   Please delay sexual intercourse for at least 5-7 days.  You may resume normal activities in 1-2 days. Please wait an additional 1-2 days for strenuous exercise.  You may resume driving 24 hours after anesthesia.      PLEASE CALL THE OFFICE @ [phone] IF YOU EXPERIENCE ANY OF THE FOLLOWING:   - Heavy bleeding (using one pad an hour)  - Fever greater than 100.4  - Foul smelling vaginal discharge   - Worsening pain or any other concerns

## 2023-04-06 NOTE — ANESTHESIA POSTPROCEDURE EVALUATION
Patient: Xin Melendez    Procedure Summary     Date: 04/06/23 Room / [facility]: [facility] / [facility]    Anesthesia Start: 1607 Anesthesia Stop: 1716    Procedure: D&C, HYSTEROSCOPY DIAGNOSTIC (Vagina ) Diagnosis:       EIN (endometrial intraepithelial neoplasia)      (EIN (endometrial intraepithelial neoplasia) [N85.02])    Surgeons: GERBER Durand III, MD Responsible Provider: Mitzi Godoy MD    Anesthesia Type: general ASA Status: 3          Anesthesia Type: general  PACU Vitals  4/6/2023 1711 - 4/6/2023 1811      4/6/2023  1715 4/6/2023  1730 4/6/2023  1745 4/6/2023  1800    BP: 153/78 168/54 144/67 151/55    Temp: 36.3 °C (97.3 °F) -- -- --    Pulse: 105 100 92 84    Resp: 20 20 25 19    SpO2: 100 % 98 % 100 % 100 %            Anesthesia Post Evaluation    Pain management: adequate  Patient location during evaluation: PACU  Patient participation: complete - patient participated  Level of consciousness: awake and alert  Cardiovascular status: acceptable  Airway Patency: adequate  Respiratory status: acceptable  Hydration status: acceptable  Anesthetic complications: no

## 2023-04-06 NOTE — ANESTHESIOLOGIST PRE-PROCEDURE ATTESTATION
Pre-Procedure Patient Identification:  I am the Primary Anesthesiologist and have identified the patient on 04/06/23 at 2:03 PM.   I have confirmed the procedure(s) will be performed by the following surgeon/proceduralist Ob, MD Jory.

## 2023-04-06 NOTE — OR SURGEON
Pre-Procedure patient identification:  I am the primary operating surgeon/proceduralist and I have identified the patient on 04/06/23 at 3:49 PM GERBER Durand III, MD  Phone Number: [phone]

## 2023-04-06 NOTE — ANESTHESIA PROCEDURE NOTES
Airway  Urgency: elective    Start Time: 4/6/2023 4:20 PM  Stop Time: 4/6/2023 4:20 PM      General Information and Staff    Performed by: Mitzi Godoy MD  Authorized by: Mitzi Godoy MD      Indications and Patient Condition  Indications for airway management: anesthesia  Sedation level: general  Preoxygenated: yes  Patient position: sniffing  Mask difficulty assessment: 1 - vent by mask    Final Airway Details  Final airway type: endotracheal airway         Successful intubation technique: video laryngoscopy  Blade: Angelic  Blade size: #4  Cormack-Lehane Classification: grade IIa - partial view of glottis  Placement verified by: chest auscultation and capnometry   Measured from: lips  ETT to lips (cm): 21  Number of attempts at approach: 1  Number of other approaches attempted: 0  Atraumatic airway insertion

## 2023-04-13 LAB
CASE RPRT: NORMAL
CLINICAL INFO: NORMAL
IMMUNE STAIN STUDY: NORMAL
PATH REPORT.FINAL DX SPEC: NORMAL
PATH REPORT.FINAL DX SPEC: NORMAL
PATH REPORT.GROSS SPEC: NORMAL
PATH REPORT.MICROSCOPIC SPEC OTHER STN: NORMAL

## 2023-04-14 ENCOUNTER — TELEPHONE (OUTPATIENT)
Dept: OBSTETRICS AND GYNECOLOGY | Facility: HOSPITAL | Age: 63
End: 2023-04-14
Payer: COMMERCIAL

## 2023-04-14 NOTE — TELEPHONE ENCOUNTER
Patient called after seeing her pathology results from procedure 4/6.  I did not discuss anything with her. Please call the patient as soon as you are able to explain the findings and the next steps.   She is very anxious

## 2023-04-17 ENCOUNTER — TELEPHONE (OUTPATIENT)
Dept: OBSTETRICS AND GYNECOLOGY | Facility: HOSPITAL | Age: 63
End: 2023-04-17
Payer: COMMERCIAL

## 2023-04-17 NOTE — TELEPHONE ENCOUNTER
Patient was returning your call which you placed this morning to discuss her path results.   She stated you can call back any time the rest of the day

## 2023-04-17 NOTE — TELEPHONE ENCOUNTER
Called patient regarding pathology results. Continue with current management at this time. Plan to follow up for post op visit with Gyn/Onc on a Friday for further review for post op visit.    Victoria Bustillos MD  Ob/Gyn PGY 4

## 2023-04-17 NOTE — TELEPHONE ENCOUNTER
Attempted to call patient with pathology results. No answer. Left VM. Plan for patient to have post op visit during Gyn Onc clinic on Fridays.    Victoria Bustillos MD  Ob/Gyn PGY 4

## 2023-06-14 DIAGNOSIS — M25.562 PAIN IN BOTH KNEES, UNSPECIFIED CHRONICITY: Primary | ICD-10-CM

## 2023-06-14 DIAGNOSIS — M25.561 PAIN IN BOTH KNEES, UNSPECIFIED CHRONICITY: Primary | ICD-10-CM

## 2023-06-16 ENCOUNTER — OFFICE VISIT (OUTPATIENT)
Dept: SURGERY | Facility: CLINIC | Age: 63
End: 2023-06-16
Payer: COMMERCIAL

## 2023-06-16 ENCOUNTER — HOSPITAL ENCOUNTER (OUTPATIENT)
Dept: RADIOLOGY | Facility: CLINIC | Age: 63
Discharge: HOME | End: 2023-06-16
Attending: PHYSICIAN ASSISTANT
Payer: COMMERCIAL

## 2023-06-16 VITALS — HEIGHT: 63 IN | WEIGHT: 293 LBS | BODY MASS INDEX: 51.91 KG/M2

## 2023-06-16 DIAGNOSIS — M17.0 PRIMARY OSTEOARTHRITIS OF BOTH KNEES: Primary | ICD-10-CM

## 2023-06-16 DIAGNOSIS — M25.561 PAIN IN BOTH KNEES, UNSPECIFIED CHRONICITY: ICD-10-CM

## 2023-06-16 DIAGNOSIS — I89.0 LYMPHEDEMA: ICD-10-CM

## 2023-06-16 DIAGNOSIS — M25.562 PAIN IN BOTH KNEES, UNSPECIFIED CHRONICITY: ICD-10-CM

## 2023-06-16 PROCEDURE — 3008F BODY MASS INDEX DOCD: CPT | Performed by: STUDENT IN AN ORGANIZED HEALTH CARE EDUCATION/TRAINING PROGRAM

## 2023-06-16 PROCEDURE — 99203 OFFICE O/P NEW LOW 30 MIN: CPT | Performed by: STUDENT IN AN ORGANIZED HEALTH CARE EDUCATION/TRAINING PROGRAM

## 2023-06-16 PROCEDURE — 73564 X-RAY EXAM KNEE 4 OR MORE: CPT | Mod: 50

## 2023-06-16 NOTE — PROGRESS NOTES
CONSULTATION FOR KNEE    CONSULTATION    CHIEF COMPLAINT: Bilateral knee pain.    HISTORY: Thank you for the pleasure of requesting a consultation on this patient. Ms. Melendez is experiencing bilateral knee pain, which is not severe in intensity.  She is accompanied today by her sister, Betzy.  Xin has had left greater than right knee pain for about 3-1/2 years now.  She also complains quite a bit of stiffness.  Her bilateral knee pain is in the setting of other lower extremity comorbidities including cellulitis requiring hospitalization and rehab couple of months ago as well as chronic lymphedema.  Lymphedema is actually worse in her right leg compared to the left.  Xin is also morbidly obese and has been struggling with her weight for quite a long time.  She states that she walks with a walker, and right now her knees are not as high that painful, but the stiffness and achiness is certainly present.  She takes meloxicam, gabapentin, and Tylenol arthritis with some improvement.  She has never had any injections into the knee.  The pain does not   substantially limit activities of daily living. Walking tolerance is not significantly reduced. Pain and restriction of function are tolerable at this time.    Past Medical History:   Diagnosis Date   • Back pain    • COVID-19 vaccine series completed     Moderna Boster 10/2021   • CTS (carpal tunnel syndrome)    • DJD (degenerative joint disease)    • Edema    • HL (hearing loss)    • Hypertension    • Hypothyroidism    • Kidney stones    • Lipid disorder    • Lymphedema    • Obesity    • Sciatica    • Shoulder pain     bilateral   • Stasis dermatitis    • Type 2 diabetes mellitus (CMS/[facility])      Past Surgical History:   Procedure Laterality Date   • APPENDECTOMY      laparotomy   •  SECTION  1999   • CYSTOSCOPY  2021    CYSTOSCOPY,INSERT URETERAL STENT   • CYSTOSCOPY  2022    CYSTO, RETRO, STENT, URETEROSCOPY, LASER LITHOTRIPSY, D&C   • DILATION  "AND CURETTAGE OF UTERUS      when patient was age 26   • OOPHORECTOMY      open RSO, right side secondary to torsion- during pregnancy     Social History     Tobacco Use   • Smoking status: Former     Types: Cigarettes     Quit date:      Years since quittin.4   • Smokeless tobacco: Never   • Tobacco comments:     Ex-smoker who smoked socially in college for approximately 4 years.   Substance Use Topics   • Alcohol use: Not Currently     Comment: No current alcohol use, socially in college.   • Drug use: Never     Allergies   Allergen Reactions   • Adhesive Tape-Silicones Rash   • Cephalexin Rash     Keflex         PHYSICAL EXAMINATION:  Visit Vitals  Ht 1.6 m (5' 3\")   Wt (!) 141 kg (310 lb)   BMI 54.91 kg/m²      Patient is well nourished, well-developed, in no acute distress, with appropriate mood and affect. The patient is oriented to time, place, and person. Gait evaluation does reveals a limp. Bilateral knee motion is reduced but does not cause significant pain. The right knee moves from 5-100 degrees. The left knee moves from 5-90 degrees The knees are stable within that range-of-motion. The alignment of the right knee is varus varus. The alignment of the left knee is varus.  She has significant lower extremity edema.  There are not currently open wounds.  Knee muscle strength is normal bilaterally with the skin intact.  Is difficult to palpate her pedal pulses, but there is brisk capillary refill of bilateral lower extremities and warm and well-perfused feet.. Hip examination, including flexion and internal rotation, was negative in that groin pain was not produced.    RADIOGRAPHIC EXAMINATION: Standing AP knee, standing flexed knee, lateral knee views of both knees were ordered and reviewed in the office and demonstrate severe degenerative joint disease of both knees.    DIAGNOSIS: Bilateral knee pain.    TREATMENT PLAN: I discussed with Xin that her knees are certainly radiographically severe " enough to merit an arthroplasty procedure, but she has not had much by way of nonsurgical treatment.  An extensive discussion was conducted on the natural history of the disease and the variety of surgical and non-surgical options available to the patient including, but not limited to non-steroidal anti-inflammatory medications, steroid injections, viscosupplementation, physical therapy, maintenance of ideal body weight, and reduction of activity.  Xin knows that her weight is likely contributing to some of her pain and dysfunction.  I recommended that she reach out to her endocrinologist and discussed with her if she thinks that she will is an appropriate candidate for one of the newer medications such as Ozempic or Wegovy which can treat both diabetes and is also been shown to be effective with weight loss.  I know that she will is not eager about the thought of seeing a weight  because she is afraid of surgical options for weight loss, but I encouraged her to at least talk to her endocrinologist to see if any of the medical weight loss drugs would be appropriate for her.  I provided you with a prescription for physical therapy which I think may help with her knee pain.  I also recommended that she try to find a therapist or therapy location that has lymphatic massage given that I think her lymphedema certainly negatively impacts her function.  I would like to see her back in about 3 months just to check in and see how things are going.  She knows she can come and see me sooner if she would like to try cortisone injections.  We would need insurance preauthorization before doing those however because of her insurance.    Brennen Awad MD

## 2023-11-01 ENCOUNTER — TELEPHONE (OUTPATIENT)
Dept: OBSTETRICS AND GYNECOLOGY | Facility: CLINIC | Age: 63
End: 2023-11-01
Payer: COMMERCIAL

## 2023-11-01 ENCOUNTER — PREP FOR CASE (OUTPATIENT)
Dept: OBSTETRICS AND GYNECOLOGY | Facility: CLINIC | Age: 63
End: 2023-11-01
Payer: COMMERCIAL

## 2023-11-01 ENCOUNTER — HOSPITAL ENCOUNTER (OUTPATIENT)
Facility: HOSPITAL | Age: 63
Setting detail: HOSPITAL OUTPATIENT SURGERY
End: 2023-11-01

## 2023-11-01 DIAGNOSIS — Z01.818 PREOP EXAMINATION: ICD-10-CM

## 2023-11-01 DIAGNOSIS — N85.02 EIN (ENDOMETRIAL INTRAEPITHELIAL NEOPLASIA): Primary | ICD-10-CM

## 2023-11-01 NOTE — TELEPHONE ENCOUNTER
Called Xin in an attempt to discuss endometrial sampling. She is due for D&C hysteroscopy. She did not answer. I left a VM with instructions to call back.    Nay De MD

## 2023-11-03 ENCOUNTER — TELEPHONE (OUTPATIENT)
Dept: OBSTETRICS AND GYNECOLOGY | Facility: CLINIC | Age: 63
End: 2023-11-03

## 2023-11-03 NOTE — TELEPHONE ENCOUNTER
Called patient in an attempt to schedule next D&C hysteroscopy for endometrial sampling. Left VM.    Nay De MD

## 2023-11-10 ENCOUNTER — TELEPHONE (OUTPATIENT)
Dept: OBSTETRICS AND GYNECOLOGY | Facility: CLINIC | Age: 63
End: 2023-11-10

## 2023-11-10 NOTE — TELEPHONE ENCOUNTER
Spoke with patient. She is well without complaints. Discussed that she is due for repeat endometrial sampling. We reviewed that she will need medical clearance and PATs. Tentative surgery date 12/21/23 at 1400.     Nay De MD

## 2023-11-10 NOTE — TELEPHONE ENCOUNTER
Called patient 11/9/23 and today to discuss surveillance and next steps. She did not answer. Left VM. Will send a letter.    Nay De MD

## 2023-11-22 ENCOUNTER — TELEPHONE (OUTPATIENT)
Dept: OBSTETRICS AND GYNECOLOGY | Facility: CLINIC | Age: 63
End: 2023-11-22
Payer: COMMERCIAL

## 2023-11-22 NOTE — H&P
Gynecology History and Physical    HPI      Xin Melendez is a 62 y.o.  who is presenting to undergo a scheduled D&C, hysteroscopy in the setting of EIN with complex atypia currently being managed with a LNG-IUD placed on 22.      Treatment hx:  - 22 Endometrial bx: fragments of endometrium with hyperplasia and foci of EIN  - 22 Endometrial bx: fragments of endometrium with EIN. Mirena IUD placed.   - 23 Endometrial bx: complex papillary mucinous metaplasia      Past medical history:  - Morbid obesity (BMI 51)  - T2DM (managed with insulin most recent HbA1c 2022 5.5): follows with endocrinology   - HTN: pressure well controlled on Avalide 12.5/150 mg qd  - Hypothyroidism: well controlled on synthroid 75 mcg qd. follows with endocrinology.  - Lymphedema    Surgical History:   Past Surgical History:   Procedure Laterality Date    APPENDECTOMY      laparotomy     SECTION  1999    CYSTOSCOPY  2021    CYSTOSCOPY,INSERT URETERAL STENT    CYSTOSCOPY  2022    CYSTO, RETRO, STENT, URETEROSCOPY, LASER LITHOTRIPSY, D&C    DILATION AND CURETTAGE OF UTERUS      when patient was age 26    OOPHORECTOMY      open RSO, right side secondary to torsion- during pregnancy     Allergies: Adhesive tape-silicones and Cephalexin    Prior to Admission medications    Medication Sig Start Date End Date Taking? Authorizing Provider   acetaminophen (TYLENOL ARTHRITIS PAIN ORAL) Take 2 tablets by mouth 2 (two) times a day as needed (pain).    Provider, Suyapa Ibarra MD   ascorbic acid, vitamin C, (VITAMIN C) 1,000 mg tablet Take 1,000 mg by mouth daily.    Provider, Suyapa Ibarra MD   aspirin 81 mg enteric coated tablet Take 81 mg by mouth daily.    ProviderFred MD   cholecalciferol, vitamin D3, (VITAMIN D3 ORAL) Take 1,000 Units by mouth daily.    ProviderFred MD   DULoxetine (CYMBALTA) 20 mg capsule Take 20 mg by mouth 2 (two) times a day.    ProviderSuyapa  MD Fred   exenatide microspheres 2 mg/0.85 mL auto-injector Inject 2 mg under the skin once a week on Friday. 8/6/21   Fred Dow MD   gabapentin (NEURONTIN) 600 mg tablet Take 600 mg by mouth 3 (three) times a day.    ProviderSuyapa MD   insulin degludec U-200 CONC (TRESIBA FLEXTOUCH U-200) 200 unit/mL (3 mL) pen Inject 0.2 mL (40 Units total) under the skin every evening. 10/15/22 11/14/22  Everardo Sky,    irbesartan-hydrochlorothiazide (AVALIDE) 150-12.5 mg per tablet Take 1 tablet by mouth daily.    Suyapa Dow MD   levothyroxine 75 mcg tablet Take 75 mcg by mouth daily.    Fred Dow MD   meloxicam (MOBIC) 15 mg tablet Take 15 mg by mouth daily.    Fred Dow MD   methocarbamoL 750 mg tablet Take 750 mg by mouth 3 (three) times a day as needed. Always takes in am but doesn't always do tid  8/9/21   Fred Dow MD   simvastatin 40 mg tablet Take 40 mg by mouth daily.   11/25/20   Fred Dow MD     Review of Systems  All systems were reviewed and normal with the exception of the above.     Assessment/Plan     Xin Melendez is a 62 y.o. female who is presenting to undergo a scheduled D&C, hysteroscopy in the setting of EIN with complex atypia currently being managed with a LNG-IUD placed on 8/22/22.    1. Obtain IV access   2. Keep NPO  3. Antibiotics not indicated per ACOG guidelines     Approved by Dr. Gooden-> Dr. BENITO De MD

## 2023-11-22 NOTE — TELEPHONE ENCOUNTER
Called patient to review surgery date. She is scheduled for Dec 21 at 1400. She will get endocrinology clearance. Reviewed with Dr. Gooden-> Dr. BENITO De MD

## 2023-12-19 ENCOUNTER — APPOINTMENT (OUTPATIENT)
Dept: LAB | Facility: HOSPITAL | Age: 63
End: 2023-12-19
Payer: COMMERCIAL

## 2023-12-19 ENCOUNTER — APPOINTMENT (OUTPATIENT)
Dept: PREADMISSION TESTING | Facility: HOSPITAL | Age: 63
End: 2023-12-19
Attending: OBSTETRICS & GYNECOLOGY
Payer: COMMERCIAL

## 2023-12-19 VITALS
TEMPERATURE: 96 F | SYSTOLIC BLOOD PRESSURE: 133 MMHG | BODY MASS INDEX: 51.91 KG/M2 | HEART RATE: 76 BPM | WEIGHT: 293 LBS | RESPIRATION RATE: 18 BRPM | OXYGEN SATURATION: 98 % | DIASTOLIC BLOOD PRESSURE: 63 MMHG | HEIGHT: 63 IN

## 2023-12-19 DIAGNOSIS — N85.02 EIN (ENDOMETRIAL INTRAEPITHELIAL NEOPLASIA): ICD-10-CM

## 2023-12-19 DIAGNOSIS — Z01.818 PREOP EXAMINATION: Primary | ICD-10-CM

## 2023-12-19 DIAGNOSIS — Z01.818 PREOP EXAMINATION: ICD-10-CM

## 2023-12-19 LAB
ABO + RH BLD: NORMAL
ALBUMIN SERPL-MCNC: 3.9 G/DL (ref 3.5–5.7)
ALP SERPL-CCNC: 81 IU/L (ref 34–125)
ALT SERPL-CCNC: 20 IU/L (ref 7–52)
ANION GAP SERPL CALC-SCNC: 9 MEQ/L (ref 3–15)
AST SERPL-CCNC: 15 IU/L (ref 13–39)
ATRIAL RATE: 73
BILIRUB SERPL-MCNC: 0.6 MG/DL (ref 0.3–1.2)
BLD GP AB SCN SERPL QL: NEGATIVE
BLOOD BANK CMNT PATIENT-IMP: NORMAL
BUN SERPL-MCNC: 20 MG/DL (ref 7–25)
CALCIUM SERPL-MCNC: 9.4 MG/DL (ref 8.6–10.3)
CHLORIDE SERPL-SCNC: 106 MEQ/L (ref 98–107)
CO2 SERPL-SCNC: 29 MEQ/L (ref 21–31)
CREAT SERPL-MCNC: 0.9 MG/DL (ref 0.6–1.2)
D AG BLD QL: POSITIVE
EGFRCR SERPLBLD CKD-EPI 2021: >60 ML/MIN/1.73M*2
ERYTHROCYTE [DISTWIDTH] IN BLOOD BY AUTOMATED COUNT: 14.3 % (ref 11.7–14.4)
GLUCOSE SERPL-MCNC: 111 MG/DL (ref 70–99)
HCT VFR BLDCO AUTO: 48.2 % (ref 35–45)
HGB BLD-MCNC: 15.8 G/DL (ref 11.8–15.7)
LABORATORY COMMENT REPORT: NORMAL
MCH RBC QN AUTO: 30.9 PG (ref 28–33.2)
MCHC RBC AUTO-ENTMCNC: 32.8 G/DL (ref 32.2–35.5)
MCV RBC AUTO: 94.3 FL (ref 83–98)
P AXIS: 60
PDW BLD AUTO: 10.4 FL (ref 9.4–12.3)
PLATELET # BLD AUTO: 234 K/UL (ref 150–369)
POTASSIUM SERPL-SCNC: 4.2 MEQ/L (ref 3.5–5.1)
PR INTERVAL: 154
PROT SERPL-MCNC: 6.9 G/DL (ref 6–8.2)
QRS DURATION: 94
QT INTERVAL: 396
QTC CALCULATION(BAZETT): 436
R AXIS: 43
RBC # BLD AUTO: 5.11 M/UL (ref 3.93–5.22)
SODIUM SERPL-SCNC: 144 MEQ/L (ref 136–145)
SPECIMEN EXP DATE BLD: NORMAL
T WAVE AXIS: 68
VENTRICULAR RATE: 73
WBC # BLD AUTO: 13.02 K/UL (ref 3.8–10.5)

## 2023-12-19 PROCEDURE — 93010 ELECTROCARDIOGRAM REPORT: CPT | Performed by: INTERNAL MEDICINE

## 2023-12-19 PROCEDURE — 36415 COLL VENOUS BLD VENIPUNCTURE: CPT

## 2023-12-19 PROCEDURE — 85027 COMPLETE CBC AUTOMATED: CPT

## 2023-12-19 PROCEDURE — 86901 BLOOD TYPING SEROLOGIC RH(D): CPT

## 2023-12-19 PROCEDURE — 93005 ELECTROCARDIOGRAM TRACING: CPT

## 2023-12-19 PROCEDURE — 80053 COMPREHEN METABOLIC PANEL: CPT

## 2023-12-19 RX ORDER — BLOOD SUGAR DIAGNOSTIC
STRIP MISCELLANEOUS
COMMUNITY
Start: 2023-12-12

## 2023-12-19 ASSESSMENT — PAIN SCALES - GENERAL: PAINLEVEL: 8

## 2024-01-09 ENCOUNTER — TELEPHONE (OUTPATIENT)
Dept: OBSTETRICS AND GYNECOLOGY | Facility: CLINIC | Age: 64
End: 2024-01-09

## 2024-01-09 NOTE — TELEPHONE ENCOUNTER
Patient had to reschedule her D&C secondary to a family emergency. She continues to experience heavy bleeding and would like to have her D&C rescheduled for early February. (She will have insurance effective 2/2/24) She stated that she has called many times. Thank you.      Left VM with patient acknowledging that I received her message and am working on it.

## 2024-01-10 ENCOUNTER — TELEPHONE (OUTPATIENT)
Dept: OBSTETRICS AND GYNECOLOGY | Facility: CLINIC | Age: 64
End: 2024-01-10
Payer: COMMERCIAL

## 2024-01-10 NOTE — TELEPHONE ENCOUNTER
Called to discuss possible days that would work to reschedule D&C. LVM that I would call again tomorrow to discuss.     Kaity Grider MD

## 2024-01-11 ENCOUNTER — PREP FOR CASE (OUTPATIENT)
Dept: OBSTETRICS AND GYNECOLOGY | Facility: CLINIC | Age: 64
End: 2024-01-11
Payer: COMMERCIAL

## 2024-01-11 DIAGNOSIS — N85.02 EIN (ENDOMETRIAL INTRAEPITHELIAL NEOPLASIA): Primary | ICD-10-CM

## 2024-01-11 DIAGNOSIS — N93.9 ABNORMAL UTERINE BLEEDING (AUB): ICD-10-CM

## 2024-01-11 RX ORDER — ACETAMINOPHEN 325 MG/1
975 TABLET ORAL ONCE
Status: CANCELLED | OUTPATIENT
Start: 2024-01-11 | End: 2024-01-11

## 2024-01-11 RX ORDER — SODIUM CHLORIDE 9 MG/ML
INJECTION, SOLUTION INTRAVENOUS CONTINUOUS
Status: CANCELLED | OUTPATIENT
Start: 2024-01-11 | End: 2024-01-18

## 2024-01-12 ENCOUNTER — TELEPHONE (OUTPATIENT)
Dept: OBSTETRICS AND GYNECOLOGY | Facility: CLINIC | Age: 64
End: 2024-01-12
Payer: COMMERCIAL

## 2024-01-12 NOTE — TELEPHONE ENCOUNTER
Called and spoke with Xin that her D&C hysteroscopy is now tentatively rescheduled for 2/6/24 at 0935. She noted that she has been having vaginal bleeding (like a light period) over the last week that is new for her. She denies any abdominal or pelvic pain. I reviewed my recommendations to move OR time up so we are not sitting on an endometrial cancer, though she prefers to keep the current date as her insurance does not go into effect until 2/1/24. I again recommended attempting to move up OR time and she is desiring to keep current OR time/date. Will get approved by GYN chief Dr. Carter and call patient after that time.     I did inform Xin to go to the ED with any lightheadedness, dizziness, bleeding more that 1 pad per hour, or other concerning symptoms as we would want to evaluate what was going on. All questions answered to the best of my ability.     Kaity Grider MD

## 2024-01-19 NOTE — TELEPHONE ENCOUNTER
Please re-order labs to go to [facility] and EKG/anesthesia clt for 2/5/24. She will not going to [facility]

## 2024-01-20 NOTE — H&P
Gynecology History and Physical    HPI      Xin Melendez is a 63 y.o.  female with history of EIN with complex atypia that is scheduled for D&C hysteroscopy on 24. She was previously scheduled for December, but had an emergency occur that caused her to cancel the case. Her EIN is currently being managed by a LNG-IUD, which was placed on 22. She is now having increased bleeding through the IUD, endometrial sampling warranted.       Treatment hx:  - 22 Endometrial bx: fragments of endometrium with hyperplasia and foci of EIN  - 22 Endometrial bx: fragments of endometrium with EIN. Mirena IUD placed.   - 23 Endometrial bx: complex papillary mucinous metaplasia    Past medical history:  - Morbid obesity (BMI 51)  - T2DM (managed with insulin most recent HbA1c 2022 5.5): follows with endocrinology   - HTN: pressure well controlled on Avalide 12.5/150 mg qd  - Hypothyroidism: well controlled on synthroid 75 mcg qd. follows with endocrinology.  - Lymphedema    OB History:   OB History    Para Term  AB Living   1 1 0 0 0 1   SAB IAB Ectopic Multiple Live Births   0 0 0 0 0      # Outcome Date GA Lbr [place]/2nd Weight Sex Delivery Anes PTL Lv   1 Para      CS-Unspec          Medical History:   Past Medical History:   Diagnosis Date   • Back pain    • COVID-19 vaccine series completed     Moderna Boster 10/2021   • CTS (carpal tunnel syndrome)    • DJD (degenerative joint disease)    • Edema    • HL (hearing loss)    • Hypertension    • Hypothyroidism    • Kidney stones    • Lipid disorder    • Lymphedema    • Obesity    • Sciatica    • Shoulder pain     bilateral   • Stasis dermatitis    • Type 2 diabetes mellitus (CMS/HCC)        Surgical History:   Past Surgical History:   Procedure Laterality Date   • APPENDECTOMY      laparotomy   •  SECTION  1999   • CYSTOSCOPY  2021    CYSTOSCOPY,INSERT URETERAL STENT   • CYSTOSCOPY  2022    CYSTO, RETRO, STENT,  URETEROSCOPY, LASER LITHOTRIPSY, D&C   • DILATION AND CURETTAGE OF UTERUS      when patient was age 26   • OOPHORECTOMY      open RSO, right side secondary to torsion- during pregnancy       Social History     Socioeconomic History   • Marital status:    • Number of children: 1   Occupational History   • Occupation: Unemployed   Tobacco Use   • Smoking status: Former     Types: Cigarettes     Quit date:      Years since quittin.0   • Smokeless tobacco: Never   • Tobacco comments:     Ex-smoker who smoked socially in college for approximately 4 years.   Substance and Sexual Activity   • Alcohol use: Not Currently     Comment: No current alcohol use, socially in college.   • Drug use: Never   • Sexual activity: Defer   Social History Narrative    Lives with son, Edd, in a 2 story home has a chair lift.  She is .  She designates her close friend, Akila León, as an emergency medical proxy.  Akila can be reached at [phone].        The patient is currently on disability.  She previously worked as a  as well as an .        The patient tells me that she smoked socially in college for 4 years.    No current alcohol use.  She consumes alcohol socially in college only.    Denies substance use.        Patient currently using a walker for unsteady gait.         Social Determinants of Health     Food Insecurity: No Food Insecurity (10/12/2022)    Hunger Vital Sign    • Worried About Running Out of Food in the Last Year: Never true    • Ran Out of Food in the Last Year: Never true       Family History   Problem Relation Age of Onset   • Heart attack Biological Father        Allergies:   Allergies   Allergen Reactions   • Adhesive Tape-Silicones Rash   • Cephalexin Rash     Keflex          Medications:  Prior to Admission medications    Medication Sig Start Date End Date Taking? Authorizing Provider   acetaminophen (TYLENOL ARTHRITIS PAIN ORAL) Take 2  tablets by mouth 2 (two) times a day as needed (pain).    Suyapa Dow MD   ascorbic acid, vitamin C, (VITAMIN C) 1,000 mg tablet Take 1,000 mg by mouth daily.    Suyapa Dow MD   aspirin 81 mg enteric coated tablet Take 81 mg by mouth daily. To stop pre-op    Fred Dow MD   cholecalciferol, vitamin D3, (VITAMIN D3 ORAL) Take 1,000 Units by mouth daily.    Fred Dow MD   DULoxetine (CYMBALTA) 20 mg capsule Take 20 mg by mouth 2 (two) times a day.    Suyapa oDw MD   exenatide microspheres 2 mg/0.85 mL auto-injector Inject 2 mg under the skin once a week on Monday. Mondays 8/6/21   Fred Dow MD   gabapentin (NEURONTIN) 600 mg tablet Take 600 mg by mouth 3 (three) times a day.    Suyapa Dow MD   insulin aspart U-100 (NovoLOG) 100 unit/mL (3 mL) pen 3 (three) times a day with meals. sliding scale 3/20/23   Suyapa Dow MD   insulin degludec U-200 CONC (TRESIBA FLEXTOUCH U-200) 200 unit/mL (3 mL) pen Inject 0.2 mL (40 Units total) under the skin every evening.  Patient taking differently: Inject 79 Units under the skin daily before dinner. 3:30 10/15/22 11/14/22  Everardo Sky,    irbesartan-hydrochlorothiazide (AVALIDE) 150-12.5 mg per tablet Take 1 tablet by mouth every morning.    Suyapa Dow MD   levothyroxine 75 mcg tablet Take 75 mcg by mouth daily.    Fred Dow MD   meloxicam (MOBIC) 15 mg tablet Take 15 mg by mouth every morning.    Fred Dow MD   methocarbamoL 750 mg tablet Take 750 mg by mouth 3 (three) times a day as needed for muscle spasms. Always takes in am but doesn't always do tid 8/9/21   Fred Dow MD   ONETOUCH ULTRA TEST strip TEST BLOOD SUGARS 4 TO 5 TIMES DAILY 12/12/23   Suyapa Dow MD   simvastatin 40 mg tablet Take 40 mg by mouth every morning. 11/25/20   Fred Dow MD       Review of Systems: All systems  were reviewed and normal with the exception of the above.       Objective     Vital Signs (last 24h):   to be obtained in Pre-op          Assessment/Plan     Xin Melendez is a 62 y.o. female who is presenting to undergo a scheduled D&C, hysteroscopy in the setting of EIN with complex atypia currently being managed with a LNG-IUD placed on 8/22/22.     1. Obtain IV access   2. Keep NPO  3. Antibiotics not indicated per ACOG guidelines     Discussed with Dr. Carter and Dr. De La Fuente.    Kaity Grider MD

## 2024-01-20 NOTE — H&P (VIEW-ONLY)
Gynecology History and Physical    HPI      Xin Melendez is a 63 y.o.  female with history of EIN with complex atypia that is scheduled for D&C hysteroscopy on 24. She was previously scheduled for December, but had an emergency occur that caused her to cancel the case. Her EIN is currently being managed by a LNG-IUD, which was placed on 22. She is now having increased bleeding through the IUD, endometrial sampling warranted.       Treatment hx:  - 22 Endometrial bx: fragments of endometrium with hyperplasia and foci of EIN  - 22 Endometrial bx: fragments of endometrium with EIN. Mirena IUD placed.   - 23 Endometrial bx: complex papillary mucinous metaplasia    Past medical history:  - Morbid obesity (BMI 51)  - T2DM (managed with insulin most recent HbA1c 2022 5.5): follows with endocrinology   - HTN: pressure well controlled on Avalide 12.5/150 mg qd  - Hypothyroidism: well controlled on synthroid 75 mcg qd. follows with endocrinology.  - Lymphedema    OB History:   OB History    Para Term  AB Living   1 1 0 0 0 1   SAB IAB Ectopic Multiple Live Births   0 0 0 0 0      # Outcome Date GA Lbr Newton/2nd Weight Sex Delivery Anes PTL Lv   1 Para      CS-Unspec          Medical History:   Past Medical History:   Diagnosis Date   • Back pain    • COVID-19 vaccine series completed     Moderna Boster 10/2021   • CTS (carpal tunnel syndrome)    • DJD (degenerative joint disease)    • Edema    • HL (hearing loss)    • Hypertension    • Hypothyroidism    • Kidney stones    • Lipid disorder    • Lymphedema    • Obesity    • Sciatica    • Shoulder pain     bilateral   • Stasis dermatitis    • Type 2 diabetes mellitus (CMS/HCC)        Surgical History:   Past Surgical History:   Procedure Laterality Date   • APPENDECTOMY      laparotomy   •  SECTION  1999   • CYSTOSCOPY  2021    CYSTOSCOPY,INSERT URETERAL STENT   • CYSTOSCOPY  2022    CYSTO, RETRO, STENT,  URETEROSCOPY, LASER LITHOTRIPSY, D&C   • DILATION AND CURETTAGE OF UTERUS      when patient was age 26   • OOPHORECTOMY      open RSO, right side secondary to torsion- during pregnancy       Social History     Socioeconomic History   • Marital status:    • Number of children: 1   Occupational History   • Occupation: Unemployed   Tobacco Use   • Smoking status: Former     Types: Cigarettes     Quit date:      Years since quittin.0   • Smokeless tobacco: Never   • Tobacco comments:     Ex-smoker who smoked socially in college for approximately 4 years.   Substance and Sexual Activity   • Alcohol use: Not Currently     Comment: No current alcohol use, socially in college.   • Drug use: Never   • Sexual activity: Defer   Social History Narrative    Lives with son, Edd, in a 2 story home has a chair lift.  She is .  She designates her close friend, Akila León, as an emergency medical proxy.  Akila can be reached at [phone].        The patient is currently on disability.  She previously worked as a  as well as an .        The patient tells me that she smoked socially in college for 4 years.    No current alcohol use.  She consumes alcohol socially in college only.    Denies substance use.        Patient currently using a walker for unsteady gait.         Social Determinants of Health     Food Insecurity: No Food Insecurity (10/12/2022)    Hunger Vital Sign    • Worried About Running Out of Food in the Last Year: Never true    • Ran Out of Food in the Last Year: Never true       Family History   Problem Relation Age of Onset   • Heart attack Biological Father        Allergies:   Allergies   Allergen Reactions   • Adhesive Tape-Silicones Rash   • Cephalexin Rash     Keflex          Medications:  Prior to Admission medications    Medication Sig Start Date End Date Taking? Authorizing Provider   acetaminophen (TYLENOL ARTHRITIS PAIN ORAL) Take 2  tablets by mouth 2 (two) times a day as needed (pain).    Suyapa Dow MD   ascorbic acid, vitamin C, (VITAMIN C) 1,000 mg tablet Take 1,000 mg by mouth daily.    Suyapa Dow MD   aspirin 81 mg enteric coated tablet Take 81 mg by mouth daily. To stop pre-op    Fred Dow MD   cholecalciferol, vitamin D3, (VITAMIN D3 ORAL) Take 1,000 Units by mouth daily.    Fred Dow MD   DULoxetine (CYMBALTA) 20 mg capsule Take 20 mg by mouth 2 (two) times a day.    Suyapa Dow MD   exenatide microspheres 2 mg/0.85 mL auto-injector Inject 2 mg under the skin once a week on Monday. Mondays 8/6/21   Fred Dow MD   gabapentin (NEURONTIN) 600 mg tablet Take 600 mg by mouth 3 (three) times a day.    Suyapa Dow MD   insulin aspart U-100 (NovoLOG) 100 unit/mL (3 mL) pen 3 (three) times a day with meals. sliding scale 3/20/23   Suyapa Dow MD   insulin degludec U-200 CONC (TRESIBA FLEXTOUCH U-200) 200 unit/mL (3 mL) pen Inject 0.2 mL (40 Units total) under the skin every evening.  Patient taking differently: Inject 79 Units under the skin daily before dinner. 3:30 10/15/22 11/14/22  Everardo Sky,    irbesartan-hydrochlorothiazide (AVALIDE) 150-12.5 mg per tablet Take 1 tablet by mouth every morning.    Suyapa Dow MD   levothyroxine 75 mcg tablet Take 75 mcg by mouth daily.    Fred Dow MD   meloxicam (MOBIC) 15 mg tablet Take 15 mg by mouth every morning.    Fred Dow MD   methocarbamoL 750 mg tablet Take 750 mg by mouth 3 (three) times a day as needed for muscle spasms. Always takes in am but doesn't always do tid 8/9/21   Fred Dow MD   ONETOUCH ULTRA TEST strip TEST BLOOD SUGARS 4 TO 5 TIMES DAILY 12/12/23   Suyapa Dow MD   simvastatin 40 mg tablet Take 40 mg by mouth every morning. 11/25/20   Fred Dow MD       Review of Systems: All systems  were reviewed and normal with the exception of the above.       Objective     Vital Signs (last 24h):   to be obtained in Pre-op          Assessment/Plan     Xin Melendez is a 62 y.o. female who is presenting to undergo a scheduled D&C, hysteroscopy in the setting of EIN with complex atypia currently being managed with a LNG-IUD placed on 8/22/22.     1. Obtain IV access   2. Keep NPO  3. Antibiotics not indicated per ACOG guidelines     Discussed with Dr. Carter and Dr. De La Fuente.    Kaity Grider MD

## 2024-01-22 ENCOUNTER — PREP FOR CASE (OUTPATIENT)
Dept: OBSTETRICS AND GYNECOLOGY | Facility: CLINIC | Age: 64
End: 2024-01-22
Payer: COMMERCIAL

## 2024-01-22 ENCOUNTER — DOCUMENTATION (OUTPATIENT)
Dept: OBSTETRICS AND GYNECOLOGY | Facility: CLINIC | Age: 64
End: 2024-01-22
Payer: COMMERCIAL

## 2024-01-22 ENCOUNTER — HOSPITAL ENCOUNTER (EMERGENCY)
Facility: HOSPITAL | Age: 64
Discharge: HOME | End: 2024-01-22
Attending: EMERGENCY MEDICINE
Payer: COMMERCIAL

## 2024-01-22 VITALS
HEART RATE: 80 BPM | TEMPERATURE: 98.4 F | OXYGEN SATURATION: 99 % | SYSTOLIC BLOOD PRESSURE: 130 MMHG | DIASTOLIC BLOOD PRESSURE: 60 MMHG | RESPIRATION RATE: 20 BRPM

## 2024-01-22 DIAGNOSIS — N95.0 POSTMENOPAUSAL VAGINAL BLEEDING: Primary | ICD-10-CM

## 2024-01-22 DIAGNOSIS — N85.02 EIN (ENDOMETRIAL INTRAEPITHELIAL NEOPLASIA): Primary | ICD-10-CM

## 2024-01-22 LAB
ALBUMIN SERPL-MCNC: 3.9 G/DL (ref 3.5–5.7)
ALP SERPL-CCNC: 77 IU/L (ref 34–125)
ALT SERPL-CCNC: 11 IU/L (ref 7–52)
ANION GAP SERPL CALC-SCNC: 14 MEQ/L (ref 3–15)
AST SERPL-CCNC: 13 IU/L (ref 13–39)
BASOPHILS # BLD: 0.11 K/UL (ref 0.01–0.1)
BASOPHILS NFR BLD: 0.8 %
BILIRUB SERPL-MCNC: 0.7 MG/DL (ref 0.3–1.2)
BUN SERPL-MCNC: 23 MG/DL (ref 7–25)
CALCIUM SERPL-MCNC: 9.3 MG/DL (ref 8.6–10.3)
CHLORIDE SERPL-SCNC: 103 MEQ/L (ref 98–107)
CO2 SERPL-SCNC: 24 MEQ/L (ref 21–31)
CREAT SERPL-MCNC: 1.1 MG/DL (ref 0.6–1.2)
DIFFERENTIAL METHOD BLD: ABNORMAL
EGFRCR SERPLBLD CKD-EPI 2021: 56.6 ML/MIN/1.73M*2
EOSINOPHIL # BLD: 0.21 K/UL (ref 0.04–0.36)
EOSINOPHIL NFR BLD: 1.4 %
ERYTHROCYTE [DISTWIDTH] IN BLOOD BY AUTOMATED COUNT: 13.3 % (ref 11.7–14.4)
GLUCOSE SERPL-MCNC: 105 MG/DL (ref 70–99)
HCT VFR BLD AUTO: 39.1 % (ref 35–45)
HGB BLD-MCNC: 12.6 G/DL (ref 11.8–15.7)
IMM GRANULOCYTES # BLD AUTO: 0.13 K/UL (ref 0–0.08)
IMM GRANULOCYTES NFR BLD AUTO: 0.9 %
LYMPHOCYTES # BLD: 2.56 K/UL (ref 1.2–3.5)
LYMPHOCYTES NFR BLD: 17.5 %
MCH RBC QN AUTO: 30.3 PG (ref 28–33.2)
MCHC RBC AUTO-ENTMCNC: 32.2 G/DL (ref 32.2–35.5)
MCV RBC AUTO: 94 FL (ref 83–98)
MONOCYTES # BLD: 0.98 K/UL (ref 0.28–0.8)
MONOCYTES NFR BLD: 6.7 %
NEUTROPHILS # BLD: 10.64 K/UL (ref 1.7–7)
NEUTS SEG NFR BLD: 72.7 %
NRBC BLD-RTO: 0 %
PDW BLD AUTO: 10.4 FL (ref 9.4–12.3)
PLATELET # BLD AUTO: 236 K/UL (ref 150–369)
POTASSIUM SERPL-SCNC: 3.6 MEQ/L (ref 3.5–5.1)
PROT SERPL-MCNC: 6.8 G/DL (ref 6–8.2)
RBC # BLD AUTO: 4.16 M/UL (ref 3.93–5.22)
SODIUM SERPL-SCNC: 141 MEQ/L (ref 136–145)
WBC # BLD AUTO: 14.63 K/UL (ref 3.8–10.5)

## 2024-01-22 PROCEDURE — 99283 EMERGENCY DEPT VISIT LOW MDM: CPT

## 2024-01-22 PROCEDURE — 36415 COLL VENOUS BLD VENIPUNCTURE: CPT | Performed by: EMERGENCY MEDICINE

## 2024-01-22 PROCEDURE — 80053 COMPREHEN METABOLIC PANEL: CPT | Performed by: EMERGENCY MEDICINE

## 2024-01-22 PROCEDURE — 85025 COMPLETE CBC W/AUTO DIFF WBC: CPT | Performed by: EMERGENCY MEDICINE

## 2024-01-22 PROCEDURE — 82040 ASSAY OF SERUM ALBUMIN: CPT | Performed by: EMERGENCY MEDICINE

## 2024-01-22 PROCEDURE — 63700000 HC SELF-ADMINISTRABLE DRUG: Performed by: STUDENT IN AN ORGANIZED HEALTH CARE EDUCATION/TRAINING PROGRAM

## 2024-01-22 RX ORDER — NORETHINDRONE 5 MG/1
5 TABLET ORAL ONCE
Qty: 1 TABLET | Refills: 0 | Status: COMPLETED | OUTPATIENT
Start: 2024-01-22 | End: 2024-01-22

## 2024-01-22 RX ORDER — NORETHINDRONE 5 MG/1
5 TABLET ORAL 2 TIMES DAILY
Qty: 180 TABLET | Refills: 3 | Status: SHIPPED | OUTPATIENT
Start: 2024-01-22 | End: 2024-06-01 | Stop reason: ALTCHOICE

## 2024-01-22 RX ADMIN — NORETHINDRONE ACETATE 5 MG: 5 TABLET ORAL at 23:07

## 2024-01-22 NOTE — ED PROVIDER NOTES
Emergency Medicine Note  HPI   HISTORY OF PRESENT ILLNESS     HPI patient is a 63-year-old woman history of abnormal uterine bleeding, EIN, IUD, D&C.  She reports vaginal bleeding for the past 2 weeks.  She says this bleeding significantly worsened in the last 3 days, and she has been passing heavy clots almost hourly.  She notes no associated pain.  She has no associated shortness of breath, chest pain, dizziness, headache.  She is due for D&C in 2 weeks, but says she cannot wait at this time.  She came to the emergency room for evaluation.      Patient History   PAST HISTORY     Reviewed from Nursing Triage:       Past Medical History:   Diagnosis Date   • Back pain    • COVID-19 vaccine series completed     Moderna Boster 10/2021   • CTS (carpal tunnel syndrome)    • DJD (degenerative joint disease)    • Edema    • HL (hearing loss)    • Hypertension    • Hypothyroidism    • Kidney stones    • Lipid disorder    • Lymphedema    • Obesity    • Sciatica    • Shoulder pain     bilateral   • Stasis dermatitis    • Type 2 diabetes mellitus (CMS/HCC)        Past Surgical History:   Procedure Laterality Date   • APPENDECTOMY      laparotomy   •  SECTION  1999   • CYSTOSCOPY  2021    CYSTOSCOPY,INSERT URETERAL STENT   • CYSTOSCOPY  2022    CYSTO, RETRO, STENT, URETEROSCOPY, LASER LITHOTRIPSY, D&C   • DILATION AND CURETTAGE OF UTERUS      when patient was age 26   • OOPHORECTOMY      open RSO, right side secondary to torsion- during pregnancy       Family History   Problem Relation Age of Onset   • Heart attack Biological Father        Social History     Tobacco Use   • Smoking status: Former     Types: Cigarettes     Quit date:      Years since quittin.0   • Smokeless tobacco: Never   • Tobacco comments:     Ex-smoker who smoked socially in college for approximately 4 years.   Substance Use Topics   • Alcohol use: Not Currently     Comment: No current alcohol use, socially in  [facility].   • Drug use: Never         Review of Systems   REVIEW OF SYSTEMS     Review of Systems      VITALS     ED Vitals    Date/Time Temp Pulse Resp BP SpO2 [facility]   01/22/24 2010 -- 76 20 128/63 97 % GB   01/22/24 1808 -- 75 18 145/68 100 % JCT   01/22/24 1409 36.9 °C (98.4 °F) 88 20 127/56 99 % SRF                       Physical Exam   PHYSICAL EXAM     Physical Exam  Vitals and nursing note reviewed.   Constitutional:       General: She is not in acute distress.     Appearance: She is not ill-appearing or diaphoretic.   HENT:      Head: Normocephalic and atraumatic.      Right Ear: External ear normal.      Left Ear: External ear normal.      Nose: Nose normal.      Mouth/Throat:      Mouth: Mucous membranes are moist.   Eyes:      Extraocular Movements: Extraocular movements intact.      Conjunctiva/sclera: Conjunctivae normal.      Pupils: Pupils are equal, round, and reactive to light.   Cardiovascular:      Rate and Rhythm: Normal rate and regular rhythm.      Pulses: Normal pulses.      Heart sounds: Normal heart sounds.   Pulmonary:      Effort: Pulmonary effort is normal. No respiratory distress.      Breath sounds: Normal breath sounds.   Abdominal:      General: There is no distension.      Tenderness: There is no abdominal tenderness. There is no guarding.   Musculoskeletal:         General: Normal range of motion.      Cervical back: Normal range of motion.      Right lower leg: No edema.      Left lower leg: No edema.   Skin:     General: Skin is warm and dry.      Capillary Refill: Capillary refill takes less than 2 seconds.      Coloration: Skin is not jaundiced or pale.   Neurological:      General: No focal deficit present.      Mental Status: She is alert.   Psychiatric:         Mood and Affect: Mood normal.           PROCEDURES     Procedures     DATA     Results     Procedure Component Value Units Date/Time    Comprehensive metabolic panel [[id number]]  (Abnormal) Collected: 01/22/24 1803     Specimen: Blood, Venous Updated: 01/22/24 1912     Sodium 141 mEQ/L      Potassium 3.6 mEQ/L      Comment: Results obtained on plasma. Plasma Potassium values may be up to 0.4 mEQ/L less than serum values. The differences may be greater for patients with high platelet or white cell counts.        Chloride 103 mEQ/L      CO2 24 mEQ/L      BUN 23 mg/dL      Creatinine 1.1 mg/dL      Glucose 105 mg/dL      Calcium 9.3 mg/dL      AST (SGOT) 13 IU/L      ALT (SGPT) 11 IU/L      Alkaline Phosphatase 77 IU/L      Total Protein 6.8 g/dL      Comment: Test performed on plasma which typically contains approximately 0.4 g/dL more protein than serum.        Albumin 3.9 g/dL      Bilirubin, Total 0.7 mg/dL      eGFR 56.6 mL/min/1.73m*2      Comment: Calculation based on the Chronic Kidney Disease Epidemiology Collaboration (CKD-EPI) equation refit without adjustment for race.        Anion Gap 14 mEQ/L     CBC and differential [[id number]]  (Abnormal) Collected: 01/22/24 1803    Specimen: Blood, Venous Updated: 01/22/24 1828     WBC 14.63 K/uL      RBC 4.16 M/uL      Hemoglobin 12.6 g/dL      Hematocrit 39.1 %      MCV 94.0 fL      MCH 30.3 pg      MCHC 32.2 g/dL      RDW 13.3 %      Platelets 236 K/uL      MPV 10.4 fL      Differential Type Auto     nRBC 0.0 %      Immature Granulocytes 0.9 %      Neutrophils 72.7 %      Lymphocytes 17.5 %      Monocytes 6.7 %      Eosinophils 1.4 %      Basophils 0.8 %      Immature Granulocytes, Absolute 0.13 K/uL      Neutrophils, Absolute 10.64 K/uL      Lymphocytes, Absolute 2.56 K/uL      Monocytes, Absolute 0.98 K/uL      Eosinophils, Absolute 0.21 K/uL      Basophils, Absolute 0.11 K/uL     [place] Draw Panel [[id number]] Collected: 01/22/24 1803    Specimen: Blood, Venous Updated: 01/22/24 1817    Narrative:      The following orders were created for panel order [place] Draw Panel.  Procedure                               Abnormality         Status                     ---------                                -----------         ------                     RAINBOW PINK[[id number]]                                     In process                   Please view results for these tests on the individual orders.    RAINBOW PINK [[id number]] Collected: 01/22/24 1803    Specimen: Blood, Venous Updated: 01/22/24 1817          Imaging Results    None         No orders to display       Scoring tools                                  ED Course & MDM   MDM / ED COURSE / CLINICAL IMPRESSION / DISPO     MDM    ED Course as of 01/22/24 2206 Mon Jan 22, 2024 1901 Patient's hemoglobin reviewed. [TP]   1901 Laying in the ED stretcher, patient is without active vaginal hemorrhage.  Consult to OB/GYN. [TP]   1902 Differential includes hemorrhage, anemia, infection, dysfunctional uterine bleeding, [TP]   2021 GYN is coming to the ED to evaluate patient. [TP]   2206 Patient was seen in the ED by GYN.  She had exam performed.  Recommended plan to start Aygestin and close follow-up outpatient for D&C. [TP]      ED Course User Index  [TP] Bernadette Whitaker MD     Clinical Impression      Postmenopausal vaginal bleeding     _________________     ED Disposition   Discharge                   Bernadette Whitaker MD  01/22/24 2206

## 2024-01-22 NOTE — PROGRESS NOTES
Pt called office line reporting very heavy bleeding and large clots. She is feeling dizzy    Has a history if AUB fir which she had a IUD placed in 2022 and also a h/o of EIN    Called form ED and wanted to make GYN team aware that she is there.   I assured her that the ED doctors would be consulting with GYN on her particular case. Howeer I let her know that I would also page the GYN chief Dr. TORRES Randall to make her aware of the situation

## 2024-01-23 NOTE — DISCHARGE INSTRUCTIONS
Please call your GYN doctor as scheduled to set up repeat evaluation of your symptoms.   Please return to the ED for any change in symptoms or worsening symptoms as we discussed.     Thanks for coming.

## 2024-01-23 NOTE — CONSULTS
Gynecology Consult    HPI     Patient is a 63 y.o.  female who presents for evaluation of postmenopausal vaginal bleeding in the setting of EIN with complex atypia. Xin's medical comorbidities include morbid obesity with BMI 51, T2DM, HTN, hypothyroidism, chronic LE lymphedema, HLD, chronic LBP. Significant surgical history includes laparotomy appendectomy, C/S, and open right salpingo-oophrectomy.     Xin states that her vaginal bleeding has acutely worsened 3 days ago.  Bleeding has been persistent for the past 2 weeks.  Every time she moves she feels like she is pouring out blood and is passing clots is larger than golf balls.  She states she is here because she has decided it is time for her D&C.  Bleeding has been significantly affecting her quality of life.  She believes that her IUD is still in place; she has not seen any IUD dislodged with any vaginal bleeding.    Denies current chest pain or pressure, heart palpitations, shortness of breath, dizziness, lightheadedness, nausea, vomiting, urinary or bowel symptoms.  Denies abdominal distension.  She has been actively trying to lose weight because she desires hysterectomy.  In the past she was told that she is not a surgical candidate.  She states she lost 60 pounds but then gained some back.  She believes she is currently -20 pounds from when she initially establish care with OB/GYN B[facility].    Pelvic ultrasound 8/3 showed fibroid uterus with thickened endometrium measuring 2 cm.  Diagnosed with EIN by EMB 22. Underwent D&C 22 with placement of levonorgestrel IUD. Pathology showed fragments of endometrium with complex atypical hyperplasia.  Plan is for serial sampling every 6 months.  Patient states she was initially interested in hysterectomy, but was advised she was not an appropriate surgical candidate.  She underwent D&C on 2023.  Pathology showed continued endometrial intraepithelial neoplasia with complex papillary mucinous metaplasia and  background fragments of endometrial polyp.  She was then planned for D&C in 2023, but states it had to be rescheduled.    OB History:   OB History    Para Term  AB Living   1 1 0 0 0 1   SAB IAB Ectopic Multiple Live Births   0 0 0 0 0      # Outcome Date GA Lbr Newton/2nd Weight Sex Delivery Anes PTL Lv   1 Para      CS-Unspec          Medical History:   Past Medical History:   Diagnosis Date   • Back pain    • COVID-19 vaccine series completed     Moderna Boster 10/2021   • CTS (carpal tunnel syndrome)    • DJD (degenerative joint disease)    • Edema    • HL (hearing loss)    • Hypertension    • Hypothyroidism    • Kidney stones    • Lipid disorder    • Lymphedema    • Obesity    • Sciatica    • Shoulder pain     bilateral   • Stasis dermatitis    • Type 2 diabetes mellitus (CMS/HCC)        Surgical History:   Past Surgical History:   Procedure Laterality Date   • APPENDECTOMY      laparotomy   •  SECTION  1999   • CYSTOSCOPY  2021    CYSTOSCOPY,INSERT URETERAL STENT   • CYSTOSCOPY  2022    CYSTO, RETRO, STENT, URETEROSCOPY, LASER LITHOTRIPSY, D&C   • DILATION AND CURETTAGE OF UTERUS      when patient was age 26   • OOPHORECTOMY      open RSO, right side secondary to torsion- during pregnancy       Allergies: Adhesive tape-silicones and Cephalexin    Prior to Admission medications    Medication Sig Start Date End Date Taking? Authorizing Provider   acetaminophen (TYLENOL ARTHRITIS PAIN ORAL) Take 2 tablets by mouth 2 (two) times a day as needed (pain).    Provider, Suyapa Ibarra MD   ascorbic acid, vitamin C, (VITAMIN C) 1,000 mg tablet Take 1,000 mg by mouth daily.    Provider, Suyapa Ibarra MD   aspirin 81 mg enteric coated tablet Take 81 mg by mouth daily. To stop pre-op    Fred Dow MD   cholecalciferol, vitamin D3, (VITAMIN D3 ORAL) Take 1,000 Units by mouth daily.    ProviderFred MD   DULoxetine (CYMBALTA) 20 mg capsule Take 20 mg by  mouth 2 (two) times a day.    Suyapa Dow MD   exenatide microspheres 2 mg/0.85 mL auto-injector Inject 2 mg under the skin once a week on Monday. Mondays 8/6/21   Fred Dow MD   gabapentin (NEURONTIN) 600 mg tablet Take 600 mg by mouth 3 (three) times a day.    Suyapa Dow MD   insulin aspart U-100 (NovoLOG) 100 unit/mL (3 mL) pen 3 (three) times a day with meals. sliding scale 3/20/23   Suyapa Dow MD   insulin degludec U-200 CONC (TRESIBA FLEXTOUCH U-200) 200 unit/mL (3 mL) pen Inject 0.2 mL (40 Units total) under the skin every evening.  Patient taking differently: Inject 79 Units under the skin daily before dinner. 3:30 10/15/22 11/14/22  Everardo Sky DO   irbesartan-hydrochlorothiazide (AVALIDE) 150-12.5 mg per tablet Take 1 tablet by mouth every morning.    Suyapa Dow MD   levothyroxine 75 mcg tablet Take 75 mcg by mouth daily.    Fred Dow MD   meloxicam (MOBIC) 15 mg tablet Take 15 mg by mouth every morning.    Fred Dow MD   methocarbamoL 750 mg tablet Take 750 mg by mouth 3 (three) times a day as needed for muscle spasms. Always takes in am but doesn't always do tid 8/9/21   Fred Dow MD   ONETOUCH ULTRA TEST strip TEST BLOOD SUGARS 4 TO 5 TIMES DAILY 12/12/23   Suyapa Dow MD   simvastatin 40 mg tablet Take 40 mg by mouth every morning. 11/25/20   Fred Dow MD       Review of Systems  All systems were reviewed and normal with the exception of the above.     Objective     Vital Signs for the last 24 hours:  Temp:  [36.9 °C (98.4 °F)] 36.9 °C (98.4 °F)  Heart Rate:  [75-88] 76  Resp:  [18-20] 20  BP: (127-145)/(56-68) 128/63     Physical Exam    General: well-developed, well-nourished, no apparent distress, morbidly obese  Respiratory: decreased BS 2/2 habitus otherwise anterior lung fields CTA throughout, normal respiratory effort  Cardiovascular: regular rate and  rhythm, +S1S2  Extremity: no clubbing, cyanosis. B/l lower extremities wrapped +2 pitting edema  GI: obese, abdomen soft, non-distended  Neurologic: alert & oriented x3, no gross deficits  Psychiatric: mood and affect normal  Gynecologic: normal external female genitalia.    Speculum: normal appearing vagina, approximately 100 cc blood on pads/chux with clot in vaginal vault. Unable to visualize cervix or palpate IUD strings.    Bimanual: uterus difficult to appreciate 2/2 habitus      Labs  Results from last 7 days   Lab Units 01/22/24  1803   WBC K/uL 14.63*   HEMOGLOBIN g/dL 12.6   HEMATOCRIT % 39.1   PLATELETS K/uL 236      Results from last 7 days   Lab Units 01/22/24  1803   SODIUM mEQ/L 141   POTASSIUM mEQ/L 3.6   CHLORIDE mEQ/L 103   CO2 mEQ/L 24   BUN mg/dL 23   CREATININE mg/dL 1.1   GLUCOSE mg/dL 105*   CALCIUM mg/dL 9.3        Imaging  No ED imaging.    Assessment/Plan     Xin Melendez is a 63 y.o. female evaluated for postmenopausal vaginal bleeding in s/o known EIN with atypia.     - While Xin does demonstrate bleeding, I reviewed with iXn that it is reassuring that she is asymptomatic with stable vital signs and hemoglobin of 12.6  - Her outpatient D&C is scheduled for 2/6.  - No indication to proceed to the OR emergently at this time.  - Will start patient on Aygestin 5 mg twice daily to address worsening bleeding.  I reviewed with Xin that Aygestin can also be considered treatment for EIN in conjunction with IUD.  Not a candidate for TXA given possible malignancy.  - I reviewed signs and symptoms for her to return to the ED sooner for emergent D&C.  - She is stable for discharge home at this time with close outpatient follow-up.      Discussed with Dr. Gilliam.     Iqra Carter, DO PGY3  Obstetrics & Gynecology   Pager #[phone]

## 2024-01-31 ENCOUNTER — PRE-ADMISSION TESTING (OUTPATIENT)
Dept: PREADMISSION TESTING | Age: 64
End: 2024-01-31
Payer: COMMERCIAL

## 2024-02-01 ENCOUNTER — PRE-ADMISSION TESTING (OUTPATIENT)
Dept: PREADMISSION TESTING | Age: 64
End: 2024-02-01
Payer: COMMERCIAL

## 2024-02-01 ENCOUNTER — ANESTHESIA EVENT (OUTPATIENT)
Dept: SURGERY | Facility: HOSPITAL | Age: 64
Setting detail: HOSPITAL OUTPATIENT SURGERY
End: 2024-02-01
Payer: COMMERCIAL

## 2024-02-01 VITALS — WEIGHT: 293 LBS | BODY MASS INDEX: 51.91 KG/M2 | HEIGHT: 63 IN

## 2024-02-01 ASSESSMENT — PAIN SCALES - GENERAL: PAINLEVEL: 0-NO PAIN

## 2024-02-01 NOTE — PRE-PROCEDURE INSTRUCTIONS
1. We will call you between 3 pm and 7 pm on February 5, 2024 to determine that arrival time for your procedure. If you do not hear by 6PM. Please call [phone] for arrival time.     2. Please report to Main Entrance near Parking lot [address], walk into main lobby and report to the admission desk on the first floor on the day of your procedure.    3. Please follow the following fasting guidelines:     No solid food EIGHT HOURS prior to arrival time on day of surgery.  Unlimited clear liquids, meaning water or PLAIN black coffee WITHOUT any milk, cream, sugar, or sweetener are permitted up to TWO HOURS prior to arrival at the hospital.    4. Please take ONLY the following medications with a sip of water on the morning of your procedure: (populate names and/or NONE)  Cymbalta, Simvastatin, Levothyroxine. No NSAIDS, Advil, Aleve, Motrin, Mobic, Ibuprofen, Herbal supplements or vitamins until after surgery. Follow DR De La Fuente's Aygestin instructions prior to surgery. Please contact your Endocrinologist for Insulin instructions prior to surgery. Tylenol is ok to take    5. Other Instructions: You may brush your teeth the morning of the procedure. Rinse and spit, do not swallow.  Bring a list of your medications with dosages.  Use surgical wash as directed.     6. If you develop a cold, cough, fever, rash, or other symptom prior to the day of the procedure, please report it to your physician immediately.    7. If you need to cancel the procedure for any reason, please contact your physician.    8. Make arrangements to have safe transportation home accompanied by a responsible adult. If you have not arranged safe transportation home, your surgery will be cancelled. Safe transportation may include private vehicle, ride-share service, taxi and public transportation when accompanied by a responsible adult who will assist you home. A responsible adult is someone known to you and does not include the taxi, ride-share or public  transit drive transporting you.    9.  If it is medically necessary for you to have a longer stay, you will be informed as soon as the decision is made.    10. Only bring essential items to the hospital.  Do not wear or bring anything of value to the hospital including jewelry of any kind, money, or wallet. Do not wear make-up or contact lenses.  DO NOT BRING MEDICATIONS FROM HOME unless instructed to do so. DO bring your hearing aids, glasses, and a case    11. No lotion, creams, powders, or oils on skin the morning of procedure     12. Dress in comfortable clothes.    13.  If instructed, please bring a copy of your Advanced Directive (Living Will/Durable Power of ) on the day of your procedure.     14. Ensuring your safety at all times is a very important part of our [facility] Culture of Safety. After having surgery and sedation, you are at risk for falling and balance issues. Although you may feel awake, the effects of the medication can last up to 24 hours after anesthesia. If you need to use the bathroom during your recovery period, nursing staff will escort you there and stay with you to ensure your safety.    15. Refrain from drinking alcohol and smoking cigarettes for 24 hours prior to surgery.    16. Shower with antibacterial soap (Dial) the night before and morning of your procedure.  If your procedure indicates the need for CHG antiseptic wash (Bactoshield or Hibiclens), please use this instead and follow instructions as discussed at the time of your Pre-Admission Testing phone interview or visit.    Above instructions reviewed with patient and patient acknowledges understanding.    Form explained by: Jaylyn Shaffer RN

## 2024-02-02 ENCOUNTER — APPOINTMENT (OUTPATIENT)
Dept: LAB | Facility: HOSPITAL | Age: 64
End: 2024-02-02
Attending: STUDENT IN AN ORGANIZED HEALTH CARE EDUCATION/TRAINING PROGRAM
Payer: COMMERCIAL

## 2024-02-02 ENCOUNTER — PRE-ADMISSION TESTING (OUTPATIENT)
Dept: PREADMISSION TESTING | Facility: HOSPITAL | Age: 64
End: 2024-02-02
Attending: OBSTETRICS & GYNECOLOGY
Payer: COMMERCIAL

## 2024-02-02 VITALS
BODY MASS INDEX: 51.91 KG/M2 | HEIGHT: 63 IN | RESPIRATION RATE: 14 BRPM | WEIGHT: 293 LBS | SYSTOLIC BLOOD PRESSURE: 114 MMHG | TEMPERATURE: 97.8 F | HEART RATE: 71 BPM | OXYGEN SATURATION: 96 % | DIASTOLIC BLOOD PRESSURE: 54 MMHG

## 2024-02-02 DIAGNOSIS — N85.02 EIN (ENDOMETRIAL INTRAEPITHELIAL NEOPLASIA): ICD-10-CM

## 2024-02-02 LAB
ABO + RH BLD: NORMAL
ALBUMIN SERPL-MCNC: 3.9 G/DL (ref 3.5–5.7)
ALP SERPL-CCNC: 57 IU/L (ref 34–125)
ALT SERPL-CCNC: 17 IU/L (ref 7–52)
ANION GAP SERPL CALC-SCNC: 7 MEQ/L (ref 3–15)
AST SERPL-CCNC: 12 IU/L (ref 13–39)
BILIRUB SERPL-MCNC: 0.4 MG/DL (ref 0.3–1.2)
BLD GP AB SCN SERPL QL: NEGATIVE
BLOOD BANK CMNT PATIENT-IMP: NORMAL
BUN SERPL-MCNC: 21 MG/DL (ref 7–25)
CALCIUM SERPL-MCNC: 8.9 MG/DL (ref 8.6–10.3)
CHLORIDE SERPL-SCNC: 109 MEQ/L (ref 98–107)
CO2 SERPL-SCNC: 26 MEQ/L (ref 21–31)
CREAT SERPL-MCNC: 1 MG/DL (ref 0.6–1.2)
D AG BLD QL: POSITIVE
EGFRCR SERPLBLD CKD-EPI 2021: >60 ML/MIN/1.73M*2
ERYTHROCYTE [DISTWIDTH] IN BLOOD BY AUTOMATED COUNT: 13.6 % (ref 11.7–14.4)
GLUCOSE SERPL-MCNC: 102 MG/DL (ref 70–99)
HCT VFR BLD AUTO: 36.9 % (ref 35–45)
HGB BLD-MCNC: 11.8 G/DL (ref 11.8–15.7)
LABORATORY COMMENT REPORT: NORMAL
MCH RBC QN AUTO: 30.6 PG (ref 28–33.2)
MCHC RBC AUTO-ENTMCNC: 32 G/DL (ref 32.2–35.5)
MCV RBC AUTO: 95.8 FL (ref 83–98)
PDW BLD AUTO: 10.5 FL (ref 9.4–12.3)
PLATELET # BLD AUTO: 254 K/UL (ref 150–369)
POTASSIUM SERPL-SCNC: 4.3 MEQ/L (ref 3.5–5.1)
PROT SERPL-MCNC: 6.3 G/DL (ref 6–8.2)
RBC # BLD AUTO: 3.85 M/UL (ref 3.93–5.22)
SODIUM SERPL-SCNC: 142 MEQ/L (ref 136–145)
SPECIMEN EXP DATE BLD: NORMAL
WBC # BLD AUTO: 11.57 K/UL (ref 3.8–10.5)

## 2024-02-02 PROCEDURE — 85027 COMPLETE CBC AUTOMATED: CPT

## 2024-02-02 PROCEDURE — 36415 COLL VENOUS BLD VENIPUNCTURE: CPT

## 2024-02-02 PROCEDURE — 86901 BLOOD TYPING SEROLOGIC RH(D): CPT

## 2024-02-02 PROCEDURE — 86900 BLOOD TYPING SEROLOGIC ABO: CPT

## 2024-02-02 PROCEDURE — 80053 COMPREHEN METABOLIC PANEL: CPT

## 2024-02-02 ASSESSMENT — ENCOUNTER SYMPTOMS
ALLERGIC/IMMUNOLOGIC COMMENTS: MEDICATION ALLERGY
CHILLS: 0
FEVER: 0

## 2024-02-02 ASSESSMENT — PAIN SCALES - GENERAL: PAINLEVEL: 0-NO PAIN

## 2024-02-02 NOTE — PRE-PROCEDURE INSTRUCTIONS
1. We will call you between 3 pm and 7 pm on February 5, 2024 to determine that arrival time for your procedure. If you do not hear by 6PM. Please call [phone] for arrival time.     2. Please report to Main Entrance near Parking lot A, walk into main lobby and report to the admission desk on the first floor on the day of your procedure.    3. Please follow the following fasting guidelines:     NOTHING TO EAT OR DRINK AFTER MIDNIGHT ON THE NIGHT BEFORE YOUR SURGERY INCLUDING WATER, CANDY, GUM, MINTS    4. Please take ONLY the following medications levothyroxine,  simvastatin , gabapentin (NEURONTIN), DULoxetine (CYMBALTA) norethindrone (AYGESTIN) ith a sip of water on the morning of your procedure:     DO NOT TAKE INSULIN, irbesartan-hydrochlorothiazide (AVALIDE) ON THE MORNING OF SURGERY    NO ASPIRIN, MOTRIN, ADVIL, ALEVE, VITAMIN E, HERBAL SUPPLEMENTS , meloxicam (MOBIC) 1 WEEK PRIOR TO SURGERY    YOU MAY TAKE TYLENOL FOR PAIN    5. Other Instructions: You may brush your teeth the morning of the procedure. Rinse and spit, do not swallow.  Bring a list of your medications with dosages.  Use surgical wash as       directed.     6. If you develop a cold, cough, fever, rash, or any other symptom prior to the day of the procedure, please report it to your physician immediately.    7. If you need to cancel the procedure for any reason, please contact your physician.    8. Make arrangements to have safe transportation home accompanied by a responsible adult. If you have not arranged safe transportation home, your surgery will be cancelled. Safe transportation may include private vehicle, ride-share service, taxi and public transportation when accompanied by a responsible adult who will assist you home. A responsible adult is someone known to you and does not include the taxi, ride-share or public transit drive transporting you.    9. You may not take public transportation unless accompanied by a responsible  person.    10. You may not drive a car or operate complex or potentially dangerous machinery for 24 hours following anesthesia and/or sedation.    11.  If it is medically necessary for you to have a longer stay, you will be informed as soon as the decision is made.    12. Only bring essential items to the hospital.  Do not wear or bring anything of value to the hospital including jewelry of any kind, money, or wallet. Do not wear make-up or contact lenses.  DO NOT BRING MEDICATIONS FROM HOME unless instructed to do so. DO bring your hearing aids, glasses, and a case    13. No lotion, creams, powders, or oils on skin once you start the surgical wash or Dial soap.        14. Dress in comfortable clothes.    15.  If instructed, please bring a copy of your Advanced Directive (Living Will/Durable Power of ) on the day of your procedure.     16. Ensuring your safety at all times is a very important part of our [facility] Culture of Safety. After having surgery and sedation, you are at risk for falling and balance issues. Although you may feel awake, the effects of the medication can last up to 24 hours after anesthesia. If you need to use the bathroom during your recovery period, nursing staff will escort you there and stay with you to ensure your safety.    17. Refrain from drinking alcohol and smoking cigarettes/ marijuana for 24 hours prior to surgery.    18. Shower with antibacterial soap (Dial) the night before and morning of your procedure.  If your procedure indicates the need for CHG antiseptic wash (Bactoshield or Hibiclens), please use this instead and follow instructions as discussed at the time of your Pre-Admission Testing phone interview or visit.    Above instructions reviewed with patient and patient acknowledges understanding.  Form explained by: FELIPE Ghotra     I have read and understand the above information. I have had sufficient opportunity to ask questions I might have and they have been  answered to my satisfaction. I agree to comply with the Patient Responsibilities listed above and have received a copy of this form.        [facility]   Patient Education Preoperative Showers       Good Hygiene, such as frequent handwashing and daily skin cleansing, promotes good health.  Daily skin cleansing helps remove germs that may cause infections. The following instructions should be followed to help reduce germs on your skin prior to your surgical procedure.     · Bactoshield/Hibiclens CHG 4% is an antiseptic soap.  The active ingredient is chlorhexidine gluconate. Do not use this product if you are allergic to chlorhexidine gluconate.     · The NIGHT before and the MORNING of your procedure , shower or bathe with Bactoshield. This product should replace your regular soap used for cleansing most of your body surfaces. Bactoshield should not be used on your head or face: keep out of your eyes, ears, and mouth.      · If you plan to wash your hair, do so with your regular shampoo. Then, rinse the hair and your body thoroughly to move any shampoo residue.     · Wash face with regular soap and water only.     · Wash your genital area with soap and water only.    · Thoroughly rinse your body with warm water from the neck down.       · Apply the minimum amount of Bactoshield to cover the skin. Use this product as you would any liquid soap. Leave this on for 2 minutes. Note- Bactoshield DOES NOT LATHER like normal soap.      · Wash the skin gently and rinse thoroughly with warm water. You do not need to scrub the skin to remove germs.      · Do not use regular soap after you have applied and rinsed the Bactoshield.  Change into clean clothes after each shower.     · Do not apply any lotion, powder, or perfumes to the body areas that have been cleansed with Bactoshield.     · No use of hair removal products or shaving at or near the surgical site 48 hours before your procedure. (72 hours for cardiac  patients.)    · For those having perineal area surgeries (ie: vaginal, rectal or cystoscopy) - please use Dial soap.        Why do we cleanse the skin prior to surgery?   There are lots of germs on our skin and in our environment. Most are harmless, some are even helpful on our skin and in our environment. As part of your preparation you will be asked to purchase a bottle of antibacterial soap, Bactoshield CHG 4% or Hibiclens CHG4% to cleanse your skin and eliminate a certain bacteria called, Staphylococcus Aureus.      What is Staphylococcus aureus? (Staph)   Staph is a common germ found on the skin. One-third of healthy people carry this germ. Methicillin-resistant Staphylococcus aureus (MRSA) is a type of Staph bacteria that is resistant to certain antibiotics. In the community, most MRSA infections are skin infections.  People who have Staph/MRSA may show no signs of illness- this is called colonization. Caution needs to be used when you come into the hospital for a surgical procedure to ensure that Staph does not enter your body.     Am I at increased risk for infection if I am carrying Staph ?   Because Staph is carried on your skin, you may be at increased risk for developing a surgical infection. To reduce the presence of Staph on your skin, we recommend a series of preoperative showers with an antibacterial skin cleanser. These showers are to be done the NIGHT BEFORE your surgery and the MORNING of your surgery.

## 2024-02-02 NOTE — ANESTHESIA PREPROCEDURE EVALUATION
Relevant Problems   CARDIOVASCULAR   (+) HTN (hypertension)      MUSCULOSKELETAL   (+) Osteoarthritis      NEUROLOGY   (+) Sciatica      URINARY SYSTEM   (+) Hypokalemia      Other   (+) Hypothyroidism   (+) Pyelonephritis   (+) Type 2 diabetes mellitus (CMS/HCC)   (+) Type 2 diabetes mellitus, with long-term current use of insulin (CMS/HCC)       Anesthesia ROS/MED HX    Anesthesia History    Previous anesthetics  No history of anesthetic complications  Pulmonary    Sleep apnea (risk factors)  Cardiovascular   hypertension   ECG reviewed  Endo/Other   Diabetes and patient Insulin dependent   Hypothyroidism  Body Habitus: Morbidly Obese  ROS/MED HX Comments:    Anesthesia History: Seen in PAT (BMI): Discussed general anesthesia in detail. She has a MP 2 score and full range of cervical motion. She takes aspirin 81 mgs prophylactically and meloxicam for osteoarthritis daily (please see complete list of medications).    Cardiology: H/O PVCs per ECG of . Current ECG is WNL.    Takes Avalide--?last dose?   Endo: Takes exenatide--last dose?       Past Surgical History:   Procedure Laterality Date   • APPENDECTOMY      laparotomy   •  SECTION  1999   • CYSTOSCOPY  2021    CYSTOSCOPY,INSERT URETERAL STENT   • CYSTOSCOPY  2022    CYSTO, RETRO, STENT, URETEROSCOPY, LASER LITHOTRIPSY, D&C   • DILATION AND CURETTAGE OF UTERUS      when patient was age 26   • OOPHORECTOMY      open RSO, right side secondary to torsion- during pregnancy   • WISDOM TOOTH EXTRACTION         Physical Exam    Airway   Mallampati: II   TM distance: <3 FB   Neck ROM: full  Cardiovascular - normal   Rhythm: regular   Rate: normalPulmonary - normal   clear to auscultation  Dental - normal        Anesthesia Plan    Plan: general    Technique: general endotracheal     Lines and Monitors: PIV     Airway: oral intubation       patient did not smoke on day of surgery   3 ASA  Blood Products:     Use of Blood Products  Discussed: Yes   Anesthetic plan and risks discussed with: patient  Induction:    intravenous   Postop Plan:   Patient Disposition: phase II then home   Pain Management: IV analgesics  Comments:    Plan: Discussed risks and benefits of general anesthesia with patient. Risks discussed including but not limited to sore throat, injury to lips,teeth and mouth. Discussed possibility of additional IV access, arterial line placement, central line placement, and remaining intubated post-operatively. All questions answered and patient agrees to proceed.     Patient Active Problem List   Diagnosis   • Calculus of proximal right ureter   • Morbid obesity with BMI of 50.0-59.9, adult (CMS/[facility])   • HTN (hypertension)   • Type 2 diabetes mellitus (CMS/[facility])   • Lymphedema due to venous insufficiency   • Hyperlipidemia   • Hypothyroidism   • Bacteremia due to Escherichia coli   • Sepsis due to Escherichia coli (CMS/[facility])   • Osteoarthritis   • Sciatica   • Left ureteral stone   • DARCY (acute kidney injury) (CMS/[facility])   • Pyelonephritis   • Hydroureteronephrosis   • Leucocytosis   • Flank pain   • Postmenopausal bleeding   • EIN (endometrial intraepithelial neoplasia)   • Infertility, female   • Cellulitis of left lower extremity   • Ambulatory dysfunction   • Hypokalemia   • Azotemia   • Type 2 diabetes mellitus, with long-term current use of insulin (CMS/[facility])   • Bilateral leg pain   • Abnormal uterine bleeding (AUB)       No current facility-administered medications for this encounter.       Prior to Admission medications    Medication Sig Start Date End Date Taking? Authorizing Provider   acetaminophen (TYLENOL ARTHRITIS PAIN ORAL) Take 2 tablets by mouth 2 (two) times a day as needed (pain).    Provider, Suyapa Ibarra MD   ascorbic acid, vitamin C, (VITAMIN C) 1,000 mg tablet Take 1,000 mg by mouth daily.    Provider, Suyapa Ibarra MD   aspirin 81 mg enteric coated tablet Take 81 mg by mouth daily. To stop pre-op    Provider,  MD Fred   cholecalciferol, vitamin D3, (VITAMIN D3 ORAL) Take 1,000 Units by mouth daily.    Fred Dow MD   DULoxetine (CYMBALTA) 20 mg capsule Take 20 mg by mouth 2 (two) times a day.    Suyapa Dow MD   exenatide microspheres 2 mg/0.85 mL auto-injector Inject 2 mg under the skin once a week on Monday. Mondays (  LD December 2023) 8/6/21   Fred Dow MD   gabapentin (NEURONTIN) 600 mg tablet Take 600 mg by mouth 3 (three) times a day.    Suyapa Dow MD   insulin aspart U-100 (NovoLOG) 100 unit/mL (3 mL) pen Inject under the skin 3 (three) times a day with meals. sliding scale ( dosage based on Carb count) 3/20/23   Suyapa Dow MD   insulin degludec U-200 CONC (TRESIBA FLEXTOUCH U-200) 200 unit/mL (3 mL) pen Inject 0.2 mL (40 Units total) under the skin every evening.  Patient taking differently: Inject 78 Units under the skin daily with lunch. 3:30 10/15/22 11/14/22  Everardo Sky,    irbesartan-hydrochlorothiazide (AVALIDE) 150-12.5 mg per tablet Take 1 tablet by mouth every morning.    Suyapa Dow MD   levothyroxine 75 mcg tablet Take 75 mcg by mouth daily.    Fred Dow MD   meloxicam (MOBIC) 15 mg tablet Take 15 mg by mouth every morning.    Fred Dow MD   methocarbamoL 750 mg tablet Take 750 mg by mouth 3 (three) times a day as needed for muscle spasms. Always takes in am but doesn't always do tid 8/9/21   Fred Dow MD   norethindrone (AYGESTIN) 5 mg tablet Take 1 tablet (5 mg total) by mouth 2 (two) times a day. 1/22/24 1/21/25  Iqra Carter, DO   ONETOUCH ULTRA TEST strip TEST BLOOD SUGARS 4 TO 5 TIMES DAILY 12/12/23   Suyapa Dow MD   simvastatin 40 mg tablet Take 40 mg by mouth every morning. 11/25/20   Fred Dow MD       CBC Results       02/02/24 01/22/24 12/19/23     1031 1803 1106    WBC 11.57 14.63 13.02    RBC 3.85 4.16 5.11    HGB 11.8 12.6  "15.8    HCT 36.9 39.1 48.2    MCV 95.8 94.0 94.3    MCH 30.6 30.3 30.9    MCHC 32.0 32.2 32.8     236 234          BMP Results       02/02/24 01/22/24 12/19/23     1031 1803 1106     141 144    K 4.3 3.6 4.2    Cl 109 103 106    CO2 26 24 29    Glucose 102 105 111    BUN 21 23 20    Creatinine 1.0 1.1 0.9    Calcium 8.9 9.3 9.4    Anion Gap 7 14 9    EGFR >60.0 56.6 >60.0         Comment for K at 1803 on 01/22/24: Results obtained on plasma. Plasma Potassium values may be up to 0.4 mEQ/L less than serum values. The differences may be greater for patients with high platelet or white cell counts.    Comment for EGFR at 1031 on 02/02/24: Calculation based on the Chronic Kidney Disease Epidemiology Collaboration (CKD-EPI) equation refit without adjustment for race.    Comment for EGFR at 1803 on 01/22/24: Calculation based on the Chronic Kidney Disease Epidemiology Collaboration (CKD-EPI) equation refit without adjustment for race.    Comment for EGFR at 1106 on 12/19/23: Calculation based on the Chronic Kidney Disease Epidemiology Collaboration (CKD-EPI) equation refit without adjustment for race.          No results found for: \"HCGPREGUR\", \"PREGSERUM\", \"HCG\", \"HCGQUANT\"          No orders to display       "

## 2024-02-02 NOTE — H&P
History and Physical  Pre-admission testing         HISTORY OF PRESENT ILLNESS      Xin Melendez is an 63 y.o. female with a past medical history of type 2 diabetes mellitus, morbid obesity, ( last dose exenatide was in 2023),  hypertension, hyperlipidemia, hypothyroidism, lymphedema, DJD, endometrial intraepithelial neoplasia. Patient presents to [facility] with diagnosis of Abnormal uterine bleeding for preoperative evaluation prior to planned surgery. She  Is scheduled for D&C, HYSTEROSCOPY DIAGNOSTIC on 2024 with Ob1, Logcc (Primary)  Dinorah De La Fuente.  PAST MEDICAL AND SURGICAL HISTORY      Past Medical History:   Diagnosis Date   • Back pain    • COVID-19 vaccine series completed     Moderna Boster 10/2021   • CTS (carpal tunnel syndrome)    • DJD (degenerative joint disease)    • Edema    • HL (hearing loss)    • Hypertension    • Hypothyroidism    • Kidney stones    • Lipid disorder    • Lymphedema    • Obesity    • Sciatica    • Shoulder pain     bilateral   • Stasis dermatitis    • Type 2 diabetes mellitus (CMS/HCC)        Past Surgical History:   Procedure Laterality Date   • APPENDECTOMY      laparotomy   •  SECTION  1999   • CYSTOSCOPY  2021    CYSTOSCOPY,INSERT URETERAL STENT   • CYSTOSCOPY  2022    CYSTO, RETRO, STENT, URETEROSCOPY, LASER LITHOTRIPSY, D&C   • DILATION AND CURETTAGE OF UTERUS      when patient was age 26   • OOPHORECTOMY      open RSO, right side secondary to torsion- during pregnancy   • WISDOM TOOTH EXTRACTION         PROBLEM LIST     Patient Active Problem List   Diagnosis   • Calculus of proximal right ureter   • Morbid obesity with BMI of 50.0-59.9, adult (CMS/HCC)   • HTN (hypertension)   • Type 2 diabetes mellitus (CMS/HCC)   • Lymphedema due to venous insufficiency   • Hyperlipidemia   • Hypothyroidism   • Bacteremia due to Escherichia coli   • Sepsis due to Escherichia coli (CMS/HCC)   • Osteoarthritis   • Sciatica   • Left ureteral stone    • DARCY (acute kidney injury) (CMS/HCC)   • Pyelonephritis   • Hydroureteronephrosis   • Leucocytosis   • Flank pain   • Postmenopausal bleeding   • EIN (endometrial intraepithelial neoplasia)   • Infertility, female   • Cellulitis of left lower extremity   • Ambulatory dysfunction   • Hypokalemia   • Azotemia   • Type 2 diabetes mellitus, with long-term current use of insulin (CMS/HCC)   • Bilateral leg pain   • Abnormal uterine bleeding (AUB)       MEDICATIONS        Current Outpatient Medications:   •  acetaminophen (TYLENOL ARTHRITIS PAIN ORAL), Take 2 tablets by mouth 2 (two) times a day as needed (pain)., Disp: , Rfl:   •  ascorbic acid, vitamin C, (VITAMIN C) 1,000 mg tablet, Take 1,000 mg by mouth daily., Disp: , Rfl:   •  aspirin 81 mg enteric coated tablet, Take 81 mg by mouth daily. To stop pre-op, Disp: , Rfl:   •  cholecalciferol, vitamin D3, (VITAMIN D3 ORAL), Take 1,000 Units by mouth daily., Disp: , Rfl:   •  DULoxetine (CYMBALTA) 20 mg capsule, Take 20 mg by mouth 2 (two) times a day., Disp: , Rfl:   •  exenatide microspheres 2 mg/0.85 mL auto-injector, Inject 2 mg under the skin once a week on Monday. Mondays (  LD December 2023), Disp: , Rfl:   •  gabapentin (NEURONTIN) 600 mg tablet, Take 600 mg by mouth 3 (three) times a day., Disp: , Rfl:   •  insulin aspart U-100 (NovoLOG) 100 unit/mL (3 mL) pen, Inject under the skin 3 (three) times a day with meals. sliding scale ( dosage based on Carb count), Disp: , Rfl:   •  insulin degludec U-200 CONC (TRESIBA FLEXTOUCH U-200) 200 unit/mL (3 mL) pen, Inject 0.2 mL (40 Units total) under the skin every evening. (Patient taking differently: Inject 78 Units under the skin daily with lunch. 3:30), Disp: 11.7 mL, Rfl: 0  •  irbesartan-hydrochlorothiazide (AVALIDE) 150-12.5 mg per tablet, Take 1 tablet by mouth every morning., Disp: , Rfl:   •  levothyroxine 75 mcg tablet, Take 75 mcg by mouth daily., Disp: , Rfl:   •  meloxicam (MOBIC) 15 mg tablet, Take 15  mg by mouth every morning., Disp: , Rfl:   •  methocarbamoL 750 mg tablet, Take 750 mg by mouth 3 (three) times a day as needed for muscle spasms. Always takes in am but doesn't always do tid, Disp: , Rfl:   •  norethindrone (AYGESTIN) 5 mg tablet, Take 1 tablet (5 mg total) by mouth 2 (two) times a day., Disp: 180 tablet, Rfl: 3  •  ONETOUCH ULTRA TEST strip, TEST BLOOD SUGARS 4 TO 5 TIMES DAILY, Disp: , Rfl:   •  simvastatin 40 mg tablet, Take 40 mg by mouth every morning., Disp: , Rfl:     ALLERGIES      Allergies   Allergen Reactions   • Adhesive Tape-Silicones Rash   • Cephalexin Rash     Keflex         FAMILY HISTORY      Family History   Problem Relation Age of Onset   • Heart attack Biological Father        SOCIAL HISTORY      Social History     Socioeconomic History   • Marital status:      Spouse name: Not on file   • Number of children: 1   • Years of education: Not on file   • Highest education level: Not on file   Occupational History   • Occupation: Unemployed   Tobacco Use   • Smoking status: Former     Types: Cigarettes     Quit date:      Years since quittin.1   • Smokeless tobacco: Never   • Tobacco comments:     Ex-smoker who smoked socially in college for approximately 4 years.   Vaping Use   • Vaping Use: Never used   Substance and Sexual Activity   • Alcohol use: Not Currently     Comment: No current alcohol use, socially in college.   • Drug use: Never   • Sexual activity: Defer   Other Topics Concern   • Not on file   Social History Narrative    Lives with son, Edd, in a 2 story home has a chair lift.  She is .  She designates her close friend, Akila León, as an emergency medical proxy.  Akila can be reached at [phone].        The patient is currently on disability.  She previously worked as a  as well as an .        The patient tells me that she smoked socially in college for 4 years.    No current alcohol use.  She  "consumes alcohol socially in college only.    Denies substance use.        Patient currently using a walker for unsteady gait.         Social Determinants of Health     Financial Resource Strain: Not on file   Food Insecurity: No Food Insecurity (1/22/2024)    Hunger Vital Sign    • Worried About Running Out of Food in the Last Year: Never true    • Ran Out of Food in the Last Year: Never true   Transportation Needs: Not on file   Physical Activity: Not on file   Stress: Not on file   Social Connections: Not on file   Intimate Partner Violence: Not on file   Housing Stability: Not on file       REVIEW OF SYSTEMS      Review of Systems   Constitutional: Negative for chills and fever.   Musculoskeletal: Positive for gait problem.   Allergic/Immunologic:        Medication allergy   All other systems reviewed and are negative.      PHYSICAL EXAMINATION      Visit Vitals  BP (!) 114/54 (BP Location: Right upper arm, Patient Position: Sitting)   Pulse 71   Temp 36.6 °C (97.8 °F) (Temporal)   Resp 14   Ht 1.6 m (5' 3\")   Wt (!) 155 kg (341 lb)   SpO2 96%   BMI 60.41 kg/m²     Body mass index is 60.41 kg/m².    Physical Exam  Vitals reviewed.   Constitutional:       Appearance: Normal appearance. She is obese.   HENT:      Head: Normocephalic and atraumatic.      Ears:      Comments: Deferred     Nose: Nose normal. No congestion or rhinorrhea.      Mouth/Throat:      Comments: Deferred. Wearing face mask  Eyes:      Extraocular Movements: Extraocular movements intact.      Conjunctiva/sclera: Conjunctivae normal.      Pupils: Pupils are equal, round, and reactive to light.   Cardiovascular:      Rate and Rhythm: Normal rate and regular rhythm.      Pulses: Normal pulses.      Heart sounds: Normal heart sounds, S1 normal and S2 normal. No murmur heard.  Pulmonary:      Effort: Pulmonary effort is normal. No respiratory distress.      Breath sounds: Normal breath sounds. No wheezing.   Abdominal:      General: Bowel sounds " are normal. There is no distension.      Palpations: Abdomen is soft.      Tenderness: There is no abdominal tenderness. There is no guarding.   Genitourinary:     Comments: Deferred  Musculoskeletal:         General: Normal range of motion.      Cervical back: Normal range of motion and neck supple.      Right lower le+ Pitting Edema present.      Left lower le+ Pitting Edema present.      Comments: Bilateral lower extremity lymphedema.  Gait instability, uses a walker at home, Arrived to [facility] via wheel chair   Skin:     General: Skin is warm and dry.   Neurological:      General: No focal deficit present.      Mental Status: She is alert and oriented to person, place, and time.   Psychiatric:         Mood and Affect: Mood normal.         Behavior: Behavior normal.            FELIPE Ghotra  2024

## 2024-02-06 ENCOUNTER — ANESTHESIA (OUTPATIENT)
Dept: SURGERY | Facility: HOSPITAL | Age: 64
Setting detail: HOSPITAL OUTPATIENT SURGERY
End: 2024-02-06
Payer: COMMERCIAL

## 2024-02-06 ENCOUNTER — HOSPITAL ENCOUNTER (OUTPATIENT)
Facility: HOSPITAL | Age: 64
Setting detail: HOSPITAL OUTPATIENT SURGERY
Discharge: HOME | End: 2024-02-06
Payer: COMMERCIAL

## 2024-02-06 VITALS
OXYGEN SATURATION: 98 % | DIASTOLIC BLOOD PRESSURE: 72 MMHG | BODY MASS INDEX: 51.91 KG/M2 | WEIGHT: 293 LBS | HEIGHT: 63 IN | HEART RATE: 80 BPM | RESPIRATION RATE: 16 BRPM | TEMPERATURE: 97.5 F | SYSTOLIC BLOOD PRESSURE: 136 MMHG

## 2024-02-06 DIAGNOSIS — N93.9 ABNORMAL UTERINE BLEEDING (AUB): ICD-10-CM

## 2024-02-06 DIAGNOSIS — N85.02 EIN (ENDOMETRIAL INTRAEPITHELIAL NEOPLASIA): ICD-10-CM

## 2024-02-06 LAB
GLUCOSE BLD-MCNC: 114 MG/DL (ref 70–99)
GLUCOSE BLD-MCNC: 93 MG/DL (ref 70–99)
POCT TEST: ABNORMAL
POCT TEST: NORMAL

## 2024-02-06 PROCEDURE — 88341 IMHCHEM/IMCYTCHM EA ADD ANTB: CPT | Mod: 59 | Performed by: OBSTETRICS & GYNECOLOGY

## 2024-02-06 PROCEDURE — 71000001 HC PACU PHASE 1 INITIAL 30MIN: Performed by: OBSTETRICS & GYNECOLOGY

## 2024-02-06 PROCEDURE — 0UDB8ZX EXTRACTION OF ENDOMETRIUM, VIA NATURAL OR ARTIFICIAL OPENING ENDOSCOPIC, DIAGNOSTIC: ICD-10-PCS | Performed by: OBSTETRICS & GYNECOLOGY

## 2024-02-06 PROCEDURE — 0UH97HZ INSERTION OF CONTRACEPTIVE DEVICE INTO UTERUS, VIA NATURAL OR ARTIFICIAL OPENING: ICD-10-PCS | Performed by: OBSTETRICS & GYNECOLOGY

## 2024-02-06 PROCEDURE — 88305 TISSUE EXAM BY PATHOLOGIST: CPT | Performed by: OBSTETRICS & GYNECOLOGY

## 2024-02-06 PROCEDURE — 0UPD7HZ REMOVAL OF CONTRACEPTIVE DEVICE FROM UTERUS AND CERVIX, VIA NATURAL OR ARTIFICIAL OPENING: ICD-10-PCS | Performed by: OBSTETRICS & GYNECOLOGY

## 2024-02-06 PROCEDURE — 27200000 HC STERILE SUPPLY: Performed by: OBSTETRICS & GYNECOLOGY

## 2024-02-06 PROCEDURE — 58300 INSERT INTRAUTERINE DEVICE: CPT | Mod: GC | Performed by: STUDENT IN AN ORGANIZED HEALTH CARE EDUCATION/TRAINING PROGRAM

## 2024-02-06 PROCEDURE — 37000001 HC ANESTHESIA GENERAL: Performed by: OBSTETRICS & GYNECOLOGY

## 2024-02-06 PROCEDURE — 36000002 HC OR LEVEL 2 INITIAL 30MIN: Performed by: OBSTETRICS & GYNECOLOGY

## 2024-02-06 PROCEDURE — 36000012 HC OR LEVEL 2 EA ADDL MIN: Performed by: OBSTETRICS & GYNECOLOGY

## 2024-02-06 PROCEDURE — 63700000 HC SELF-ADMINISTRABLE DRUG

## 2024-02-06 PROCEDURE — 25800000 HC PHARMACY IV SOLUTIONS

## 2024-02-06 PROCEDURE — 58558 HYSTEROSCOPY BIOPSY: CPT | Mod: GC | Performed by: STUDENT IN AN ORGANIZED HEALTH CARE EDUCATION/TRAINING PROGRAM

## 2024-02-06 PROCEDURE — 71000011 HC PACU PHASE 1 EA ADDL MIN: Performed by: OBSTETRICS & GYNECOLOGY

## 2024-02-06 PROCEDURE — 63600000 HC DRUGS/DETAIL CODE: Mod: JZ | Performed by: ANESTHESIOLOGY

## 2024-02-06 PROCEDURE — 71000012 HC PACU PHASE 2 EA ADDL MIN: Performed by: OBSTETRICS & GYNECOLOGY

## 2024-02-06 PROCEDURE — 71000002 HC PACU PHASE 2 INITIAL 30MIN: Performed by: OBSTETRICS & GYNECOLOGY

## 2024-02-06 PROCEDURE — 25000000 HC PHARMACY GENERAL: Performed by: ANESTHESIOLOGY

## 2024-02-06 PROCEDURE — 88342 IMHCHEM/IMCYTCHM 1ST ANTB: CPT | Mod: 59 | Performed by: OBSTETRICS & GYNECOLOGY

## 2024-02-06 DEVICE — IMPLANTABLE DEVICE: Type: IMPLANTABLE DEVICE | Site: UTERUS | Status: FUNCTIONAL

## 2024-02-06 RX ORDER — KETOROLAC TROMETHAMINE 30 MG/ML
INJECTION, SOLUTION INTRAMUSCULAR; INTRAVENOUS AS NEEDED
Status: DISCONTINUED | OUTPATIENT
Start: 2024-02-06 | End: 2024-02-06 | Stop reason: SURG

## 2024-02-06 RX ORDER — ONDANSETRON 4 MG/1
4 TABLET, ORALLY DISINTEGRATING ORAL EVERY 8 HOURS PRN
Status: DISCONTINUED | OUTPATIENT
Start: 2024-02-06 | End: 2024-02-06 | Stop reason: HOSPADM

## 2024-02-06 RX ORDER — SODIUM CHLORIDE 9 MG/ML
INJECTION, SOLUTION INTRAVENOUS CONTINUOUS
Status: DISCONTINUED | OUTPATIENT
Start: 2024-02-06 | End: 2024-02-06 | Stop reason: HOSPADM

## 2024-02-06 RX ORDER — MIDAZOLAM HYDROCHLORIDE 2 MG/2ML
INJECTION, SOLUTION INTRAMUSCULAR; INTRAVENOUS AS NEEDED
Status: DISCONTINUED | OUTPATIENT
Start: 2024-02-06 | End: 2024-02-06 | Stop reason: SURG

## 2024-02-06 RX ORDER — ONDANSETRON HYDROCHLORIDE 2 MG/ML
INJECTION, SOLUTION INTRAVENOUS AS NEEDED
Status: DISCONTINUED | OUTPATIENT
Start: 2024-02-06 | End: 2024-02-06 | Stop reason: SURG

## 2024-02-06 RX ORDER — PHENYLEPHRINE HYDROCHLORIDE 10 MG/ML
INJECTION INTRAVENOUS AS NEEDED
Status: DISCONTINUED | OUTPATIENT
Start: 2024-02-06 | End: 2024-02-06 | Stop reason: SURG

## 2024-02-06 RX ORDER — ONDANSETRON HYDROCHLORIDE 2 MG/ML
4 INJECTION, SOLUTION INTRAVENOUS
Status: DISCONTINUED | OUTPATIENT
Start: 2024-02-06 | End: 2024-02-06 | Stop reason: HOSPADM

## 2024-02-06 RX ORDER — FENTANYL CITRATE 50 UG/ML
25 INJECTION, SOLUTION INTRAMUSCULAR; INTRAVENOUS EVERY 5 MIN PRN
Status: DISCONTINUED | OUTPATIENT
Start: 2024-02-06 | End: 2024-02-06 | Stop reason: HOSPADM

## 2024-02-06 RX ORDER — DEXAMETHASONE SODIUM PHOSPHATE 4 MG/ML
INJECTION, SOLUTION INTRA-ARTICULAR; INTRALESIONAL; INTRAMUSCULAR; INTRAVENOUS; SOFT TISSUE AS NEEDED
Status: DISCONTINUED | OUTPATIENT
Start: 2024-02-06 | End: 2024-02-06 | Stop reason: SURG

## 2024-02-06 RX ORDER — OXYCODONE HYDROCHLORIDE 5 MG/1
5 TABLET ORAL EVERY 4 HOURS PRN
Status: DISCONTINUED | OUTPATIENT
Start: 2024-02-06 | End: 2024-02-06 | Stop reason: HOSPADM

## 2024-02-06 RX ORDER — ROCURONIUM BROMIDE 10 MG/ML
INJECTION, SOLUTION INTRAVENOUS AS NEEDED
Status: DISCONTINUED | OUTPATIENT
Start: 2024-02-06 | End: 2024-02-06 | Stop reason: SURG

## 2024-02-06 RX ORDER — DEXTROSE 50 % IN WATER (D50W) INTRAVENOUS SYRINGE
25 AS NEEDED
Status: DISCONTINUED | OUTPATIENT
Start: 2024-02-06 | End: 2024-02-06 | Stop reason: HOSPADM

## 2024-02-06 RX ORDER — LIDOCAINE HYDROCHLORIDE 10 MG/ML
INJECTION, SOLUTION INFILTRATION; PERINEURAL AS NEEDED
Status: DISCONTINUED | OUTPATIENT
Start: 2024-02-06 | End: 2024-02-06 | Stop reason: SURG

## 2024-02-06 RX ORDER — SODIUM CHLORIDE, SODIUM GLUCONATE, SODIUM ACETATE, POTASSIUM CHLORIDE AND MAGNESIUM CHLORIDE 30; 37; 368; 526; 502 MG/100ML; MG/100ML; MG/100ML; MG/100ML; MG/100ML
125 INJECTION, SOLUTION INTRAVENOUS CONTINUOUS
Status: DISCONTINUED | OUTPATIENT
Start: 2024-02-06 | End: 2024-02-06 | Stop reason: HOSPADM

## 2024-02-06 RX ORDER — IBUPROFEN 200 MG
16-32 TABLET ORAL AS NEEDED
Status: DISCONTINUED | OUTPATIENT
Start: 2024-02-06 | End: 2024-02-06 | Stop reason: HOSPADM

## 2024-02-06 RX ORDER — ACETAMINOPHEN 325 MG/1
975 TABLET ORAL EVERY 6 HOURS PRN
Status: DISCONTINUED | OUTPATIENT
Start: 2024-02-06 | End: 2024-02-06 | Stop reason: HOSPADM

## 2024-02-06 RX ORDER — FENTANYL CITRATE 50 UG/ML
INJECTION, SOLUTION INTRAMUSCULAR; INTRAVENOUS AS NEEDED
Status: DISCONTINUED | OUTPATIENT
Start: 2024-02-06 | End: 2024-02-06 | Stop reason: SURG

## 2024-02-06 RX ORDER — DEXTROSE 40 %
15-30 GEL (GRAM) ORAL AS NEEDED
Status: DISCONTINUED | OUTPATIENT
Start: 2024-02-06 | End: 2024-02-06 | Stop reason: HOSPADM

## 2024-02-06 RX ORDER — PROPOFOL 10 MG/ML
INJECTION, EMULSION INTRAVENOUS AS NEEDED
Status: DISCONTINUED | OUTPATIENT
Start: 2024-02-06 | End: 2024-02-06 | Stop reason: SURG

## 2024-02-06 RX ORDER — ACETAMINOPHEN 325 MG/1
975 TABLET ORAL ONCE
Status: COMPLETED | OUTPATIENT
Start: 2024-02-06 | End: 2024-02-06

## 2024-02-06 RX ADMIN — MIDAZOLAM HYDROCHLORIDE 2 MG: 1 INJECTION, SOLUTION INTRAMUSCULAR; INTRAVENOUS at 10:29

## 2024-02-06 RX ADMIN — FENTANYL CITRATE 100 MCG: 50 INJECTION, SOLUTION INTRAMUSCULAR; INTRAVENOUS at 10:33

## 2024-02-06 RX ADMIN — KETOROLAC TROMETHAMINE 15 MG: 30 INJECTION, SOLUTION INTRAMUSCULAR; INTRAVENOUS at 11:25

## 2024-02-06 RX ADMIN — SUCCINYLCHOLINE CHLORIDE 140 MG: 20 INJECTION, SOLUTION INTRAMUSCULAR; INTRAVENOUS; PARENTERAL at 10:33

## 2024-02-06 RX ADMIN — PROPOFOL 240 MG: 10 INJECTION, EMULSION INTRAVENOUS at 10:33

## 2024-02-06 RX ADMIN — SUGAMMADEX 400 MG: 100 INJECTION, SOLUTION INTRAVENOUS at 11:25

## 2024-02-06 RX ADMIN — PHENYLEPHRINE HYDROCHLORIDE 50 MCG: 10 INJECTION INTRAVENOUS at 11:03

## 2024-02-06 RX ADMIN — SODIUM CHLORIDE: 9 INJECTION, SOLUTION INTRAVENOUS at 09:17

## 2024-02-06 RX ADMIN — ROCURONIUM BROMIDE 50 MG: 10 INJECTION, SOLUTION INTRAVENOUS at 10:36

## 2024-02-06 RX ADMIN — LIDOCAINE HYDROCHLORIDE 5 ML: 10 INJECTION, SOLUTION INFILTRATION; PERINEURAL at 10:33

## 2024-02-06 RX ADMIN — ACETAMINOPHEN 975 MG: 325 TABLET ORAL at 09:19

## 2024-02-06 RX ADMIN — ONDANSETRON HYDROCHLORIDE 4 MG: 2 SOLUTION INTRAMUSCULAR; INTRAVENOUS at 11:25

## 2024-02-06 RX ADMIN — DEXAMETHASONE SODIUM PHOSPHATE 4 MG: 4 INJECTION, SOLUTION INTRAMUSCULAR; INTRAVENOUS at 10:52

## 2024-02-06 NOTE — OP NOTE
truclear 6 mm soft tissue mini    D&C, HYSTEROSCOPY excision of intrauterine mass with truclear and IUD placement Procedure Note     Procedure:    D&C, HYSTEROSCOPY excision of intrauterine mass with truclear and IUD placement  CPT(R) Code:  10241 - AK DILATION/CURETTAGE,DIAGNOSTIC      Pre-Op Diagnosis: Endometrial intraepithelial neoplasia     Post-Op Diagnosis: Same    Surgeon: Dr. Dinorah De La Fuente MD    Assist: Dr. Iqra Carter,  PGY-3    Anesthesia: GETA    Estimated Blood Loss: Minimal    Fluid Deficit: 130 mL           Specimens:   ID Type Source Tests Collected by Time Destination   1 : endometrial samplings Tissue Endometrium PATHOLOGY TISSUE EXAM Dinorah De La Fuente MD 2024 1123               Complications: None    Condition: Stable    Findings: Intra-cavitary endometrial mass, cauliflower like lesion 7 o'clock posterior wall, cystic hypervascular endometrium, scattered calcifications    Procedure Details:     Xin Melendez is a 63 y.o.  with a history of EIN with presumed Mirena IUD in place.  Patient was explained risks and benefits of procedure and consented for a D&C hysteroscopy. She was also consented for IUD removal/insertion, as there was suspicion that Mirena IUD might have fallen out due to heavy vaginal bleeding.     On 24 patient presented to the hospital and denied any significant interval history. The surgeon and patient were mutually identified and the patient was taken to the operating room.  Sequential compressive devices were placed and turned on. She was given GETA anesthesia. The patient was placed in the dorsal lithotomy position and prepped and draped in the usual sterile fashion.  A timeout was performed.  Normal appearing external genitalia were appreciated. The patient was then straight cathed for 200 mL clear urine.  A Contreras speculum and a Gena retractor were placed in the vagina, the cervix was difficult to visualize. Using an Allis clamp, cervical tissue was brought  down with gentle traction then grasped with single toothed tenaculum. The cervix was serially dilated to accommodate a 6mm hysteroscope. Upon insertion of the scope, there was an obvious intracavitary mass. Additionally, no IUD was visualize. The endometrium was abnormal appearing with irregular topography, cystic changes, as well as calcifications. There was a suspicious appearing cauliflower like mass as well.  The Truclear soft tissue mini shaver device was used and directed biopsies were taken. Biopsy samples were sent to pathology for permanent evaluation. The majority of the intracavity mass was resected. The uterine cavity was open and normal appearing after resection. The hysteroscope was then removed. The uterus was sounded to 9 cm and mirena IUD advanced to uterine fundus. The IUD was deployed, applicator removed, and strings cut. The hysteroscope was then reintroduced and we visually confirmed that the IUD was indeed in correct position. The scope was removed atraumatically. The single tooth tenaculum was removed from the cervix and hemostasis was observed from this site as well as from the os. All instruments were removed from the vagina. All sponge and instrument counts were reported to be correct. The patient tolerated the procedure well and was taken to the recovery room awake and in stable condition.    Dr. De La Fuente was scrubbed and present for the entire procedure.    Iqra Cartre DO

## 2024-02-06 NOTE — ANESTHESIA POSTPROCEDURE EVALUATION
Patient: Xin Melendez    Procedure Summary     Date: 02/06/24 Room / Location: [facility]    Anesthesia Start: 1025 Anesthesia Stop: 1134    Procedure: D&C, HYSTEROSCOPY excision of intrauterine mass with truclear and IUD placement Diagnosis:       Abnormal uterine bleeding (AUB)      EIN (endometrial intraepithelial neoplasia)      (Abnormal uterine bleeding (AUB) [N93.9])      (EIN (endometrial intraepithelial neoplasia) [N85.02])    Surgeons: Dinorah De La Fuente MD Responsible Provider: Mary Grace Magana MD    Anesthesia Type: general ASA Status: 3          Anesthesia Type: general  PACU Vitals  2/6/2024 1152 - 2/6/2024 1252      2/6/2024  1200 2/6/2024  1215 2/6/2024  1230       BP: 154/72 147/68 152/70     Pulse: 79 73 73     Resp: 17 14 14     SpO2: 100 % 99 % 97 %             Anesthesia Post Evaluation    Pain management: adequate  Patient location during evaluation: PACU  Patient participation: complete - patient participated  Level of consciousness: awake and alert  Cardiovascular status: acceptable  Airway Patency: adequate  Respiratory status: acceptable  Hydration status: acceptable  Anesthetic complications: no

## 2024-02-06 NOTE — ANESTHESIA PROCEDURE NOTES
Airway  Urgency: elective    Start Time: 2/6/2024 10:35 AM  Airway not difficult    General Information and Staff    Patient location during procedure: OR  Anesthesiologist: Mary Grace Magana MD  Resident/CRNA: Sandy Ruth CRNA  Performed: anesthesiologist   Performed by: Mary Grace Magana MD  Authorized by: Mary Grace Magana MD      Indications and Patient Condition  Indications for airway management: anesthesia  Sedation level: general  Preoxygenated: yes  Patient position: sniffing and ramp  MILS not maintained throughout  Mask difficulty assessment: 0 - not attempted    Final Airway Details  Final airway type: endotracheal airway      Successful airway: ETT  Cuffed: yes   Successful intubation technique: video laryngoscopy (Glidescope)  Facilitating devices/methods: intubating stylet  Endotracheal tube insertion site: oral  Blade: Angelic  Blade size: #3  ETT size (mm): 7.0  Cormack-Lehane Classification: grade I - full view of glottis  Placement verified by: chest auscultation and capnometry   Cuff volume (mL): 7  Measured from: teeth  ETT to teeth (cm): 22  Number of attempts at approach: 1  Number of other approaches attempted: 0  Atraumatic airway insertion

## 2024-02-06 NOTE — DISCHARGE INSTRUCTIONS
Dilation and Curettage (D&C/Hysteroscopy)  Mirena IUD Placement   Discharge Instructions    Complete pelvic ultrasound in 2 weeks.     What to Expect    It is normal to have light bleeding.  This can occur immediately after the procedure or in a few days.      It is normal to experience some mild cramping after the procedure but this usually does not last for more than a few days.  You may take Acetaminophen (Tylenol) or Ibuprofen (Motrin, Advil) as directed for pain.    Activity      Please do not get in a swimming pool, hot tub or tub bath for at least 2-3 days after your procedure.      You may resume normal activities in 1-2 days. Please delay sexual intercourse for at least 5-7 days.      PLEASE CALL THE OFFICE -[phone] IF YOU EXPERIENCE ANY OF THE FOLLOWING:    - Heavy bleeding (using one pad an hour)  - Fever greater than 101.0   - Foul smelling vaginal discharge   - Worsening pain or any other concerns

## 2024-02-06 NOTE — ANESTHESIOLOGIST PRE-PROCEDURE ATTESTATION
Pre-Procedure Patient Identification:  I am the Primary Anesthesiologist and have identified the patient on 02/06/24 at 10:19 AM.   I have confirmed the procedure(s) will be performed by the following surgeon/proceduralist Ob1, MD Jory.

## 2024-02-06 NOTE — OR SURGEON
Pre-Procedure patient identification:  I am the primary operating surgeon/proceduralist and I have reviewed the applicable pathology reports and radiology studies for this procedure. I have identified the patient on 02/06/24 at 10:07 AM Ob1, LogMD matthew  Phone Number: Not on file

## 2024-02-06 NOTE — INTERVAL H&P NOTE
H&P reviewed. The patient was examined and there are no changes to the H&P.    Patient consented for d and c hysteroscopy.  Possible iud removal and resinsertion pending presence of iud and or malposition.    All questions answered.

## 2024-02-07 ENCOUNTER — TELEPHONE (OUTPATIENT)
Dept: OBSTETRICS AND GYNECOLOGY | Facility: CLINIC | Age: 64
End: 2024-02-07
Payer: COMMERCIAL

## 2024-02-07 NOTE — TELEPHONE ENCOUNTER
Called patient and left VM checking in after surgery. Requested return call for questions/concerns. She should follow up in 2 weeks after completion pelvic US.     Iqra Carter, DO PGY3  Obstetrics & Gynecology   Pager #[phone]

## 2024-02-08 NOTE — TELEPHONE ENCOUNTER
Called and spoke to Xin. She is scheduled to see Dr. Ronquillo on 2/12. Informed Xin that no imaging required or recommended prior to seeing Dr. Ronquillo. Patient appreciative of call.     Iqra Carter, DO PGY3  Obstetrics & Gynecology   Pager #[phone]

## 2024-02-08 NOTE — TELEPHONE ENCOUNTER
Called and spoke to patient.   Reviewed pathology new diagnosis endometrial adenocarcinoma endometrioid type, FIGO grade 2.   Recommend patient establish care with Gyn Onc; will make outreach to facilitate appointment.   Reviewed likely need for imaging prior to seeing Viviane Andrew or Yg.   Will call Xin to coordinate imaging and as well as referral after speaking with Gyn Onc.   Emotional support provided to patient.     Discussed with Dr. De La Fuente.     .rudolph

## 2024-02-12 ENCOUNTER — TELEPHONE (OUTPATIENT)
Dept: OBSTETRICS AND GYNECOLOGY | Facility: CLINIC | Age: 64
End: 2024-02-12
Payer: COMMERCIAL

## 2024-02-12 ENCOUNTER — OFFICE VISIT (OUTPATIENT)
Dept: GYNECOLOGIC ONCOLOGY | Facility: CLINIC | Age: 64
End: 2024-02-12
Attending: OBSTETRICS & GYNECOLOGY
Payer: COMMERCIAL

## 2024-02-12 VITALS
HEIGHT: 63 IN | RESPIRATION RATE: 18 BRPM | HEART RATE: 81 BPM | OXYGEN SATURATION: 98 % | BODY MASS INDEX: 51.91 KG/M2 | WEIGHT: 293 LBS | DIASTOLIC BLOOD PRESSURE: 77 MMHG | TEMPERATURE: 98 F | SYSTOLIC BLOOD PRESSURE: 151 MMHG

## 2024-02-12 DIAGNOSIS — C54.1 CARCINOMA OF ENDOMETRIUM (CMS/HCC): Primary | ICD-10-CM

## 2024-02-12 DIAGNOSIS — Z01.818 PREOP TESTING: ICD-10-CM

## 2024-02-12 PROBLEM — A41.51 SEPSIS DUE TO ESCHERICHIA COLI (CMS/HCC): Status: RESOLVED | Noted: 2021-11-05 | Resolved: 2024-02-12

## 2024-02-12 PROBLEM — N93.9 ABNORMAL UTERINE BLEEDING (AUB): Status: RESOLVED | Noted: 2024-01-11 | Resolved: 2024-02-12

## 2024-02-12 PROBLEM — E87.6 HYPOKALEMIA: Status: RESOLVED | Noted: 2022-10-11 | Resolved: 2024-02-12

## 2024-02-12 PROBLEM — B96.20 BACTEREMIA DUE TO ESCHERICHIA COLI: Status: RESOLVED | Noted: 2021-11-05 | Resolved: 2024-02-12

## 2024-02-12 PROBLEM — R78.81 BACTEREMIA DUE TO ESCHERICHIA COLI: Status: RESOLVED | Noted: 2021-11-05 | Resolved: 2024-02-12

## 2024-02-12 PROCEDURE — 3078F DIAST BP <80 MM HG: CPT | Performed by: OBSTETRICS & GYNECOLOGY

## 2024-02-12 PROCEDURE — 3077F SYST BP >= 140 MM HG: CPT | Performed by: OBSTETRICS & GYNECOLOGY

## 2024-02-12 PROCEDURE — 99205 OFFICE O/P NEW HI 60 MIN: CPT | Performed by: OBSTETRICS & GYNECOLOGY

## 2024-02-12 PROCEDURE — 3008F BODY MASS INDEX DOCD: CPT | Performed by: OBSTETRICS & GYNECOLOGY

## 2024-02-12 RX ORDER — CLINDAMYCIN PHOSPHATE 900 MG/50ML
900 INJECTION, SOLUTION INTRAVENOUS
Status: CANCELLED | OUTPATIENT
Start: 2024-03-14 | End: 2024-02-12

## 2024-02-12 RX ORDER — SODIUM CHLORIDE, SODIUM GLUCONATE, SODIUM ACETATE, POTASSIUM CHLORIDE AND MAGNESIUM CHLORIDE 30; 37; 368; 526; 502 MG/100ML; MG/100ML; MG/100ML; MG/100ML; MG/100ML
80 INJECTION, SOLUTION INTRAVENOUS CONTINUOUS
Status: CANCELLED | OUTPATIENT
Start: 2024-02-12 | End: 2024-02-13

## 2024-02-12 RX ORDER — CELECOXIB 100 MG/1
400 CAPSULE ORAL ONCE
Status: CANCELLED | OUTPATIENT
Start: 2024-02-12 | End: 2024-02-12

## 2024-02-12 RX ORDER — PHENAZOPYRIDINE HYDROCHLORIDE 95 MG/1
95 TABLET ORAL ONCE
Status: CANCELLED | OUTPATIENT
Start: 2024-02-12 | End: 2024-02-12

## 2024-02-12 RX ORDER — ACETAMINOPHEN 325 MG/1
975 TABLET ORAL ONCE
Status: CANCELLED | OUTPATIENT
Start: 2024-02-12 | End: 2024-02-12

## 2024-02-12 NOTE — PROGRESS NOTES
Gynecologic Oncology Clinic Visit    PATIENT ID:  Xin Melendez is a 63 y.o. female.  REFERRING PHYSICIAN:   Iqra Carter,   [address]  [facility]  [place] [id number]  PRIMARY CARE PROVIDER:  Rasta Ortiz MD    HPI: Ms. Xin Melendez is a 63 y.o. female referred by Dr. 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    Her history is summarized below.  But briefly, she was being followed for EIN with serial endometrial sampling. On this most recent hysteroscopy and D&C, she was found to have expelled her IUD (no IUD seen on hysteroscopy) and was also found to have grade 2 endometrioid adenocarcinoma. She was referred to gyn oncology for further management.    Today she is here with one her best friends, who is contributing to her HPI.      Brief Treatment History  Oncology History   Carcinoma of endometrium (CMS/HCC)   8/3/2022 Imaging Significant Findings    Presented with postmenopausal vaginal bleeding  Pelvic US: Enlarged fibroid uterus. Thickened endometrium measuring up to 2 cm.      8/8/2022 Biopsy    EUA/EMB: fragments of endometrium with hyperplasia and EIN     8/22/2022 Surgery    D&C/IUD placement: fragments of endometrium with complex atypical hyperplasia (EIN)     4/6/2023 Biopsy    dxHSC/D&C/IUD removal and replacement: Complex papillary mucinous metaplasia. Background fragments of endometrial polyp(s). Stromal decidual changes compatible with exogenous hormone effect.     2/6/2024 Biopsy    dxHSC/D&C/IUD insertion: grade 2 endometrioid adenocarcinoma    Of note, no IUD was seen in her endometrial cavity during diagnostic hysteroscopy. LNG-IUD replaced during her procedure.     2/6/2024 Initial Diagnosis    Carcinoma of endometrium (CMS/HCC)           Medical History:   Past Medical History:   Diagnosis Date   • Back pain    • Bacteremia due to Escherichia coli 11/5/2021   • COVID-19 vaccine series completed     Moderna Boster  10/2021   • CTS (carpal tunnel syndrome)    • DJD (degenerative joint disease)    • Edema    • HL (hearing loss)    • Hypertension    • Hypothyroidism    • Kidney stones    • Lipid disorder    • Lymphedema    • Obesity    • Sciatica    • Shoulder pain     bilateral   • Stasis dermatitis    • Type 2 diabetes mellitus (CMS/HCC)        Patient Active Problem List   Diagnosis   • Calculus of proximal right ureter   • Morbid obesity with BMI of 50.0-59.9, adult (CMS/[facility])   • HTN (hypertension)   • Type 2 diabetes mellitus (CMS/HCC)   • Lymphedema due to venous insufficiency   • Hyperlipidemia   • Hypothyroidism   • Osteoarthritis   • Sciatica   • Left ureteral stone   • DARCY (acute kidney injury) (CMS/[facility])   • Pyelonephritis   • Hydroureteronephrosis   • Leucocytosis   • Flank pain   • Postmenopausal bleeding   • EIN (endometrial intraepithelial neoplasia)   • Infertility, female   • Cellulitis of left lower extremity   • Ambulatory dysfunction   • Azotemia   • Type 2 diabetes mellitus, with long-term current use of insulin (CMS/[facility])   • Bilateral leg pain   • Carcinoma of endometrium (CMS/[facility])         Surgical History:   Past Surgical History:   Procedure Laterality Date   • APPENDECTOMY      laparotomy   •  SECTION  1999   • CYSTOSCOPY  2021    CYSTOSCOPY,INSERT URETERAL STENT   • CYSTOSCOPY  2022    CYSTO, RETRO, STENT, URETEROSCOPY, LASER LITHOTRIPSY, D&C   • DILATION AND CURETTAGE OF UTERUS      when patient was age 26   • OOPHORECTOMY      open RSO, right side secondary to torsion- during pregnancy   • WISDOM TOOTH EXTRACTION         OB History:  OB History        1    Para   1    Term   0       0    AB   0    Living   1       SAB   0    IAB   0    Ectopic   0    Multiple   0    Live Births   1           Obstetric Comments     CS x1  Tammy TAB/SABs    Menarche at age 12  Menopause at age 55  Denies using HRT  Denies history of abnormal pap smears             Social  History:   Social History     Socioeconomic History   • Marital status:      Spouse name: None   • Number of children: 1   • Years of education: None   • Highest education level: None   Occupational History   • Occupation: Unemployed   Tobacco Use   • Smoking status: Former     Types: Cigarettes     Quit date:      Years since quittin.1   • Smokeless tobacco: Current   • Tobacco comments:     Ex-smoker who smoked socially in college for approximately 4 years.   Vaping Use   • Vaping Use: Never used   Substance and Sexual Activity   • Alcohol use: Not Currently     Comment: No current alcohol use, socially in college.   • Drug use: Not Currently   • Sexual activity: Not Currently   Social History Narrative    Lives with son, Edd, in a 2 story home has a chair lift.  She is .  She designates her close friend, Akila León, as an emergency medical proxy.  Akila can be reached at [phone].        The patient is currently on disability.  She previously worked as a  as well as an .        The patient tells me that she smoked socially in college for 4 years.    No current alcohol use.  She consumes alcohol socially in college only.    Denies substance use.        Patient currently using a walker for unsteady gait.         Social Determinants of Health     Food Insecurity: No Food Insecurity (2024)    Hunger Vital Sign    • Worried About Running Out of Food in the Last Year: Never true    • Ran Out of Food in the Last Year: Never true       Family History:   Family History   Problem Relation Age of Onset   • Heart attack Biological Father    • Stomach cancer Paternal Grandmother        Allergies: Adhesive tape-silicones and Cephalexin    Medications:   Current Outpatient Medications   Medication Sig Dispense Refill   • acetaminophen (TYLENOL ARTHRITIS PAIN ORAL) Take 2 tablets by mouth 2 (two) times a day as needed (pain).     • ascorbic acid,  vitamin C, (VITAMIN C) 1,000 mg tablet Take 1,000 mg by mouth daily.     • aspirin 81 mg enteric coated tablet Take 81 mg by mouth daily. To stop pre-op     • cholecalciferol, vitamin D3, (VITAMIN D3 ORAL) Take 1,000 Units by mouth daily.     • DULoxetine (CYMBALTA) 20 mg capsule Take 20 mg by mouth 2 (two) times a day.     • gabapentin (NEURONTIN) 600 mg tablet Take 600 mg by mouth 3 (three) times a day.     • insulin aspart U-100 (NovoLOG) 100 unit/mL (3 mL) pen Inject under the skin 3 (three) times a day with meals. sliding scale ( dosage based on Carb count)     • irbesartan-hydrochlorothiazide (AVALIDE) 150-12.5 mg per tablet Take 1 tablet by mouth every morning.     • levothyroxine 75 mcg tablet Take 75 mcg by mouth daily.     • meloxicam (MOBIC) 15 mg tablet Take 15 mg by mouth every morning.     • methocarbamoL 750 mg tablet Take 750 mg by mouth 3 (three) times a day as needed for muscle spasms. Always takes in am but doesn't always do tid     • norethindrone (AYGESTIN) 5 mg tablet Take 1 tablet (5 mg total) by mouth 2 (two) times a day. 180 tablet 3   • ONETOUCH ULTRA TEST strip TEST BLOOD SUGARS 4 TO 5 TIMES DAILY     • simvastatin 40 mg tablet Take 40 mg by mouth every morning.     • exenatide microspheres 2 mg/0.85 mL auto-injector Inject 2 mg under the skin once a week on Monday. Mondays (  LD December 2023)     • insulin degludec U-200 CONC (TRESIBA FLEXTOUCH U-200) 200 unit/mL (3 mL) pen Inject 0.2 mL (40 Units total) under the skin every evening. (Patient taking differently: Inject 78 Units under the skin daily with lunch. 3:30) 11.7 mL 0     No current facility-administered medications for this visit.       Review of Systems  Review of Systems   Constitutional: Negative for chills, fatigue and fever.   HENT: Negative.    Respiratory: Negative for cough and shortness of breath.    Cardiovascular: Negative for chest pain, palpitations and leg swelling.   Gastrointestinal: Negative for abdominal  "distention, abdominal pain, constipation, diarrhea, nausea and vomiting.   Genitourinary: +postmenopausal vaginal bleeding  Musculoskeletal: Negative for myalgias.   Skin: Negative.    Neurological: Negative for dizziness and weakness.   Psychiatric/Behavioral: Negative.         Physical Exam:  Vital Signs:  Visit Vitals  BP (!) 151/77 (BP Location: Right upper arm, Patient Position: Sitting)   Pulse 81   Temp 36.7 °C (98 °F) (Oral)   Resp 18   Ht 1.6 m (5' 3\")   Wt (!) 156 kg (344 lb)   SpO2 98%   BMI 60.94 kg/m²       General: well-developed, well-nourished, no apparent distress  Neck: supple, no masses  Lymphatic: no supraclavicular, cervical, or inguinal adenopathy  Respiratory: lungs clear to auscultation bilaterally, normal respiratory effort  Cardiovascular: regular rate and rhythm, no murmurs  Extremity: +bilateral lymphedema up to her pelvis with ace bandages wrapped around her legs  GI: abdomen soft, non-distended, non-tender    Incision: well healed low transverse skin incision  Neurologic: alert & oriented x3, no gross deficits  Psychiatric: mood and affect normal  Musculoskeletal: no deformity or gross strength deficit    (Chaperone present during exam)  Gynecologic: of note exam limited due to body habitus   External genitalia/bartholin's/skene's/urethra (EGBUS): normal external genitalia  Speculum: unable to visualize the entire cervix due to a long TVL, no vaginal lesions or masses, old blood in vault   Bimanual: uterus feels mobile but unable to appreciate fundus of the uterus, no obvious adnexal lesions but exam limited due to body habitus      Imaging:  US Pelvis (8/3/22):            CT AP (8/5/22):      Pathology:   2/6/24:      Labs:  Lab Results   Component Value Date    WBC 11.57 (H) 02/02/2024    HGB 11.8 02/02/2024    HCT 36.9 02/02/2024    MCV 95.8 02/02/2024     02/02/2024         Chemistry        Component Value Date/Time     02/02/2024 1031    K 4.3 02/02/2024 1031     " (H) 02/02/2024 1031    CO2 26 02/02/2024 1031    BUN 21 02/02/2024 1031    CREATININE 1.0 02/02/2024 1031        Component Value Date/Time    CALCIUM 8.9 02/02/2024 1031    ALKPHOS 57 02/02/2024 1031    AST 12 (L) 02/02/2024 1031    ALT 17 02/02/2024 1031    BILITOT 0.4 02/02/2024 1031                Assessment   63 y.o. female being consulted for the following issues:  Problem List Items Addressed This Visit        Genitourinary    Carcinoma of endometrium (CMS/HCC) - Primary    Relevant Orders    Case request operating room: robotic assisted total laparoscopic hysterectomy, bilateral salpingo-oophorectomy, sentinel lymph node biopsies, possible lymphadenectomy, possible laparotomy (open) (Completed)     I discussed that technically the standard of care for endometrial cancer is to proceed with surgical resection and staging. Based off of her imaging and exam, she is potentially a candidate for minimally invasive approach via robotic surgery. We did discuss that her increase BMI may preclude her from minimally invasive surgery due to difficulty with ventilation - minimally invasive surgery requires her to be in trendelenburg position with intraabdominal insufflation. We discussed alternatives to surgery including hormonal treatment (which she has been on) or radiation treatment.     Usual plan would be to remove her uterus, cervix, fallopian tubes and ovaries, and to check for cancer spread by sampling her lymph nodes via sentinel lymph node mapping. 10% non-mapping rate is expected for sentinel lymph node identification, which may mean she would need a full pelvic lymph node dissection. The patient has underlying lymphedema, and we discussed the risks and benefits of proceeding with a full pelvic lymph node dissection in this setting. She wants to think about this option and will readdress with me.    The risks of the procedure was explained in detail to the patient. These include but are not limited to:  infection, bleeding, wound breakdown, lymphedema especially with lymph node sampling, conversion to laparotomy (moderately high possibility), need for blood transfusion, VTE, MI/stroke, injury to visceral organs such as bowel, bladder, ureters, blood vessels, nerves. She is at increased risk of yobani-operative complications due to her age, prior abdominal surgery, prior CS surgery, BMI of over 60, and lymphedema.  I advised her that some patients will require adjuvant treatment such as radiation or chemotherapy, but whether she will or will not require this is dependent on her surgical pathology findings.   At this time, she desires to proceed with surgical resection and consents were signed.    In the event that we are able to perform the surgery via robotic approach, she would like to stay overnight (<24h obs). She understands that if she requires a larger incision to complete her surgery safely, she may need to be admitted postoperatively. She also understands that with a larger incision, she is at a high risk of wound infection, especially given her medical comorbidities.    #Surgical risk factors/considerations:  BMI > 60   Prior abdominal surgery  Prior  section  History of smoking  Diabetes Mellitus  Age  Has known cancer   Underlying lymphedema  Calficied uterine fibroids    #Preoperative visit  - Procedure: robotic assisted total laparoscopic hysterectomy, bilateral salpingo-oophorectomy, sentinel lymph node biopsies, possible lymphadenectomy, possible laparotomy (open)  - Consents: signed today (see above for details of discussion)  - Anesthesia: anesthesia appointment requested  - Medical clearance: patient does not need medical clearance prior to surgery  - Preoperative instructions given to the patient: Advised not to eat or drink anything after midnight the evening prior to the procedure. Advised to avoid NSAIDs, Fish Oil and other blood thinning agents 7 days prior to the procedure.   -  preoperative labs ordered: T&S, CBC and BMP  - preoperative imaging ordered: EKG reviewed      #Day of surgery orders:  - Plan on <24h obs  - NPO, SCDs bilateral lower extremity  - mIVF  - IV Gent/Clinda (given keflex allergy)  - will likely need to go home with narcotic pain medication, pending route of surgery        Yohana Ronquillo MD         Time spend on care for the patient: 50-59 minutes  Patient complexity: High (1 acute or chronic illness that poses a threat to life or bodily function)  Data: Extensive ( I reviewed prior external notes, I reviewed test results , I ordered tests, I discussed management of tests with an external provider and I performed an independent interpretation of tests )  I personally reviewed the CT images and ultrasound images and I agree with the interpretation.  Risk: High - decision regarding elective major surgery with risk factors

## 2024-02-12 NOTE — TELEPHONE ENCOUNTER
Patient had procedure done on 02/06/24 with Dr. De La Fuente. Originally procedure was listed as having CPT code with 78454. Surgery was billed with Codes 55511 w/ C54.1 and 44005 w/ Z30.430. According to [facility] portal and [facility] representative  Iman ( called on 02/12/24 at 12:04 PM) no prior authorization is needed for these codes.

## 2024-02-13 NOTE — H&P
Gynecologic Oncology Clinic Visit    PATIENT ID:  Xin Melendez is a 63 y.o. female.  REFERRING PHYSICIAN:   Iqra Carter,   [address]  [facility]  [place] [id number]  PRIMARY CARE PROVIDER:  Rasta Ortiz MD    HPI: Ms. Xin Melendez is a 63 y.o. female referred by Dr. 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    Her history is summarized below.  But briefly, she was being followed for EIN with serial endometrial sampling. On this most recent hysteroscopy and D&C, she was found to have expelled her IUD (no IUD seen on hysteroscopy) and was also found to have grade 2 endometrioid adenocarcinoma. She was referred to gyn oncology for further management.    Today she is here with one her best friends, who is contributing to her HPI.      Brief Treatment History  Oncology History   Carcinoma of endometrium (CMS/HCC)   8/3/2022 Imaging Significant Findings    Presented with postmenopausal vaginal bleeding  Pelvic US: Enlarged fibroid uterus. Thickened endometrium measuring up to 2 cm.      8/8/2022 Biopsy    EUA/EMB: fragments of endometrium with hyperplasia and EIN     8/22/2022 Surgery    D&C/IUD placement: fragments of endometrium with complex atypical hyperplasia (EIN)     4/6/2023 Biopsy    dxHSC/D&C/IUD removal and replacement: Complex papillary mucinous metaplasia. Background fragments of endometrial polyp(s). Stromal decidual changes compatible with exogenous hormone effect.     2/6/2024 Biopsy    dxHSC/D&C/IUD insertion: grade 2 endometrioid adenocarcinoma    Of note, no IUD was seen in her endometrial cavity during diagnostic hysteroscopy. LNG-IUD replaced during her procedure.     2/6/2024 Initial Diagnosis    Carcinoma of endometrium (CMS/HCC)           Medical History:   Past Medical History:   Diagnosis Date   • Back pain    • Bacteremia due to Escherichia coli 11/5/2021   • COVID-19 vaccine series completed     Moderna Boster  10/2021   • CTS (carpal tunnel syndrome)    • DJD (degenerative joint disease)    • Edema    • HL (hearing loss)    • Hypertension    • Hypothyroidism    • Kidney stones    • Lipid disorder    • Lymphedema    • Obesity    • Sciatica    • Shoulder pain     bilateral   • Stasis dermatitis    • Type 2 diabetes mellitus (CMS/HCC)        Patient Active Problem List   Diagnosis   • Calculus of proximal right ureter   • Morbid obesity with BMI of 50.0-59.9, adult (CMS/[facility])   • HTN (hypertension)   • Type 2 diabetes mellitus (CMS/HCC)   • Lymphedema due to venous insufficiency   • Hyperlipidemia   • Hypothyroidism   • Osteoarthritis   • Sciatica   • Left ureteral stone   • DARCY (acute kidney injury) (CMS/[facility])   • Pyelonephritis   • Hydroureteronephrosis   • Leucocytosis   • Flank pain   • Postmenopausal bleeding   • EIN (endometrial intraepithelial neoplasia)   • Infertility, female   • Cellulitis of left lower extremity   • Ambulatory dysfunction   • Azotemia   • Type 2 diabetes mellitus, with long-term current use of insulin (CMS/[facility])   • Bilateral leg pain   • Carcinoma of endometrium (CMS/[facility])         Surgical History:   Past Surgical History:   Procedure Laterality Date   • APPENDECTOMY      laparotomy   •  SECTION  1999   • CYSTOSCOPY  2021    CYSTOSCOPY,INSERT URETERAL STENT   • CYSTOSCOPY  2022    CYSTO, RETRO, STENT, URETEROSCOPY, LASER LITHOTRIPSY, D&C   • DILATION AND CURETTAGE OF UTERUS      when patient was age 26   • OOPHORECTOMY      open RSO, right side secondary to torsion- during pregnancy   • WISDOM TOOTH EXTRACTION         OB History:  OB History        1    Para   1    Term   0       0    AB   0    Living   1       SAB   0    IAB   0    Ectopic   0    Multiple   0    Live Births   1           Obstetric Comments     CS x1  Tammy TAB/SABs    Menarche at age 12  Menopause at age 55  Denies using HRT  Denies history of abnormal pap smears             Social  History:   Social History     Socioeconomic History   • Marital status:      Spouse name: None   • Number of children: 1   • Years of education: None   • Highest education level: None   Occupational History   • Occupation: Unemployed   Tobacco Use   • Smoking status: Former     Types: Cigarettes     Quit date:      Years since quittin.1   • Smokeless tobacco: Current   • Tobacco comments:     Ex-smoker who smoked socially in college for approximately 4 years.   Vaping Use   • Vaping Use: Never used   Substance and Sexual Activity   • Alcohol use: Not Currently     Comment: No current alcohol use, socially in college.   • Drug use: Not Currently   • Sexual activity: Not Currently   Social History Narrative    Lives with son, Edd, in a 2 story home has a chair lift.  She is .  She designates her close friend, Akila León, as an emergency medical proxy.  Akila can be reached at [phone].        The patient is currently on disability.  She previously worked as a  as well as an .        The patient tells me that she smoked socially in college for 4 years.    No current alcohol use.  She consumes alcohol socially in college only.    Denies substance use.        Patient currently using a walker for unsteady gait.         Social Determinants of Health     Food Insecurity: No Food Insecurity (2024)    Hunger Vital Sign    • Worried About Running Out of Food in the Last Year: Never true    • Ran Out of Food in the Last Year: Never true       Family History:   Family History   Problem Relation Age of Onset   • Heart attack Biological Father    • Stomach cancer Paternal Grandmother        Allergies: Adhesive tape-silicones and Cephalexin    Medications:   Current Outpatient Medications   Medication Sig Dispense Refill   • acetaminophen (TYLENOL ARTHRITIS PAIN ORAL) Take 2 tablets by mouth 2 (two) times a day as needed (pain).     • ascorbic acid,  vitamin C, (VITAMIN C) 1,000 mg tablet Take 1,000 mg by mouth daily.     • aspirin 81 mg enteric coated tablet Take 81 mg by mouth daily. To stop pre-op     • cholecalciferol, vitamin D3, (VITAMIN D3 ORAL) Take 1,000 Units by mouth daily.     • DULoxetine (CYMBALTA) 20 mg capsule Take 20 mg by mouth 2 (two) times a day.     • gabapentin (NEURONTIN) 600 mg tablet Take 600 mg by mouth 3 (three) times a day.     • insulin aspart U-100 (NovoLOG) 100 unit/mL (3 mL) pen Inject under the skin 3 (three) times a day with meals. sliding scale ( dosage based on Carb count)     • irbesartan-hydrochlorothiazide (AVALIDE) 150-12.5 mg per tablet Take 1 tablet by mouth every morning.     • levothyroxine 75 mcg tablet Take 75 mcg by mouth daily.     • meloxicam (MOBIC) 15 mg tablet Take 15 mg by mouth every morning.     • methocarbamoL 750 mg tablet Take 750 mg by mouth 3 (three) times a day as needed for muscle spasms. Always takes in am but doesn't always do tid     • norethindrone (AYGESTIN) 5 mg tablet Take 1 tablet (5 mg total) by mouth 2 (two) times a day. 180 tablet 3   • ONETOUCH ULTRA TEST strip TEST BLOOD SUGARS 4 TO 5 TIMES DAILY     • simvastatin 40 mg tablet Take 40 mg by mouth every morning.     • exenatide microspheres 2 mg/0.85 mL auto-injector Inject 2 mg under the skin once a week on Monday. Mondays (  LD December 2023)     • insulin degludec U-200 CONC (TRESIBA FLEXTOUCH U-200) 200 unit/mL (3 mL) pen Inject 0.2 mL (40 Units total) under the skin every evening. (Patient taking differently: Inject 78 Units under the skin daily with lunch. 3:30) 11.7 mL 0     No current facility-administered medications for this visit.       Review of Systems  Review of Systems   Constitutional: Negative for chills, fatigue and fever.   HENT: Negative.    Respiratory: Negative for cough and shortness of breath.    Cardiovascular: Negative for chest pain, palpitations and leg swelling.   Gastrointestinal: Negative for abdominal  "distention, abdominal pain, constipation, diarrhea, nausea and vomiting.   Genitourinary: +postmenopausal vaginal bleeding  Musculoskeletal: Negative for myalgias.   Skin: Negative.    Neurological: Negative for dizziness and weakness.   Psychiatric/Behavioral: Negative.         Physical Exam:  Vital Signs:  Visit Vitals  BP (!) 151/77 (BP Location: Right upper arm, Patient Position: Sitting)   Pulse 81   Temp 36.7 °C (98 °F) (Oral)   Resp 18   Ht 1.6 m (5' 3\")   Wt (!) 156 kg (344 lb)   SpO2 98%   BMI 60.94 kg/m²       General: well-developed, well-nourished, no apparent distress  Neck: supple, no masses  Lymphatic: no supraclavicular, cervical, or inguinal adenopathy  Respiratory: lungs clear to auscultation bilaterally, normal respiratory effort  Cardiovascular: regular rate and rhythm, no murmurs  Extremity: +bilateral lymphedema up to her pelvis with ace bandages wrapped around her legs  GI: abdomen soft, non-distended, non-tender    Incision: well healed low transverse skin incision  Neurologic: alert & oriented x3, no gross deficits  Psychiatric: mood and affect normal  Musculoskeletal: no deformity or gross strength deficit    (Chaperone present during exam)  Gynecologic: of note exam limited due to body habitus   External genitalia/bartholin's/skene's/urethra (EGBUS): normal external genitalia  Speculum: unable to visualize the entire cervix due to a long TVL, no vaginal lesions or masses, old blood in vault   Bimanual: uterus feels mobile but unable to appreciate fundus of the uterus, no obvious adnexal lesions but exam limited due to body habitus      Imaging:  US Pelvis (8/3/22):            CT AP (8/5/22):      Pathology:   2/6/24:      Labs:  Lab Results   Component Value Date    WBC 11.57 (H) 02/02/2024    HGB 11.8 02/02/2024    HCT 36.9 02/02/2024    MCV 95.8 02/02/2024     02/02/2024         Chemistry        Component Value Date/Time     02/02/2024 1031    K 4.3 02/02/2024 1031     " (H) 02/02/2024 1031    CO2 26 02/02/2024 1031    BUN 21 02/02/2024 1031    CREATININE 1.0 02/02/2024 1031        Component Value Date/Time    CALCIUM 8.9 02/02/2024 1031    ALKPHOS 57 02/02/2024 1031    AST 12 (L) 02/02/2024 1031    ALT 17 02/02/2024 1031    BILITOT 0.4 02/02/2024 1031                Assessment   63 y.o. female being consulted for the following issues:  Problem List Items Addressed This Visit        Genitourinary    Carcinoma of endometrium (CMS/HCC) - Primary    Relevant Orders    Case request operating room: robotic assisted total laparoscopic hysterectomy, bilateral salpingo-oophorectomy, sentinel lymph node biopsies, possible lymphadenectomy, possible laparotomy (open) (Completed)     I discussed that technically the standard of care for endometrial cancer is to proceed with surgical resection and staging. Based off of her imaging and exam, she is potentially a candidate for minimally invasive approach via robotic surgery. We did discuss that her increase BMI may preclude her from minimally invasive surgery due to difficulty with ventilation - minimally invasive surgery requires her to be in trendelenburg position with intraabdominal insufflation. We discussed alternatives to surgery including hormonal treatment (which she has been on) or radiation treatment.     Usual plan would be to remove her uterus, cervix, fallopian tubes and ovaries, and to check for cancer spread by sampling her lymph nodes via sentinel lymph node mapping. 10% non-mapping rate is expected for sentinel lymph node identification, which may mean she would need a full pelvic lymph node dissection. The patient has underlying lymphedema, and we discussed the risks and benefits of proceeding with a full pelvic lymph node dissection in this setting. She wants to think about this option and will readdress with me.    The risks of the procedure was explained in detail to the patient. These include but are not limited to:  infection, bleeding, wound breakdown, lymphedema especially with lymph node sampling, conversion to laparotomy (moderately high possibility), need for blood transfusion, VTE, MI/stroke, injury to visceral organs such as bowel, bladder, ureters, blood vessels, nerves. She is at increased risk of yobani-operative complications due to her age, prior abdominal surgery, prior CS surgery, BMI of over 60, and lymphedema.  I advised her that some patients will require adjuvant treatment such as radiation or chemotherapy, but whether she will or will not require this is dependent on her surgical pathology findings.   At this time, she desires to proceed with surgical resection and consents were signed.    In the event that we are able to perform the surgery via robotic approach, she would like to stay overnight (<24h obs). She understands that if she requires a larger incision to complete her surgery safely, she may need to be admitted postoperatively. She also understands that with a larger incision, she is at a high risk of wound infection, especially given her medical comorbidities.    #Surgical risk factors/considerations:  BMI > 60   Prior abdominal surgery  Prior  section  History of smoking  Diabetes Mellitus  Age  Has known cancer   Underlying lymphedema  Calficied uterine fibroids    #Preoperative visit  - Procedure: robotic assisted total laparoscopic hysterectomy, bilateral salpingo-oophorectomy, sentinel lymph node biopsies, possible lymphadenectomy, possible laparotomy (open)  - Consents: signed today (see above for details of discussion)  - Anesthesia: anesthesia appointment requested  - Medical clearance: patient does not need medical clearance prior to surgery  - Preoperative instructions given to the patient: Advised not to eat or drink anything after midnight the evening prior to the procedure. Advised to avoid NSAIDs, Fish Oil and other blood thinning agents 7 days prior to the procedure.   -  preoperative labs ordered: T&S, CBC and BMP  - preoperative imaging ordered: EKG reviewed      #Day of surgery orders:  - Plan on <24h obs  - NPO, SCDs bilateral lower extremity  - mIVF  - IV Gent/Clinda (given keflex allergy)  - will likely need to go home with narcotic pain medication, pending route of surgery        Yohana Ronquillo MD         Time spend on care for the patient: 50-59 minutes  Patient complexity: High (1 acute or chronic illness that poses a threat to life or bodily function)  Data: Extensive ( I reviewed prior external notes, I reviewed test results , I ordered tests, I discussed management of tests with an external provider and I performed an independent interpretation of tests )  I personally reviewed the CT images and ultrasound images and I agree with the interpretation.  Risk: High - decision regarding elective major surgery with risk factors

## 2024-02-15 ENCOUNTER — TELEPHONE (OUTPATIENT)
Dept: OBSTETRICS AND GYNECOLOGY | Facility: CLINIC | Age: 64
End: 2024-02-15
Payer: COMMERCIAL

## 2024-02-26 ENCOUNTER — TELEPHONE (OUTPATIENT)
Dept: GYNECOLOGIC ONCOLOGY | Facility: CLINIC | Age: 64
End: 2024-02-26
Payer: COMMERCIAL

## 2024-02-26 ENCOUNTER — APPOINTMENT (OUTPATIENT)
Dept: LAB | Facility: HOSPITAL | Age: 64
DRG: 740 | End: 2024-02-26
Attending: OBSTETRICS & GYNECOLOGY
Payer: COMMERCIAL

## 2024-02-26 ENCOUNTER — PRE-ADMISSION TESTING (OUTPATIENT)
Dept: PREADMISSION TESTING | Facility: HOSPITAL | Age: 64
DRG: 740 | End: 2024-02-26
Attending: OBSTETRICS & GYNECOLOGY
Payer: COMMERCIAL

## 2024-02-26 VITALS
SYSTOLIC BLOOD PRESSURE: 169 MMHG | TEMPERATURE: 98.1 F | OXYGEN SATURATION: 95 % | RESPIRATION RATE: 24 BRPM | HEIGHT: 63 IN | HEART RATE: 83 BPM | WEIGHT: 293 LBS | DIASTOLIC BLOOD PRESSURE: 72 MMHG | BODY MASS INDEX: 51.91 KG/M2

## 2024-02-26 DIAGNOSIS — Z01.818 ENCOUNTER FOR PREADMISSION TESTING: Primary | ICD-10-CM

## 2024-02-26 DIAGNOSIS — Z01.818 PREOP TESTING: ICD-10-CM

## 2024-02-26 DIAGNOSIS — C54.1 CARCINOMA OF ENDOMETRIUM (CMS/HCC): ICD-10-CM

## 2024-02-26 LAB
ABO + RH BLD: NORMAL
ANION GAP SERPL CALC-SCNC: 7 MEQ/L (ref 3–15)
ATRIAL RATE: 69
BLD GP AB SCN SERPL QL: NEGATIVE
BUN SERPL-MCNC: 19 MG/DL (ref 7–25)
CALCIUM SERPL-MCNC: 9.3 MG/DL (ref 8.6–10.3)
CHLORIDE SERPL-SCNC: 109 MEQ/L (ref 98–107)
CO2 SERPL-SCNC: 26 MEQ/L (ref 21–31)
CREAT SERPL-MCNC: 1.1 MG/DL (ref 0.6–1.2)
D AG BLD QL: POSITIVE
EGFRCR SERPLBLD CKD-EPI 2021: 56.6 ML/MIN/1.73M*2
ERYTHROCYTE [DISTWIDTH] IN BLOOD BY AUTOMATED COUNT: 13.2 % (ref 11.7–14.4)
GLUCOSE SERPL-MCNC: 63 MG/DL (ref 70–99)
HCT VFR BLD AUTO: 40.3 % (ref 35–45)
HGB BLD-MCNC: 12.6 G/DL (ref 11.8–15.7)
LABORATORY COMMENT REPORT: NORMAL
MCH RBC QN AUTO: 28.8 PG (ref 28–33.2)
MCHC RBC AUTO-ENTMCNC: 31.3 G/DL (ref 32.2–35.5)
MCV RBC AUTO: 92 FL (ref 83–98)
P AXIS: 59
PDW BLD AUTO: 11.3 FL (ref 9.4–12.3)
PLATELET # BLD AUTO: 269 K/UL (ref 150–369)
POTASSIUM SERPL-SCNC: 4.8 MEQ/L (ref 3.5–5.1)
PR INTERVAL: 138
QRS DURATION: 92
QT INTERVAL: 408
QTC CALCULATION(BAZETT): 437
R AXIS: 26
RBC # BLD AUTO: 4.38 M/UL (ref 3.93–5.22)
SODIUM SERPL-SCNC: 142 MEQ/L (ref 136–145)
SPECIMEN EXP DATE BLD: NORMAL
T WAVE AXIS: 56
VENTRICULAR RATE: 69
WBC # BLD AUTO: 11.4 K/UL (ref 3.8–10.5)

## 2024-02-26 PROCEDURE — 93010 ELECTROCARDIOGRAM REPORT: CPT | Performed by: INTERNAL MEDICINE

## 2024-02-26 PROCEDURE — 93005 ELECTROCARDIOGRAM TRACING: CPT

## 2024-02-26 PROCEDURE — 36415 COLL VENOUS BLD VENIPUNCTURE: CPT

## 2024-02-26 PROCEDURE — 86900 BLOOD TYPING SEROLOGIC ABO: CPT

## 2024-02-26 PROCEDURE — 80048 BASIC METABOLIC PNL TOTAL CA: CPT

## 2024-02-26 PROCEDURE — 85027 COMPLETE CBC AUTOMATED: CPT

## 2024-02-26 ASSESSMENT — ENCOUNTER SYMPTOMS
WEAKNESS: 1
PSYCHIATRIC NEGATIVE: 1
ARTHRALGIAS: 1
EYES NEGATIVE: 1
GASTROINTESTINAL NEGATIVE: 1
ENDOCRINE NEGATIVE: 1
ACTIVITY CHANGE: 1
HEMATOLOGIC/LYMPHATIC NEGATIVE: 1
JOINT SWELLING: 1
SHORTNESS OF BREATH: 1
ALLERGIC/IMMUNOLOGIC NEGATIVE: 1

## 2024-02-26 ASSESSMENT — PAIN SCALES - GENERAL: PAINLEVEL: 0-NO PAIN

## 2024-02-26 NOTE — H&P
History and Physical  Pre-admission testing         HISTORY OF PRESENT ILLNESS      Xin Melendez is an 63 y.o. female with a past medical history of  type 2 diabetes mellitus, morbid obesity, ( last dose exenatide was in 2023),  hypertension, hyperlipidemia, hypothyroidism, lymphedema, DJD, had post menopausal bleeding.  D&C done in  showed hyperplasia.  Repeat D&C on 24 revealed endometrial caner.  Patient now returns for robotic assisted total laparoscopic hysterectomy, bilateral salpingo-oophorectomy, sentinel lymph node biopsies, possible lymphadenectomy, possible laparotomy (open)  Scheduled for 3/24/24.     PAST MEDICAL AND SURGICAL HISTORY      Past Medical History:   Diagnosis Date   • Back pain    • Bacteremia due to Escherichia coli 2021   • COVID-19 vaccine series completed     Moderna Boster 10/2021   • CTS (carpal tunnel syndrome)    • DJD (degenerative joint disease)    • Edema    • HL (hearing loss)    • Hypertension    • Hypothyroidism    • Kidney stones    • Lipid disorder    • Lymphedema    • Obesity    • Sciatica    • Shoulder pain     bilateral   • Stasis dermatitis    • Type 2 diabetes mellitus (CMS/HCC)        Past Surgical History:   Procedure Laterality Date   • APPENDECTOMY      laparotomy   •  SECTION  1999   • CYSTOSCOPY  2021    CYSTOSCOPY,INSERT URETERAL STENT   • CYSTOSCOPY  2022    CYSTO, RETRO, STENT, URETEROSCOPY, LASER LITHOTRIPSY, D&C   • DILATION AND CURETTAGE OF UTERUS      x 2 ** when patient was age 26, 2024   • OOPHORECTOMY      open RSO, right side secondary to torsion- during pregnancy   • WISDOM TOOTH EXTRACTION         PROBLEM LIST     Patient Active Problem List   Diagnosis   • Calculus of proximal right ureter   • Morbid obesity with BMI of 50.0-59.9, adult (CMS/HCC)   • HTN (hypertension)   • Type 2 diabetes mellitus (CMS/HCC)   • Lymphedema due to venous insufficiency   • Hyperlipidemia   • Hypothyroidism   •  Osteoarthritis   • Sciatica   • Left ureteral stone   • DARCY (acute kidney injury) (CMS/HCC)   • Pyelonephritis   • Hydroureteronephrosis   • Leucocytosis   • Flank pain   • Postmenopausal bleeding   • EIN (endometrial intraepithelial neoplasia)   • Infertility, female   • Cellulitis of left lower extremity   • Ambulatory dysfunction   • Azotemia   • Type 2 diabetes mellitus, with long-term current use of insulin (CMS/HCC)   • Bilateral leg pain   • Carcinoma of endometrium (CMS/HCC)       MEDICATIONS        Current Outpatient Medications:   •  acetaminophen (TYLENOL ARTHRITIS PAIN ORAL), Take 2 tablets by mouth 2 (two) times a day as needed (pain)., Disp: , Rfl:   •  ascorbic acid, vitamin C, (VITAMIN C) 1,000 mg tablet, Take 1,000 mg by mouth daily., Disp: , Rfl:   •  aspirin 81 mg enteric coated tablet, Take 81 mg by mouth daily. To stop pre-op, Disp: , Rfl:   •  cholecalciferol, vitamin D3, (VITAMIN D3 ORAL), Take 1,000 Units by mouth daily., Disp: , Rfl:   •  DULoxetine (CYMBALTA) 20 mg capsule, Take 20 mg by mouth 2 (two) times a day., Disp: , Rfl:   •  gabapentin (NEURONTIN) 600 mg tablet, Take 600 mg by mouth 3 (three) times a day., Disp: , Rfl:   •  insulin aspart U-100 (NovoLOG) 100 unit/mL (3 mL) pen, Inject under the skin 3 (three) times a day with meals. sliding scale ( dosage based on Carb count), Disp: , Rfl:   •  insulin degludec U-200 CONC (TRESIBA FLEXTOUCH U-200) 200 unit/mL (3 mL) pen, Inject 0.2 mL (40 Units total) under the skin every evening. (Patient taking differently: Inject 80 Units under the skin daily. 3:30), Disp: 11.7 mL, Rfl: 0  •  irbesartan-hydrochlorothiazide (AVALIDE) 150-12.5 mg per tablet, Take 1 tablet by mouth every morning., Disp: , Rfl:   •  levothyroxine 75 mcg tablet, Take 75 mcg by mouth daily., Disp: , Rfl:   •  meloxicam (MOBIC) 15 mg tablet, Take 15 mg by mouth every morning., Disp: , Rfl:   •  methocarbamoL 750 mg tablet, Take 750 mg by mouth 3 (three) times a day as  needed for muscle spasms. Always takes in am but doesn't always do tid, Disp: , Rfl:   •  norethindrone (AYGESTIN) 5 mg tablet, Take 1 tablet (5 mg total) by mouth 2 (two) times a day., Disp: 180 tablet, Rfl: 3  •  ONETOUCH ULTRA TEST strip, TEST BLOOD SUGARS 4 TO 5 TIMES DAILY, Disp: , Rfl:   •  simvastatin 40 mg tablet, Take 40 mg by mouth every morning., Disp: , Rfl:     ALLERGIES      Allergies   Allergen Reactions   • Adhesive Tape-Silicones Rash   • Cephalexin Rash     Keflex         FAMILY HISTORY      Family History   Problem Relation Age of Onset   • Heart attack Biological Father    • Stomach cancer Paternal Grandmother        SOCIAL HISTORY      Social History     Socioeconomic History   • Marital status:      Spouse name: Not on file   • Number of children: 1   • Years of education: Not on file   • Highest education level: Not on file   Occupational History   • Occupation: Unemployed   Tobacco Use   • Smoking status: Former     Types: Cigarettes     Quit date:      Years since quittin.1   • Smokeless tobacco: Current   • Tobacco comments:     Ex-smoker who smoked socially in college for approximately 4 years.   Vaping Use   • Vaping Use: Never used   Substance and Sexual Activity   • Alcohol use: Not Currently     Comment: No current alcohol use, socially in college.   • Drug use: Not Currently   • Sexual activity: Not Currently   Other Topics Concern   • Not on file   Social History Narrative    Lives with son, Edd, in a 2 story home has a chair lift.  She is .  She designates her close friend, Akila León, as an emergency medical proxy.  Akila can be reached at [phone].        The patient is currently on disability.  She previously worked as a  as well as an .        The patient tells me that she smoked socially in college for 4 years.    No current alcohol use.  She consumes alcohol socially in college only.    Denies  "substance use.        Patient currently using a walker for unsteady gait.         Social Determinants of Health     Financial Resource Strain: Not on file   Food Insecurity: No Food Insecurity (1/22/2024)    Hunger Vital Sign    • Worried About Running Out of Food in the Last Year: Never true    • Ran Out of Food in the Last Year: Never true   Transportation Needs: Not on file   Physical Activity: Not on file   Stress: Not on file   Social Connections: Not on file   Intimate Partner Violence: Not on file   Housing Stability: Not on file       REVIEW OF SYSTEMS      Review of Systems   Constitutional: Positive for activity change.   HENT: Negative.    Eyes: Negative.    Respiratory: Positive for shortness of breath.    Cardiovascular: Positive for leg swelling.   Gastrointestinal: Negative.    Endocrine: Negative.    Genitourinary: Negative.    Musculoskeletal: Positive for arthralgias, gait problem and joint swelling.        Ambulates with walker at home, in wheelchair currently at [facility].   Skin: Negative.    Allergic/Immunologic: Negative.    Neurological: Positive for weakness.   Hematological: Negative.    Psychiatric/Behavioral: Negative.        PHYSICAL EXAMINATION      Visit Vitals  BP (!) 169/72 (BP Location: Right forearm, Patient Position: Sitting)   Pulse 83   Temp 36.7 °C (98.1 °F) (Temporal)   Resp (!) 24   Ht 1.6 m (5' 3\")   Wt (!) 154 kg (340 lb)   SpO2 95%   BMI 60.23 kg/m²     Body mass index is 60.23 kg/m².    Physical Exam  Vitals reviewed.   Constitutional:       Appearance: She is morbidly obese.   HENT:      Head: Normocephalic.      Nose:      Comments: Mask in place     Mouth/Throat:      Comments: Mask in place   Eyes:      Pupils: Pupils are equal, round, and reactive to light.   Cardiovascular:      Rate and Rhythm: Normal rate and regular rhythm.      Heart sounds: Normal heart sounds.   Pulmonary:      Effort: Pulmonary effort is normal.      Comments: Diminished t/o   Abdominal:      " General: Bowel sounds are normal.      Palpations: Abdomen is soft.   Musculoskeletal:         General: Swelling present.      Cervical back: Normal range of motion.      Comments: B/l legs lymphedema  w/ comression stockings in place    Skin:     General: Skin is warm.   Neurological:      General: No focal deficit present.      Mental Status: She is alert and oriented to person, place, and time.   Psychiatric:         Mood and Affect: Mood normal.         Behavior: Behavior normal.            FELIPE Mauricio  2/26/2024

## 2024-02-26 NOTE — TELEPHONE ENCOUNTER
Brief Gyn Onc Phone Call    Received an email with multiple questions regarding her upcoming procedure.  I called the patient at both of her listed phone numbers.  No answer, left a voicemail with our callback information on her preferred phone number.    Yohana Ronquillo MD  Gynecologic Oncology  [id number]  02/26/24  12:11 PM

## 2024-02-26 NOTE — TELEPHONE ENCOUNTER
Patient ret'd your call from today about noonish. She is completely available NOW thru this evening.

## 2024-02-26 NOTE — PRE-PROCEDURE INSTRUCTIONS
1. We will call you between 3 pm and 7 pm on Wednesday March 13, 2024 to determine that arrival time for your procedure. If you do not hear by 6PM. Please call [phone] for arrival time.     2. Please report to Main Entrance near Park[address], walk into main lobby and report to the admission desk on the first floor on the day of your procedure.    3. Please follow the following fasting guidelines:     No solid food EIGHT HOURS prior to surgery.  Unlimited clear liquids, meaning water or PLAIN black coffee WITHOUT any milk, cream, sugar, or sweetener are permitted up to TWO HOURS prior to arrival at the hospital.    4. Please take ONLY the following medications with a sip of water on the morning of your procedure:  Take cymbalta, levothyroxine the morning of surgery     5. Other Instructions: You may brush your teeth the morning of the procedure. Rinse and spit, do not swallow.  Bring a list of your medications with dosages.  Use surgical wash as directed.     6. If you develop a cold, cough, fever, rash, or other symptom prior to the day of the procedure, please report it to your physician immediately.    7. If you need to cancel the procedure for any reason, please contact your physician.    8. Make arrangements to have safe transportation home accompanied by a responsible adult. If you have not arranged safe transportation home, your surgery will be cancelled. Safe transportation may include private vehicle, ride-share service, taxi and public transportation when accompanied by a responsible adult who will assist you home. A responsible adult is someone known to you and does not include the taxi, ride-share or public transit drive transporting you.    9. You may not take public transportation unless accompanied by a responsible person.    10. You may not drive a car or operate complex or potentially dangerous machinery for 24 hours following anesthesia and/or sedation.    11.  If it is medically necessary  for you to have a longer stay, you will be informed as soon as the decision is made.    12. Only bring essential items to the hospital.  Do not wear or bring anything of value to the hospital including jewelry of any kind, money, or wallet. Do not wear make-up or contact lenses.   DO NOT BRING MEDICATIONS FROM HOME unless instructed to do so. DO bring your hearing aids, glasses, and a case    13. No lotion, creams, powders, or oils on skin once you start the surgical wash or Dial soap.        14. Dress in comfortable clothes.    15.  If instructed, please bring a copy of your Advanced Directive (Living Will/Durable Power of ) on the day of your procedure.     16. Ensuring your safety at all times is a very important part of our [facility] Culture of Safety. After having surgery and sedation, you are at risk for falling and balance issues. Although you may feel awake, the effects of the medication can last up to 24 hours after anesthesia. If you need to use the bathroom during your recovery period, nursing staff will escort you there and stay with you to ensure your safety.    17. Refrain from drinking alcohol, Smoking cigarettes or marijuana for 24 hours prior to surgery.    18. Shower with antibacterial soap (Dial) the night before and morning of your procedure.  If your procedure indicates the need for CHG antiseptic wash (Bactoshield or Hibiclens), please use this instead and follow instructions as discussed at the time of your Pre-Admission Testing phone interview or visit.    Above instructions reviewed with patient and patient acknowledges understanding.       [facility]   Patient Education Preoperative Showers     Good Hygiene, such as frequent handwashing and daily skin cleansing, promotes good health.  Daily skin cleansing helps remove germs that may cause infections. The following instructions should be followed to help reduce germs on your skin prior to your surgical procedure.      · Bactoshield/Hibiclens CHG 4% is an antiseptic soap.  The active ingredient is chlorhexidine gluconate. Do not use this product if you are allergic to chlorhexidine gluconate.     · The NIGHT before and the MORNING of your procedure , shower or bathe with Bactoshield. This product should replace your regular soap used for cleansing most of your body surfaces. Bactoshield should not be used on your head or face: keep out of your eyes, ears, and mouth.      · If you plan to wash your hair, do so with your regular shampoo. Then, rinse the hair and your body thoroughly to move any shampoo residue.     · Wash face with regular soap and water only.     · Wash your genital area with soap and water only.    · Thoroughly rinse your body with warm water from the neck down.       · Apply the minimum amount of Bactoshield to cover the skin. Use this product as you would any liquid soap. Leave this on for 2 minutes. Note- Bactoshield DOES NOT LATHER like normal soap.      · Wash the skin gently and rinse thoroughly with warm water. You do not need to scrub the skin to remove germs.      · Do not use regular soap after you have applied and rinsed the Bactoshield.  Change into clean clothes after each shower.     · Do not apply any lotion, powder, or perfumes to the body areas that have been cleansed with Bactoshield.     · No use of hair removal products or shaving at or near the surgical site 48 hours before your procedure. (72 hours for cardiac patients.)    · For those having perineal area surgeries (ie: vaginal, rectal or cystoscopy) - please use Dial soap.          Why do we cleanse the skin prior to surgery?   There are lots of germs on our skin and in our environment. Most are harmless, some are even helpful on our skin and in our environment. As part of your preparation you will be asked to purchase a bottle of antibacterial soap, Bactoshield CHG 4% or Hibiclens CHG4% to cleanse your skin and eliminate a certain  bacteria called, Staphylococcus Aureus.      What is Staphylococcus aureus? (Staph)   Staph is a common germ found on the skin. One-third of healthy people carry this germ. Methicillin-resistant Staphylococcus aureus (MRSA) is a type of Staph bacteria that is resistant to certain antibiotics. In the community, most MRSA infections are skin infections.  People who have Staph/MRSA may show no signs of illness- this is called colonization. Caution needs to be used when you come into the hospital for a surgical procedure to ensure that Staph does not enter your body.     Am I at increased risk for infection if I am carrying Staph ?   Because Staph is carried on your skin, you may be at increased risk for developing a surgical infection. To reduce the presence of Staph on your skin, we recommend a series of preoperative showers with an antibacterial skin cleanser. These showers are to be done the NIGHT BEFORE your surgery and the MORNING of your surgery.

## 2024-02-26 NOTE — TELEPHONE ENCOUNTER
Brief Gyn Onc Phone Call    I called the patient at her listed phone number once again.    Reviewed her preoperative questions. All questions answered to her satisfaction.     We reviewed her upcoming procedure with us.  We also reviewed reasons to reach out prior to her procedure.  Office phone number: [phone]      Yohana Ronquillo MD  Gynecologic Oncology  [id number]  02/26/24  4:01 PM

## 2024-03-13 ENCOUNTER — ANESTHESIA EVENT (OUTPATIENT)
Dept: OPERATING ROOM | Facility: HOSPITAL | Age: 64
Setting detail: HOSPITAL OUTPATIENT SURGERY
DRG: 740 | End: 2024-03-13
Payer: COMMERCIAL

## 2024-03-13 ASSESSMENT — LIFESTYLE VARIABLES: TOBACCO_USE: 0

## 2024-03-13 NOTE — ANESTHESIA PREPROCEDURE EVALUATION
Relevant Problems   CARDIOVASCULAR   (+) HTN (hypertension)      MUSCULOSKELETAL   (+) Osteoarthritis      NEUROLOGY   (+) Sciatica      Other   (+) Hypothyroidism   (+) Pyelonephritis   (+) Type 2 diabetes mellitus (CMS/[facility])   (+) Type 2 diabetes mellitus, with long-term current use of insulin (CMS/[facility])     Patient Active Problem List   Diagnosis   • Calculus of proximal right ureter   • Morbid obesity with BMI of 50.0-59.9, adult (CMS/[facility])   • HTN (hypertension)   • Type 2 diabetes mellitus (CMS/[facility])   • Lymphedema due to venous insufficiency   • Hyperlipidemia   • Hypothyroidism   • Osteoarthritis   • Sciatica   • Left ureteral stone   • DARCY (acute kidney injury) (CMS/[facility])   • Pyelonephritis   • Hydroureteronephrosis   • Leucocytosis   • Flank pain   • Postmenopausal bleeding   • EIN (endometrial intraepithelial neoplasia)   • Infertility, female   • Cellulitis of left lower extremity   • Ambulatory dysfunction   • Azotemia   • Type 2 diabetes mellitus, with long-term current use of insulin (CMS/[facility])   • Bilateral leg pain   • Carcinoma of endometrium (CMS/[facility])       No current facility-administered medications for this encounter.       Prior to Admission medications    Medication Sig Start Date End Date Taking? Authorizing Provider   acetaminophen (TYLENOL ARTHRITIS PAIN ORAL) Take 2 tablets by mouth 2 (two) times a day as needed (pain).    ProviderSuyapa MD   ascorbic acid, vitamin C, (VITAMIN C) 1,000 mg tablet Take 1,000 mg by mouth daily.    ProviderSuyapa MD   aspirin 81 mg enteric coated tablet Take 81 mg by mouth daily. To stop pre-op    Fred Dow MD   cholecalciferol, vitamin D3, (VITAMIN D3 ORAL) Take 1,000 Units by mouth daily.    Fred Dow MD   DULoxetine (CYMBALTA) 20 mg capsule Take 20 mg by mouth 2 (two) times a day.    Suyapa Dow MD   gabapentin (NEURONTIN) 600 mg tablet Take 600 mg by mouth 3 (three) times a day.    Suyapa Dow  MD Fred   insulin aspart U-100 (NovoLOG) 100 unit/mL (3 mL) pen Inject under the skin 3 (three) times a day with meals. sliding scale ( dosage based on Carb count) 3/20/23   Suyapa Dow MD   insulin degludec U-200 CONC (TRESIBA FLEXTOUCH U-200) 200 unit/mL (3 mL) pen Inject 0.2 mL (40 Units total) under the skin every evening.  Patient taking differently: Inject 80 Units under the skin daily. 3:30 10/15/22 11/14/22  Everardo Sky DO   irbesartan-hydrochlorothiazide (AVALIDE) 150-12.5 mg per tablet Take 1 tablet by mouth every morning.    ProviderSuyapa MD   levothyroxine 75 mcg tablet Take 75 mcg by mouth daily.    Fred Dow MD   meloxicam (MOBIC) 15 mg tablet Take 15 mg by mouth every morning.    Fred Dow MD   methocarbamoL 750 mg tablet Take 750 mg by mouth 3 (three) times a day as needed for muscle spasms. Always takes in am but doesn't always do tid 8/9/21   Fred Dow MD   norethindrone (AYGESTIN) 5 mg tablet Take 1 tablet (5 mg total) by mouth 2 (two) times a day. 1/22/24 1/21/25  Iqra Carter,    ONETOUCH ULTRA TEST strip TEST BLOOD SUGARS 4 TO 5 TIMES DAILY 12/12/23   Suyapa Dow MD   simvastatin 40 mg tablet Take 40 mg by mouth every morning. 11/25/20   Fred Dow MD       CBC Results       02/26/24 02/02/24 01/22/24     1202 1031 1803    WBC 11.40 11.57 14.63    RBC 4.38 3.85 4.16    HGB 12.6 11.8 12.6    HCT 40.3 36.9 39.1    MCV 92.0 95.8 94.0    MCH 28.8 30.6 30.3    MCHC 31.3 32.0 32.2     254 236          BMP Results       02/26/24 02/02/24 01/22/24     1202 1031 1803     142 141    K 4.8 4.3 3.6    Cl 109 109 103    CO2 26 26 24    Glucose 63 102 105    BUN 19 21 23    Creatinine 1.1 1.0 1.1    Calcium 9.3 8.9 9.3    Anion Gap 7 7 14    EGFR 56.6 >60.0 56.6         Comment for K at 1803 on 01/22/24: Results obtained on plasma. Plasma Potassium values may be up to 0.4 mEQ/L less than  "serum values. The differences may be greater for patients with high platelet or white cell counts.    Comment for EGFR at 1202 on 24: Calculation based on the Chronic Kidney Disease Epidemiology Collaboration (CKD-EPI) equation refit without adjustment for race.    Comment for EGFR at 1031 on 24: Calculation based on the Chronic Kidney Disease Epidemiology Collaboration (CKD-EPI) equation refit without adjustment for race.    Comment for EGFR at 1803 on 24: Calculation based on the Chronic Kidney Disease Epidemiology Collaboration (CKD-EPI) equation refit without adjustment for race.          No results found for: \"HCGPREGUR\", \"PREGSERUM\", \"HCG\", \"HCGQUANT\"          No orders to display         Anesthesia ROS/MED HX    Anesthesia History    Previous anesthetics (Grade 1 view with glidescope)  Pulmonary   no histpor of tobacco use  Cardiovascular   hypertension (Meds last taken 3/13)   ECG reviewed   Normal ECG  Endo/Other   Diabetes and patient Insulin dependent (Insulin taken 3/13 PM. BG 56 this AM. )   Hypothyroidism  Body Habitus: Morbidly Obese  ROS/MED HX Comments:    Endo: There is no height or weight on file to calculate BMI.       Past Surgical History:   Procedure Laterality Date   • APPENDECTOMY      laparotomy   •  SECTION  1999   • CYSTOSCOPY  2021    CYSTOSCOPY,INSERT URETERAL STENT   • CYSTOSCOPY  2022    CYSTO, RETRO, STENT, URETEROSCOPY, LASER LITHOTRIPSY, D&C   • DILATION AND CURETTAGE OF UTERUS      x 2 ** when patient was age 26, 2024   • OOPHORECTOMY      open RSO, right side secondary to torsion- during pregnancy   • WISDOM TOOTH EXTRACTION         Physical Exam    Airway   Mallampati: II   TM distance: >3 FB   Neck ROM: full  Cardiovascular    Rhythm: regular   Rate: normalPulmonary    clear to auscultation        Anesthesia Plan    Plan: general    Technique: general endotracheal     Lines and Monitors: PIV, additional IV and arterial line   3 " ASA  Anesthetic plan and risks discussed with: patient  Induction:    intravenous   Postop Plan:   Patient Disposition: phase II then home

## 2024-03-14 ENCOUNTER — HOSPITAL ENCOUNTER (INPATIENT)
Facility: HOSPITAL | Age: 64
LOS: 2 days | Discharge: HOME | DRG: 740 | End: 2024-03-16
Attending: OBSTETRICS & GYNECOLOGY | Admitting: OBSTETRICS & GYNECOLOGY
Payer: COMMERCIAL

## 2024-03-14 ENCOUNTER — ANESTHESIA (OUTPATIENT)
Dept: OPERATING ROOM | Facility: HOSPITAL | Age: 64
Setting detail: HOSPITAL OUTPATIENT SURGERY
DRG: 740 | End: 2024-03-14
Payer: COMMERCIAL

## 2024-03-14 DIAGNOSIS — C54.1 CARCINOMA OF ENDOMETRIUM (CMS/HCC): ICD-10-CM

## 2024-03-14 LAB
GLUCOSE BLD-MCNC: 117 MG/DL (ref 70–99)
GLUCOSE BLD-MCNC: 121 MG/DL (ref 70–99)
GLUCOSE BLD-MCNC: 150 MG/DL (ref 70–99)
GLUCOSE BLD-MCNC: 156 MG/DL (ref 70–99)
GLUCOSE BLD-MCNC: 54 MG/DL (ref 70–99)
GLUCOSE BLD-MCNC: 56 MG/DL (ref 70–99)
GLUCOSE BLD-MCNC: 84 MG/DL (ref 70–99)
GLUCOSE BLD-MCNC: 86 MG/DL (ref 70–99)
GLUCOSE BLD-MCNC: 99 MG/DL (ref 70–99)
POCT TEST: ABNORMAL
POCT TEST: NORMAL

## 2024-03-14 PROCEDURE — 63700000 HC SELF-ADMINISTRABLE DRUG

## 2024-03-14 PROCEDURE — 63700000 HC SELF-ADMINISTRABLE DRUG: Performed by: ANESTHESIOLOGY

## 2024-03-14 PROCEDURE — 71000001 HC PACU PHASE 1 INITIAL 30MIN: Performed by: OBSTETRICS & GYNECOLOGY

## 2024-03-14 PROCEDURE — 63600000 HC DRUGS/DETAIL CODE: Mod: JZ | Performed by: NURSE ANESTHETIST, CERTIFIED REGISTERED

## 2024-03-14 PROCEDURE — S2900 ROBOTIC SURGICAL SYSTEM: HCPCS | Performed by: OBSTETRICS & GYNECOLOGY

## 2024-03-14 PROCEDURE — 25000000 HC PHARMACY GENERAL: Performed by: NURSE ANESTHETIST, CERTIFIED REGISTERED

## 2024-03-14 PROCEDURE — 25000000 HC PHARMACY GENERAL: Performed by: ANESTHESIOLOGY

## 2024-03-14 PROCEDURE — 8E0W4CZ ROBOTIC ASSISTED PROCEDURE OF TRUNK REGION, PERCUTANEOUS ENDOSCOPIC APPROACH: ICD-10-PCS | Performed by: OBSTETRICS & GYNECOLOGY

## 2024-03-14 PROCEDURE — 88307 TISSUE EXAM BY PATHOLOGIST: CPT | Performed by: OBSTETRICS & GYNECOLOGY

## 2024-03-14 PROCEDURE — 63700000 HC SELF-ADMINISTRABLE DRUG: Performed by: OBSTETRICS & GYNECOLOGY

## 2024-03-14 PROCEDURE — 71000011 HC PACU PHASE 1 EA ADDL MIN: Performed by: OBSTETRICS & GYNECOLOGY

## 2024-03-14 PROCEDURE — 25800000 HC PHARMACY IV SOLUTIONS: Performed by: OBSTETRICS & GYNECOLOGY

## 2024-03-14 PROCEDURE — 58571 TLH W/T/O 250 G OR LESS: CPT | Mod: 82 | Performed by: OBSTETRICS & GYNECOLOGY

## 2024-03-14 PROCEDURE — 63600000 HC DRUGS/DETAIL CODE: Mod: JG | Performed by: OBSTETRICS & GYNECOLOGY

## 2024-03-14 PROCEDURE — 25800000 HC PHARMACY IV SOLUTIONS: Performed by: NURSE ANESTHETIST, CERTIFIED REGISTERED

## 2024-03-14 PROCEDURE — 38900 IO MAP OF SENT LYMPH NODE: CPT | Performed by: OBSTETRICS & GYNECOLOGY

## 2024-03-14 PROCEDURE — 07BC4ZX EXCISION OF PELVIS LYMPHATIC, PERCUTANEOUS ENDOSCOPIC APPROACH, DIAGNOSTIC: ICD-10-PCS | Performed by: OBSTETRICS & GYNECOLOGY

## 2024-03-14 PROCEDURE — 63600000 HC DRUGS/DETAIL CODE: Performed by: ANESTHESIOLOGY

## 2024-03-14 PROCEDURE — 58571 TLH W/T/O 250 G OR LESS: CPT | Mod: 22 | Performed by: OBSTETRICS & GYNECOLOGY

## 2024-03-14 PROCEDURE — 0UT9FZZ RESECTION OF UTERUS, VIA NATURAL OR ARTIFICIAL OPENING WITH PERCUTANEOUS ENDOSCOPIC ASSISTANCE: ICD-10-PCS | Performed by: OBSTETRICS & GYNECOLOGY

## 2024-03-14 PROCEDURE — 63600000 HC DRUGS/DETAIL CODE: Performed by: OBSTETRICS & GYNECOLOGY

## 2024-03-14 PROCEDURE — 63600000 HC DRUGS/DETAIL CODE

## 2024-03-14 PROCEDURE — 36000006 HC OR LEVEL 6 INITIAL 30MIN: Performed by: OBSTETRICS & GYNECOLOGY

## 2024-03-14 PROCEDURE — 0UT6FZZ RESECTION OF LEFT FALLOPIAN TUBE, VIA NATURAL OR ARTIFICIAL OPENING WITH PERCUTANEOUS ENDOSCOPIC ASSISTANCE: ICD-10-PCS | Performed by: OBSTETRICS & GYNECOLOGY

## 2024-03-14 PROCEDURE — 36000016 HC OR LEVEL 6 EA ADDL MIN: Performed by: OBSTETRICS & GYNECOLOGY

## 2024-03-14 PROCEDURE — 27200000 HC STERILE SUPPLY: Performed by: OBSTETRICS & GYNECOLOGY

## 2024-03-14 PROCEDURE — 37000001 HC ANESTHESIA GENERAL: Performed by: OBSTETRICS & GYNECOLOGY

## 2024-03-14 PROCEDURE — 38570 LAPAROSCOPY LYMPH NODE BIOP: CPT | Performed by: OBSTETRICS & GYNECOLOGY

## 2024-03-14 PROCEDURE — 38570 LAPAROSCOPY LYMPH NODE BIOP: CPT | Mod: 82 | Performed by: OBSTETRICS & GYNECOLOGY

## 2024-03-14 PROCEDURE — 63600000 HC DRUGS/DETAIL CODE: Performed by: NURSE ANESTHETIST, CERTIFIED REGISTERED

## 2024-03-14 PROCEDURE — 0UT1FZZ RESECTION OF LEFT OVARY, VIA NATURAL OR ARTIFICIAL OPENING WITH PERCUTANEOUS ENDOSCOPIC ASSISTANCE: ICD-10-PCS | Performed by: OBSTETRICS & GYNECOLOGY

## 2024-03-14 PROCEDURE — 0TJB8ZZ INSPECTION OF BLADDER, VIA NATURAL OR ARTIFICIAL OPENING ENDOSCOPIC: ICD-10-PCS | Performed by: OBSTETRICS & GYNECOLOGY

## 2024-03-14 PROCEDURE — 12000000 HC ROOM AND CARE MED/SURG

## 2024-03-14 RX ORDER — ROCURONIUM BROMIDE 10 MG/ML
INJECTION, SOLUTION INTRAVENOUS AS NEEDED
Status: DISCONTINUED | OUTPATIENT
Start: 2024-03-14 | End: 2024-03-14 | Stop reason: SURG

## 2024-03-14 RX ORDER — ONDANSETRON HYDROCHLORIDE 2 MG/ML
4 INJECTION, SOLUTION INTRAVENOUS EVERY 8 HOURS PRN
Status: DISCONTINUED | OUTPATIENT
Start: 2024-03-14 | End: 2024-03-16 | Stop reason: HOSPADM

## 2024-03-14 RX ORDER — PROPOFOL 10 MG/ML
INJECTION, EMULSION INTRAVENOUS AS NEEDED
Status: DISCONTINUED | OUTPATIENT
Start: 2024-03-14 | End: 2024-03-14 | Stop reason: SURG

## 2024-03-14 RX ORDER — SODIUM CHLORIDE, SODIUM GLUCONATE, SODIUM ACETATE, POTASSIUM CHLORIDE AND MAGNESIUM CHLORIDE 30; 37; 368; 526; 502 MG/100ML; MG/100ML; MG/100ML; MG/100ML; MG/100ML
80 INJECTION, SOLUTION INTRAVENOUS CONTINUOUS
Status: DISCONTINUED | OUTPATIENT
Start: 2024-03-14 | End: 2024-03-14

## 2024-03-14 RX ORDER — HYDROCHLOROTHIAZIDE 25 MG/1
12.5 TABLET ORAL DAILY
Status: DISCONTINUED | OUTPATIENT
Start: 2024-03-14 | End: 2024-03-16 | Stop reason: HOSPADM

## 2024-03-14 RX ORDER — DIPHENHYDRAMINE HCL 50 MG/ML
12.5 VIAL (ML) INJECTION
Status: DISCONTINUED | OUTPATIENT
Start: 2024-03-14 | End: 2024-03-14 | Stop reason: HOSPADM

## 2024-03-14 RX ORDER — DEXTROSE 40 %
15-30 GEL (GRAM) ORAL AS NEEDED
Status: DISCONTINUED | OUTPATIENT
Start: 2024-03-14 | End: 2024-03-16 | Stop reason: HOSPADM

## 2024-03-14 RX ORDER — PHENAZOPYRIDINE HYDROCHLORIDE 95 MG/1
95 TABLET ORAL ONCE
Status: COMPLETED | OUTPATIENT
Start: 2024-03-14 | End: 2024-03-14

## 2024-03-14 RX ORDER — ATORVASTATIN CALCIUM 20 MG/1
20 TABLET, FILM COATED ORAL EVERY EVENING
Status: DISCONTINUED | OUTPATIENT
Start: 2024-03-14 | End: 2024-03-16 | Stop reason: HOSPADM

## 2024-03-14 RX ORDER — OXYCODONE HYDROCHLORIDE 5 MG/1
5 TABLET ORAL ONCE AS NEEDED
Status: DISCONTINUED | OUTPATIENT
Start: 2024-03-14 | End: 2024-03-14 | Stop reason: HOSPADM

## 2024-03-14 RX ORDER — DIPHENHYDRAMINE HCL 25 MG
25 CAPSULE ORAL EVERY 6 HOURS PRN
Status: DISCONTINUED | OUTPATIENT
Start: 2024-03-14 | End: 2024-03-16 | Stop reason: HOSPADM

## 2024-03-14 RX ORDER — OXYCODONE HYDROCHLORIDE 5 MG/1
5 TABLET ORAL EVERY 4 HOURS PRN
Status: DISCONTINUED | OUTPATIENT
Start: 2024-03-14 | End: 2024-03-16 | Stop reason: HOSPADM

## 2024-03-14 RX ORDER — FENTANYL CITRATE 50 UG/ML
50 INJECTION, SOLUTION INTRAMUSCULAR; INTRAVENOUS EVERY 5 MIN PRN
Status: COMPLETED | OUTPATIENT
Start: 2024-03-14 | End: 2024-03-14

## 2024-03-14 RX ORDER — KETAMINE HYDROCHLORIDE 10 MG/ML
INJECTION, SOLUTION INTRAMUSCULAR; INTRAVENOUS AS NEEDED
Status: DISCONTINUED | OUTPATIENT
Start: 2024-03-14 | End: 2024-03-14 | Stop reason: SURG

## 2024-03-14 RX ORDER — ENOXAPARIN SODIUM 100 MG/ML
40 INJECTION SUBCUTANEOUS EVERY 12 HOURS
Status: DISCONTINUED | OUTPATIENT
Start: 2024-03-15 | End: 2024-03-16 | Stop reason: HOSPADM

## 2024-03-14 RX ORDER — PHENYLEPHRINE HYDROCHLORIDE 10 MG/ML
INJECTION INTRAVENOUS AS NEEDED
Status: DISCONTINUED | OUTPATIENT
Start: 2024-03-14 | End: 2024-03-14 | Stop reason: SURG

## 2024-03-14 RX ORDER — BUPIVACAINE HYDROCHLORIDE 5 MG/ML
INJECTION, SOLUTION PERINEURAL
Status: DISCONTINUED | OUTPATIENT
Start: 2024-03-14 | End: 2024-03-14 | Stop reason: HOSPADM

## 2024-03-14 RX ORDER — ONDANSETRON HYDROCHLORIDE 2 MG/ML
INJECTION, SOLUTION INTRAVENOUS AS NEEDED
Status: DISCONTINUED | OUTPATIENT
Start: 2024-03-14 | End: 2024-03-14 | Stop reason: SURG

## 2024-03-14 RX ORDER — IBUPROFEN 200 MG
16-32 TABLET ORAL AS NEEDED
Status: DISCONTINUED | OUTPATIENT
Start: 2024-03-14 | End: 2024-03-16 | Stop reason: HOSPADM

## 2024-03-14 RX ORDER — DEXTROSE 50 % IN WATER (D50W) INTRAVENOUS SYRINGE
25 AS NEEDED
Status: DISCONTINUED | OUTPATIENT
Start: 2024-03-14 | End: 2024-03-14 | Stop reason: HOSPADM

## 2024-03-14 RX ORDER — AMOXICILLIN 250 MG
1 CAPSULE ORAL 2 TIMES DAILY
Status: DISCONTINUED | OUTPATIENT
Start: 2024-03-14 | End: 2024-03-16 | Stop reason: HOSPADM

## 2024-03-14 RX ORDER — DEXTROSE 50 % IN WATER (D50W) INTRAVENOUS SYRINGE
25 ONCE
Status: COMPLETED | OUTPATIENT
Start: 2024-03-14 | End: 2024-03-14

## 2024-03-14 RX ORDER — POLYETHYLENE GLYCOL 3350 17 G/17G
17 POWDER, FOR SOLUTION ORAL DAILY
Status: DISCONTINUED | OUTPATIENT
Start: 2024-03-14 | End: 2024-03-16 | Stop reason: HOSPADM

## 2024-03-14 RX ORDER — HEPARIN SODIUM 5000 [USP'U]/ML
5000 INJECTION, SOLUTION INTRAVENOUS; SUBCUTANEOUS ONCE
Status: COMPLETED | OUTPATIENT
Start: 2024-03-14 | End: 2024-03-14

## 2024-03-14 RX ORDER — ACETAMINOPHEN 325 MG/1
975 TABLET ORAL ONCE
Status: COMPLETED | OUTPATIENT
Start: 2024-03-14 | End: 2024-03-14

## 2024-03-14 RX ORDER — HYDROMORPHONE HYDROCHLORIDE 1 MG/ML
0.25 INJECTION, SOLUTION INTRAMUSCULAR; INTRAVENOUS; SUBCUTANEOUS EVERY 4 HOURS PRN
Status: DISCONTINUED | OUTPATIENT
Start: 2024-03-14 | End: 2024-03-16 | Stop reason: HOSPADM

## 2024-03-14 RX ORDER — IBUPROFEN 200 MG
16-32 TABLET ORAL AS NEEDED
Status: DISCONTINUED | OUTPATIENT
Start: 2024-03-14 | End: 2024-03-14 | Stop reason: HOSPADM

## 2024-03-14 RX ORDER — KETOROLAC TROMETHAMINE 30 MG/ML
INJECTION, SOLUTION INTRAMUSCULAR; INTRAVENOUS AS NEEDED
Status: DISCONTINUED | OUTPATIENT
Start: 2024-03-14 | End: 2024-03-14 | Stop reason: SURG

## 2024-03-14 RX ORDER — HYDROMORPHONE HYDROCHLORIDE 1 MG/ML
0.5 INJECTION, SOLUTION INTRAMUSCULAR; INTRAVENOUS; SUBCUTANEOUS
Status: DISCONTINUED | OUTPATIENT
Start: 2024-03-14 | End: 2024-03-14 | Stop reason: HOSPADM

## 2024-03-14 RX ORDER — CLINDAMYCIN PHOSPHATE 900 MG/50ML
900 INJECTION, SOLUTION INTRAVENOUS
Status: COMPLETED | OUTPATIENT
Start: 2024-03-14 | End: 2024-03-14

## 2024-03-14 RX ORDER — KETOROLAC TROMETHAMINE 15 MG/ML
15 INJECTION, SOLUTION INTRAMUSCULAR; INTRAVENOUS
Status: COMPLETED | OUTPATIENT
Start: 2024-03-14 | End: 2024-03-15

## 2024-03-14 RX ORDER — SODIUM CHLORIDE, SODIUM LACTATE, POTASSIUM CHLORIDE, CALCIUM CHLORIDE 600; 310; 30; 20 MG/100ML; MG/100ML; MG/100ML; MG/100ML
INJECTION, SOLUTION INTRAVENOUS CONTINUOUS
Status: DISCONTINUED | OUTPATIENT
Start: 2024-03-14 | End: 2024-03-15

## 2024-03-14 RX ORDER — DEXTROSE 40 %
15-30 GEL (GRAM) ORAL AS NEEDED
Status: DISCONTINUED | OUTPATIENT
Start: 2024-03-14 | End: 2024-03-14 | Stop reason: HOSPADM

## 2024-03-14 RX ORDER — SODIUM CHLORIDE, SODIUM GLUCONATE, SODIUM ACETATE, POTASSIUM CHLORIDE AND MAGNESIUM CHLORIDE 30; 37; 368; 526; 502 MG/100ML; MG/100ML; MG/100ML; MG/100ML; MG/100ML
INJECTION, SOLUTION INTRAVENOUS CONTINUOUS PRN
Status: DISCONTINUED | OUTPATIENT
Start: 2024-03-14 | End: 2024-03-14 | Stop reason: SURG

## 2024-03-14 RX ORDER — IBUPROFEN 600 MG/1
600 TABLET ORAL
Status: DISCONTINUED | OUTPATIENT
Start: 2024-03-15 | End: 2024-03-16 | Stop reason: HOSPADM

## 2024-03-14 RX ORDER — ACETAMINOPHEN 325 MG/1
975 TABLET ORAL
Status: DISCONTINUED | OUTPATIENT
Start: 2024-03-14 | End: 2024-03-16 | Stop reason: HOSPADM

## 2024-03-14 RX ORDER — LIDOCAINE HYDROCHLORIDE 10 MG/ML
INJECTION, SOLUTION INFILTRATION; PERINEURAL AS NEEDED
Status: DISCONTINUED | OUTPATIENT
Start: 2024-03-14 | End: 2024-03-14 | Stop reason: SURG

## 2024-03-14 RX ORDER — CELECOXIB 200 MG/1
400 CAPSULE ORAL ONCE
Status: COMPLETED | OUTPATIENT
Start: 2024-03-14 | End: 2024-03-14

## 2024-03-14 RX ORDER — DULOXETIN HYDROCHLORIDE 20 MG/1
20 CAPSULE, DELAYED RELEASE ORAL 2 TIMES DAILY
Status: DISCONTINUED | OUTPATIENT
Start: 2024-03-14 | End: 2024-03-16 | Stop reason: HOSPADM

## 2024-03-14 RX ORDER — LOSARTAN POTASSIUM 50 MG/1
50 TABLET ORAL DAILY
Status: DISCONTINUED | OUTPATIENT
Start: 2024-03-14 | End: 2024-03-16 | Stop reason: HOSPADM

## 2024-03-14 RX ORDER — INSULIN LISPRO 100 [IU]/ML
6-10 INJECTION, SOLUTION INTRAVENOUS; SUBCUTANEOUS
Status: DISCONTINUED | OUTPATIENT
Start: 2024-03-14 | End: 2024-03-16 | Stop reason: HOSPADM

## 2024-03-14 RX ORDER — LEVOTHYROXINE SODIUM 75 UG/1
75 TABLET ORAL
Status: DISCONTINUED | OUTPATIENT
Start: 2024-03-14 | End: 2024-03-16 | Stop reason: HOSPADM

## 2024-03-14 RX ORDER — SODIUM CHLORIDE 9 MG/ML
INJECTION, SOLUTION INTRAVENOUS CONTINUOUS PRN
Status: DISCONTINUED | OUTPATIENT
Start: 2024-03-14 | End: 2024-03-14 | Stop reason: SURG

## 2024-03-14 RX ORDER — METOCLOPRAMIDE HYDROCHLORIDE 5 MG/ML
10 INJECTION INTRAMUSCULAR; INTRAVENOUS
Status: DISCONTINUED | OUTPATIENT
Start: 2024-03-14 | End: 2024-03-14 | Stop reason: HOSPADM

## 2024-03-14 RX ORDER — ONDANSETRON 4 MG/1
4 TABLET, ORALLY DISINTEGRATING ORAL EVERY 8 HOURS PRN
Status: DISCONTINUED | OUTPATIENT
Start: 2024-03-14 | End: 2024-03-16 | Stop reason: HOSPADM

## 2024-03-14 RX ORDER — SCOPOLAMINE 1 MG/3D
1 PATCH, EXTENDED RELEASE TRANSDERMAL
Status: DISCONTINUED | OUTPATIENT
Start: 2024-03-14 | End: 2024-03-14 | Stop reason: HOSPADM

## 2024-03-14 RX ORDER — MEPERIDINE HYDROCHLORIDE 25 MG/ML
12.5 INJECTION INTRAMUSCULAR; INTRAVENOUS; SUBCUTANEOUS EVERY 10 MIN PRN
Status: DISCONTINUED | OUTPATIENT
Start: 2024-03-14 | End: 2024-03-14 | Stop reason: HOSPADM

## 2024-03-14 RX ORDER — ONDANSETRON HYDROCHLORIDE 2 MG/ML
4 INJECTION, SOLUTION INTRAVENOUS
Status: DISCONTINUED | OUTPATIENT
Start: 2024-03-14 | End: 2024-03-14 | Stop reason: HOSPADM

## 2024-03-14 RX ORDER — FENTANYL CITRATE 50 UG/ML
INJECTION, SOLUTION INTRAMUSCULAR; INTRAVENOUS AS NEEDED
Status: DISCONTINUED | OUTPATIENT
Start: 2024-03-14 | End: 2024-03-14 | Stop reason: SURG

## 2024-03-14 RX ORDER — GABAPENTIN 300 MG/1
600 CAPSULE ORAL 3 TIMES DAILY
Status: DISCONTINUED | OUTPATIENT
Start: 2024-03-14 | End: 2024-03-16 | Stop reason: HOSPADM

## 2024-03-14 RX ORDER — DIPHENHYDRAMINE HCL 50 MG/ML
25 VIAL (ML) INJECTION EVERY 6 HOURS PRN
Status: DISCONTINUED | OUTPATIENT
Start: 2024-03-14 | End: 2024-03-16 | Stop reason: HOSPADM

## 2024-03-14 RX ORDER — DEXMEDETOMIDINE HYDROCHLORIDE 100 UG/ML
INJECTION, SOLUTION INTRAVENOUS AS NEEDED
Status: DISCONTINUED | OUTPATIENT
Start: 2024-03-14 | End: 2024-03-14 | Stop reason: SURG

## 2024-03-14 RX ORDER — DEXAMETHASONE SODIUM PHOSPHATE 4 MG/ML
INJECTION, SOLUTION INTRA-ARTICULAR; INTRALESIONAL; INTRAMUSCULAR; INTRAVENOUS; SOFT TISSUE AS NEEDED
Status: DISCONTINUED | OUTPATIENT
Start: 2024-03-14 | End: 2024-03-14 | Stop reason: SURG

## 2024-03-14 RX ORDER — DEXTROSE 50 % IN WATER (D50W) INTRAVENOUS SYRINGE
25 AS NEEDED
Status: DISCONTINUED | OUTPATIENT
Start: 2024-03-14 | End: 2024-03-16 | Stop reason: HOSPADM

## 2024-03-14 RX ADMIN — GENTAMICIN SULFATE 280 MG: 40 INJECTION, SOLUTION INTRAMUSCULAR; INTRAVENOUS at 07:58

## 2024-03-14 RX ADMIN — KETOROLAC TROMETHAMINE 30 MG: 30 INJECTION, SOLUTION INTRAMUSCULAR; INTRAVENOUS at 13:53

## 2024-03-14 RX ADMIN — Medication 10 MG: at 12:22

## 2024-03-14 RX ADMIN — ROCURONIUM BROMIDE 20 MG: 10 INJECTION, SOLUTION INTRAVENOUS at 10:03

## 2024-03-14 RX ADMIN — Medication 10 MG: at 13:03

## 2024-03-14 RX ADMIN — ACETAMINOPHEN 975 MG: 325 TABLET ORAL at 21:36

## 2024-03-14 RX ADMIN — FENTANYL CITRATE 50 MCG: 50 INJECTION, SOLUTION INTRAMUSCULAR; INTRAVENOUS at 10:15

## 2024-03-14 RX ADMIN — SODIUM CHLORIDE, SODIUM GLUCONATE, SODIUM ACETATE, POTASSIUM CHLORIDE AND MAGNESIUM CHLORIDE: 526; 502; 368; 37; 30 INJECTION, SOLUTION INTRAVENOUS at 07:44

## 2024-03-14 RX ADMIN — ROCURONIUM BROMIDE 10 MG: 10 INJECTION, SOLUTION INTRAVENOUS at 11:58

## 2024-03-14 RX ADMIN — CELECOXIB 400 MG: 200 CAPSULE ORAL at 07:26

## 2024-03-14 RX ADMIN — SCOPOLAMINE 1 PATCH: 1.5 PATCH, EXTENDED RELEASE TRANSDERMAL at 07:34

## 2024-03-14 RX ADMIN — DEXAMETHASONE SODIUM PHOSPHATE 6 MG: 4 INJECTION, SOLUTION INTRAMUSCULAR; INTRAVENOUS at 07:52

## 2024-03-14 RX ADMIN — LIDOCAINE HYDROCHLORIDE 5 ML: 10 INJECTION, SOLUTION INFILTRATION; PERINEURAL at 07:52

## 2024-03-14 RX ADMIN — ROCURONIUM BROMIDE 100 MG: 10 INJECTION, SOLUTION INTRAVENOUS at 07:52

## 2024-03-14 RX ADMIN — DEXMEDETOMIDINE HYDROCHLORIDE 20 MCG: 100 INJECTION, SOLUTION INTRAVENOUS at 07:52

## 2024-03-14 RX ADMIN — HYDROMORPHONE HYDROCHLORIDE 0.25 MG: 1 INJECTION, SOLUTION INTRAMUSCULAR; INTRAVENOUS; SUBCUTANEOUS at 20:27

## 2024-03-14 RX ADMIN — Medication 50 MG: at 08:02

## 2024-03-14 RX ADMIN — PHENAZOPYRIDINE HYDROCHLORIDE 95 MG: 95 TABLET ORAL at 07:26

## 2024-03-14 RX ADMIN — SUGAMMADEX 400 MG: 100 INJECTION, SOLUTION INTRAVENOUS at 14:03

## 2024-03-14 RX ADMIN — FENTANYL CITRATE 50 MCG: 50 INJECTION, SOLUTION INTRAMUSCULAR; INTRAVENOUS at 08:19

## 2024-03-14 RX ADMIN — GABAPENTIN 600 MG: 300 CAPSULE ORAL at 21:34

## 2024-03-14 RX ADMIN — FENTANYL CITRATE 50 MCG: 50 INJECTION INTRAMUSCULAR; INTRAVENOUS at 14:26

## 2024-03-14 RX ADMIN — KETOROLAC TROMETHAMINE 15 MG: 15 INJECTION, SOLUTION INTRAMUSCULAR; INTRAVENOUS at 22:33

## 2024-03-14 RX ADMIN — DEXMEDETOMIDINE HYDROCHLORIDE 8 MCG: 100 INJECTION, SOLUTION INTRAVENOUS at 10:29

## 2024-03-14 RX ADMIN — ACETAMINOPHEN 975 MG: 325 TABLET ORAL at 07:26

## 2024-03-14 RX ADMIN — SODIUM CHLORIDE, SODIUM GLUCONATE, SODIUM ACETATE, POTASSIUM CHLORIDE AND MAGNESIUM CHLORIDE: 526; 502; 368; 37; 30 INJECTION, SOLUTION INTRAVENOUS at 08:52

## 2024-03-14 RX ADMIN — KETOROLAC TROMETHAMINE 15 MG: 15 INJECTION, SOLUTION INTRAMUSCULAR; INTRAVENOUS at 17:19

## 2024-03-14 RX ADMIN — LOSARTAN POTASSIUM 50 MG: 50 TABLET, FILM COATED ORAL at 17:19

## 2024-03-14 RX ADMIN — HYDROMORPHONE HYDROCHLORIDE 0.5 MG: 1 INJECTION, SOLUTION INTRAMUSCULAR; INTRAVENOUS; SUBCUTANEOUS at 14:44

## 2024-03-14 RX ADMIN — CLINDAMYCIN PHOSPHATE 900 MG: 900 INJECTION, SOLUTION INTRAVENOUS at 07:58

## 2024-03-14 RX ADMIN — PROPOFOL 200 MG: 10 INJECTION, EMULSION INTRAVENOUS at 07:52

## 2024-03-14 RX ADMIN — ACETAMINOPHEN 975 MG: 325 TABLET ORAL at 15:34

## 2024-03-14 RX ADMIN — PHENYLEPHRINE HYDROCHLORIDE 100 MCG: 10 INJECTION INTRAVENOUS at 13:28

## 2024-03-14 RX ADMIN — ATORVASTATIN CALCIUM 20 MG: 20 TABLET, FILM COATED ORAL at 17:19

## 2024-03-14 RX ADMIN — FENTANYL CITRATE 50 MCG: 50 INJECTION, SOLUTION INTRAMUSCULAR; INTRAVENOUS at 10:29

## 2024-03-14 RX ADMIN — DULOXETINE 20 MG: 20 CAPSULE, DELAYED RELEASE ORAL at 21:35

## 2024-03-14 RX ADMIN — HEPARIN SODIUM 5000 UNITS: 5000 INJECTION, SOLUTION INTRAVENOUS; SUBCUTANEOUS at 08:08

## 2024-03-14 RX ADMIN — SODIUM CHLORIDE, POTASSIUM CHLORIDE, SODIUM LACTATE AND CALCIUM CHLORIDE: 600; 310; 30; 20 INJECTION, SOLUTION INTRAVENOUS at 17:09

## 2024-03-14 RX ADMIN — FENTANYL CITRATE 50 MCG: 50 INJECTION INTRAMUSCULAR; INTRAVENOUS at 14:33

## 2024-03-14 RX ADMIN — DEXTROSE MONOHYDRATE 12.5 G: 25 INJECTION, SOLUTION INTRAVENOUS at 07:34

## 2024-03-14 RX ADMIN — DOCUSATE SODIUM AND SENNOSIDES 1 TABLET: 8.6; 5 TABLET, FILM COATED ORAL at 21:35

## 2024-03-14 RX ADMIN — FENTANYL CITRATE 50 MCG: 50 INJECTION, SOLUTION INTRAMUSCULAR; INTRAVENOUS at 07:54

## 2024-03-14 RX ADMIN — OXYCODONE HYDROCHLORIDE 5 MG: 5 TABLET ORAL at 18:44

## 2024-03-14 RX ADMIN — ONDANSETRON HYDROCHLORIDE 4 MG: 2 SOLUTION INTRAMUSCULAR; INTRAVENOUS at 13:34

## 2024-03-14 RX ADMIN — POLYETHYLENE GLYCOL 3350 17 G: 17 POWDER, FOR SOLUTION ORAL at 17:18

## 2024-03-14 RX ADMIN — ROCURONIUM BROMIDE 10 MG: 10 INJECTION, SOLUTION INTRAVENOUS at 10:29

## 2024-03-14 RX ADMIN — HYDROCHLOROTHIAZIDE 12.5 MG: 25 TABLET ORAL at 17:19

## 2024-03-14 RX ADMIN — ROCURONIUM BROMIDE 10 MG: 10 INJECTION, SOLUTION INTRAVENOUS at 12:59

## 2024-03-14 RX ADMIN — HYDROMORPHONE HYDROCHLORIDE 0.5 MG: 1 INJECTION, SOLUTION INTRAMUSCULAR; INTRAVENOUS; SUBCUTANEOUS at 15:06

## 2024-03-14 RX ADMIN — SODIUM CHLORIDE: 9 INJECTION, SOLUTION INTRAVENOUS at 07:44

## 2024-03-14 RX ADMIN — DEXMEDETOMIDINE HYDROCHLORIDE 8 MCG: 100 INJECTION, SOLUTION INTRAVENOUS at 13:32

## 2024-03-14 ASSESSMENT — COGNITIVE AND FUNCTIONAL STATUS - GENERAL
WALKING IN HOSPITAL ROOM: 2 - A LOT
CLIMB 3 TO 5 STEPS WITH RAILING: 2 - A LOT
MOVING TO AND FROM BED TO CHAIR: 3 - A LITTLE
WALKING IN HOSPITAL ROOM: 2 - A LOT
CLIMB 3 TO 5 STEPS WITH RAILING: 2 - A LOT
STANDING UP FROM CHAIR USING ARMS: 3 - A LITTLE
MOVING TO AND FROM BED TO CHAIR: 3 - A LITTLE
STANDING UP FROM CHAIR USING ARMS: 3 - A LITTLE

## 2024-03-14 NOTE — DISCHARGE INSTRUCTIONS
[facility]  Laparoscopic Surgery  Discharge Instructions      What to Expect    Although your surgery was done laparoscopically, it is still surgery and may take some time to recover. Please resume activities slowly.    It is normal for one incision to be slighter larger and sometimes more painful than the rest.    You can continue the ibuprofen 600 mg every 6 hours and acetaminophen  1000mg every 6 hours that you were taking in the hospital to decrease or prevent pain.  Please be sure to take these medications with food to avoid stomach upset.You may be given a prescription for narcotic pain medicine (usually tramadol or Dilaudid).The narcotic medicine is used for moderate to severe pain. Understand that narcotics cause constipation and can be addictive.     Try to drink plenty of fluids.  We recommend taking one dose of Miralax daily and a  stool softener (Colace/Docusate) while taking narcotic pain medications or if you are prone to constipation.       Activity    Lifting: Do not lift more than 25lbs.  for at least 2 weeks from the time of surgery     Climbing stairs is ok. Gradually increase the amount as tolerated.    If you have had a hysterectomy performed no tampons, douching or intercourse until you’re examined and given the approval from the doctor (usually around 6-8 weeks from surgery).    You may resume driving when you are off narcotics for 24 hours and pain-free enough to react quickly with your braking foot.    You may shower normally.  The incisions usually have dissolvable sutures or glue at the incision sites. Please keep clean and dry. Just pat the area dry after a shower. A small amount of redness at the skin edge is normal and a small amount of bloody or clear discharge can be expected.    PLEASE CALL THE OFFICE [phone] TO SCHEDULE A FOLLOW-UP EXAM IN 2 WEEKS FROM TIME OF SURGERY.    PLEASE CALL THE OFFICE IF YOU EXPERIENCE ANY OF THE FOLLOWING:  - Heavy vaginal  bleeding.  This is if you are using about 1 pad per hour.  - Fever greater than 101.0.   - Worsening pain or pain not relieved by pain medications.  - Constipation not relieved by laxatives.  - Persistent nausea or vomiting or any other concerns.

## 2024-03-14 NOTE — ANESTHESIOLOGIST PRE-PROCEDURE ATTESTATION
Pre-Procedure Patient Identification:  I am the Primary Anesthesiologist and have identified the patient on 03/14/24 at 7:24 AM.   I have confirmed the procedure(s) will be performed by the following surgeon/proceduralist Yohana Ronquillo MD.

## 2024-03-14 NOTE — PLAN OF CARE
Problem: Adult Inpatient Plan of Care  Goal: Plan of Care Review  Outcome: Progressing  Flowsheets (Taken 3/14/2024 1846)  Progress: improving  Outcome Evaluation: Pt from PACU s/p robotic TLH, AAOX4, VSS. Able to make wants and needs known. Pt with c/o abdominal pain, Toradol and prn oxycodone administered per order. Abdominal lap site dressings intact, some bloody drainage noted. Pt voided 200cc. Tolerating regular diet and PO fluids well. Pt ambulates with walker and assist x1-2. FSP maintained. Call bell remains in reach.  Plan of Care Reviewed With: patient   Plan of Care Review  Plan of Care Reviewed With: patient  Progress: improving  Outcome Evaluation: Pt from PACU s/p robotic TLH, AAOX4, VSS. Able to make wants and needs known. Pt with c/o abdominal pain, Toradol and prn oxycodone administered per order. Abdominal lap site dressings intact, some bloody drainage noted. Pt voided 200cc. Tolerating regular diet and PO fluids well. Pt ambulates with walker and assist x1-2. FSP maintained. Call bell remains in reach.

## 2024-03-14 NOTE — H&P
Agree with H&P below    ---    Gynecologic Oncology Clinic Visit     PATIENT ID:  Xin Melendez is a 63 y.o. female.  REFERRING PHYSICIAN:   Iqra Carter DO  [address]  [address]  [place] [id number]  PRIMARY CARE PROVIDER:  Rasta Ortiz MD     HPI: Ms. Xin Melendez is a 63 y.o. female referred by Dr. 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     Her history is summarized below.  But briefly, she was being followed for EIN with serial endometrial sampling. On this most recent hysteroscopy and D&C, she was found to have expelled her IUD (no IUD seen on hysteroscopy) and was also found to have grade 2 endometrioid adenocarcinoma. She was referred to gyn oncology for further management.     Today she is here with one her best friends, who is contributing to her HPI.        Brief Treatment History      Oncology History   Carcinoma of endometrium (CMS/HCC)   8/3/2022 Imaging Significant Findings     Presented with postmenopausal vaginal bleeding  Pelvic US: Enlarged fibroid uterus. Thickened endometrium measuring up to 2 cm.       8/8/2022 Biopsy     EUA/EMB: fragments of endometrium with hyperplasia and EIN      8/22/2022 Surgery     D&C/IUD placement: fragments of endometrium with complex atypical hyperplasia (EIN)      4/6/2023 Biopsy     dxHSC/D&C/IUD removal and replacement: Complex papillary mucinous metaplasia. Background fragments of endometrial polyp(s). Stromal decidual changes compatible with exogenous hormone effect.      2/6/2024 Biopsy     dxHSC/D&C/IUD insertion: grade 2 endometrioid adenocarcinoma     Of note, no IUD was seen in her endometrial cavity during diagnostic hysteroscopy. LNG-IUD replaced during her procedure.      2/6/2024 Initial Diagnosis     Carcinoma of endometrium (CMS/HCC)               Medical History:        Past Medical History:   Diagnosis Date   • Back pain     • Bacteremia due to Escherichia coli  2021   • COVID-19 vaccine series completed       Moderna Boster 10/2021   • CTS (carpal tunnel syndrome)     • DJD (degenerative joint disease)     • Edema     • HL (hearing loss)     • Hypertension     • Hypothyroidism     • Kidney stones     • Lipid disorder     • Lymphedema     • Obesity     • Sciatica     • Shoulder pain       bilateral   • Stasis dermatitis     • Type 2 diabetes mellitus (CMS/HCC)               Patient Active Problem List   Diagnosis   • Calculus of proximal right ureter   • Morbid obesity with BMI of 50.0-59.9, adult (CMS/HCC)   • HTN (hypertension)   • Type 2 diabetes mellitus (CMS/HCC)   • Lymphedema due to venous insufficiency   • Hyperlipidemia   • Hypothyroidism   • Osteoarthritis   • Sciatica   • Left ureteral stone   • DARCY (acute kidney injury) (CMS/HCC)   • Pyelonephritis   • Hydroureteronephrosis   • Leucocytosis   • Flank pain   • Postmenopausal bleeding   • EIN (endometrial intraepithelial neoplasia)   • Infertility, female   • Cellulitis of left lower extremity   • Ambulatory dysfunction   • Azotemia   • Type 2 diabetes mellitus, with long-term current use of insulin (CMS/HCC)   • Bilateral leg pain   • Carcinoma of endometrium (CMS/HCC)            Surgical History:         Past Surgical History:   Procedure Laterality Date   • APPENDECTOMY         laparotomy   •  SECTION   1999   • CYSTOSCOPY   2021     CYSTOSCOPY,INSERT URETERAL STENT   • CYSTOSCOPY   2022     CYSTO, RETRO, STENT, URETEROSCOPY, LASER LITHOTRIPSY, D&C   • DILATION AND CURETTAGE OF UTERUS         when patient was age 26   • OOPHORECTOMY        open RSO, right side secondary to torsion- during pregnancy   • WISDOM TOOTH EXTRACTION             OB History:           OB History         1    Para   1    Term   0       0    AB   0    Living   1        SAB   0    IAB   0    Ectopic   0    Multiple   0    Live Births   1             Obstetric Comments     CS  x1  Tammy TAB/SABs     Menarche at age 12  Menopause at age 55  Denies using HRT  Denies history of abnormal pap smears                Social History:   Social History            Socioeconomic History   • Marital status:        Spouse name: None   • Number of children: 1   • Years of education: None   • Highest education level: None   Occupational History   • Occupation: Unemployed   Tobacco Use   • Smoking status: Former       Types: Cigarettes       Quit date:        Years since quittin.1   • Smokeless tobacco: Current   • Tobacco comments:       Ex-smoker who smoked socially in college for approximately 4 years.   Vaping Use   • Vaping Use: Never used   Substance and Sexual Activity   • Alcohol use: Not Currently       Comment: No current alcohol use, socially in college.   • Drug use: Not Currently   • Sexual activity: Not Currently   Social History Narrative     Lives with son, Edd, in a 2 story home has a chair lift.  She is .  She designates her close friend, Akila León, as an emergency medical proxy.  Akila can be reached at [phone].           The patient is currently on disability.  She previously worked as a  as well as an .           The patient tells me that she smoked socially in college for 4 years.     No current alcohol use.  She consumes alcohol socially in college only.     Denies substance use.           Patient currently using a walker for unsteady gait.            Social Determinants of Health           Food Insecurity: No Food Insecurity (2024)     Hunger Vital Sign     • Worried About Running Out of Food in the Last Year: Never true     • Ran Out of Food in the Last Year: Never true         Family History:         Family History   Problem Relation Age of Onset   • Heart attack Biological Father     • Stomach cancer Paternal Grandmother           Allergies: Adhesive tape-silicones and Cephalexin     Medications:           Current Outpatient Medications   Medication Sig Dispense Refill   • acetaminophen (TYLENOL ARTHRITIS PAIN ORAL) Take 2 tablets by mouth 2 (two) times a day as needed (pain).       • ascorbic acid, vitamin C, (VITAMIN C) 1,000 mg tablet Take 1,000 mg by mouth daily.       • aspirin 81 mg enteric coated tablet Take 81 mg by mouth daily. To stop pre-op       • cholecalciferol, vitamin D3, (VITAMIN D3 ORAL) Take 1,000 Units by mouth daily.       • DULoxetine (CYMBALTA) 20 mg capsule Take 20 mg by mouth 2 (two) times a day.       • gabapentin (NEURONTIN) 600 mg tablet Take 600 mg by mouth 3 (three) times a day.       • insulin aspart U-100 (NovoLOG) 100 unit/mL (3 mL) pen Inject under the skin 3 (three) times a day with meals. sliding scale ( dosage based on Carb count)       • irbesartan-hydrochlorothiazide (AVALIDE) 150-12.5 mg per tablet Take 1 tablet by mouth every morning.       • levothyroxine 75 mcg tablet Take 75 mcg by mouth daily.       • meloxicam (MOBIC) 15 mg tablet Take 15 mg by mouth every morning.       • methocarbamoL 750 mg tablet Take 750 mg by mouth 3 (three) times a day as needed for muscle spasms. Always takes in am but doesn't always do tid       • norethindrone (AYGESTIN) 5 mg tablet Take 1 tablet (5 mg total) by mouth 2 (two) times a day. 180 tablet 3   • ONETOUCH ULTRA TEST strip TEST BLOOD SUGARS 4 TO 5 TIMES DAILY       • simvastatin 40 mg tablet Take 40 mg by mouth every morning.       • exenatide microspheres 2 mg/0.85 mL auto-injector Inject 2 mg under the skin once a week on Monday. Mondays (  LD December 2023)       • insulin degludec U-200 CONC (TRESIBA FLEXTOUCH U-200) 200 unit/mL (3 mL) pen Inject 0.2 mL (40 Units total) under the skin every evening. (Patient taking differently: Inject 78 Units under the skin daily with lunch. 3:30) 11.7 mL 0      No current facility-administered medications for this visit.         Review of Systems  Review of Systems   Constitutional:  "Negative for chills, fatigue and fever.   HENT: Negative.    Respiratory: Negative for cough and shortness of breath.    Cardiovascular: Negative for chest pain, palpitations and leg swelling.   Gastrointestinal: Negative for abdominal distention, abdominal pain, constipation, diarrhea, nausea and vomiting.   Genitourinary: +postmenopausal vaginal bleeding  Musculoskeletal: Negative for myalgias.   Skin: Negative.    Neurological: Negative for dizziness and weakness.   Psychiatric/Behavioral: Negative.          Physical Exam:  Vital Signs:  Visit Vitals  BP (!) 151/77 (BP Location: Right upper arm, Patient Position: Sitting)   Pulse 81   Temp 36.7 °C (98 °F) (Oral)   Resp 18   Ht 1.6 m (5' 3\")   Wt (!) 156 kg (344 lb)   SpO2 98%   BMI 60.94 kg/m²         General: well-developed, well-nourished, no apparent distress  Neck: supple, no masses  Lymphatic: no supraclavicular, cervical, or inguinal adenopathy  Respiratory: lungs clear to auscultation bilaterally, normal respiratory effort  Cardiovascular: regular rate and rhythm, no murmurs  Extremity: +bilateral lymphedema up to her pelvis with ace bandages wrapped around her legs  GI: abdomen soft, non-distended, non-tender               Incision: well healed low transverse skin incision  Neurologic: alert & oriented x3, no gross deficits  Psychiatric: mood and affect normal  Musculoskeletal: no deformity or gross strength deficit     (Chaperone present during exam)  Gynecologic: of note exam limited due to body habitus              External genitalia/bartholin's/skene's/urethra (EGBUS): normal external genitalia  Speculum: unable to visualize the entire cervix due to a long TVL, no vaginal lesions or masses, old blood in vault              Bimanual: uterus feels mobile but unable to appreciate fundus of the uterus, no obvious adnexal lesions but exam limited due to body habitus        Imaging:  US Pelvis (8/3/22):               CT AP (8/5/22):       Pathology: "   2/6/24:       Labs:        Lab Results   Component Value Date     WBC 11.57 (H) 02/02/2024     HGB 11.8 02/02/2024     HCT 36.9 02/02/2024     MCV 95.8 02/02/2024      02/02/2024           Chemistry               Component Value Date/Time      02/02/2024 1031     K 4.3 02/02/2024 1031      (H) 02/02/2024 1031     CO2 26 02/02/2024 1031     BUN 21 02/02/2024 1031     CREATININE 1.0 02/02/2024 1031              Component Value Date/Time     CALCIUM 8.9 02/02/2024 1031     ALKPHOS 57 02/02/2024 1031     AST 12 (L) 02/02/2024 1031     ALT 17 02/02/2024 1031     BILITOT 0.4 02/02/2024 1031                     Assessment   63 y.o. female being consulted for the following issues:      Problem List Items Addressed This Visit           Genitourinary     Carcinoma of endometrium (CMS/HCC) - Primary     Relevant Orders     Case request operating room: robotic assisted total laparoscopic hysterectomy, bilateral salpingo-oophorectomy, sentinel lymph node biopsies, possible lymphadenectomy, possible laparotomy (open) (Completed)      I discussed that technically the standard of care for endometrial cancer is to proceed with surgical resection and staging. Based off of her imaging and exam, she is potentially a candidate for minimally invasive approach via robotic surgery. We did discuss that her increase BMI may preclude her from minimally invasive surgery due to difficulty with ventilation - minimally invasive surgery requires her to be in trendelenburg position with intraabdominal insufflation. We discussed alternatives to surgery including hormonal treatment (which she has been on) or radiation treatment.      Usual plan would be to remove her uterus, cervix, fallopian tubes and ovaries, and to check for cancer spread by sampling her lymph nodes via sentinel lymph node mapping. 10% non-mapping rate is expected for sentinel lymph node identification, which may mean she would need a full pelvic lymph node  dissection. The patient has underlying lymphedema, and we discussed the risks and benefits of proceeding with a full pelvic lymph node dissection in this setting. She wants to think about this option and will readdress with me.     The risks of the procedure was explained in detail to the patient. These include but are not limited to: infection, bleeding, wound breakdown, lymphedema especially with lymph node sampling, conversion to laparotomy (moderately high possibility), need for blood transfusion, VTE, MI/stroke, injury to visceral organs such as bowel, bladder, ureters, blood vessels, nerves. She is at increased risk of yobani-operative complications due to her age, prior abdominal surgery, prior CS surgery, BMI of over 60, and lymphedema.  I advised her that some patients will require adjuvant treatment such as radiation or chemotherapy, but whether she will or will not require this is dependent on her surgical pathology findings.   At this time, she desires to proceed with surgical resection and consents were signed.     In the event that we are able to perform the surgery via robotic approach, she would like to stay overnight (<24h obs). She understands that if she requires a larger incision to complete her surgery safely, she may need to be admitted postoperatively. She also understands that with a larger incision, she is at a high risk of wound infection, especially given her medical comorbidities.     #Surgical risk factors/considerations:  BMI > 60   Prior abdominal surgery  Prior  section  History of smoking  Diabetes Mellitus  Age  Has known cancer   Underlying lymphedema  Calficied uterine fibroids     #Preoperative visit  - Procedure: robotic assisted total laparoscopic hysterectomy, bilateral salpingo-oophorectomy, sentinel lymph node biopsies, possible lymphadenectomy, possible laparotomy (open)  - Consents: signed today (see above for details of discussion)  - Anesthesia: anesthesia  appointment requested  - Medical clearance: patient does not need medical clearance prior to surgery  - Preoperative instructions given to the patient: Advised not to eat or drink anything after midnight the evening prior to the procedure. Advised to avoid NSAIDs, Fish Oil and other blood thinning agents 7 days prior to the procedure.   - preoperative labs ordered: T&S, CBC and BMP  - preoperative imaging ordered: EKG reviewed      #Day of surgery orders:  - Plan on <24h obs  - NPO, SCDs bilateral lower extremity  - mIVF  - IV Gent/Clinda (given keflex allergy)  - will likely need to go home with narcotic pain medication, pending route of surgery           Yohana Ronquillo MD

## 2024-03-14 NOTE — OR SURGEON
I have identified the patient and reviewed the procedure and risks.  All questions answered.     Yohana Ronquillo MD  Gynecologic Oncology  [id number]  03/14/24  6:59 AM

## 2024-03-14 NOTE — ANESTHESIA PROCEDURE NOTES
Airway  Urgency: elective    Start Time: 3/14/2024 7:55 AM  Airway not difficult    General Information and Staff    Patient location during procedure: OR  Resident/CRNA: Radha Miller CRNA  Performed by: Radha Miller CRNA  Authorized by: Harshad Gary MD      Indications and Patient Condition  Indications for airway management: anesthesia  Sedation level: general  Preoxygenated: yes  Patient position: sniffing and ramp  MILS maintained throughout  Mask difficulty assessment: 1 - vent by mask    Final Airway Details  Final airway type: endotracheal airway      Successful airway: ETT  Cuffed: yes   Successful intubation technique: video laryngoscopy  Facilitating devices/methods: intubating stylet  Endotracheal tube insertion site: oral  Blade: Angelic  Blade size: #4  ETT size (mm): 7.0  Cormack-Lehane Classification: grade I - full view of glottis  Placement verified by: capnometry   Measured from: teeth  ETT to teeth (cm): 22  Number of attempts at approach: 1  Number of other approaches attempted: 0  Atraumatic airway insertion

## 2024-03-14 NOTE — ANESTHESIA POSTPROCEDURE EVALUATION
Patient: Xin Melendez    Procedure Summary     Date: 03/14/24 Room / Location: [facility] OR [facility] / [facility] OR    Anesthesia Start: 0744 Anesthesia Stop: 1421    Procedure: robotic assisted total laparoscopic hysterectomy, left salpingo-oophorectomy, sentinel lymph node mapping and left pelvic sentinel lymph node biopsy, minilaparotomy for delivery of the specimen Diagnosis:       Carcinoma of endometrium (CMS/HCC)      (Carcinoma of endometrium (CMS/HCC) [C54.1])    Surgeons: Yohana Ronquillo MD Responsible Provider: Harshad Gary MD    Anesthesia Type: general ASA Status: 3          Anesthesia Type: general  PACU Vitals  3/14/2024 1411 - 3/14/2024 1511      3/14/2024  1414 3/14/2024  1426 3/14/2024  1430 3/14/2024  1433    BP: 153/81 90/56 90/56 --    Temp: 37.3 °C (99.1 °F) -- -- --    Pulse: 92 90 90 90    Resp: 20 24 24 24    SpO2: 98 % 99 % 98 % 98 %              3/14/2024  1444 3/14/2024  1445 3/14/2024  1506       BP: -- 136/66 142/61     Temp: -- -- --     Pulse: 90 90 88     Resp: 15 15 19     SpO2: 97 % 97 % 99 %             Anesthesia Post Evaluation    Pain management: adequate  Patient location during evaluation: PACU  Patient participation: complete - patient participated  Level of consciousness: awake and alert  Cardiovascular status: acceptable  Airway Patency: adequate  Respiratory status: acceptable  Hydration status: acceptable  Anesthetic complications: no

## 2024-03-14 NOTE — PROGRESS NOTES
Gynecologic Oncology: Post Op Check    Subjective     Patient sitting up in bed, awake and alert. She reports feeling well at this time. She tolerated a sandwich for lunch. She was able to ambulate to bathroom and void without difficulty. Her pain is controlled.   Denies HA, CP, SOB, N/V, F/C, lightheadedness and dizziness.         Objective     Vital Signs (last 24h):  Temp:  [36.4 °C (97.5 °F)-37.3 °C (99.1 °F)] 36.4 °C (97.5 °F)  Heart Rate:  [68-92] 87  Resp:  [13-78] 18  BP: ()/(56-81) 135/66    Intake/Output (shift):  I/O this shift:  In: 3120 [P.O.:120; I.V.:3000]  Out: 750 [Urine:550; Blood:200]    Exam:  General: NAD   Heart: regular rate and rhythm  Lungs: clear to auscultation bilaterally, no increased respiratory effort  Abdomen: soft, nondistended, non-tender, no rebound/rigidity/guarding, laparoscopic incisions with dressings clean/dry/intact   Extremities: symmetric and no edema     Assessment/Plan     Xin Melendez is a 63 y.o. female POD#0  RA TLH, LSO, left pelvic sentinel  lymph node biopsy secondary to endometrial cancer     1. Post operative care:   - Afebrile, vital signs stable.   - Pain well controlled with ERAS pain management protocol.  - Preop hgb 12.6. No signs/sx of anemia.   - Encouraged regular diet and use of incentive spirometer  - Meeting post operative milestone. Continue routine post-op care.     2. VTE prophylaxis:   - Continue Lovenox while inpatient   - Encouraged ambulation at least 4 times a day    3. DMII:   - POCT glucose q AC/HS  - Continue insulin sliding scale inpatient    4.  Hypothyroidism:   - Continue home Synthroid     5. HLD:  - Continue home Simvastatin     6. HTN:   - Continue home Irbesartan/HCTZ     Please reach out to the Gyn team at pager #[phone] with any questions or concerns.     Radha Purvis MD

## 2024-03-14 NOTE — ANESTHESIA PROCEDURE NOTES
Arterial Line:    Start time: 3/14/2024 8:30 AM  End time: 3/14/2024 8:37 AM    An arterial line was placed in the OR for the following indication(s): continuous blood pressure monitoring and blood sampling needed.    A 20 G (size), 5 cm (length), Arrow (type) catheter was placed,   Seldinger technique used, into the Left brachial artery, secured by Tegaderm.      Procedure performed using ultrasound guidance.    Image visualization comments: Ultrasound used to visualize the placement of wire and/or needle in appropriate artery.    Events:  patient tolerated procedure well with no complications.    Additional notes:  Left radial a-line failed. This is replacement arterial line placed in one attempt.       The supervising anesthesiologist was   Performed By: anesthesiologist  Attending: Harshad Gary MD

## 2024-03-14 NOTE — OP NOTE
robotic assisted total laparoscopic hysterectomy, left salpingo-oophorectomy, sentinel lymph node mapping and left pelvic sentinel lymph node biopsy, minilaparotomy for delivery of the specimen Procedure Note    Procedure:    robotic assisted total laparoscopic hysterectomy, left salpingo-oophorectomy, sentinel lymph node mapping and left pelvic sentinel lymph node biopsy, minilaparotomy for delivery of the specimen  CPT(R) Code:  63150 - ID LAPAROSCOPY W TOT HYSTERECTUTERUS <=250 GRAM  W TUBE/OVARY      Date of Surgery: 03/14/24         Pre-op Diagnosis     * Carcinoma of endometrium (CMS/HCC) [C54.1]    Post-op Diagnosis     * Carcinoma of endometrium (CMS/HCC) [C54.1]      Surgeon(s) and Role:     * Yohana Ronquillo MD - Primary     * Maurice Hammond MD - Assisting     * Radha Purvis MD - Resident - Assisting    Anesthesiology: General  Anesthesiologist: Harshad Gary MD  CRNA: Adriana Guzmán CRNA; Radha Miller CRNA     Staff: Circulator: Charanjit Villanueva RN; Lulú Warner RN  Scrub Person: Morenita Hayes RN; So Soria RN; Lulú Warner RN    Anesthesia: General endotracheal anesthesia    ASA Class: 3     Indications: Xin Melendez is a 63 y.o. with a history of EIN, and most recently found on her biopsy to have grade 2 endometrial cancer. She is here today for her scheduled surgical resection and staging.    Findings:  - no grossly metastatic disease  - right fallopian tube and ovary surgically absent  - significantly enlarged uterus to approximatly 14 weeks size with multiple fibroids, IUD in situ  - normal appearing left fallopian tube and ovary  - no mapping seen on the right side  - left sentinel lymph node biopsies to the external iliac artery  - cystoscopy with an intact bladder and strong bilateral ureteral jets    Estimated Blood Loss:  200 mL  IV Fluids: 2500 mL  Urine Output: 350 mL    Specimens:   ID Type Source Tests Collected by Time Destination   1 : Uterus,  Cervix, left fallopian tube and left ovary and IUD Tissue Uterus/Tubes/Ovaries PATHOLOGY TISSUE EXAM Yohana Ronquillo MD 3/14/2024 1100    2 : Left pelvic sentinel lymph node  Lymph Node Lymph Node (specify site) PATHOLOGY TISSUE EXAM Yohana Ronquillo MD 3/14/2024 1240        Procedure Details:   The patient was taken to the operating room where a routine briefing was performed, with the surgical plan reviewed.  General endotracheal anesthesia was induced, and the patient was placed in split leg position due to inability to fit her legs within the stirrups. Her legs were padded and secured to the split legs. We could not place SCDs due to her leg circumference and SC heparin was given as ppx. Her pannus was taped and secured down. An examination under anesthesia was performed with the findings noted above. The patient was then prepped and draped in the usual sterile fashion. An orogastric tube was placed by the anesthetist. A Campoverde catheter was inserted. A speculum was placed and 1 cc of ICG was injected at 3 and 9 o'clock in the cervix at a depth of 1 cm, then another 1 mL just beneath the cervical epithelium at the same locations. A Vcare uterine manipulator was inserted after dilating the cervix.     Attention was then turned to the abdomen. An incision was made at Wong's point and an 5mm trocar inserted using the optiview, confirmed with the laparoscope. The abdomen was then insufflated with carbon dioxide gas to a pressure of 15 mmHg. Next, an intraabdominal survey was performed, with findings as described above. The patient's abdomen was then marked, and four 8mm robotic ports were placed into the abdominal cavity under direct visualization. An additional 12mm port was placed left lateral to the robotic ports. The robot was then docked in the normal fashion. Instruments included monopolar scissors, fenestrated bipolar forceps, and a ProGrasp forceps.     Unless otherwise noted, identical procedures  were performed on the left and right sides. The round ligament was cauterized and transected near the pelvic side wall.  The pararectal space was then explored, and the ureter and iliac vessels identified.  A window was then made in the avascular space of Graves beneath the left infundibulopelvic ligament.  The gonadal vessels were then isolated, cauterized and transected. The residual adhesions and uteroovarian ligament on the right was isolated, cauterized and cut. The anterior and posterior peritoneum were then skeletonized back to the level of the ConMed cup. The bladder was dissected sharply from the lower uterine segment, cervix, and upper vagina. The uterine vessels were cauterized, transected, and lateralized from the colpotomy ring.     The colpotomy was performed circumferentially, freeing the specimen.  Given the size of the uterus, the decision was made to place it within a 15mm endocatch bag. We initially tried to deliver this through her colpotomy and tried to morcellate the uterus within the bag. However, given her vaginal length and large fibroids, we stopped this process. We closed the bag and placed it within another 15mm endocatch bag, closed this bag and left it in the abdomen.     The pelvic lymph node beds were then evaluated for sentinel lymph node mapping. The left sentinel lymph node was carefully dissected off and removed through the 12mm port. There was no mapping on the right side.      The vaginal cuff was then closed with a running V-loc suture. Good hemostasis was noted under decreased pressure. Instruments were removed, and the robot was undocked.  All the trocars were removed.     We extended the 12mm port site to 4 cm. We then delivered the endocatch bag through this incision. We bivalved the uterus within the bag to help deliver the specimen.     A cystoscopy was performed which demonstrated an intact bladder with strong bilateral ureteral jets.    The fascia of the extended left  incision was closed with a 0-Maxon.   The subcutaneous layer was irrigated and reapproximated with 2-0 vicryl.  All the skin incisions were reapproximated with 4-0 monocryl. They were infiltrated with 0.5% marcaine. Steritrips and sterile dressings were placed.    The henry was removed. The vagina was inspected and noted to be free of any laceration or defects. The sponge, lap, and needle counts were correct x 2. The patient tolerated the procedure well. She was awakened and taken to the postoperative care unit in stable condition.       Of note, this surgery was particularly challenging due to the patient's body habitus and inability to tolerate steep trendelenburg or full CO2 insufflation, which significantly decreased visualization during the surgery. She also was found to have an enlarged uterus (measuring approximately 14-16 weeks in size) due to fibroids, which also decreased visualization, and added surgical time for safe removal from her body.     NEED FOR ASSISTANT: Given the complexity of this case, there was no qualified  available. The addition of another attending physician (Dr. Hammond) was required for removal of the uterus from the patient's body. This assistance decreased the risk of damage to the surrounding structures during the dissection; as well as decreased overall operative time, thereby decreasing the risk of infection, overall anesthetic given to the patient, development of deep venous thrombosis, and operative cost.            Drains: None     Implants: * No implants in log *           Complications:  * No complications entered in OR log *    Disposition: PACU - hemodynamically stable.           Condition: stable      Yohana Ronquillo MD  Gynecologic Oncology  [id number]  03/14/24  2:37 PM

## 2024-03-14 NOTE — ANESTHESIA PROCEDURE NOTES
Arterial Line:    Start time: 3/14/2024 8:05 AM  End time: 3/14/2024 8:14 AM    An arterial line was placed in the OR for the following indication(s): continuous blood pressure monitoring and blood sampling needed.    A 20 G (size), 1 and 3/4 inch (length), Angiocath (type) catheter was placed,   Seldinger technique used, into the Left radial artery, secured by Tegaderm.      Procedure performed using ultrasound guidance.    Image visualization comments: Ultrasound used to visualize the placement of wire and/or needle in appropriate artery.    Events:  patient tolerated procedure well with no complications and 2 attempts.    The supervising anesthesiologist was   Performed By: anesthesiologist  Attending: Harshad Gary MD

## 2024-03-15 LAB
GLUCOSE BLD-MCNC: 136 MG/DL (ref 70–99)
GLUCOSE BLD-MCNC: 161 MG/DL (ref 70–99)
GLUCOSE BLD-MCNC: 171 MG/DL (ref 70–99)
POCT TEST: ABNORMAL

## 2024-03-15 PROCEDURE — 12000000 HC ROOM AND CARE MED/SURG

## 2024-03-15 PROCEDURE — 63700000 HC SELF-ADMINISTRABLE DRUG

## 2024-03-15 PROCEDURE — 99024 POSTOP FOLLOW-UP VISIT: CPT | Performed by: OBSTETRICS & GYNECOLOGY

## 2024-03-15 PROCEDURE — 63600000 HC DRUGS/DETAIL CODE

## 2024-03-15 RX ADMIN — DOCUSATE SODIUM AND SENNOSIDES 1 TABLET: 8.6; 5 TABLET, FILM COATED ORAL at 19:52

## 2024-03-15 RX ADMIN — IBUPROFEN 600 MG: 600 TABLET, FILM COATED ORAL at 21:48

## 2024-03-15 RX ADMIN — LEVOTHYROXINE SODIUM 75 MCG: 0.07 TABLET ORAL at 06:36

## 2024-03-15 RX ADMIN — OXYCODONE HYDROCHLORIDE 5 MG: 5 TABLET ORAL at 19:52

## 2024-03-15 RX ADMIN — GABAPENTIN 600 MG: 300 CAPSULE ORAL at 19:52

## 2024-03-15 RX ADMIN — KETOROLAC TROMETHAMINE 15 MG: 15 INJECTION, SOLUTION INTRAMUSCULAR; INTRAVENOUS at 09:43

## 2024-03-15 RX ADMIN — KETOROLAC TROMETHAMINE 15 MG: 15 INJECTION, SOLUTION INTRAMUSCULAR; INTRAVENOUS at 03:52

## 2024-03-15 RX ADMIN — ACETAMINOPHEN 975 MG: 325 TABLET ORAL at 03:04

## 2024-03-15 RX ADMIN — POLYETHYLENE GLYCOL 3350 17 G: 17 POWDER, FOR SOLUTION ORAL at 09:43

## 2024-03-15 RX ADMIN — ENOXAPARIN SODIUM 40 MG: 40 INJECTION SUBCUTANEOUS at 06:36

## 2024-03-15 RX ADMIN — IBUPROFEN 600 MG: 600 TABLET, FILM COATED ORAL at 16:21

## 2024-03-15 RX ADMIN — HYDROCHLOROTHIAZIDE 12.5 MG: 25 TABLET ORAL at 09:42

## 2024-03-15 RX ADMIN — LOSARTAN POTASSIUM 50 MG: 50 TABLET, FILM COATED ORAL at 09:47

## 2024-03-15 RX ADMIN — ACETAMINOPHEN 975 MG: 325 TABLET ORAL at 09:42

## 2024-03-15 RX ADMIN — OXYCODONE HYDROCHLORIDE 5 MG: 5 TABLET ORAL at 13:43

## 2024-03-15 RX ADMIN — DULOXETINE 20 MG: 20 CAPSULE, DELAYED RELEASE ORAL at 19:52

## 2024-03-15 RX ADMIN — GABAPENTIN 600 MG: 300 CAPSULE ORAL at 09:42

## 2024-03-15 RX ADMIN — DULOXETINE 20 MG: 20 CAPSULE, DELAYED RELEASE ORAL at 09:43

## 2024-03-15 RX ADMIN — DOCUSATE SODIUM AND SENNOSIDES 1 TABLET: 8.6; 5 TABLET, FILM COATED ORAL at 09:43

## 2024-03-15 RX ADMIN — ENOXAPARIN SODIUM 40 MG: 40 INJECTION SUBCUTANEOUS at 21:48

## 2024-03-15 RX ADMIN — GABAPENTIN 600 MG: 300 CAPSULE ORAL at 13:43

## 2024-03-15 SDOH — ECONOMIC STABILITY: FOOD INSECURITY: WITHIN THE PAST 12 MONTHS, YOU WORRIED THAT YOUR FOOD WOULD RUN OUT BEFORE YOU GOT MONEY TO BUY MORE.: PATIENT DECLINED

## 2024-03-15 SDOH — ECONOMIC STABILITY: FOOD INSECURITY: WITHIN THE PAST 12 MONTHS, THE FOOD YOU BOUGHT JUST DIDN'T LAST AND YOU DIDN'T HAVE MONEY TO GET MORE.: PATIENT DECLINED

## 2024-03-15 ASSESSMENT — COGNITIVE AND FUNCTIONAL STATUS - GENERAL
MOVING TO AND FROM BED TO CHAIR: 3 - A LITTLE
STANDING UP FROM CHAIR USING ARMS: 3 - A LITTLE
WALKING IN HOSPITAL ROOM: 2 - A LOT
CLIMB 3 TO 5 STEPS WITH RAILING: 2 - A LOT

## 2024-03-15 NOTE — POST-OP (NON-BILLABLE)
Gynecologic Oncology Progress Note    Subjective     Patient resting comfortably in bed this morning. She reports that her pain is well controlled. She is tolerating a regular diet. She was amble to ambulate to bathroom and void without difficulty. Has not yet passed flatus.   Patient denies HA, CP, SOB, N/V, F/C, lightheadedness and dizziness.        Objective     Vital Signs (last 24h):  Temp:  [36.4 °C (97.5 °F)-37.3 °C (99.1 °F)] 36.9 °C (98.4 °F)  Heart Rate:  [68-92] 87  Resp:  [13-78] 16  BP: ()/(56-81) 136/73    Exam:  General: NAD   Heart: regular rate and rhythm  Lungs: clear to auscultation bilaterally, no increased respiratory effort  Abdomen: soft, nondistended, non-tender, no rebound/rigidity/guarding, 4 laparoscopic incisions with dressing clean/dry/intact. LUQ mini lap with Mepilex dressing with dark blood strikethrough, no active bleeding.   Extremities: symmetric and no edema    Assessment/Plan     Xin Melendez is a 63 y.o. female POD#1 s/p robotic assisted total laparoscopic hysterectomy, left salpingo oophorectomy, left pelvic sentinel lymph node biopsy secondary to endometrial cancer     1. Post operative care:   - Afebrile, vital signs stable.   - Pain well controlled with ERAS pain management protocol.  - Preop hgb 12.6. No signs/sx of anemia.   - Encouraged regular diet and use of incentive spirometer  - Meeting post operative milestone. Continue routine post-op care.     2. VTE prophylaxis:   - Continue Lovenox while inpatient   - Given larger incision in setting of malignancy and patient's risk factors for VTE, will plan a 30 day course of Apixaban on discharge home   - Encouraged ambulation at least 4 times a day     3. DMII:   - POCT glucose q AC/HS  - Continue insulin sliding scale inpatient     4.  Hypothyroidism:   - Continue home Synthroid      5. HLD:  - Continue home Simvastatin      6. HTN:   - Continue home Irbesartan/HCTZ      Please reach out to the Gyn team at pager #[phone]  with any questions or concerns.     Radha Purvis MD

## 2024-03-15 NOTE — PLAN OF CARE
Plan of Care Review  Plan of Care Reviewed With: patient  Progress: improving  Outcome Evaluation: Received pt AAOx4, with the ability to make needs known. Pt continues to c/o pain and tenderness to the abdomen which increases with movement. Carleen pain management regimen as ordered is being maintained and providing relief. No s/s of resp distress/SOB. Abdominal lap site dressings remain intact, with some boody drainage noted. Pt did ambulate to the bathroom with a walker, minimal assistance needed. She continues to void without issue. IVF continue at 80ml/hr. Callbell placed within reach safety measures maintained.

## 2024-03-15 NOTE — PLAN OF CARE
Care Coordination Admission Assessment Note    General Information:  Readmission Within the last 30 days: no previous admission in last 30 days  Does patient have a :    Patient-Specific Goals (include timeframe):      Living Arrangements:  Arrived From: home  Current Living Arrangements: home  People in Home: child(greta), adult  Home Accessibility: stairs to enter home (Group), stairs within home (Group)  Living Arrangement Comments:      Housing Stability and Utility Access (SDOH):  In the last 12 months, was there a time when you were not able to pay the mortgage or rent on time?:    In the last 12 months, how many places have you lived?:    In the last 12 months, was there a time when you did not have a steady place to sleep or slept in a shelter (including now)?:    In the past 12 months has the electric, gas, oil, or water company threatened to shut off services in your home?:      Functional Status Prior to Admission:   Assistive Device/Animal Currently Used at Home:    Functional Status Comments:    IADL Comments:       Supports and Services:  Current Outpatient/Agency/Support Group: none  Type of Current Home Care Services:    History of home care episode or rehab stay:      Discharge Needs Assessment:   Concerns to be Addressed: discharge planning, care coordination/care conferences  Current Discharge Risk:    Anticipated Changes Related to Illness:      Patient/Family Anticipated Discharge Plan:  Patient/Family Anticipates Transition To: home with family, home  Patient/Family Anticipated Services at Transition:      Connection to Community       Patient Choice:   Offered/Gave Vendor List: yes  Patient's Choice of Community Agency(s):    Patient and/or patient guardian/advocate was made aware of their right to choose a provider. A list of eligible providers was presented and reviewed with the  patient and/or patient guardian/advocate in written and/or verbal form. The list delineates providers in the patient’s desired geographic area who are participating in the Medicare program and/or providers contracted with the patient’s primary insurance. The Medicare list and quality ratings were obtained from the Medicare.gov [medicare.gov] website.    Anticipated Discharge Plan:  Met with patient. Provided education and contact information for Care Coordination services.: yes  Anticipated Discharge Disposition: home with assistance, home with home health     Transportation Needs (SDOH):  Transportation Concerns:    Transportation Anticipated: family or friend will provide  Is Out of Hospital DNR needed at discharge?:      In the past 12 months, has lack of transportation kept you from medical appointments or from getting medications?:    In the past 12 months, has lack of transportation kept you from meetings, work, or from getting things needed for daily living?:      Concerns - comments: CM initial assessment completed with patient at the bedside. Patient lives in a 1  with her son. There are 2 LEROY & a ff of steps to the 2nd level of the home. She also has a stairlift in the home.There are 1 & 1/2 bathrooms & 4 bedrooms in the home. She has a rw, bsc & a glucometer at home. She has had home care in the past but is unsure of the agency that provided her care. She would be amenable to [facility] providing home care if she is recommended for it post discharge. PCP, Insurance, and Pharmacy verified. Her family/friend will transport her home at discharge. She is vaccinated for Covid, receiving 2 shots & 2 boosters. She has not gotten a Flu shot. She does not have a LW/HCPOA. She has not had any issues getting food, housing, transportation or medication in the last 12 months. DC planning pending medical course & pt/ot recs.      CM will continue to follow for transition of care needs until discharge is completed.    Care  Coordination Discharge Plan Note     Discharge Needs Assessment  Concerns to be Addressed: discharge planning, care coordination/care conferences  Current Discharge Risk:      Anticipated Discharge Plan  Anticipated Discharge Disposition: home with assistance, home with home health       Patient Choice  Offered/Gave Vendor List: yes  Patient's Choice of Community Agency(s):      Patient and/or patient guardian/advocate was made aware of their right to choose a provider. A list of eligible providers was presented and reviewed with the patient and/or patient guardian/advocate in written and/or verbal form. The list delineates providers in the patient’s desired geographic area who are participating in the Medicare program and/or providers contracted with the patient’s primary insurance. The Medicare list and quality ratings were obtained from the Medicare.gov [medicare.gov] website.    ---------------------------------------------------------------------------------------------------------------------    Interdisciplinary Discharge Plan Review:  Participants:     Concerns Comments:  Per MD/team; VITA bird, no discharge needs.    CM will continue to follow for transition of care needs until discharge is completed.    Discharge Plan:   Disposition/Destination:   /    Discharge Facility:    Community Resources:      Discharge Transportation:  Is Out of Hospital DNR needed at Discharge:    Does patient need discharge transport?

## 2024-03-15 NOTE — DISCHARGE SUMMARY
Inpatient Discharge Summary    BRIEF OVERVIEW  Admitting Provider: Yohana Ronquillo MD  Discharge Provider: Yohana Ronquillo MD  Primary Care Physician at Discharge: Rasta Ortiz [phone]   Admission Date: 3/14/2024     Discharge Date: 3/16/2024    Primary Discharge Diagnosis  Carcinoma of endometrium (CMS/[facility])    Discharge Medications     Medication List      START taking these medications    apixaban 2.5 mg tablet  Commonly known as: ELIQUIS  Take 1 tablet (2.5 mg total) by mouth 2 (two) times a day.  Dose: 2.5 mg     ibuprofen 600 mg tablet  Commonly known as: MOTRIN  Take 1 tablet (600 mg total) by mouth every 6 (six) hours for 10 days.  Dose: 600 mg     polyethylene glycol 17 gram packet  Commonly known as: MIRALAX  Start taking on: March 17, 2024  Take 17 g by mouth daily for 3 days.  Dose: 17 g     sennosides-docusate sodium 8.6-50 mg  Commonly known as: SENOKOT-S  Take 1 tablet by mouth 2 (two) times a day.  Dose: 1 tablet        CHANGE how you take these medications    * TYLENOL ARTHRITIS PAIN ORAL  Take 2 tablets by mouth 2 (two) times a day as needed (pain).  Dose: 2 tablet  What changed: Another medication with the same name was added. Make sure you understand how and when to take each.     * acetaminophen 325 mg tablet  Commonly known as: TYLENOL  Take 3 tablets (975 mg total) by mouth every 6 (six) hours.  Dose: 975 mg  What changed: You were already taking a medication with the same name, and this prescription was added. Make sure you understand how and when to take each.         * This list has 2 medication(s) that are the same as other medications prescribed for you. Read the directions carefully, and ask your doctor or other care provider to review them with you.            CONTINUE taking these medications    ascorbic acid (vitamin C) 1,000 mg tablet  Commonly known as: VITAMIN C  Take 1,000 mg by mouth daily.  Dose: 1,000 mg     aspirin 81 mg enteric coated tablet  Take 81 mg by  mouth daily. To stop pre-op  Dose: 81 mg     AVALIDE 150-12.5 mg per tablet  Take 1 tablet by mouth every morning.  Dose: 1 tablet  Generic drug: irbesartan-hydrochlorothiazide     DULoxetine 20 mg capsule  Commonly known as: CYMBALTA  Take 20 mg by mouth 2 (two) times a day.  Dose: 20 mg     gabapentin 600 mg tablet  Commonly known as: NEURONTIN  Take 600 mg by mouth 3 (three) times a day.  Dose: 600 mg     insulin aspart U-100 100 unit/mL (3 mL) pen  Commonly known as: NovoLOG  Inject under the skin 3 (three) times a day with meals. sliding scale ( dosage based on Carb count)     levothyroxine 75 mcg tablet  Commonly known as: SYNTHROID  Take 75 mcg by mouth daily.  Dose: 75 mcg     meloxicam 15 mg tablet  Commonly known as: MOBIC  Take 15 mg by mouth every morning.  Dose: 15 mg     methocarbamoL 750 mg tablet  Commonly known as: ROBAXIN  Take 750 mg by mouth 3 (three) times a day as needed for muscle spasms. Always takes in am but doesn't always do tid  Dose: 750 mg     norethindrone 5 mg tablet  Commonly known as: AYGESTIN  Take 1 tablet (5 mg total) by mouth 2 (two) times a day.  Dose: 5 mg     ONETOUCH ULTRA TEST strip  TEST BLOOD SUGARS 4 TO 5 TIMES DAILY  Generic drug: blood sugar diagnostic     simvastatin 40 mg tablet  Commonly known as: ZOCOR  Take 40 mg by mouth every morning.  Dose: 40 mg     TRESIBA FLEXTOUCH U-200 200 unit/mL (3 mL) pen  Inject 0.2 mL (40 Units total) under the skin every evening.  Dose: 40 Units  Generic drug: insulin degludec U-200 CONC     VITAMIN D3 ORAL  Take 1,000 Units by mouth daily.  Dose: 1,000 Units          Outpatient Follow-Up            In 2 weeks Yohana Ronquillo MD [facility]        Test Results Pending at Discharge  Unresulted Labs (From admission, onward)    None        DETAILS OF HOSPITAL STAY    Presenting Problem/History of Present Illness  Carcinoma of endometrium (CMS/HCC) [C54.1]    Hospital Course/Operative  Procedures Performed  Procedure(s):  robotic assisted total laparoscopic hysterectomy, left salpingo-oophorectomy, sentinel lymph node mapping and left pelvic sentinel lymph node biopsy, minilaparotomy for delivery of the specimen    Patient presented to hospital on 3/14/24 for scheduled surgery. She underwent a robotic assisted total laparoscopic hysterectomy, left salpingo oophorectomy, left pelvic sentinel lymph node biopsy secondary to endometrial cancer without complications. On post op day 2 she was meeting all milestones and was ultimately discharged home on 3/16/24

## 2024-03-15 NOTE — PROGRESS NOTES
Gynecologic Oncology Progress Note:     Subjective     Patient resting comfortably in bed. She is without complaints  and reports feeling well at this time. She requests to stay overnight with plan for discharge home tomorrow.   Denies HA, CP, SOB, N/V, F/C, lightheadedness and dizziness.       Objective     Vital Signs (last 24h):  Temp:  [36.4 °C (97.5 °F)-37.3 °C (99.1 °F)] 36.5 °C (97.7 °F)  Heart Rate:  [75-87] 76  Resp:  [16-18] 16  BP: (115-155)/(52-74) 126/52    Exam:  General: NAD   Heart: regular rate and rhythm  Lungs: clear to auscultation bilaterally, no increased respiratory effort  Abdomen: soft, nondistended, non-tender, no rebound/rigidity/guarding, 4 laparoscopic incisions with dressing clean/dry/intact. LUQ mini lap with Mepilex dressing with dark blood strikethrough, no active bleeding.   Extremities: symmetric and no edema     Assessment/Plan     Xin Melendez is a 63 y.o. female POD#1 RA TLH, LSO, left pelvic sentinel  lymph node biopsy secondary to endometrial cancer     1. Post operative care:   - Afebrile, vital signs stable.   - Pain well controlled with ERAS pain management protocol.  - Preop hgb 12.6. No signs/sx of anemia.   - Encouraged regular diet and use of incentive spirometer  - Meeting post operative milestone. Continue routine post-op care.     2. VTE prophylaxis:   - Continue Lovenox while inpatient   - Given larger incision in setting of malignancy and patient's risk factors for VTE, will plan a 30 day course of Apixaban on discharge home   - Encouraged ambulation at least 4 times a day    3. DMII:   - POCT glucose q AC/HS  - Continue insulin sliding scale inpatient    4.  Hypothyroidism:   - Continue home Synthroid     5. HLD:  - Continue home Simvastatin     6. HTN:   - Continue home Irbesartan/HCTZ     Patient seen and examined with Dr. Hammond this afternoon. Anticipate discharge home tomorrow morning.     Please reach out to the Gyn team at pager #[phone] with any questions or  concerns.     Radha Purvis MD

## 2024-03-16 VITALS
HEART RATE: 67 BPM | WEIGHT: 293 LBS | HEIGHT: 63 IN | RESPIRATION RATE: 18 BRPM | TEMPERATURE: 97.5 F | OXYGEN SATURATION: 97 % | SYSTOLIC BLOOD PRESSURE: 118 MMHG | BODY MASS INDEX: 51.91 KG/M2 | DIASTOLIC BLOOD PRESSURE: 54 MMHG

## 2024-03-16 LAB
GLUCOSE BLD-MCNC: 123 MG/DL (ref 70–99)
POCT TEST: ABNORMAL

## 2024-03-16 PROCEDURE — 63700000 HC SELF-ADMINISTRABLE DRUG

## 2024-03-16 PROCEDURE — 99024 POSTOP FOLLOW-UP VISIT: CPT | Performed by: OBSTETRICS & GYNECOLOGY

## 2024-03-16 PROCEDURE — 200200 PR NO CHARGE: Performed by: OBSTETRICS & GYNECOLOGY

## 2024-03-16 PROCEDURE — 63600000 HC DRUGS/DETAIL CODE: Mod: JZ

## 2024-03-16 RX ORDER — OXYCODONE HYDROCHLORIDE 5 MG/1
5 TABLET ORAL EVERY 6 HOURS PRN
Qty: 5 TABLET | Refills: 0 | Status: SHIPPED | OUTPATIENT
Start: 2024-03-16 | End: 2024-03-20 | Stop reason: SDUPTHER

## 2024-03-16 RX ORDER — ACETAMINOPHEN 325 MG/1
975 TABLET ORAL
Qty: 360 TABLET | Refills: 0 | Status: SHIPPED | OUTPATIENT
Start: 2024-03-16 | End: 2024-04-15

## 2024-03-16 RX ORDER — POLYETHYLENE GLYCOL 3350 17 G/17G
17 POWDER, FOR SOLUTION ORAL DAILY
Qty: 3 PACKET | Refills: 0 | Status: SHIPPED | OUTPATIENT
Start: 2024-03-17 | End: 2024-03-20

## 2024-03-16 RX ORDER — IBUPROFEN 600 MG/1
600 TABLET ORAL
Qty: 40 TABLET | Refills: 0 | Status: SHIPPED | OUTPATIENT
Start: 2024-03-16 | End: 2024-06-01 | Stop reason: ALTCHOICE

## 2024-03-16 RX ORDER — NALOXONE HYDROCHLORIDE 4 MG/.1ML
1 SPRAY NASAL ONCE
Qty: 1 EACH | Refills: 0 | Status: SHIPPED | OUTPATIENT
Start: 2024-03-16 | End: 2024-03-16

## 2024-03-16 RX ORDER — AMOXICILLIN 250 MG
1 CAPSULE ORAL 2 TIMES DAILY
Qty: 60 TABLET | Refills: 0 | Status: SHIPPED | OUTPATIENT
Start: 2024-03-16 | End: 2024-04-15

## 2024-03-16 RX ADMIN — ENOXAPARIN SODIUM 40 MG: 40 INJECTION SUBCUTANEOUS at 08:44

## 2024-03-16 RX ADMIN — GABAPENTIN 600 MG: 300 CAPSULE ORAL at 08:45

## 2024-03-16 RX ADMIN — LEVOTHYROXINE SODIUM 75 MCG: 0.07 TABLET ORAL at 06:46

## 2024-03-16 RX ADMIN — LOSARTAN POTASSIUM 50 MG: 50 TABLET, FILM COATED ORAL at 08:46

## 2024-03-16 RX ADMIN — IBUPROFEN 600 MG: 600 TABLET, FILM COATED ORAL at 03:40

## 2024-03-16 RX ADMIN — HYDROCHLOROTHIAZIDE 12.5 MG: 25 TABLET ORAL at 08:45

## 2024-03-16 RX ADMIN — DULOXETINE 20 MG: 20 CAPSULE, DELAYED RELEASE ORAL at 08:46

## 2024-03-16 RX ADMIN — IBUPROFEN 600 MG: 600 TABLET, FILM COATED ORAL at 08:47

## 2024-03-16 RX ADMIN — OXYCODONE HYDROCHLORIDE 5 MG: 5 TABLET ORAL at 10:55

## 2024-03-16 ASSESSMENT — COGNITIVE AND FUNCTIONAL STATUS - GENERAL
MOVING TO AND FROM BED TO CHAIR: 4 - NONE
STANDING UP FROM CHAIR USING ARMS: 4 - NONE
CLIMB 3 TO 5 STEPS WITH RAILING: 4 - NONE
WALKING IN HOSPITAL ROOM: 4 - NONE

## 2024-03-16 NOTE — PLAN OF CARE
Plan of Care Review  Plan of Care Reviewed With: patient  Outcome Evaluation: AAOX4 able to make needs known. VSS. c/o abd pain managed with Motrin & Oxy 5mg. OOB to bathroom with minimal assistance. Call bell in reach. Pt will be d/cd  to home this afternoon.

## 2024-03-16 NOTE — PROGRESS NOTES
"Per updates from W/E rounds and Dr. Randall, pt is stable for Dc home today. CM requested to price check Eliquis. Call to [facility] pharmacy [phone] on [address] in [place]. Pharmacist states that pt's insurance says, \"customer needs to make payments owed on policy\". Her insurance will not cover any payments until that happens. [facility] was able to apply the 1 free month coupon to the Eliquis to make the cost $0 out of pocket for the pt. The oxy ordered cost is $12 out of pocket. Dr. Randall aware. No HC needs anticipated at [place]. Family to provide transport home at [place]. Cm will continue to offer support and follow for needs until DC complete.   "

## 2024-03-16 NOTE — POST-OP (NON-BILLABLE)
Afebrile VSS  +flatus  Abd soft incisions clean   Pain controlled  Foor discharge.  Maurice Hammond MD  Gyn Oncology

## 2024-03-16 NOTE — POST-OP (NON-BILLABLE)
Gynecologic Oncology Progress Note:     Subjective     No acute events overnight.  Patient is without complaints this AM.  Denies HA, CP, SOB, N/V, F/C, lightheadedness and dizziness.   She is anticipating discharge today.    Objective     Vital Signs (last 24h):  Temp:  [36.5 °C (97.7 °F)-37.3 °C (99.1 °F)] 37.2 °C (99 °F)  Heart Rate:  [75-88] 80  Resp:  [16-18] 18  BP: (115-138)/(52-63) 124/63    Exam:  General: NAD   Heart: regular rate and rhythm  Lungs: clear to auscultation bilaterally, no increased respiratory effort  Abdomen: soft, nondistended, non-tender, no rebound/rigidity/guarding, 4 laparoscopic incisions with dressing clean/dry/intact. LUQ mini lap with Mepilex dressing with dark blood strikethrough, no active bleeding.   Extremities: symmetric and no edema     Assessment/Plan     Xin Melendez is a 63 y.o. female POD#2 s/p RA-TLH, LSO, L pelvic sentinel lymph node biopsy secondary to endometrial cancer     1. Post operative care:   - Afebrile, vital signs stable.   - Pain well controlled with ERAS pain management protocol.  - Preop hgb 12.6. No signs/sx of anemia.   - Encouraged regular diet and use of incentive spirometer  - Meeting post operative milestone. Continue routine post-op care.     2. VTE prophylaxis:   - Continue Lovenox while inpatient   - Given larger incision in setting of malignancy and patient's risk factors for VTE, will plan a 30 day course of Apixaban on discharge home   - Encouraged ambulation at least 4 times a day    3. DMII:   - POCT glucose q AC/HS  - Continue insulin sliding scale inpatient    4.  Hypothyroidism:   - Continue home Synthroid     5. HLD:  - Continue home Simvastatin     6. HTN:   - Continue home Irbesartan/HCTZ     Plan will be discussed with Dr. Hammond by daytime resident.  Anticipate discharge home today.    Please reach out to the Gyn team at pager #[phone] with any questions or concerns.     Jerad Gooden MD

## 2024-03-16 NOTE — PLAN OF CARE
Problem: Adult Inpatient Plan of Care  Goal: Plan of Care Review  Outcome: Progressing  Flowsheets (Taken 3/16/2024 0503)  Progress: improving  Outcome Evaluation: Pt received AAOX4. Able to make needs knnown. PRN pain medication administered, + results. Safety measures in place. Call bell in reach.  Plan of Care Reviewed With: patient  Goal: Patient-Specific Goal (Individualized)  Outcome: Progressing  Goal: Absence of Hospital-Acquired Illness or Injury  Outcome: Progressing  Goal: Optimal Comfort and Wellbeing  Outcome: Progressing  Goal: Readiness for Transition of Care  Outcome: Progressing     Problem: Fall Injury Risk  Goal: Absence of Fall and Fall-Related Injury  Outcome: Progressing   Plan of Care Review  Plan of Care Reviewed With: patient  Progress: improving  Outcome Evaluation: Pt received AAOX4. Able to make needs knnown. PRN pain medication administered, + results. Safety measures in place. Call bell in reach.

## 2024-03-18 ENCOUNTER — TELEPHONE (OUTPATIENT)
Dept: GYNECOLOGIC ONCOLOGY | Facility: CLINIC | Age: 64
End: 2024-03-18
Payer: COMMERCIAL

## 2024-03-18 NOTE — TELEPHONE ENCOUNTER
Brief Gyn Onc Phone Call    I called the patient at her listed phone number to check in since she was discharged.    She is overall doing well. Ambulating, voiding, having bowel movements.  She is taking the Eliquis. Her pain is well controlled with the PO oxycodone at night.     I will reach out when her pathology is available.     We reviewed her upcoming appointment with us.  We also reviewed reasons to reach out prior to her appointment.  Office phone number: [phone]      Yohana Ronquillo MD  Gynecologic Oncology  [id number]  03/18/24  6:42 PM

## 2024-03-19 ENCOUNTER — TELEPHONE (OUTPATIENT)
Dept: GYNECOLOGIC ONCOLOGY | Facility: CLINIC | Age: 64
End: 2024-03-19
Payer: COMMERCIAL

## 2024-03-19 NOTE — TELEPHONE ENCOUNTER
Brief Gyn Onc Phone Call    I called the patient at her listed phone number to check in and discuss her pathology results.    Only residual EIN. No adjuvant treatment recommended.  She is happy with the news.    We reviewed her upcoming appointment with us.  We also reviewed reasons to reach out prior to her appointment.  Office phone number: [phone]      Yohana Ronquillo MD  Gynecologic Oncology  [id number]  03/19/24  7:45 PM

## 2024-03-20 ENCOUNTER — PATIENT MESSAGE (OUTPATIENT)
Dept: GYNECOLOGIC ONCOLOGY | Facility: CLINIC | Age: 64
End: 2024-03-20
Payer: COMMERCIAL

## 2024-03-20 DIAGNOSIS — Z98.890 POST-OPERATIVE STATE: Primary | ICD-10-CM

## 2024-03-20 RX ORDER — OXYCODONE HYDROCHLORIDE 5 MG/1
5 TABLET ORAL EVERY 6 HOURS PRN
Qty: 15 TABLET | Refills: 0 | Status: SHIPPED | OUTPATIENT
Start: 2024-03-20 | End: 2024-03-30

## 2024-03-21 LAB
CASE RPRT: NORMAL
CLINICAL INFO: NORMAL
LAB AP CORRECTION HISTORY: NORMAL
PATH REPORT.FINAL DX SPEC: NORMAL
PATH REPORT.FINAL DX SPEC: NORMAL
PATH REPORT.GROSS SPEC: NORMAL
PATHOLOGY SYNOPTIC REPORT: NORMAL

## 2024-03-26 ENCOUNTER — TELEPHONE (OUTPATIENT)
Dept: GYNECOLOGIC ONCOLOGY | Facility: CLINIC | Age: 64
End: 2024-03-26
Payer: COMMERCIAL

## 2024-03-26 NOTE — TELEPHONE ENCOUNTER
Brief Gyn Onc Phone Call    I called the patient at her listed phone number after receiving a message regarding her incision and postoperative pain.    Discussed that I would like her to come in tomorrow so I can evaluate her left incision, which is her largest incision and is where there is the most pain.    Scheduled an appointment at 8 am, patient aware and has a ride.      Yohana Ronquillo MD  Gynecologic Oncology  [id number]  03/26/24  1:48 PM

## 2024-03-27 ENCOUNTER — HOSPITAL ENCOUNTER (EMERGENCY)
Facility: HOSPITAL | Age: 64
Discharge: HOME | End: 2024-03-27
Attending: EMERGENCY MEDICINE
Payer: COMMERCIAL

## 2024-03-27 ENCOUNTER — OFFICE VISIT (OUTPATIENT)
Dept: GYNECOLOGIC ONCOLOGY | Facility: CLINIC | Age: 64
End: 2024-03-27
Attending: OBSTETRICS & GYNECOLOGY
Payer: COMMERCIAL

## 2024-03-27 ENCOUNTER — APPOINTMENT (EMERGENCY)
Dept: RADIOLOGY | Facility: HOSPITAL | Age: 64
End: 2024-03-27
Payer: COMMERCIAL

## 2024-03-27 VITALS
WEIGHT: 293 LBS | BODY MASS INDEX: 51.91 KG/M2 | OXYGEN SATURATION: 99 % | DIASTOLIC BLOOD PRESSURE: 78 MMHG | RESPIRATION RATE: 18 BRPM | TEMPERATURE: 98.3 F | HEIGHT: 63 IN | HEART RATE: 78 BPM | SYSTOLIC BLOOD PRESSURE: 154 MMHG

## 2024-03-27 VITALS
DIASTOLIC BLOOD PRESSURE: 64 MMHG | RESPIRATION RATE: 16 BRPM | WEIGHT: 293 LBS | BODY MASS INDEX: 51.91 KG/M2 | TEMPERATURE: 98.2 F | OXYGEN SATURATION: 99 % | HEART RATE: 71 BPM | SYSTOLIC BLOOD PRESSURE: 130 MMHG | HEIGHT: 63 IN

## 2024-03-27 DIAGNOSIS — C54.1 CARCINOMA OF ENDOMETRIUM (CMS/HCC): ICD-10-CM

## 2024-03-27 DIAGNOSIS — N20.0 KIDNEY STONE ON RIGHT SIDE: Primary | ICD-10-CM

## 2024-03-27 DIAGNOSIS — Z98.890 POST-OPERATIVE STATE: Primary | ICD-10-CM

## 2024-03-27 DIAGNOSIS — N13.30 HYDRONEPHROSIS, RIGHT: ICD-10-CM

## 2024-03-27 DIAGNOSIS — S30.1XXA ABDOMINAL WALL SEROMA, INITIAL ENCOUNTER: ICD-10-CM

## 2024-03-27 PROBLEM — L03.116 CELLULITIS OF LEFT LOWER EXTREMITY: Status: RESOLVED | Noted: 2022-10-11 | Resolved: 2024-03-27

## 2024-03-27 PROBLEM — M75.00 FROZEN SHOULDER: Status: ACTIVE | Noted: 2024-03-27

## 2024-03-27 PROBLEM — G56.00 CTS (CARPAL TUNNEL SYNDROME): Status: ACTIVE | Noted: 2024-03-27

## 2024-03-27 PROBLEM — N97.9 INFERTILITY, FEMALE: Status: RESOLVED | Noted: 2022-08-19 | Resolved: 2024-03-27

## 2024-03-27 PROBLEM — N17.9 AKI (ACUTE KIDNEY INJURY) (CMS/HCC): Status: RESOLVED | Noted: 2022-08-03 | Resolved: 2024-03-27

## 2024-03-27 PROBLEM — N85.02 EIN (ENDOMETRIAL INTRAEPITHELIAL NEOPLASIA): Status: RESOLVED | Noted: 2022-08-19 | Resolved: 2024-03-27

## 2024-03-27 PROBLEM — N12 PYELONEPHRITIS: Status: RESOLVED | Noted: 2022-08-03 | Resolved: 2024-03-27

## 2024-03-27 PROBLEM — N95.0 POSTMENOPAUSAL BLEEDING: Status: RESOLVED | Noted: 2022-08-03 | Resolved: 2024-03-27

## 2024-03-27 LAB
ALBUMIN SERPL-MCNC: 3.6 G/DL (ref 3.5–5.7)
ALP SERPL-CCNC: 61 IU/L (ref 34–125)
ALT SERPL-CCNC: 9 IU/L (ref 7–52)
ANION GAP SERPL CALC-SCNC: 6 MEQ/L (ref 3–15)
AST SERPL-CCNC: 12 IU/L (ref 13–39)
BASOPHILS # BLD: 0.1 K/UL (ref 0.01–0.1)
BASOPHILS NFR BLD: 0.7 %
BILIRUB SERPL-MCNC: 0.4 MG/DL (ref 0.3–1.2)
BUN SERPL-MCNC: 23 MG/DL (ref 7–25)
CALCIUM SERPL-MCNC: 9.2 MG/DL (ref 8.6–10.3)
CHLORIDE SERPL-SCNC: 106 MEQ/L (ref 98–107)
CO2 SERPL-SCNC: 29 MEQ/L (ref 21–31)
CREAT SERPL-MCNC: 1.2 MG/DL (ref 0.6–1.2)
DIFFERENTIAL METHOD BLD: ABNORMAL
EGFRCR SERPLBLD CKD-EPI 2021: 51 ML/MIN/1.73M*2
EOSINOPHIL # BLD: 0.44 K/UL (ref 0.04–0.36)
EOSINOPHIL NFR BLD: 3 %
ERYTHROCYTE [DISTWIDTH] IN BLOOD BY AUTOMATED COUNT: 13.4 % (ref 11.7–14.4)
GLUCOSE SERPL-MCNC: 91 MG/DL (ref 70–99)
HCT VFR BLD AUTO: 37 % (ref 35–45)
HGB BLD-MCNC: 11.5 G/DL (ref 11.8–15.7)
IMM GRANULOCYTES # BLD AUTO: 0.2 K/UL (ref 0–0.08)
IMM GRANULOCYTES NFR BLD AUTO: 1.4 %
LYMPHOCYTES # BLD: 2.78 K/UL (ref 1.2–3.5)
LYMPHOCYTES NFR BLD: 19.1 %
MCH RBC QN AUTO: 28 PG (ref 28–33.2)
MCHC RBC AUTO-ENTMCNC: 31.1 G/DL (ref 32.2–35.5)
MCV RBC AUTO: 90 FL (ref 83–98)
MONOCYTES # BLD: 1.15 K/UL (ref 0.28–0.8)
MONOCYTES NFR BLD: 7.9 %
NEUTROPHILS # BLD: 9.92 K/UL (ref 1.7–7)
NEUTS SEG NFR BLD: 67.9 %
NRBC BLD-RTO: 0 %
PDW BLD AUTO: 10.3 FL (ref 9.4–12.3)
PLATELET # BLD AUTO: 345 K/UL (ref 150–369)
POTASSIUM SERPL-SCNC: 4.4 MEQ/L (ref 3.5–5.1)
PROT SERPL-MCNC: 6.7 G/DL (ref 6–8.2)
RBC # BLD AUTO: 4.11 M/UL (ref 3.93–5.22)
SODIUM SERPL-SCNC: 141 MEQ/L (ref 136–145)
WBC # BLD AUTO: 14.59 K/UL (ref 3.8–10.5)

## 2024-03-27 PROCEDURE — 80053 COMPREHEN METABOLIC PANEL: CPT | Performed by: PHYSICIAN ASSISTANT

## 2024-03-27 PROCEDURE — 25000000 HC PHARMACY GENERAL: Performed by: PHYSICIAN ASSISTANT

## 2024-03-27 PROCEDURE — 74177 CT ABD & PELVIS W/CONTRAST: CPT

## 2024-03-27 PROCEDURE — 87205 SMEAR GRAM STAIN: CPT

## 2024-03-27 PROCEDURE — 36415 COLL VENOUS BLD VENIPUNCTURE: CPT | Performed by: PHYSICIAN ASSISTANT

## 2024-03-27 PROCEDURE — 63600105 HC IODINE BASED CONTRAST: Mod: JZ | Performed by: PHYSICIAN ASSISTANT

## 2024-03-27 PROCEDURE — 99024 POSTOP FOLLOW-UP VISIT: CPT | Performed by: OBSTETRICS & GYNECOLOGY

## 2024-03-27 PROCEDURE — 99284 EMERGENCY DEPT VISIT MOD MDM: CPT | Mod: 25

## 2024-03-27 PROCEDURE — 85025 COMPLETE CBC W/AUTO DIFF WBC: CPT | Performed by: PHYSICIAN ASSISTANT

## 2024-03-27 PROCEDURE — 99212 OFFICE O/P EST SF 10 MIN: CPT | Mod: 24 | Performed by: OBSTETRICS & GYNECOLOGY

## 2024-03-27 PROCEDURE — 63700000 HC SELF-ADMINISTRABLE DRUG

## 2024-03-27 RX ORDER — SULFAMETHOXAZOLE AND TRIMETHOPRIM 800; 160 MG/1; MG/1
1 TABLET ORAL 2 TIMES DAILY
Qty: 14 TABLET | Refills: 0 | Status: SHIPPED | OUTPATIENT
Start: 2024-03-27 | End: 2024-04-03

## 2024-03-27 RX ORDER — DIPHENHYDRAMINE HCL 25 MG
50 CAPSULE ORAL EVERY 6 HOURS PRN
Status: DISCONTINUED | OUTPATIENT
Start: 2024-03-27 | End: 2024-03-27 | Stop reason: HOSPADM

## 2024-03-27 RX ORDER — IOPAMIDOL 755 MG/ML
100 INJECTION, SOLUTION INTRAVASCULAR
Status: COMPLETED | OUTPATIENT
Start: 2024-03-27 | End: 2024-03-27

## 2024-03-27 RX ORDER — LIDOCAINE HYDROCHLORIDE 10 MG/ML
10 INJECTION, SOLUTION EPIDURAL; INFILTRATION; INTRACAUDAL; PERINEURAL ONCE
Status: COMPLETED | OUTPATIENT
Start: 2024-03-27 | End: 2024-03-27

## 2024-03-27 RX ADMIN — IOPAMIDOL 100 ML: 755 INJECTION, SOLUTION INTRAVENOUS at 10:26

## 2024-03-27 RX ADMIN — DIPHENHYDRAMINE HYDROCHLORIDE 50 MG: 25 CAPSULE ORAL at 12:39

## 2024-03-27 RX ADMIN — LIDOCAINE HYDROCHLORIDE 10 ML: 10 INJECTION, SOLUTION EPIDURAL; INFILTRATION; INTRACAUDAL; PERINEURAL at 12:40

## 2024-03-27 ASSESSMENT — ENCOUNTER SYMPTOMS
COUGH: 0
DIFFICULTY URINATING: 0
COLOR CHANGE: 0
NAUSEA: 0
AGITATION: 0
LIGHT-HEADEDNESS: 0
SHORTNESS OF BREATH: 0
ABDOMINAL PAIN: 0
FEVER: 0
WOUND: 0
WEAKNESS: 0
DIZZINESS: 0
VOMITING: 0
CHILLS: 0

## 2024-03-27 NOTE — CONSULTS
Gynecology-oncology Consult Note    HPI      Xin Melendez is a 63 y.o.  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 Given concern for possible fascial dehiscence, she was instructed to come down to the ED to be evaluated.     In the ED, vitals have been stable, afebrile. Lab works is notable for a WBC of 14, otherwise largely WNL. CT A/P was remarkable for a seroma, no signs of fascial dehiscence.     Of note, patient had a RA- TLH, LSO, SLND, and minilap for delivery of specimen on 3/14/24.       OB History:   OB History    Para Term  AB Living   1 1 0 0 0 1   SAB IAB Ectopic Multiple Live Births   0 0 0 0 1      # Outcome Date GA Lbr Newton/2nd Weight Sex Delivery Anes PTL Lv   1 Para      CS-Unspec         Obstetric Comments      CS x1   Tammy TAB/SABs      Menarche at age 12   Menopause at age 55   Denies using HRT   Denies history of abnormal pap smears       Medical History:   Past Medical History:   Diagnosis Date   • DARCY (acute kidney injury) (CMS/HCC)     Added automatically from request for surgery [id number]   • Back pain    • Bacteremia due to Escherichia coli 2021   • Cellulitis of left lower extremity    • COVID-19 vaccine series completed     Moderna Boster 10/2021   • CTS (carpal tunnel syndrome)    • DJD (degenerative joint disease)    • Edema    • EIN (endometrial intraepithelial neoplasia)     22 Endometrial Biopsy:    Fragments of endometrium with  hyperplasia and foci of EIN. Also acute inflammatory infiltrate and necrosis     22: endometrial curettage and IUD placement   Fragments of endometrium with complex atypical hyperplasia (endometrial intraepithelial neoplasia).   • HL (hearing loss)    • Hypertension    • Hypothyroidism    • Infertility, female     Reports hx of infertility and received IVF for  pregnancy in . During treatment she reports she was told by her DONI Dr. Cantor that her tubes were scarred and he suspected she may have had a history of pelvic infection.    • Kidney stones    • Lipid disorder    • Lymphedema    • Obesity    • Postmenopausal bleeding    • Pyelonephritis    • Sciatica    • Shoulder pain     bilateral   • Stasis dermatitis    • Type 2 diabetes mellitus (CMS/[facility])        Surgical History:   Past Surgical History:   Procedure Laterality Date   • APPENDECTOMY      laparotomy   •  SECTION  1999   • CYSTOSCOPY  2021    CYSTOSCOPY,INSERT URETERAL STENT   • CYSTOSCOPY  2022    CYSTO, RETRO, STENT, URETEROSCOPY, LASER LITHOTRIPSY, D&C   • DILATION AND CURETTAGE OF UTERUS      x 2 ** when patient was age 26, 2024   • OOPHORECTOMY      open RSO, right side secondary to torsion- during pregnancy   • ROBOTIC ASSISTED HYSTERECTOMY  2024    robotic assisted total laparoscopic hysterectomy, left salpingo-oophorectomy, sentinel lymph node mapping and left pelvic sentinel lymph node biopsy, minilaparotomy for delivery of the specimen   • WISDOM TOOTH EXTRACTION       Social History     Socioeconomic History   • Marital status:    • Number of children: 1   Occupational History   • Occupation: Unemployed   Tobacco Use   • Smoking status: Former     Types: Cigarettes     Quit date:      Years since quittin.2   • Smokeless tobacco: Current   • Tobacco comments:     Ex-smoker who smoked socially in college for approximately 4 years.   Vaping Use   • Vaping Use: Never used   Substance and Sexual Activity   • Alcohol use: Not Currently     Comment: No current alcohol use, socially in college.   • Drug use: Not Currently   • Sexual activity: Not Currently   Social History Narrative    Lives with son, Edd, in a 2 story home has a chair lift.  She is .  She designates her close friend, Akila Romelia, as an emergency medical proxy.  Akila  can be reached at [phone].        The patient is currently on disability.  She previously worked as a  as well as an .        The patient tells me that she smoked socially in college for 4 years.    No current alcohol use.  She consumes alcohol socially in college only.    Denies substance use.        Patient currently using a walker for unsteady gait.         Social Determinants of Health     Food Insecurity: No Food Insecurity (3/27/2024)    Hunger Vital Sign    • Worried About Running Out of Food in the Last Year: Never true    • Ran Out of Food in the Last Year: Never true       Family History   Problem Relation Age of Onset   • Heart attack Biological Father    • Stomach cancer Paternal Grandmother        Allergies:   Allergies   Allergen Reactions   • Adhesive Tape-Silicones Rash   • Cephalexin Rash     Keflex          Medications:  Prior to Admission medications    Medication Sig Start Date End Date Taking? Authorizing Provider   sulfamethoxazole-trimethoprim (BACTRIM DS) 800-160 mg per tablet Take 1 tablet by mouth 2 (two) times a day for 7 days. 3/27/24 4/3/24 Yes Yohana Ronquillo MD   acetaminophen (TYLENOL ARTHRITIS PAIN ORAL) Take 2 tablets by mouth 2 (two) times a day as needed (pain).    Provider, Suyapa Ibarra MD   acetaminophen (TYLENOL) 325 mg tablet Take 3 tablets (975 mg total) by mouth every 6 (six) hours. 3/16/24 4/15/24  Fito Randall MD   apixaban (ELIQUIS) 2.5 mg tablet Take 1 tablet (2.5 mg total) by mouth 2 (two) times a day. 3/15/24 4/14/24  Radha Purvis MD   ascorbic acid, vitamin C, (VITAMIN C) 1,000 mg tablet Take 1,000 mg by mouth daily.    Provider, Suyapa Ibarra MD   aspirin 81 mg enteric coated tablet Take 81 mg by mouth daily. To stop pre-op    Fred Dow MD   cholecalciferol, vitamin D3, (VITAMIN D3 ORAL) Take 1,000 Units by mouth daily.    Fred Dow MD   DULoxetine (CYMBALTA) 20 mg capsule Take 20 mg  by mouth 2 (two) times a day.    Suyapa Dow MD   gabapentin (NEURONTIN) 600 mg tablet Take 600 mg by mouth 3 (three) times a day.    Suyapa Dow MD   ibuprofen (MOTRIN) 600 mg tablet Take 1 tablet (600 mg total) by mouth every 6 (six) hours for 10 days. 3/16/24 3/26/24  Fito Randall MD   insulin aspart U-100 (NovoLOG) 100 unit/mL (3 mL) pen Inject under the skin 3 (three) times a day with meals. sliding scale ( dosage based on Carb count) 3/20/23   Suyapa Dow MD   insulin degludec U-200 CONC (TRESIBA FLEXTOUCH U-200) 200 unit/mL (3 mL) pen Inject 0.2 mL (40 Units total) under the skin every evening.  Patient taking differently: Inject 80 Units under the skin daily. 3:30 10/15/22 3/14/24  Everardo Sky DO   irbesartan-hydrochlorothiazide (AVALIDE) 150-12.5 mg per tablet Take 1 tablet by mouth every morning.    ProviderSuyapa MD   levothyroxine 75 mcg tablet Take 75 mcg by mouth daily.    Fred Dow MD   meloxicam (MOBIC) 15 mg tablet Take 15 mg by mouth every morning.    Fred Dow MD   methocarbamoL 750 mg tablet Take 750 mg by mouth 3 (three) times a day as needed for muscle spasms. Always takes in am but doesn't always do tid 8/9/21   Fred Dow MD   norethindrone (AYGESTIN) 5 mg tablet Take 1 tablet (5 mg total) by mouth 2 (two) times a day. 1/22/24 1/21/25  Iqra Carter DO   ONETOUCH ULTRA TEST strip TEST BLOOD SUGARS 4 TO 5 TIMES DAILY 12/12/23   Suyapa Dow MD   oxyCODONE (ROXICODONE) 5 mg immediate release tablet Take 1 tablet (5 mg total) by mouth every 6 (six) hours as needed for severe pain for up to 10 days. 3/20/24 3/30/24  Yohana Ronquillo MD   polyethylene glycol (MIRALAX) 17 gram packet Take 17 g by mouth daily for 3 days. 3/17/24 3/20/24  Fito Randall MD   sennosides-docusate sodium (SENOKOT-S) 8.6-50 mg Take 1 tablet by mouth 2 (two) times a day. 3/16/24 4/15/24  Fito Randall MD    simvastatin 40 mg tablet Take 40 mg by mouth every morning. 11/25/20   Provider, MD Fred       Review of Systems: All systems were reviewed and normal with the exception of the above.       Objective     Vital Signs (last 24h):  Temp:  [36.8 °C (98.2 °F)-36.8 °C (98.3 °F)] 36.8 °C (98.2 °F)  Heart Rate:  [71-78] 71  Resp:  [16-18] 16  BP: (130-154)/(64-78) 130/64    Exam:  General: No acute distress.  Abdomen: soft, nondistended, tenderness mild over left sided mini-lap port incision, No rebound/rigidity/guarding Port sites well healed, approximately softball sized soft tissue mass noted under mini-lap incision site that is not warm to touch, is fluctuant, and minimally TTP.   Genitalia: deferred   Extremities: +4 lower extremity edema, symmetric.    Labs:  Available studies reviewed.    Results from last 7 days   Lab Units 03/27/24  0913   WBC K/uL 14.59*   HEMOGLOBIN g/dL 11.5*   HEMATOCRIT % 37.0   MCV fL 90.0   PLATELETS K/uL 345     Results from last 7 days   Lab Units 03/27/24  0913   SODIUM mEQ/L 141   POTASSIUM mEQ/L 4.4   CHLORIDE mEQ/L 106   CO2 mEQ/L 29   BUN mg/dL 23   CREATININE mg/dL 1.2   EGFR mL/min/1.73m*2 51.0*   GLUCOSE mg/dL 91   CALCIUM mg/dL 9.2   ALK PHOS IU/L 61   BILIRUBIN TOTAL mg/dL 0.4   ALBUMIN g/dL 3.6   ALT IU/L 9   AST IU/L 12*     Recent Labs   Lab 03/16/24  0804 03/15/24  2112 03/15/24  1638 03/15/24  1057 03/14/24  2105 03/14/24  1656 03/14/24  1439   POCGLU 123* 171* 136* 161* 156* 150* 121*       Imaging:  Available studies reviewed.    Narrative & Impression   CLINICAL HISTORY:    Abdominal pain and firmness at left-sided abdominal  incision     COMPARISON: CT 8/5/2022     TECHNIQUE: CT of the abdomen and pelvis was performed with the patient in the  supine position. Images reconstructed/reformatted in the axial, coronal and  sagittal planes. Oral contrast was not administered.     CT DOSE:  One or more dose reduction techniques (e.g. automated exposure  control,  adjustment of the mA and/or kV according to patient size, use of  iterative reconstruction technique) utilized for this examination.     INTRAVENOUS CONTRAST: None     COMMENT:     The lack of intravenous contrast limits evaluation of the viscera.     LOWER THORAX: The lung bases are clear. The heart is normal in size.     LIVER: The liver appears stereotactic and is mildly increased in size measuring  19 cm in length.     BILIARY: No intrahepatic or extrahepatic bile duct dilation.     GALLBLADDER: Three 1 cm stones in the gallbladder near the neck.  No findings of  acute cholecystitis.     PANCREAS: Unremarkable. No main pancreatic duct dilation.     SPLEEN: Unremarkable.     ADRENAL GLANDS: Unremarkable.     KIDNEYS, URETERS: The nephrograms are symmetric.  There is multifocal renal  cortical thinning.  There is a 3 mm calculus at the right ureteropelvic junction  with mild right hydronephrosis.     BOWEL: No disproportionate dilation of the small or large bowel. Scattered  colonic diverticula without findings of acute diverticulitis.     PERITONEUM:  Small amount of simple pelvic fluid, within range for postsurgical  change.     LYMPH NODES: No abdominal or pelvic lymphadenopathy.     REPRODUCTIVE ORGANS: Hysterectomy. No adnexal mass.     BLADDER: Unremarkable     VESSELS: The aorta and branch vessels are normal in caliber.     BONES: No suspicious osseous lesion. Mild degenerative changes of the lumbar  spine.     SOFT TISSUES: There is loculated fluid in the subcutaneous fat of the left  lateral abdominal wall measuring up to 9.5 x 4.9 x 3.9 cm in extent.  No thick  rim enhancement of fluid to indicate abscess formation.  There is generalized  subcutaneous fat stranding around the collection, mild overlying skin  thickening.  There are additional scattered areas of subcutaneous fat stranding  in the anterior abdominal wall.     --  IMPRESSION:        1.  Postsurgical changes of the abdominal wall.  Loculated  fluid in the  subcutaneous fat of the left lateral abdominal wall without thick rim  enhancement to indicate an abscess.  This likely represents subcutaneous seroma.  Adjacent fat stranding and overlying skin thickening.  2.  Postsurgical changes of hysterectomy.  3.  Mild right hydronephrosis secondary to 3 mm calculus at the right  ureterovesical junction.  4.  Additional stable findings as above.           Assessment/Plan     Xin Melendez is a 63 y.o.  female presenting with seroma of subcutaneous space s/p surgery on 3/14/24.     #Seroma  - vitals stable, afebrile. With WBC of 14, otherwise no signs of infection  - no fascial dehiscence noted on CT imaging  - Bedside drainage of seroma fluid performed after prepping skin with chloraprep. Sample sent to lab to rule out infection with culture. Remainder of seroma drained as tolerated by patient. Steri-strip placed over puncture site and dressed with mepilex dressing.   - Patient requested antibiotic coverage prior to culture returning, bactrim BID prescribed to pharmacy of choice. Benadryl ordered for in ED for allergic reaction to prior tape applied to skin.   - Stable for discharge home from GYN perspective. She has an appointment with Dr. Ronquillo next week which she was encouraged to keep.       Please contact the GYN team at pager #[phone] with any questions or concerns.     Discussed with and evaluated with Dr. Ronquillo..     Kaity Grider MD

## 2024-03-27 NOTE — ED PROVIDER NOTES
"Emergency Medicine Note  HPI   HISTORY OF PRESENT ILLNESS     Patient is a 63-year-old female with past medical history of hypertension, lipid disorder, obesity, type 2 diabetes, endometrial adenocarcinoma with recent robotic laparoscopic hysterectomy on 3/14/2024, who was sent to the ED by GYN with concern for postop complication.  Patient had seen her GYN in the office this morning with reported pain and firmness over her left abdominal surgical incision.  She feels the pain is worsened with movement and she notices a \"bulge\" when she moves over this area.  She is passing regular BMs and gas.  She denied any fever/chills, redness, warmth, purulent drainage from her wound.          Patient History   PAST HISTORY     Reviewed from Nursing Triage:       Past Medical History:   Diagnosis Date    DARCY (acute kidney injury) (CMS/[facility])     Added automatically from request for surgery [id number]    Back pain     Bacteremia due to Escherichia coli 11/05/2021    Cellulitis of left lower extremity     COVID-19 vaccine series completed     Moderna Boster 10/2021    CTS (carpal tunnel syndrome)     DJD (degenerative joint disease)     Edema     EIN (endometrial intraepithelial neoplasia)     8/8/22 Endometrial Biopsy:    Fragments of endometrium with  hyperplasia and foci of EIN. Also acute inflammatory infiltrate and necrosis     8/22/22: endometrial curettage and IUD placement   Fragments of endometrium with complex atypical hyperplasia (endometrial intraepithelial neoplasia).    HL (hearing loss)     Hypertension     Hypothyroidism     Infertility, female     Reports hx of infertility and received IVF for pregnancy in 1999. During treatment she reports she was told by her DONI Dr. Cantor that her tubes were scarred and he suspected she may have had a history of pelvic infection.     Kidney stones     Lipid disorder     Lymphedema     Obesity     Postmenopausal bleeding     Pyelonephritis     Sciatica     Shoulder pain     " bilateral    Stasis dermatitis     Type 2 diabetes mellitus (CMS/HCC)        Past Surgical History:   Procedure Laterality Date    APPENDECTOMY      laparotomy     SECTION  1999    CYSTOSCOPY  2021    CYSTOSCOPY,INSERT URETERAL STENT    CYSTOSCOPY  2022    CYSTO, RETRO, STENT, URETEROSCOPY, LASER LITHOTRIPSY, D&C    DILATION AND CURETTAGE OF UTERUS      x 2 ** when patient was age 26, 2024    OOPHORECTOMY      open RSO, right side secondary to torsion- during pregnancy    ROBOTIC ASSISTED HYSTERECTOMY  2024    robotic assisted total laparoscopic hysterectomy, left salpingo-oophorectomy, sentinel lymph node mapping and left pelvic sentinel lymph node biopsy, minilaparotomy for delivery of the specimen    WISDOM TOOTH EXTRACTION         Family History   Problem Relation Age of Onset    Heart attack Biological Father     Stomach cancer Paternal Grandmother        Social History     Tobacco Use    Smoking status: Former     Types: Cigarettes     Quit date:      Years since quittin.2    Smokeless tobacco: Current    Tobacco comments:     Ex-smoker who smoked socially in college for approximately 4 years.   Vaping Use    Vaping Use: Never used   Substance Use Topics    Alcohol use: Not Currently     Comment: No current alcohol use, socially in college.    Drug use: Not Currently         Review of Systems   REVIEW OF SYSTEMS     Review of Systems   Constitutional:  Negative for chills and fever.   HENT:  Negative for congestion.    Respiratory:  Negative for cough and shortness of breath.    Cardiovascular:  Negative for chest pain.   Gastrointestinal:  Negative for abdominal pain, nausea and vomiting.   Genitourinary:  Negative for difficulty urinating.   Skin:  Negative for color change and wound.   Neurological:  Negative for dizziness, weakness and light-headedness.   Psychiatric/Behavioral:  Negative for agitation and behavioral problems.          VITALS     ED Vitals       Date/Time Temp Pulse Resp BP SpO2 [facility]   03/27/24 0841 36.8 °C (98.2 °F) 71 16 130/64 99 % AF          Pulse Ox %: 99 % (03/27/24 0905)  Pulse Ox Interpretation: Normal (03/27/24 0905)  Heart Rate: 71 (03/27/24 0905)        Physical Exam   PHYSICAL EXAM     Physical Exam  Constitutional:       General: She is not in acute distress.     Appearance: Normal appearance. She is obese. She is not ill-appearing or toxic-appearing.   HENT:      Head: Normocephalic.   Eyes:      Conjunctiva/sclera: Conjunctivae normal.   Cardiovascular:      Rate and Rhythm: Normal rate.      Pulses: Normal pulses.      Heart sounds: Normal heart sounds. No murmur heard.  Pulmonary:      Effort: Pulmonary effort is normal. No respiratory distress.      Breath sounds: Normal breath sounds. No wheezing, rhonchi or rales.   Abdominal:      General: Bowel sounds are normal. There is no distension.      Palpations: Abdomen is soft. There is no mass.      Tenderness: There is abdominal tenderness. There is no guarding or rebound.      Comments: Abdomen soft, mildly tender over left mid abdomen surgical incision with firmness appreciated, no fluctuance.  NABS noted.   Musculoskeletal:         General: Normal range of motion.      Cervical back: Normal range of motion.   Skin:     General: Skin is warm and dry.      Findings: Erythema present.      Comments: Mild erythema with no apparent purulent drainage or fluctuance of left mid abdominal surgical incision.  Surgical incision appears dry and intact, as well as other robotic port incisions.   Neurological:      Mental Status: She is alert and oriented to person, place, and time. Mental status is at baseline.   Psychiatric:         Mood and Affect: Mood normal.         Behavior: Behavior normal.           PROCEDURES     Procedures     DATA     Results       None            Imaging Results    None         No orders to display       Scoring tools                                  ED Course & MDM    MDM / ED COURSE / CLINICAL IMPRESSION / DISPO     Medical Decision Making  Ddx includes postoperative collection, hematoma, abdominal hernia among other pathology.  Patient is passing regular BMs and gas, lower suspicion for SBO, although considered.  Patient is afebrile and nontoxic-appearing with stable vital signs.  Mild leukocytosis of 14 noted.  CT imaging was obtained that showed evidence of a seroma without rim enhancement to suggest abscess.  Also showed a 3 mm right kidney stone with mild hydronephrosis that appeared small enough to pass, no reported right flank pain.  Will provide urology follow-up as needed concerning this finding.  GYN was consulted and evaluated patient in the ED and performed I&D of seroma.  They do not feel this is an abscess, however obtained culture and sent to the lab for analysis, discharged with prescription for Bactrim.  Patient was advised to follow-up with PCP and GYN outpatient, given return precautions to the ED.  Discussed with supervising attending who agrees with this discharge plan.    Problems Addressed:  Abdominal wall seroma, initial encounter: acute illness or injury  Hydronephrosis, right: acute illness or injury  Kidney stone on right side: acute illness or injury    Amount and/or Complexity of Data Reviewed  External Data Reviewed: notes.     Details: Reviewed GYN note from earlier this morning.  Labs: ordered.  Radiology: ordered and independent interpretation performed.     Details: Radiology results reviewed and agree with radiologist interpretation.    Risk  Prescription drug management.  Risk Details: Consideration for admission but ultimately discharged because: No current indication for admission based on work up and clinical course, need for placement in observation status, or need for immediate surgical/procedural intervention was found during the emergency department visit.         ED Course as of 03/27/24 1236   Wed Mar 27, 2024   1036   IMPRESSION:         1.  Postsurgical changes of the abdominal wall.  Loculated fluid in the  subcutaneous fat of the left lateral abdominal wall without thick rim  enhancement to indicate an abscess.  This likely represents subcutaneous seroma.  Adjacent fat stranding and overlying skin thickening.  2.  Postsurgical changes of hysterectomy.  3.  Mild right hydronephrosis secondary to 3 mm calculus at the right  ureterovesical junction.  4.  Additional stable findings as above.   [BF]   1036 Paged GYN to discuss [BF]   1042 I spoke with Cecilia from GYN who will discuss and review w/u with attending and call back with recs. [BF]   1138 GYN is going to come down to see patient to attempt I&D at bedside.  Believes this is a seroma without need for antibiotics at this time. [BF]   1236 I&D was performed by GYN team at bedside in the ED.  Sent fluid for culture but low suspicion for abscess.  GYN team had sent Bactrim to patient's pharmacy in case but otherwise stable for discharge to follow-up with PCP and GYN outpatient. [BF]      ED Course User Index  [BF] Lexus Medina PA C     Clinical Impression      None                 Lexus Medina PA C  03/27/24 1252

## 2024-03-27 NOTE — ED ATTESTATION NOTE
Xin Melendez was evaluated and managed by the physician assistant. I was available for immediate consult. I agree with the physician assistants interpretation of laboratory studies as well as the care provided to this patient.     Tian Hyde,   03/27/24 1256     Stop the PCA pump, as pt is unable to effectively use it for pain management As per provider, continue to monitor. No new orders provided

## 2024-03-27 NOTE — DISCHARGE INSTRUCTIONS
Please review all discharge instructions as discussed.  Please be re-evaluated by your primary doctor as soon as possible and be sure to review all laboratory, radiology and other testing with your doctor.  Should your symptoms worsen or not improve, or if you develop fever, chills, redness, warmth, purulent drainage from your surgical wounds, return to emergency room immediately.     As discussed, your CT scan showed a seroma or fluid collection in the left side of your abdomen that was drained by the GYN team in the ER.  A culture was sent to the lab for analysis and may take 2-3 days to result.  You may contact the ER at [phone] for these results.  A prescription for Bactrim was sent to your pharmacy by the GYN team.    You were also found to have a kidney stone in your right kidney that appears small at 3 mm and should pass on its own.  You were provided the contact information for Dr. Harper from urology as needed.    Please follow-up with your primary care doctor in the next 1 to 2 days for reevaluation.

## 2024-03-27 NOTE — PROGRESS NOTES
Gynecologic Oncology Clinic Visit    CC: postoperative check    HPI: Ms. Xin Melendez is a 63 y.o. 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 She was instructed to come to clinic today for evaluation.      Subjective: Xin reports ongoing pain near her left minilaparotomy site. She also has noticed an outpouching under her incision. She is eating, voiding, and having regular bowel movements. She denies fevers, chills, bleeding, or pus from this incision. Her actual skin incision is well healed over this protrusion.       Brief Treatment History  Oncology History   Carcinoma of endometrium (CMS/HCC)   8/3/2022 Imaging Significant Findings    Presented with postmenopausal vaginal bleeding  Pelvic US: Enlarged fibroid uterus. Thickened endometrium measuring up to 2 cm.      8/8/2022 Biopsy    EUA/EMB: fragments of endometrium with hyperplasia and EIN     8/22/2022 Surgery    D&C/IUD placement: fragments of endometrium with complex atypical hyperplasia (EIN)     4/6/2023 Biopsy    dxHSC/D&C/IUD removal and replacement: Complex papillary mucinous metaplasia. Background fragments of endometrial polyp(s). Stromal decidual changes compatible with exogenous hormone effect.     2/6/2024 Biopsy    dxHSC/D&C/IUD insertion: grade 2 endometrioid adenocarcinoma    Of note, no IUD was seen in her endometrial cavity during diagnostic hysteroscopy. LNG-IUD replaced during her procedure.     2/6/2024 Initial Diagnosis    Carcinoma of endometrium (CMS/HCC)     3/14/2024 Cancer Staged    Staging form: Corpus Uteri - Carcinoma and Carcinosarcoma, AJCC 8th Edition  - Clinical stage from 3/14/2024: FIGO Stage IA (cT1a, cN0, cM0)     3/14/2024 Surgery    Robotic assisted total laparoscopic hysterectomy, left salpingo-oophorectomy, sentinel lymph node mapping and left pelvic sentinel lymph  node biopsy, minilaparotomy for delivery of the specimen     No residual carcinoma on final pathology specimen, EIN in the endometrium         Past Medical History:   Diagnosis Date    DARCY (acute kidney injury) (CMS/HCC)     Added automatically from request for surgery [id number]    Back pain     Bacteremia due to Escherichia coli 2021    Cellulitis of left lower extremity     COVID-19 vaccine series completed     Moderna Boster 10/2021    CTS (carpal tunnel syndrome)     DJD (degenerative joint disease)     Edema     EIN (endometrial intraepithelial neoplasia)     22 Endometrial Biopsy:    Fragments of endometrium with  hyperplasia and foci of EIN. Also acute inflammatory infiltrate and necrosis     22: endometrial curettage and IUD placement   Fragments of endometrium with complex atypical hyperplasia (endometrial intraepithelial neoplasia).    HL (hearing loss)     Hypertension     Hypothyroidism     Infertility, female     Reports hx of infertility and received IVF for pregnancy in . During treatment she reports she was told by her DONI Dr. Cantor that her tubes were scarred and he suspected she may have had a history of pelvic infection.     Kidney stones     Lipid disorder     Lymphedema     Obesity     Postmenopausal bleeding     Pyelonephritis     Sciatica     Shoulder pain     bilateral    Stasis dermatitis     Type 2 diabetes mellitus (CMS/[facility])      Past Surgical History:   Procedure Laterality Date    APPENDECTOMY      laparotomy     SECTION  1999    CYSTOSCOPY  2021    CYSTOSCOPY,INSERT URETERAL STENT    CYSTOSCOPY  2022    CYSTO, RETRO, STENT, URETEROSCOPY, LASER LITHOTRIPSY, D&C    DILATION AND CURETTAGE OF UTERUS      x 2 ** when patient was age 26, 2024    OOPHORECTOMY      open RSO, right side secondary to torsion- during pregnancy    ROBOTIC ASSISTED HYSTERECTOMY  2024    robotic assisted total laparoscopic hysterectomy, left  salpingo-oophorectomy, sentinel lymph node mapping and left pelvic sentinel lymph node biopsy, minilaparotomy for delivery of the specimen    WISDOM TOOTH EXTRACTION       Family History   Problem Relation Age of Onset    Heart attack Biological Father     Stomach cancer Paternal Grandmother      Social History     Socioeconomic History    Marital status:      Spouse name: None    Number of children: 1    Years of education: None    Highest education level: None   Occupational History    Occupation: Unemployed   Tobacco Use    Smoking status: Former     Types: Cigarettes     Quit date:      Years since quittin.2    Smokeless tobacco: Current    Tobacco comments:     Ex-smoker who smoked socially in college for approximately 4 years.   Vaping Use    Vaping Use: Never used   Substance and Sexual Activity    Alcohol use: Not Currently     Comment: No current alcohol use, socially in college.    Drug use: Not Currently    Sexual activity: Not Currently   Social History Narrative    Lives with son, Edd, in a 2 story home has a chair lift.  She is .  She designates her close friend, Akila León, as an emergency medical proxy.  Akila can be reached at [phone].        The patient is currently on disability.  She previously worked as a  as well as an .        The patient tells me that she smoked socially in college for 4 years.    No current alcohol use.  She consumes alcohol socially in college only.    Denies substance use.        Patient currently using a walker for unsteady gait.         Social Determinants of Health     Food Insecurity: No Food Insecurity (2024)    Hunger Vital Sign     Worried About Running Out of Food in the Last Year: Never true     Ran Out of Food in the Last Year: Never true     OB History          1    Para   1    Term   0       0    AB   0    Living   1         SAB   0    IAB   0    Ectopic   0     Multiple   0    Live Births   1           Obstetric Comments     CS x1  Tammy TAB/SABs    Menarche at age 12  Menopause at age 55  Denies using HRT  Denies history of abnormal pap smears                   Allergies: Adhesive tape-silicones and Cephalexin    Medications:   Current Outpatient Medications   Medication Sig Dispense Refill    acetaminophen (TYLENOL ARTHRITIS PAIN ORAL) Take 2 tablets by mouth 2 (two) times a day as needed (pain).      acetaminophen (TYLENOL) 325 mg tablet Take 3 tablets (975 mg total) by mouth every 6 (six) hours. 360 tablet 0    apixaban (ELIQUIS) 2.5 mg tablet Take 1 tablet (2.5 mg total) by mouth 2 (two) times a day. 60 tablet 0    ascorbic acid, vitamin C, (VITAMIN C) 1,000 mg tablet Take 1,000 mg by mouth daily.      aspirin 81 mg enteric coated tablet Take 81 mg by mouth daily. To stop pre-op      cholecalciferol, vitamin D3, (VITAMIN D3 ORAL) Take 1,000 Units by mouth daily.      DULoxetine (CYMBALTA) 20 mg capsule Take 20 mg by mouth 2 (two) times a day.      gabapentin (NEURONTIN) 600 mg tablet Take 600 mg by mouth 3 (three) times a day.      ibuprofen (MOTRIN) 600 mg tablet Take 1 tablet (600 mg total) by mouth every 6 (six) hours for 10 days. 40 tablet 0    insulin aspart U-100 (NovoLOG) 100 unit/mL (3 mL) pen Inject under the skin 3 (three) times a day with meals. sliding scale ( dosage based on Carb count)      insulin degludec U-200 CONC (TRESIBA FLEXTOUCH U-200) 200 unit/mL (3 mL) pen Inject 0.2 mL (40 Units total) under the skin every evening. (Patient taking differently: Inject 80 Units under the skin daily. 3:30) 11.7 mL 0    irbesartan-hydrochlorothiazide (AVALIDE) 150-12.5 mg per tablet Take 1 tablet by mouth every morning.      levothyroxine 75 mcg tablet Take 75 mcg by mouth daily.      meloxicam (MOBIC) 15 mg tablet Take 15 mg by mouth every morning.      methocarbamoL 750 mg tablet Take 750 mg by mouth 3 (three) times a day as needed for muscle spasms.  "Always takes in am but doesn't always do tid      norethindrone (AYGESTIN) 5 mg tablet Take 1 tablet (5 mg total) by mouth 2 (two) times a day. 180 tablet 3    ONETOUCH ULTRA TEST strip TEST BLOOD SUGARS 4 TO 5 TIMES DAILY      oxyCODONE (ROXICODONE) 5 mg immediate release tablet Take 1 tablet (5 mg total) by mouth every 6 (six) hours as needed for severe pain for up to 10 days. 15 tablet 0    polyethylene glycol (MIRALAX) 17 gram packet Take 17 g by mouth daily for 3 days. 3 packet 0    sennosides-docusate sodium (SENOKOT-S) 8.6-50 mg Take 1 tablet by mouth 2 (two) times a day. 60 tablet 0    simvastatin 40 mg tablet Take 40 mg by mouth every morning.       No current facility-administered medications for this visit.       Review of Systems: negative except for what is listed above    Physical Exam:  Vital Signs:  Visit Vitals  BP (!) 154/78 (BP Location: Right upper arm, Patient Position: Sitting)   Pulse 78   Temp 36.8 °C (98.3 °F) (Oral)   Resp 18   Ht 1.6 m (5' 3\")   Wt (!) 156 kg (343 lb)   SpO2 99%   BMI 60.76 kg/m²       General: well-developed, well-nourished, no apparent distress  Respiratory: nonlabored breathing on room air  Cardiovascular: nontachycardic  Extremity: symmetric, no clubbing, cyanosis, edema  GI: abdomen soft, non-distended, non-tender   Incision: non-reducible outpouching from under her left minilaparotomy site with will healed skin incision overlying this mass, no erythema, no discharge; the rest of her robotic ports look good  Neurologic: alert & oriented x3, no gross deficits  Musculoskeletal: no deformity or gross strength deficit      Pathology:          Labs:  Lab Results   Component Value Date    WBC 11.40 (H) 02/26/2024    HGB 12.6 02/26/2024    HCT 40.3 02/26/2024    MCV 92.0 02/26/2024     02/26/2024         Chemistry        Component Value Date/Time     02/26/2024 1202    K 4.8 02/26/2024 1202     (H) 02/26/2024 1202    CO2 26 02/26/2024 1202    BUN 19 " 02/26/2024 1202    CREATININE 1.1 02/26/2024 1202        Component Value Date/Time    CALCIUM 9.3 02/26/2024 1202    ALKPHOS 57 02/02/2024 1031    AST 12 (L) 02/02/2024 1031    ALT 17 02/02/2024 1031    BILITOT 0.4 02/02/2024 1031            Assessment   63 y.o. female being consulted for the following issues:  Problem List Items Addressed This Visit       Carcinoma of endometrium (CMS/HCC)     Other Visit Diagnoses       Post-operative state    -  Primary          I met with Xin today in the clinic to evaluate her incisions given ongoing postoperative pain.   I expressed my concern for a possible dehisence and that she needs to go to the ED or evaluation and imaging. She has multiple risk factors for a fascial dehiscence including BMI>60, history of cancer, diabetes mellitus type 2. She is thankfully hemodynamically stable, but I still think this warrants urgent evaluation and treatment.  She agrees to this plan.   I notified the ED and we brought her down to the ED directly from clinic.       Yohana Ronquillo MD

## 2024-03-31 LAB
GRAM STN SPEC: NORMAL
GRAM STN SPEC: NORMAL
MICROORGANISM SPEC CULT: NORMAL

## 2024-04-01 LAB
CASE RPRT: NORMAL
CLINICAL INFO: NORMAL
PATH REPORT.ADDENDUM SPEC: NORMAL
PATH REPORT.ADDENDUM SPEC: NORMAL
PATH REPORT.FINAL DX SPEC: NORMAL
PATH REPORT.FINAL DX SPEC: NORMAL
PATH REPORT.GROSS SPEC: NORMAL

## 2024-04-09 NOTE — PROGRESS NOTES
Gynecologic Oncology Clinic Visit    CC: postoperative check    HPI: Ms. Xin Melendez is a 63 y.o. 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       Subjective: Xin Melendez is doing overall well. She does continues to feel a slight fullness under her minilaparotomy site, but is overall improved. She denies chest pain, palpitations, or shortness of breath. She is tolerating her diet without nausea or emesis. She is having regular bowel movements without issues. She is voiding freely. She has pink discharge but no vaginal bleeding. She denies abdominal or pelvic pain.       Brief Treatment History  Oncology History   Carcinoma of endometrium (CMS/HCC)   8/3/2022 Imaging Significant Findings    Presented with postmenopausal vaginal bleeding  Pelvic US: Enlarged fibroid uterus. Thickened endometrium measuring up to 2 cm.      8/8/2022 Biopsy    EUA/EMB: fragments of endometrium with hyperplasia and EIN     8/22/2022 Surgery    D&C/IUD placement: fragments of endometrium with complex atypical hyperplasia (EIN)     4/6/2023 Biopsy    dxHSC/D&C/IUD removal and replacement: Complex papillary mucinous metaplasia. Background fragments of endometrial polyp(s). Stromal decidual changes compatible with exogenous hormone effect.     2/6/2024 Biopsy    dxHSC/D&C/IUD insertion: grade 2 endometrioid adenocarcinoma, dMMR (MLH1 promoter hypermethylation)    Of note, no IUD was seen in her endometrial cavity during diagnostic hysteroscopy. LNG-IUD replaced during her procedure.     2/6/2024 Initial Diagnosis    Carcinoma of endometrium (CMS/HCC)     3/14/2024 Cancer Staged    Staging form: Corpus Uteri - Carcinoma and Carcinosarcoma, AJCC 8th Edition  - Clinical stage from 3/14/2024: FIGO Stage IA (cT1a, cN0, cM0)      3/14/2024 Surgery    Robotic assisted total laparoscopic hysterectomy, left salpingo-oophorectomy, sentinel lymph node mapping and left pelvic sentinel lymph node biopsy, minilaparotomy for delivery of the specimen     No residual carcinoma on final pathology specimen, EIN in the endometrium         Past Medical History:   Diagnosis Date    DARCY (acute kidney injury) (CMS/HCC)     Added automatically from request for surgery [id number]    Back pain     Bacteremia due to Escherichia coli 2021    Cellulitis of left lower extremity     COVID-19 vaccine series completed     Moderna Boster 10/2021    CTS (carpal tunnel syndrome)     DJD (degenerative joint disease)     Edema     EIN (endometrial intraepithelial neoplasia)     22 Endometrial Biopsy:    Fragments of endometrium with  hyperplasia and foci of EIN. Also acute inflammatory infiltrate and necrosis     22: endometrial curettage and IUD placement   Fragments of endometrium with complex atypical hyperplasia (endometrial intraepithelial neoplasia).    HL (hearing loss)     Hypertension     Hypothyroidism     Infertility, female     Reports hx of infertility and received IVF for pregnancy in . During treatment she reports she was told by her DONI Dr. Cantor that her tubes were scarred and he suspected she may have had a history of pelvic infection.     Kidney stones     Lipid disorder     Lymphedema     Obesity     Postmenopausal bleeding     Pyelonephritis     Sciatica     Shoulder pain     bilateral    Stasis dermatitis     Type 2 diabetes mellitus (CMS/HCC)      Past Surgical History:   Procedure Laterality Date    APPENDECTOMY      laparotomy     SECTION  1999    CYSTOSCOPY  2021    CYSTOSCOPY,INSERT URETERAL STENT    CYSTOSCOPY  2022    CYSTO, RETRO, STENT, URETEROSCOPY, LASER LITHOTRIPSY, D&C    DILATION AND CURETTAGE OF UTERUS      x 2 ** when patient was age 26, 2024    OOPHORECTOMY      open RSO, right side  secondary to torsion- during pregnancy    ROBOTIC ASSISTED HYSTERECTOMY  2024    robotic assisted total laparoscopic hysterectomy, left salpingo-oophorectomy, sentinel lymph node mapping and left pelvic sentinel lymph node biopsy, minilaparotomy for delivery of the specimen    WISDOM TOOTH EXTRACTION       Family History   Problem Relation Age of Onset    Heart attack Biological Father     Stomach cancer Paternal Grandmother      Social History     Socioeconomic History    Marital status:      Spouse name: None    Number of children: 1    Years of education: None    Highest education level: None   Occupational History    Occupation: Unemployed   Tobacco Use    Smoking status: Former     Types: Cigarettes     Quit date:      Years since quittin.3    Smokeless tobacco: Current    Tobacco comments:     Ex-smoker who smoked socially in college for approximately 4 years.   Vaping Use    Vaping Use: Never used   Substance and Sexual Activity    Alcohol use: Not Currently     Comment: No current alcohol use, socially in college.    Drug use: Not Currently    Sexual activity: Not Currently   Social History Narrative    Lives with son, Edd, in a 2 story home has a chair lift.  She is .  She designates her close friend, Akila León, as an emergency medical proxy.  Akila can be reached at [phone].        The patient is currently on disability.  She previously worked as a  as well as an .        The patient tells me that she smoked socially in college for 4 years.    No current alcohol use.  She consumes alcohol socially in college only.    Denies substance use.        Patient currently using a walker for unsteady gait.         Social Determinants of Health     Food Insecurity: No Food Insecurity (3/27/2024)    Hunger Vital Sign     Worried About Running Out of Food in the Last Year: Never true     Ran Out of Food in the Last Year: Never true      OB History          1    Para   1    Term   0       0    AB   0    Living   1         SAB   0    IAB   0    Ectopic   0    Multiple   0    Live Births   1           Obstetric Comments     CS x1  Tammy TAB/SABs    Menarche at age 12  Menopause at age 55  Denies using HRT  Denies history of abnormal pap smears                 Allergies: Adhesive tape-silicones and Cephalexin    Medications:   Current Outpatient Medications   Medication Sig Dispense Refill    acetaminophen (TYLENOL ARTHRITIS PAIN ORAL) Take 2 tablets by mouth 2 (two) times a day as needed (pain).      acetaminophen (TYLENOL) 325 mg tablet Take 3 tablets (975 mg total) by mouth every 6 (six) hours. 360 tablet 0    apixaban (ELIQUIS) 2.5 mg tablet Take 1 tablet (2.5 mg total) by mouth 2 (two) times a day. 60 tablet 0    ascorbic acid, vitamin C, (VITAMIN C) 1,000 mg tablet Take 1,000 mg by mouth daily.      aspirin 81 mg enteric coated tablet Take 81 mg by mouth daily. To stop pre-op      cholecalciferol, vitamin D3, (VITAMIN D3 ORAL) Take 1,000 Units by mouth daily.      DULoxetine (CYMBALTA) 20 mg capsule Take 20 mg by mouth 2 (two) times a day.      gabapentin (NEURONTIN) 600 mg tablet Take 600 mg by mouth 3 (three) times a day.      insulin aspart U-100 (NovoLOG) 100 unit/mL (3 mL) pen Inject under the skin 3 (three) times a day with meals. sliding scale ( dosage based on Carb count)      irbesartan-hydrochlorothiazide (AVALIDE) 150-12.5 mg per tablet Take 1 tablet by mouth every morning.      levothyroxine 75 mcg tablet Take 75 mcg by mouth daily.      meloxicam (MOBIC) 15 mg tablet Take 15 mg by mouth every morning.      methocarbamoL 750 mg tablet Take 750 mg by mouth 3 (three) times a day as needed for muscle spasms. Always takes in am but doesn't always do tid      norethindrone (AYGESTIN) 5 mg tablet Take 1 tablet (5 mg total) by mouth 2 (two) times a day. 180 tablet 3    ONETOUCH ULTRA TEST strip TEST BLOOD SUGARS 4  "TO 5 TIMES DAILY      sennosides-docusate sodium (SENOKOT-S) 8.6-50 mg Take 1 tablet by mouth 2 (two) times a day. 60 tablet 0    simvastatin 40 mg tablet Take 40 mg by mouth every morning.      ibuprofen (MOTRIN) 600 mg tablet Take 1 tablet (600 mg total) by mouth every 6 (six) hours for 10 days. 40 tablet 0    insulin degludec U-200 CONC (TRESIBA FLEXTOUCH U-200) 200 unit/mL (3 mL) pen Inject 0.2 mL (40 Units total) under the skin every evening. (Patient taking differently: Inject 80 Units under the skin daily. 3:30) 11.7 mL 0    polyethylene glycol (MIRALAX) 17 gram packet Take 17 g by mouth daily for 3 days. 3 packet 0     No current facility-administered medications for this visit.       Review of Systems: negative except for what is listed above    Physical Exam:  Vital Signs:  Visit Vitals  /80 (BP Location: Right upper arm, Patient Position: Sitting)   Pulse 91   Temp 36.9 °C (98.4 °F) (Oral)   Ht 1.6 m (5' 3\")   Wt (!) 154 kg (339 lb 6.4 oz)   SpO2 98%   BMI 60.12 kg/m²       General: well-developed, well-nourished, no apparent distress  Respiratory: nonlabored breathing on room air  Cardiovascular: nontachycardic  Extremity: symmetric, no clubbing, cyanosis, edema  GI: abdomen soft, non-distended, non-tender   Incision: all incisions well approximated without erythema, slight firmness under her minilaparotomy site but significantly better than her last visit and no tenderness or fluctuance or erythema  Neurologic: alert & oriented x3, no gross deficits  Musculoskeletal: no deformity or gross strength deficit        Pathology:            Labs:  Lab Results   Component Value Date    WBC 14.59 (H) 03/27/2024    HGB 11.5 (L) 03/27/2024    HCT 37.0 03/27/2024    MCV 90.0 03/27/2024     03/27/2024         Chemistry        Component Value Date/Time     03/27/2024 0913    K 4.4 03/27/2024 0913     03/27/2024 0913    CO2 29 03/27/2024 0913    BUN 23 03/27/2024 0913    CREATININE 1.2 " 03/27/2024 0913        Component Value Date/Time    CALCIUM 9.2 03/27/2024 0913    ALKPHOS 61 03/27/2024 0913    AST 12 (L) 03/27/2024 0913    ALT 9 03/27/2024 0913    BILITOT 0.4 03/27/2024 0913            Assessment   63 y.o. female being consulted for the following issues:  Problem List Items Addressed This Visit       Carcinoma of endometrium (CMS/HCC) - Primary     Other Visit Diagnoses       Post-operative state                I met with Xin today in the clinic to re-evaluate her incisions after presenting with a wound seroma. Cultures were negative.  She is healing well and significant improvement.  We discussed the plan for cuff exam in 2-3 weeks.  Reviewed reasons to reach out prior to her next appt    Follow up 2-3 weeks for cuff exam    Yohana Ronquillo MD

## 2024-04-10 ENCOUNTER — OFFICE VISIT (OUTPATIENT)
Dept: GYNECOLOGIC ONCOLOGY | Facility: CLINIC | Age: 64
End: 2024-04-10
Attending: OBSTETRICS & GYNECOLOGY
Payer: COMMERCIAL

## 2024-04-10 VITALS
BODY MASS INDEX: 51.91 KG/M2 | HEART RATE: 91 BPM | WEIGHT: 293 LBS | DIASTOLIC BLOOD PRESSURE: 80 MMHG | TEMPERATURE: 98.4 F | OXYGEN SATURATION: 98 % | SYSTOLIC BLOOD PRESSURE: 120 MMHG | HEIGHT: 63 IN

## 2024-04-10 DIAGNOSIS — C54.1 CARCINOMA OF ENDOMETRIUM (CMS/HCC): Primary | ICD-10-CM

## 2024-04-10 DIAGNOSIS — Z98.890 POST-OPERATIVE STATE: ICD-10-CM

## 2024-04-10 PROCEDURE — 99024 POSTOP FOLLOW-UP VISIT: CPT | Performed by: OBSTETRICS & GYNECOLOGY

## 2024-04-26 ENCOUNTER — TELEPHONE (OUTPATIENT)
Dept: GYNECOLOGIC ONCOLOGY | Facility: CLINIC | Age: 64
End: 2024-04-26

## 2024-05-31 ENCOUNTER — OFFICE VISIT (OUTPATIENT)
Dept: GYNECOLOGIC ONCOLOGY | Facility: CLINIC | Age: 64
End: 2024-05-31
Attending: OBSTETRICS & GYNECOLOGY
Payer: COMMERCIAL

## 2024-05-31 VITALS
SYSTOLIC BLOOD PRESSURE: 134 MMHG | OXYGEN SATURATION: 96 % | TEMPERATURE: 97 F | RESPIRATION RATE: 18 BRPM | DIASTOLIC BLOOD PRESSURE: 82 MMHG | BODY MASS INDEX: 51.91 KG/M2 | HEIGHT: 63 IN | WEIGHT: 293 LBS | HEART RATE: 74 BPM

## 2024-05-31 DIAGNOSIS — Z98.890 POST-OPERATIVE STATE: ICD-10-CM

## 2024-05-31 DIAGNOSIS — C54.1 CARCINOMA OF ENDOMETRIUM (CMS/HCC): Primary | ICD-10-CM

## 2024-05-31 PROCEDURE — 99024 POSTOP FOLLOW-UP VISIT: CPT | Performed by: OBSTETRICS & GYNECOLOGY

## 2024-06-01 NOTE — PROGRESS NOTES
Gynecologic Oncology Clinic Visit    CC: postoperative visit, cuff check      HPI: Ms. Xin Melendez is a 63 y.o. 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      Subjective: Xin Melendez is doing overall well. She has no complaints. She denies chest pain, palpitations, or shortness of breath. She is tolerating her diet without nausea or emesis. She is having regular bowel movements without issues. She is voiding freely. She denies vaginal bleeding or abnormal vaginal discharge. She denies abdominal or pelvic pain. Her incision swelling has resolved.      Brief Treatment History  Oncology History   Carcinoma of endometrium (CMS/HCC)   8/3/2022 Imaging Significant Findings    Presented with postmenopausal vaginal bleeding  Pelvic US: Enlarged fibroid uterus. Thickened endometrium measuring up to 2 cm.      8/8/2022 Biopsy    EUA/EMB: fragments of endometrium with hyperplasia and EIN     8/22/2022 Surgery    D&C/IUD placement: fragments of endometrium with complex atypical hyperplasia (EIN)     4/6/2023 Biopsy    dxHSC/D&C/IUD removal and replacement: Complex papillary mucinous metaplasia. Background fragments of endometrial polyp(s). Stromal decidual changes compatible with exogenous hormone effect.     2/6/2024 Biopsy    dxHSC/D&C/IUD insertion: grade 2 endometrioid adenocarcinoma, dMMR (MLH1 promoter hypermethylation)    Of note, no IUD was seen in her endometrial cavity during diagnostic hysteroscopy. LNG-IUD replaced during her procedure.     2/6/2024 Initial Diagnosis    Carcinoma of endometrium (CMS/HCC)     3/14/2024 Cancer Staged    Staging form: Corpus Uteri - Carcinoma and Carcinosarcoma, AJCC 8th Edition  - Clinical stage from 3/14/2024: FIGO Stage IA (cT1a, cN0, cM0)     3/14/2024 Surgery    Robotic assisted total laparoscopic hysterectomy, left salpingo-oophorectomy, sentinel lymph node mapping and left pelvic sentinel lymph node  biopsy, minilaparotomy for delivery of the specimen     No residual carcinoma on final pathology specimen, EIN in the endometrium         Medical/Surgical/Social/Family/OBGYN History: reviewed and no changes to what is listed in epic    Allergies: Adhesive tape-silicones and Cephalexin    Medications:   Current Outpatient Medications   Medication Sig Dispense Refill    acetaminophen (TYLENOL ARTHRITIS PAIN ORAL) Take 2 tablets by mouth 2 (two) times a day as needed (pain).      ascorbic acid, vitamin C, (VITAMIN C) 1,000 mg tablet Take 1,000 mg by mouth daily.      aspirin 81 mg enteric coated tablet Take 81 mg by mouth daily. To stop pre-op      cholecalciferol, vitamin D3, (VITAMIN D3 ORAL) Take 1,000 Units by mouth daily.      DULoxetine (CYMBALTA) 20 mg capsule Take 20 mg by mouth 2 (two) times a day.      gabapentin (NEURONTIN) 600 mg tablet Take 600 mg by mouth 3 (three) times a day.      insulin aspart U-100 (NovoLOG) 100 unit/mL (3 mL) pen Inject under the skin 3 (three) times a day with meals. sliding scale ( dosage based on Carb count)      irbesartan-hydrochlorothiazide (AVALIDE) 150-12.5 mg per tablet Take 1 tablet by mouth every morning.      levothyroxine 75 mcg tablet Take 75 mcg by mouth daily.      meloxicam (MOBIC) 15 mg tablet Take 15 mg by mouth every morning.      methocarbamoL 750 mg tablet Take 750 mg by mouth 3 (three) times a day as needed for muscle spasms. Always takes in am but doesn't always do tid      ONETOUCH ULTRA TEST strip TEST BLOOD SUGARS 4 TO 5 TIMES DAILY      insulin degludec U-200 CONC (TRESIBA FLEXTOUCH U-200) 200 unit/mL (3 mL) pen Inject 0.2 mL (40 Units total) under the skin every evening. (Patient taking differently: Inject 80 Units under the skin daily. 3:30) 11.7 mL 0    polyethylene glycol (MIRALAX) 17 gram packet Take 17 g by mouth daily for 3 days. 3 packet 0    sennosides-docusate sodium (SENOKOT-S) 8.6-50 mg Take 1 tablet by mouth 2 (two) times a day. 60 tablet 0  "   simvastatin 40 mg tablet Take 40 mg by mouth every morning.       No current facility-administered medications for this visit.       Review of Systems: negative except for what is listed above    Physical Exam:  Vital Signs:  Visit Vitals  /82 (BP Location: Left upper arm, Patient Position: Sitting)   Pulse 74   Temp 36.1 °C (97 °F) (Oral)   Resp 18   Ht 1.6 m (5' 3\")   Wt (!) 152 kg (334 lb)   SpO2 96%   BMI 59.17 kg/m²       General: well-developed, well-nourished, no apparent distress  Respiratory: nonlabored breathing on room air  Cardiovascular: nontachycardic  Extremity: symmetric, no clubbing, cyanosis, edema  GI: abdomen soft, non-distended, non-tender   Incision: well healed laparoscopic incisions  Neurologic: alert & oriented x3, no gross deficits  Musculoskeletal: no deformity or gross strength deficit    (Chaperone present during exam)  Gynecologic:    External genitalia/bartholin's/skene's/urethra (EGBUS): normal external genitalia   Speculum: cuff intact without lesions, no vaginal lesions or masses, no blood or discharge   Bimanual: slightly scarring of the hymenal ring (?potentially from tear during surgery) that was easily opened with pressure, uterus/cervix surgically absent, cuff intact without fullness or nodularity, no adnexal fullness      Assessment   63 y.o. female being consulted for the following issues:  Problem List Items Addressed This Visit       Carcinoma of endometrium (CMS/HCC) - Primary     Other Visit Diagnoses       Post-operative state              I met with Xin today in the clinic.  She is well healed from the surgery, and her cuff is intact.  She can return to regular activity as tolerated.  We reviewed our plan for surveillance with q6 month visits, and reviewed reasons to reach out prior to her next appointment.    Follow up: 6 months    Yohana Ronquillo MD   "

## 2024-12-06 ENCOUNTER — OFFICE VISIT (OUTPATIENT)
Dept: GYNECOLOGIC ONCOLOGY | Facility: CLINIC | Age: 64
End: 2024-12-06
Attending: OBSTETRICS & GYNECOLOGY
Payer: COMMERCIAL

## 2024-12-06 VITALS
RESPIRATION RATE: 18 BRPM | OXYGEN SATURATION: 99 % | HEART RATE: 85 BPM | BODY MASS INDEX: 51.91 KG/M2 | HEIGHT: 63 IN | DIASTOLIC BLOOD PRESSURE: 82 MMHG | WEIGHT: 293 LBS | SYSTOLIC BLOOD PRESSURE: 145 MMHG | TEMPERATURE: 98 F

## 2024-12-06 DIAGNOSIS — N39.3 PRIMARY STRESS URINARY INCONTINENCE: ICD-10-CM

## 2024-12-06 DIAGNOSIS — C54.1 ENDOMETRIAL CANCER (CMS/HCC): Primary | ICD-10-CM

## 2024-12-06 PROCEDURE — 3008F BODY MASS INDEX DOCD: CPT | Performed by: OBSTETRICS & GYNECOLOGY

## 2024-12-06 PROCEDURE — 99213 OFFICE O/P EST LOW 20 MIN: CPT | Performed by: OBSTETRICS & GYNECOLOGY

## 2024-12-06 PROCEDURE — 3077F SYST BP >= 140 MM HG: CPT | Performed by: OBSTETRICS & GYNECOLOGY

## 2024-12-06 PROCEDURE — 3079F DIAST BP 80-89 MM HG: CPT | Performed by: OBSTETRICS & GYNECOLOGY

## 2024-12-06 NOTE — LETTER
December 8, 2024     Rasta Ortiz MD  [address] [facility] [id number]    Patient: Xin Melendez  YOB: 1960  Date of Visit: 12/6/2024      Dear Dr. Ortiz:    Thank you for referring Xin Melendez to me for evaluation. Below are my notes for this consultation.    If you have questions, please do not hesitate to call me. I look forward to following your patient along with you.         Sincerely,        Yohana Ronquillo MD        CC: DO Fam Steele MD Nicole Salva, MD Hom-Tedla, Marianne, MD  12/8/2024  6:21 PM  Sign when Signing Visit  Gynecologic Oncology Clinic Visit    CC: 6 month surveillance      HPI: Ms. Xin Melendez is a 63 y.o. 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      Subjective: Xin Melendez is doing overall well. She has no complaints except she feels like there is a reaccumulation of fluid at her minilaparotomy site, versus a new hernia at the site. It is not painful or bothersome, but she wanted to let us know. She denies chest pain, palpitations, or shortness of breath. She is tolerating her diet without nausea or emesis. She is having regular bowel movements without issues. She is voiding freely though she does notice that she will have some leakage when she stands up from sitting for long periods of time. She denies vaginal bleeding or abnormal vaginal discharge. She denies abdominal or pelvic pain.       Brief Treatment History  Oncology History   Carcinoma of endometrium (CMS/HCC)   8/3/2022 Imaging Significant Findings    Presented with postmenopausal vaginal bleeding  Pelvic US: Enlarged fibroid uterus. Thickened endometrium measuring up to 2 cm.      8/8/2022 Biopsy    EUA/EMB: fragments of endometrium with hyperplasia and EIN     8/22/2022 Surgery    D&C/IUD placement: fragments of endometrium with complex atypical hyperplasia (EIN)     4/6/2023 Biopsy     dxHSC/D&C/IUD removal and replacement: Complex papillary mucinous metaplasia. Background fragments of endometrial polyp(s). Stromal decidual changes compatible with exogenous hormone effect.     2/6/2024 Biopsy    dxHSC/D&C/IUD insertion: grade 2 endometrioid adenocarcinoma, dMMR (MLH1 promoter hypermethylation)    Of note, no IUD was seen in her endometrial cavity during diagnostic hysteroscopy. LNG-IUD replaced during her procedure.     2/6/2024 Initial Diagnosis    Carcinoma of endometrium (CMS/HCC)     3/14/2024 Cancer Staged    Staging form: Corpus Uteri - Carcinoma and Carcinosarcoma, AJCC 8th Edition  - Clinical stage from 3/14/2024: FIGO Stage IA (cT1a, cN0, cM0)     3/14/2024 Surgery    Robotic assisted total laparoscopic hysterectomy, left salpingo-oophorectomy, sentinel lymph node mapping and left pelvic sentinel lymph node biopsy, minilaparotomy for delivery of the specimen     No residual carcinoma on final pathology specimen, EIN in the endometrium         Medical/Surgical/Social/Family/OBGYN History: reviewed and no changes to what is listed in epic    Allergies: Adhesive tape-silicones and Cephalexin    Medications:   Current Outpatient Medications   Medication Sig Dispense Refill   • acetaminophen (TYLENOL ARTHRITIS PAIN ORAL) Take 2 tablets by mouth 2 (two) times a day as needed (pain).     • ascorbic acid, vitamin C, (VITAMIN C) 1,000 mg tablet Take 1,000 mg by mouth daily.     • aspirin 81 mg enteric coated tablet Take 81 mg by mouth daily. To stop pre-op     • cholecalciferol, vitamin D3, (VITAMIN D3 ORAL) Take 1,000 Units by mouth daily.     • DULoxetine (CYMBALTA) 20 mg capsule Take 20 mg by mouth 2 (two) times a day.     • gabapentin (NEURONTIN) 600 mg tablet Take 600 mg by mouth 3 (three) times a day.     • insulin aspart U-100 (NovoLOG) 100 unit/mL (3 mL) pen Inject under the skin 3 (three) times a day with meals. sliding scale ( dosage based on Carb count)     • insulin degludec U-200 CONC  "(TRESIBA FLEXTOUCH U-200) 200 unit/mL (3 mL) pen Inject 0.2 mL (40 Units total) under the skin every evening. (Patient taking differently: Inject 80 Units under the skin daily. 3:30) 11.7 mL 0   • irbesartan-hydrochlorothiazide (AVALIDE) 150-12.5 mg per tablet Take 1 tablet by mouth every morning.     • levothyroxine 75 mcg tablet Take 75 mcg by mouth daily.     • meloxicam (MOBIC) 15 mg tablet Take 15 mg by mouth every morning.     • methocarbamoL 750 mg tablet Take 750 mg by mouth 3 (three) times a day as needed for muscle spasms. Always takes in am but doesn't always do tid     • ONETOUCH ULTRA TEST strip TEST BLOOD SUGARS 4 TO 5 TIMES DAILY     • polyethylene glycol (MIRALAX) 17 gram packet Take 17 g by mouth daily for 3 days. 3 packet 0   • sennosides-docusate sodium (SENOKOT-S) 8.6-50 mg Take 1 tablet by mouth 2 (two) times a day. 60 tablet 0   • simvastatin 40 mg tablet Take 40 mg by mouth every morning.       No current facility-administered medications for this visit.       Review of Systems: negative except for what is listed above    Physical Exam:  Vital Signs:  Visit Vitals  BP (!) 145/82 (BP Location: Left upper arm, Patient Position: Sitting)   Pulse 85   Temp 36.7 °C (98 °F) (Oral)   Resp 18   Ht 1.6 m (5' 3\")   Wt (!) 152 kg (335 lb)   SpO2 99%   BMI 59.34 kg/m²       General: well-developed, well-nourished, no apparent distress  Respiratory: nonlabored breathing on room air  Cardiovascular: nontachycardic  Extremity: symmetric, no clubbing, cyanosis, edema  GI: abdomen soft, non-distended, non-tender   Incision: there is a ?hernia versus palpable mass near the minilaparotomy site, othewise all incision sites are well healed  Neurologic: alert & oriented x3, no gross deficits  Musculoskeletal: no deformity or gross strength deficit    (Pelvic exam conducted with staff chaperone present. Staff chaperone name: Chacho GATES)  Gynecologic: of note exam limited due to body habitus   External " genitalia/bartholin's/skene's/urethra (EGBUS): normal external genitalia   Speculum: cuff intact without lesions, no vaginal lesions or masses, no blood or discharge   Bimanual: uterus/cervix surgically absent, cuff intact without fullness or nodularity, no adnexal fullness   Rectovaginal: no masses/nodularity, no parametrial fullness, no intraluminal rectal masses, no blood on rectal glove       Assessment  63 y.o. female being consulted for the following issues:  Problem List Items Addressed This Visit    None  Visit Diagnoses       Endometrial cancer (CMS/[facility])    -  Primary    Relevant Orders    CT ABDOMEN PELVIS WITH IV CONTRAST    Basic metabolic panel    Ambulatory Referral to Pelvic Floor Rehab (Physical Therapy)    Primary stress urinary incontinence        Relevant Orders    Ambulatory Referral to Pelvic Floor Rehab (Physical Therapy)            I met with Xin today in the clinic.  No evidence of disease on exam today. However will obtain a CT given she has a new potential hernia versus mass in her minilaparotomy site. If it is a hernia, she does not desire to proceed with further treatment, but would like to rule out disease with imaging.   We discussed a referral to pelvic floor PT for her stress urinary incontinence.     I reviewed reasons to reach out prior to her next appointment. Will adjust her appt pending her CT results.         Follow up: 6 months    Yohana Ronquillo MD

## 2024-12-06 NOTE — PROGRESS NOTES
Gynecologic Oncology Clinic Visit    CC: 6 month surveillance      HPI: Ms. Xin Melendez is a 63 y.o. 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      Subjective: Xin Melendez is doing overall well. She has no complaints except she feels like there is a reaccumulation of fluid at her minilaparotomy site, versus a new hernia at the site. It is not painful or bothersome, but she wanted to let us know. She denies chest pain, palpitations, or shortness of breath. She is tolerating her diet without nausea or emesis. She is having regular bowel movements without issues. She is voiding freely though she does notice that she will have some leakage when she stands up from sitting for long periods of time. She denies vaginal bleeding or abnormal vaginal discharge. She denies abdominal or pelvic pain.       Brief Treatment History  Oncology History   Carcinoma of endometrium (CMS/HCC)   8/3/2022 Imaging Significant Findings    Presented with postmenopausal vaginal bleeding  Pelvic US: Enlarged fibroid uterus. Thickened endometrium measuring up to 2 cm.      8/8/2022 Biopsy    EUA/EMB: fragments of endometrium with hyperplasia and EIN     8/22/2022 Surgery    D&C/IUD placement: fragments of endometrium with complex atypical hyperplasia (EIN)     4/6/2023 Biopsy    dxHSC/D&C/IUD removal and replacement: Complex papillary mucinous metaplasia. Background fragments of endometrial polyp(s). Stromal decidual changes compatible with exogenous hormone effect.     2/6/2024 Biopsy    dxHSC/D&C/IUD insertion: grade 2 endometrioid adenocarcinoma, dMMR (MLH1 promoter hypermethylation)    Of note, no IUD was seen in her endometrial cavity during diagnostic hysteroscopy. LNG-IUD replaced during her procedure.     2/6/2024 Initial Diagnosis    Carcinoma of endometrium (CMS/HCC)     3/14/2024 Cancer Staged    Staging form: Corpus Uteri - Carcinoma and Carcinosarcoma, AJCC  8th Edition  - Clinical stage from 3/14/2024: FIGO Stage IA (cT1a, cN0, cM0)     3/14/2024 Surgery    Robotic assisted total laparoscopic hysterectomy, left salpingo-oophorectomy, sentinel lymph node mapping and left pelvic sentinel lymph node biopsy, minilaparotomy for delivery of the specimen     No residual carcinoma on final pathology specimen, EIN in the endometrium         Medical/Surgical/Social/Family/OBGYN History: reviewed and no changes to what is listed in epic    Allergies: Adhesive tape-silicones and Cephalexin    Medications:   Current Outpatient Medications   Medication Sig Dispense Refill    acetaminophen (TYLENOL ARTHRITIS PAIN ORAL) Take 2 tablets by mouth 2 (two) times a day as needed (pain).      ascorbic acid, vitamin C, (VITAMIN C) 1,000 mg tablet Take 1,000 mg by mouth daily.      aspirin 81 mg enteric coated tablet Take 81 mg by mouth daily. To stop pre-op      cholecalciferol, vitamin D3, (VITAMIN D3 ORAL) Take 1,000 Units by mouth daily.      DULoxetine (CYMBALTA) 20 mg capsule Take 20 mg by mouth 2 (two) times a day.      gabapentin (NEURONTIN) 600 mg tablet Take 600 mg by mouth 3 (three) times a day.      insulin aspart U-100 (NovoLOG) 100 unit/mL (3 mL) pen Inject under the skin 3 (three) times a day with meals. sliding scale ( dosage based on Carb count)      insulin degludec U-200 CONC (TRESIBA FLEXTOUCH U-200) 200 unit/mL (3 mL) pen Inject 0.2 mL (40 Units total) under the skin every evening. (Patient taking differently: Inject 80 Units under the skin daily. 3:30) 11.7 mL 0    irbesartan-hydrochlorothiazide (AVALIDE) 150-12.5 mg per tablet Take 1 tablet by mouth every morning.      levothyroxine 75 mcg tablet Take 75 mcg by mouth daily.      meloxicam (MOBIC) 15 mg tablet Take 15 mg by mouth every morning.      methocarbamoL 750 mg tablet Take 750 mg by mouth 3 (three) times a day as needed for muscle spasms. Always takes in am but doesn't always do tid      ONETOUCH ULTRA TEST strip  "TEST BLOOD SUGARS 4 TO 5 TIMES DAILY      polyethylene glycol (MIRALAX) 17 gram packet Take 17 g by mouth daily for 3 days. 3 packet 0    sennosides-docusate sodium (SENOKOT-S) 8.6-50 mg Take 1 tablet by mouth 2 (two) times a day. 60 tablet 0    simvastatin 40 mg tablet Take 40 mg by mouth every morning.       No current facility-administered medications for this visit.       Review of Systems: negative except for what is listed above    Physical Exam:  Vital Signs:  Visit Vitals  BP (!) 145/82 (BP Location: Left upper arm, Patient Position: Sitting)   Pulse 85   Temp 36.7 °C (98 °F) (Oral)   Resp 18   Ht 1.6 m (5' 3\")   Wt (!) 152 kg (335 lb)   SpO2 99%   BMI 59.34 kg/m²       General: well-developed, well-nourished, no apparent distress  Respiratory: nonlabored breathing on room air  Cardiovascular: nontachycardic  Extremity: symmetric, no clubbing, cyanosis, edema  GI: abdomen soft, non-distended, non-tender   Incision: there is a ?hernia versus palpable mass near the minilaparotomy site, othewise all incision sites are well healed  Neurologic: alert & oriented x3, no gross deficits  Musculoskeletal: no deformity or gross strength deficit    (Pelvic exam conducted with staff chaperone present. Staff chaperone name: Chacho GATES)  Gynecologic: of note exam limited due to body habitus   External genitalia/bartholin's/skene's/urethra (EGBUS): normal external genitalia   Speculum: cuff intact without lesions, no vaginal lesions or masses, no blood or discharge   Bimanual: uterus/cervix surgically absent, cuff intact without fullness or nodularity, no adnexal fullness   Rectovaginal: no masses/nodularity, no parametrial fullness, no intraluminal rectal masses, no blood on rectal glove       Assessment   63 y.o. female being consulted for the following issues:  Problem List Items Addressed This Visit    None  Visit Diagnoses       Endometrial cancer (CMS/HCC)    -  Primary    Relevant Orders    CT ABDOMEN PELVIS WITH IV " CONTRAST    Basic metabolic panel    Ambulatory Referral to Pelvic Floor Rehab (Physical Therapy)    Primary stress urinary incontinence        Relevant Orders    Ambulatory Referral to Pelvic Floor Rehab (Physical Therapy)            I met with Xin today in the clinic.  No evidence of disease on exam today. However will obtain a CT given she has a new potential hernia versus mass in her minilaparotomy site. If it is a hernia, she does not desire to proceed with further treatment, but would like to rule out disease with imaging.   We discussed a referral to pelvic floor PT for her stress urinary incontinence.     I reviewed reasons to reach out prior to her next appointment. Will adjust her appt pending her CT results.         Follow up: 6 months    Yohana Ronquillo MD

## 2024-12-10 ENCOUNTER — TELEPHONE (OUTPATIENT)
Dept: GYNECOLOGIC ONCOLOGY | Facility: CLINIC | Age: 64
End: 2024-12-10
Payer: COMMERCIAL

## 2024-12-10 DIAGNOSIS — C54.1 ENDOMETRIAL CANCER (CMS/HCC): Primary | ICD-10-CM

## 2024-12-10 NOTE — TELEPHONE ENCOUNTER
Called pt to f/up on portal message re CT. She notes she has been unable to tolerate oral contrast with Cts in the past - causes vomiting and she hasn't been able to drink it. She is agreeable to IV contrast and has never had an issue with this - last CT 3/2024 had IV contrast without issue.     Pt is aware of need for BMP prior - emailing this script as well as her PT referral to her personal email at her request ([email or web address])     Her CT is scheduled for 12/31.

## 2024-12-27 LAB
BUN SERPL-MCNC: 14 MG/DL (ref 8–27)
BUN/CREAT SERPL: 16 (ref 12–28)
CALCIUM SERPL-MCNC: 9.2 MG/DL (ref 8.7–10.3)
CHLORIDE SERPL-SCNC: 106 MMOL/L (ref 96–106)
CO2 SERPL-SCNC: 19 MMOL/L (ref 20–29)
CREAT SERPL-MCNC: 0.9 MG/DL (ref 0.57–1)
EGFRCR SERPLBLD CKD-EPI 2021: 72 ML/MIN/1.73
GLUCOSE SERPL-MCNC: 119 MG/DL (ref 70–99)
POTASSIUM SERPL-SCNC: 4 MMOL/L (ref 3.5–5.2)
SODIUM SERPL-SCNC: 145 MMOL/L (ref 134–144)

## 2024-12-31 ENCOUNTER — HOSPITAL ENCOUNTER (OUTPATIENT)
Dept: RADIOLOGY | Age: 64
Discharge: HOME | End: 2024-12-31
Attending: PHYSICIAN ASSISTANT
Payer: COMMERCIAL

## 2024-12-31 DIAGNOSIS — C54.1 ENDOMETRIAL CANCER (CMS/HCC): ICD-10-CM

## 2024-12-31 PROCEDURE — 63600105 HC IODINE BASED CONTRAST: Performed by: PHYSICIAN ASSISTANT

## 2024-12-31 PROCEDURE — 74177 CT ABD & PELVIS W/CONTRAST: CPT

## 2024-12-31 RX ORDER — IOPAMIDOL 755 MG/ML
135 INJECTION, SOLUTION INTRAVASCULAR
Status: COMPLETED | OUTPATIENT
Start: 2024-12-31 | End: 2024-12-31

## 2024-12-31 RX ADMIN — IOPAMIDOL 135 ML: 755 INJECTION, SOLUTION INTRAVENOUS at 11:05

## 2025-01-02 ENCOUNTER — TELEPHONE (OUTPATIENT)
Dept: GYNECOLOGIC ONCOLOGY | Facility: CLINIC | Age: 65
End: 2025-01-02
Payer: COMMERCIAL

## 2025-01-02 DIAGNOSIS — K43.9 SPIGELIAN HERNIA: Primary | ICD-10-CM

## 2025-01-07 ENCOUNTER — TELEPHONE (OUTPATIENT)
Dept: GYNECOLOGIC ONCOLOGY | Facility: CLINIC | Age: 65
End: 2025-01-07
Payer: COMMERCIAL

## 2025-01-07 NOTE — TELEPHONE ENCOUNTER
Brief Gyn Onc Phone Call    I called the patient at her listed phone number to also review her CT scan results, no answer. Left her a voicemail with our callback information      Yohana Ronquillo MD  Gynecologic Oncology  [id number]  01/07/25  4:02 PM      ---      Spoke with Xin when she called back.  Reviewed her CT, reassurance regarding cancer given.  Recommend repeat CT Chest in 1 year for pulmonary nodule    Yohana Ronquillo MD

## 2025-06-06 ENCOUNTER — OFFICE VISIT (OUTPATIENT)
Dept: GYNECOLOGIC ONCOLOGY | Facility: CLINIC | Age: 65
End: 2025-06-06
Attending: OBSTETRICS & GYNECOLOGY
Payer: COMMERCIAL

## 2025-06-06 VITALS
TEMPERATURE: 98 F | HEIGHT: 63 IN | DIASTOLIC BLOOD PRESSURE: 80 MMHG | SYSTOLIC BLOOD PRESSURE: 127 MMHG | BODY MASS INDEX: 51.91 KG/M2 | HEART RATE: 91 BPM | WEIGHT: 293 LBS | OXYGEN SATURATION: 95 %

## 2025-06-06 DIAGNOSIS — C54.1 CARCINOMA OF ENDOMETRIUM (CMS/HCC): Primary | ICD-10-CM

## 2025-06-06 NOTE — LETTER
June 7, 2025     Hawa Enriquez MD  [address]  [address]  [facility] [place] [address]    Patient: Xin Melendez  YOB: 1960  Date of Visit: 6/6/2025      Dear Dr. Enriquez:    Thank you for referring Xin Melendez to me for evaluation. Below are my notes for this consultation.    If you have questions, please do not hesitate to call me. I look forward to following your patient along with you.         Sincerely,        Yohana Ronquillo MD        CC: DO Dinorah Steele MD Hom-Tedla, Marianne, MD  6/7/2025  5:44 PM  Sign when Signing Visit  Gynecologic Oncology Clinic Visit    CC: 6 month surveillance      HPI: Ms. Xin Melendez is a 64 y.o. 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      Subjective: Xin Melendez is doing overall well. She has no complaints. She ultimately did not go to see Dr. Pruitt because she does not want to have her hernia fixed since it is not causing issues. She denies chest pain, palpitations, or shortness of breath. She is tolerating her diet without nausea or emesis. She is having regular bowel movements without issues. She is voiding freely. She denies vaginal bleeding or abnormal vaginal discharge. She denies abdominal or pelvic pain.       Brief Treatment History  Oncology History   Carcinoma of endometrium (CMS/HCC)   8/3/2022 Imaging Significant Findings    Presented with postmenopausal vaginal bleeding  Pelvic US: Enlarged fibroid uterus. Thickened endometrium measuring up to 2 cm.      8/8/2022 Biopsy    EUA/EMB: fragments of endometrium with hyperplasia and EIN     8/22/2022 Surgery    D&C/IUD placement: fragments of endometrium with complex atypical hyperplasia (EIN)     4/6/2023 Biopsy    dxHSC/D&C/IUD removal and replacement: Complex papillary mucinous metaplasia. Background fragments of endometrial polyp(s). Stromal decidual changes compatible with exogenous hormone  effect.     2/6/2024 Biopsy    dxHSC/D&C/IUD insertion: grade 2 endometrioid adenocarcinoma, dMMR (MLH1 promoter hypermethylation)    Of note, no IUD was seen in her endometrial cavity during diagnostic hysteroscopy. LNG-IUD replaced during her procedure.     2/6/2024 Initial Diagnosis    Carcinoma of endometrium (CMS/HCC)     3/14/2024 Cancer Staged    Staging form: Corpus Uteri - Carcinoma and Carcinosarcoma, AJCC 8th Edition  - Clinical stage from 3/14/2024: FIGO Stage IA (cT1a, cN0, cM0)     3/14/2024 Surgery    Robotic assisted total laparoscopic hysterectomy, left salpingo-oophorectomy, sentinel lymph node mapping and left pelvic sentinel lymph node biopsy, minilaparotomy for delivery of the specimen     No residual carcinoma on final pathology specimen, EIN in the endometrium     3/27/2024 Imaging Significant Findings    CT AP: HARLEY, seroma near minilaparotomy site     12/31/2024 Imaging Significant Findings    CT CAP: 2 mm left lower lobe lung nodule; No evidence of metastatic disease in the abdomen or pelvis.          Medical/Surgical/Social/Family/OBGYN History: reviewed and no changes to what is listed in epic    Allergies: Adhesive tape-silicones and Cephalexin    Medications:   Current Outpatient Medications   Medication Sig Dispense Refill   • acetaminophen (TYLENOL ARTHRITIS PAIN ORAL) Take 2 tablets by mouth 2 (two) times a day as needed (pain).     • ascorbic acid, vitamin C, (VITAMIN C) 1,000 mg tablet Take 1,000 mg by mouth daily.     • aspirin 81 mg enteric coated tablet Take 81 mg by mouth daily. To stop pre-op     • cholecalciferol, vitamin D3, (VITAMIN D3 ORAL) Take 1,000 Units by mouth daily.     • DULoxetine (CYMBALTA) 20 mg capsule Take 20 mg by mouth 2 (two) times a day.     • gabapentin (NEURONTIN) 600 mg tablet Take 600 mg by mouth 3 (three) times a day.     • insulin aspart U-100 (NovoLOG) 100 unit/mL (3 mL) pen Inject under the skin 3 (three) times a day with meals. sliding scale (  "dosage based on Carb count)     • insulin degludec U-200 CONC (TRESIBA FLEXTOUCH U-200) 200 unit/mL (3 mL) pen Inject 0.2 mL (40 Units total) under the skin every evening. (Patient taking differently: Inject 80 Units under the skin daily. 3:30) 11.7 mL 0   • irbesartan-hydrochlorothiazide (AVALIDE) 150-12.5 mg per tablet Take 1 tablet by mouth every morning.     • levothyroxine 75 mcg tablet Take 75 mcg by mouth daily.     • meloxicam (MOBIC) 15 mg tablet Take 15 mg by mouth every morning.     • methocarbamoL 750 mg tablet Take 750 mg by mouth 3 (three) times a day as needed for muscle spasms. Always takes in am but doesn't always do tid     • ONETOUCH ULTRA TEST strip TEST BLOOD SUGARS 4 TO 5 TIMES DAILY     • polyethylene glycol (MIRALAX) 17 gram packet Take 17 g by mouth daily for 3 days. 3 packet 0   • sennosides-docusate sodium (SENOKOT-S) 8.6-50 mg Take 1 tablet by mouth 2 (two) times a day. 60 tablet 0   • simvastatin 40 mg tablet Take 40 mg by mouth every morning.       No current facility-administered medications for this visit.       Review of Systems: negative except for what is listed above    Physical Exam:  Vital Signs:  Visit Vitals  /80 (BP Location: Right upper arm, Patient Position: Sitting)   Pulse 91   Temp 36.7 °C (98 °F) (Oral)   Ht 1.6 m (5' 3\")   Wt (!) 152 kg (335 lb)   SpO2 95%   BMI 59.34 kg/m²       General: well-developed, well-nourished, no apparent distress  Respiratory: nonlabored breathing on room air  Cardiovascular: nontachycardic  Extremity: symmetric, no clubbing, cyanosis, edema  GI: abdomen soft, non-distended, non-tender   Incision: well healed laparoscopic sites, reducible hernia at the minilaparotomy site  Neurologic: alert & oriented x3, no gross deficits  Musculoskeletal: no deformity or gross strength deficit    (Pelvic exam conducted with staff chaperone present. Staff chaperone name: YAJAIRA Zapata)  Gynecologic: of note exam limited due to body habitus   External " genitalia/bartholin's/skene's/urethra (EGBUS): normal external genitalia   Speculum: cuff intact without lesions, no vaginal lesions or masses, no blood or discharge   Bimanual: uterus/cervix surgically absent, cuff intact without fullness or nodularity, no adnexal fullness   Rectovaginal: no masses/nodularity, no parametrial fullness, no intraluminal rectal masses, no blood on rectal glove       Assessment  64 y.o. female being consulted for the following issues:  Problem List Items Addressed This Visit       Carcinoma of endometrium (CMS/HCC) - Primary         I met with Xin today in the clinic.  No evidence of disease on exam today.   She will follow up with her PCP regarding her lung nodule.  Otherwise plan for follow up in 6 months.  Reviewed reasons to reach out prior to her next visit      Follow up: 6 months    Yohana Ronquillo MD

## 2025-06-06 NOTE — PROGRESS NOTES
Gynecologic Oncology Clinic Visit    CC: 6 month surveillance      HPI: Ms. Xin Melendez is a 64 y.o. 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      Subjective: Xin Melendez is doing overall well. She has no complaints. She ultimately did not go to see Dr. Pruitt because she does not want to have her hernia fixed since it is not causing issues. She denies chest pain, palpitations, or shortness of breath. She is tolerating her diet without nausea or emesis. She is having regular bowel movements without issues. She is voiding freely. She denies vaginal bleeding or abnormal vaginal discharge. She denies abdominal or pelvic pain.       Brief Treatment History  Oncology History   Carcinoma of endometrium (CMS/HCC)   8/3/2022 Imaging Significant Findings    Presented with postmenopausal vaginal bleeding  Pelvic US: Enlarged fibroid uterus. Thickened endometrium measuring up to 2 cm.      8/8/2022 Biopsy    EUA/EMB: fragments of endometrium with hyperplasia and EIN     8/22/2022 Surgery    D&C/IUD placement: fragments of endometrium with complex atypical hyperplasia (EIN)     4/6/2023 Biopsy    dxHSC/D&C/IUD removal and replacement: Complex papillary mucinous metaplasia. Background fragments of endometrial polyp(s). Stromal decidual changes compatible with exogenous hormone effect.     2/6/2024 Biopsy    dxHSC/D&C/IUD insertion: grade 2 endometrioid adenocarcinoma, dMMR (MLH1 promoter hypermethylation)    Of note, no IUD was seen in her endometrial cavity during diagnostic hysteroscopy. LNG-IUD replaced during her procedure.     2/6/2024 Initial Diagnosis    Carcinoma of endometrium (CMS/HCC)     3/14/2024 Cancer Staged    Staging form: Corpus Uteri - Carcinoma and Carcinosarcoma, AJCC 8th Edition  - Clinical stage from 3/14/2024: FIGO Stage IA (cT1a, cN0, cM0)     3/14/2024 Surgery    Robotic assisted total laparoscopic hysterectomy, left  salpingo-oophorectomy, sentinel lymph node mapping and left pelvic sentinel lymph node biopsy, minilaparotomy for delivery of the specimen     No residual carcinoma on final pathology specimen, EIN in the endometrium     3/27/2024 Imaging Significant Findings    CT AP: HARLEY, seroma near minilaparotomy site     12/31/2024 Imaging Significant Findings    CT CAP: 2 mm left lower lobe lung nodule; No evidence of metastatic disease in the abdomen or pelvis.          Medical/Surgical/Social/Family/OBGYN History: reviewed and no changes to what is listed in epic    Allergies: Adhesive tape-silicones and Cephalexin    Medications:   Current Outpatient Medications   Medication Sig Dispense Refill    acetaminophen (TYLENOL ARTHRITIS PAIN ORAL) Take 2 tablets by mouth 2 (two) times a day as needed (pain).      ascorbic acid, vitamin C, (VITAMIN C) 1,000 mg tablet Take 1,000 mg by mouth daily.      aspirin 81 mg enteric coated tablet Take 81 mg by mouth daily. To stop pre-op      cholecalciferol, vitamin D3, (VITAMIN D3 ORAL) Take 1,000 Units by mouth daily.      DULoxetine (CYMBALTA) 20 mg capsule Take 20 mg by mouth 2 (two) times a day.      gabapentin (NEURONTIN) 600 mg tablet Take 600 mg by mouth 3 (three) times a day.      insulin aspart U-100 (NovoLOG) 100 unit/mL (3 mL) pen Inject under the skin 3 (three) times a day with meals. sliding scale ( dosage based on Carb count)      insulin degludec U-200 CONC (TRESIBA FLEXTOUCH U-200) 200 unit/mL (3 mL) pen Inject 0.2 mL (40 Units total) under the skin every evening. (Patient taking differently: Inject 80 Units under the skin daily. 3:30) 11.7 mL 0    irbesartan-hydrochlorothiazide (AVALIDE) 150-12.5 mg per tablet Take 1 tablet by mouth every morning.      levothyroxine 75 mcg tablet Take 75 mcg by mouth daily.      meloxicam (MOBIC) 15 mg tablet Take 15 mg by mouth every morning.      methocarbamoL 750 mg tablet Take 750 mg by mouth 3 (three) times a day as needed for muscle  "spasms. Always takes in am but doesn't always do tid      ONETOUCH ULTRA TEST strip TEST BLOOD SUGARS 4 TO 5 TIMES DAILY      polyethylene glycol (MIRALAX) 17 gram packet Take 17 g by mouth daily for 3 days. 3 packet 0    sennosides-docusate sodium (SENOKOT-S) 8.6-50 mg Take 1 tablet by mouth 2 (two) times a day. 60 tablet 0    simvastatin 40 mg tablet Take 40 mg by mouth every morning.       No current facility-administered medications for this visit.       Review of Systems: negative except for what is listed above    Physical Exam:  Vital Signs:  Visit Vitals  /80 (BP Location: Right upper arm, Patient Position: Sitting)   Pulse 91   Temp 36.7 °C (98 °F) (Oral)   Ht 1.6 m (5' 3\")   Wt (!) 152 kg (335 lb)   SpO2 95%   BMI 59.34 kg/m²       General: well-developed, well-nourished, no apparent distress  Respiratory: nonlabored breathing on room air  Cardiovascular: nontachycardic  Extremity: symmetric, no clubbing, cyanosis, edema  GI: abdomen soft, non-distended, non-tender   Incision: well healed laparoscopic sites, reducible hernia at the minilaparotomy site  Neurologic: alert & oriented x3, no gross deficits  Musculoskeletal: no deformity or gross strength deficit    (Pelvic exam conducted with staff chaperone present. Staff chaperone name: YAJAIRA Zapata)  Gynecologic: of note exam limited due to body habitus   External genitalia/bartholin's/skene's/urethra (EGBUS): normal external genitalia   Speculum: cuff intact without lesions, no vaginal lesions or masses, no blood or discharge   Bimanual: uterus/cervix surgically absent, cuff intact without fullness or nodularity, no adnexal fullness   Rectovaginal: no masses/nodularity, no parametrial fullness, no intraluminal rectal masses, no blood on rectal glove       Assessment   64 y.o. female being consulted for the following issues:  Problem List Items Addressed This Visit       Carcinoma of endometrium (CMS/HCC) - Primary         I met with Xin today in the " clinic.  No evidence of disease on exam today.   She will follow up with her PCP regarding her lung nodule.  Otherwise plan for follow up in 6 months.  Reviewed reasons to reach out prior to her next visit      Follow up: 6 months    Yohana Ronquillo MD

## (undated) DEVICE — TROCAR XCEL BLADELESS

## (undated) DEVICE — SKIN MARKER SURGICAL

## (undated) DEVICE — GUIDEWIRE SENSOR DUAL FLEX .035 STRAIGHT TIP

## (undated) DEVICE — SUTURE VLOC 2-0 90 UNDYED 1X9 GS-22

## (undated) DEVICE — TRAY WET SKIN PREP PREMIUM

## (undated) DEVICE — TOOMEY SYRINGE, 70CC STERILE

## (undated) DEVICE — PACK RFID CYSTOSCOPY

## (undated) DEVICE — STIRRUP STRAP DISPOSABLE

## (undated) DEVICE — DEVICE TRUCLEAR INCISOR 2.9

## (undated) DEVICE — PACK RFID MAJOR PROCEDURE PACK

## (undated) DEVICE — TUBING INSUFFLATION PNEUMOSURE HEATED HIGH FLOW

## (undated) DEVICE — GLOVE SZ 6.5 LINER PROTEXIS PI BL

## (undated) DEVICE — TUBING CYSTO BLADDER IRRIG

## (undated) DEVICE — SOLN IRRIG STER WATER 500ML

## (undated) DEVICE — BAG PRESSURE INFUSE 1000CC

## (undated) DEVICE — DOLPHIN COLLECTION KIT

## (undated) DEVICE — TUBING PLUM PORT ACTIVE SMOKE EVAC

## (undated) DEVICE — SOLN IRRIG .9%SOD 3L

## (undated) DEVICE — SUTURE MONOCRYL 4-0 Y426H PS-2 27IN

## (undated) DEVICE — PAD POSITIONING XL

## (undated) DEVICE — GOWN SURGICAL REINFORCED X-LAR

## (undated) DEVICE — DRESSING MEPILEX LITE 4 X 4

## (undated) DEVICE — ELEVATOR UTERINE V-CARE STD CERVICAL CUP

## (undated) DEVICE — BULB PATHFINDER PLUS

## (undated) DEVICE — SOLN IRRIG STER WATER 1000ML

## (undated) DEVICE — APPLICATOR CHLORAPREP 26ML ORANGE TINT

## (undated) DEVICE — MANIFOLD SINGLE PORT NEPTUNE

## (undated) DEVICE — Device

## (undated) DEVICE — NEEDLE SPINAL 18G X3-1-2IN

## (undated) DEVICE — SOLN IV 0.9% NSS 1000ML

## (undated) DEVICE — GUIDEWIRE STRAIGHT AMPLATZ SUPER STIFF .038 X 145

## (undated) DEVICE — ***USE 141003*** DRAPE UTILITY 15 X 25

## (undated) DEVICE — TROCAR XCEL BLADELESS 5MM X 100MM LONG

## (undated) DEVICE — DRAPE COLUMN DAVINCI XI

## (undated) DEVICE — PAD GROUND ELECTROSURGICAL W/CORD

## (undated) DEVICE — CATH OPEN ENDED 5FR

## (undated) DEVICE — DRESSING TEGADERM 2 3/8 X 2 3/4

## (undated) DEVICE — SUTURE VLOC M2145 2-0 VL 9" GS-22

## (undated) DEVICE — XI OPTICAL BLADLESS OBTURATOR 8MM

## (undated) DEVICE — GAUZE 8X4 16 PLY RFID DOUBLE XRAY

## (undated) DEVICE — TUBING HYSTEROLUX INFLOW TUBE

## (undated) DEVICE — CATH URETERAL 5FR 70CM

## (undated) DEVICE — BAG DRAIN UROLOGY

## (undated) DEVICE — STRYKEFLOW 2 W/ DISP TIP

## (undated) DEVICE — TOWEL SURGICAL W17XL27IN BLUE COTTON STANDARD PREWASHED DELI

## (undated) DEVICE — SURESEAL II  0-5FR

## (undated) DEVICE — GLOVE SZ 6 PROTEXIS CLASSIC LATEX

## (undated) DEVICE — BAG PRESSURE INFUSION 3000CC

## (undated) DEVICE — DRESSING OPTIFOAM 3 X 3IN BASIC

## (undated) DEVICE — MASTISOL LIQUID ADHESIVE

## (undated) DEVICE — GLOVE SZ 6 PROTEXIS PI

## (undated) DEVICE — ***USE 59336***OMNIPAQUE 300MG PLMR BTL 50 ML NOVA+ 530Y

## (undated) DEVICE — BLADE SCAPEL #11

## (undated) DEVICE — SYRINGE DISP LUER-LOK 10 CC

## (undated) DEVICE — PAD POSITIONING XL W/ARM PROTECTORS

## (undated) DEVICE — DRESSING TEGADERM 4X4 3/4

## (undated) DEVICE — GLOVE SZ 8 PROTEXIS CLASSIC LATEX

## (undated) DEVICE — GOWN SURG X-LARGE MICROCOOL

## (undated) DEVICE — PACK OR TOWEL

## (undated) DEVICE — COVER CAMERA LIGHT HANDLE

## (undated) DEVICE — CONTAINER SPECIMEN STERILE 5OZ

## (undated) DEVICE — MANIFOLD FOUR PORT NEPTUNE

## (undated) DEVICE — GOWN SURGICAL REINFORCED LARGE

## (undated) DEVICE — ***USE 57698*** SLEEVE FLOWTRON DVT CALF SINGLE USE

## (undated) DEVICE — SOLN IRRIG .9%SOD 1000ML

## (undated) DEVICE — WIRE GUIDE SENSOR DUAL FLEX .035

## (undated) DEVICE — BASKET NITINOL RETRIEVAL ESCAPE 1.9FR X 120CM

## (undated) DEVICE — COVER LIGHTHANDLE (STERILE SINGLE PA

## (undated) DEVICE — DRAPE POUCH IRRIGATION

## (undated) DEVICE — BASKET STONE RETRIEVAL ZERO TIP 2.4 FR

## (undated) DEVICE — TUBING DE SMALL FOR 8-14MM VACURETTE

## (undated) DEVICE — SEAL UNIVERSAL 5MM-8MM XI

## (undated) DEVICE — PAD PERI MATERNITY LENGTH

## (undated) DEVICE — SOLUTION ELECTROLUBE ANTI-STICK

## (undated) DEVICE — DRAPE UNDERBUTTOCK GRAD POUCH PORT 20/CS

## (undated) DEVICE — TRAY URINE METER FOLEY NON-SILVER

## (undated) DEVICE — DRIVER NEEDLE MEGACUT SUTURECUT XI REPOSABLE

## (undated) DEVICE — SEAL BIOPSY PORT TO 6FR ABP

## (undated) DEVICE — SEAL HYSTEROSCOPE ELITE

## (undated) DEVICE — STERISTRIP 1/2INX4IN

## (undated) DEVICE — CURETTE FLEXIBLE STRAIGHT 6MM (50/PACK)

## (undated) DEVICE — DRESSING TELFA 3X8

## (undated) DEVICE — ADAPTER URETHERAL CATH

## (undated) DEVICE — DRAPE ARM DAVINCI XI

## (undated) DEVICE — HYSTEROLUX OUTFLOW TUBING SET

## (undated) DEVICE — COVER LIGHTHANDLE

## (undated) DEVICE — SYSTEM VISUALIZATION CLEARIFY

## (undated) DEVICE — SOLN IRRIG .9%SOD 500ML

## (undated) DEVICE — WIRE GUIDE SENSOR DUAL FLEX .038

## (undated) DEVICE — HEEL  ELBOW PROTECTOR

## (undated) DEVICE — ENDOCATCH 15MM

## (undated) DEVICE — TIPS SCISSOR MICROLINE LAP

## (undated) DEVICE — GUIDEWIRE TEFLON COATED 0.38MM

## (undated) DEVICE — LEGGINGS 33X49 XL PAIR ST CLR 20/CS

## (undated) DEVICE — ENDOWRIST PROGRASP DAVINCI REUSABLE

## (undated) DEVICE — BLANKET UPPER BODY

## (undated) DEVICE — TUBING INFLOW/OUTFLOW

## (undated) DEVICE — DRAPE LAPAROTOMY POUCH ST 12/CS

## (undated) DEVICE — SHEARS TIP COVER DAVINCI ONE USE

## (undated) DEVICE — FORCEPS BIPOLAR FENESTRATED XI REPOSABLE

## (undated) DEVICE — RETRIEVAL SPECIMEN INZII 5MM